# Patient Record
Sex: MALE | Race: WHITE | Employment: OTHER | URBAN - NONMETROPOLITAN AREA
[De-identification: names, ages, dates, MRNs, and addresses within clinical notes are randomized per-mention and may not be internally consistent; named-entity substitution may affect disease eponyms.]

---

## 2017-01-02 DIAGNOSIS — I25.84 CORONARY ARTERY DISEASE DUE TO CALCIFIED CORONARY LESION: ICD-10-CM

## 2017-01-02 DIAGNOSIS — I25.10 CORONARY ARTERY DISEASE DUE TO CALCIFIED CORONARY LESION: ICD-10-CM

## 2017-01-02 DIAGNOSIS — I20.9 ANGINA, CLASS III (HCC): Primary | ICD-10-CM

## 2017-03-13 ENCOUNTER — HOSPITAL ENCOUNTER (OUTPATIENT)
Dept: CARDIAC REHAB | Age: 82
Setting detail: THERAPIES SERIES
Discharge: HOME | End: 2017-03-13
Attending: FAMILY MEDICINE | Admitting: FAMILY MEDICINE
Payer: MEDICARE

## 2017-03-15 ENCOUNTER — HOSPITAL ENCOUNTER (OUTPATIENT)
Dept: CARDIAC REHAB | Age: 82
Setting detail: THERAPIES SERIES
Discharge: HOME | End: 2017-03-15
Attending: FAMILY MEDICINE | Admitting: FAMILY MEDICINE
Payer: MEDICARE

## 2017-03-27 ENCOUNTER — HOSPITAL ENCOUNTER (OUTPATIENT)
Dept: CARDIAC REHAB | Age: 82
Setting detail: THERAPIES SERIES
Discharge: HOME | End: 2017-03-27
Attending: FAMILY MEDICINE | Admitting: FAMILY MEDICINE
Payer: MEDICARE

## 2017-03-27 PROCEDURE — 93798 PHYS/QHP OP CAR RHAB W/ECG: CPT

## 2017-03-28 ENCOUNTER — HOSPITAL ENCOUNTER (OUTPATIENT)
Dept: CARDIAC REHAB | Age: 82
Setting detail: THERAPIES SERIES
Discharge: HOME | End: 2017-03-28
Attending: FAMILY MEDICINE | Admitting: FAMILY MEDICINE
Payer: MEDICARE

## 2017-03-28 PROCEDURE — 93798 PHYS/QHP OP CAR RHAB W/ECG: CPT

## 2017-03-30 ENCOUNTER — HOSPITAL ENCOUNTER (OUTPATIENT)
Dept: CARDIAC REHAB | Age: 82
Setting detail: THERAPIES SERIES
Discharge: HOME | End: 2017-03-30
Attending: FAMILY MEDICINE | Admitting: FAMILY MEDICINE
Payer: MEDICARE

## 2017-03-30 PROCEDURE — 93798 PHYS/QHP OP CAR RHAB W/ECG: CPT

## 2017-04-03 ENCOUNTER — HOSPITAL ENCOUNTER (OUTPATIENT)
Dept: CARDIAC REHAB | Age: 82
Setting detail: THERAPIES SERIES
Discharge: HOME | End: 2017-04-03
Attending: FAMILY MEDICINE | Admitting: FAMILY MEDICINE
Payer: MEDICARE

## 2017-04-03 PROCEDURE — 93798 PHYS/QHP OP CAR RHAB W/ECG: CPT

## 2017-04-06 ENCOUNTER — HOSPITAL ENCOUNTER (OUTPATIENT)
Dept: CARDIAC REHAB | Age: 82
Setting detail: THERAPIES SERIES
Discharge: HOME | End: 2017-04-06
Attending: FAMILY MEDICINE | Admitting: FAMILY MEDICINE
Payer: MEDICARE

## 2017-04-06 PROCEDURE — 93798 PHYS/QHP OP CAR RHAB W/ECG: CPT

## 2017-04-12 ENCOUNTER — HOSPITAL ENCOUNTER (OUTPATIENT)
Dept: CARDIAC REHAB | Age: 82
Setting detail: THERAPIES SERIES
Discharge: HOME | End: 2017-04-12
Attending: FAMILY MEDICINE | Admitting: FAMILY MEDICINE
Payer: MEDICARE

## 2017-04-17 ENCOUNTER — HOSPITAL ENCOUNTER (OUTPATIENT)
Dept: CARDIAC REHAB | Age: 82
Setting detail: THERAPIES SERIES
Discharge: HOME | End: 2017-04-17
Attending: FAMILY MEDICINE | Admitting: FAMILY MEDICINE
Payer: MEDICARE

## 2017-04-17 ENCOUNTER — HOSPITAL ENCOUNTER (OUTPATIENT)
Age: 82
Discharge: HOME | End: 2017-04-17
Payer: MEDICARE

## 2017-04-17 DIAGNOSIS — E78.49 OTHER HYPERLIPIDEMIA: ICD-10-CM

## 2017-04-17 LAB
ALT SERPL-CCNC: 37 U/L (ref 5–41)
ANION GAP SERPL CALCULATED.3IONS-SCNC: 14 MMOL/L (ref 9–17)
AST SERPL-CCNC: 46 U/L
BUN BLDV-MCNC: 31 MG/DL (ref 8–23)
BUN/CREAT BLD: 21 (ref 9–20)
CALCIUM SERPL-MCNC: 9.5 MG/DL (ref 8.6–10.4)
CHLORIDE BLD-SCNC: 102 MMOL/L (ref 98–107)
CO2: 26 MMOL/L (ref 20–31)
CREAT SERPL-MCNC: 1.46 MG/DL (ref 0.7–1.2)
GFR AFRICAN AMERICAN: 56 ML/MIN
GFR NON-AFRICAN AMERICAN: 46 ML/MIN
GFR SERPL CREATININE-BSD FRML MDRD: ABNORMAL ML/MIN/{1.73_M2}
GFR SERPL CREATININE-BSD FRML MDRD: ABNORMAL ML/MIN/{1.73_M2}
GLUCOSE BLD-MCNC: 104 MG/DL (ref 70–99)
HCT VFR BLD CALC: 40.7 % (ref 41–53)
HEMOGLOBIN: 13.3 G/DL (ref 13.5–17)
HIGH SENSITIVE C-REACTIVE PROTEIN: 10.6 MG/L
MCH RBC QN AUTO: 30.6 PG (ref 26–34)
MCHC RBC AUTO-ENTMCNC: 32.8 G/DL (ref 31–37)
MCV RBC AUTO: 93.3 FL (ref 80–100)
PDW BLD-RTO: 15.8 % (ref 12.1–15.2)
PLATELET # BLD: 203 K/UL (ref 140–450)
PMV BLD AUTO: ABNORMAL FL (ref 6–12)
POTASSIUM SERPL-SCNC: 4.3 MMOL/L (ref 3.7–5.3)
RBC # BLD: 4.37 M/UL (ref 4.5–5.9)
SODIUM BLD-SCNC: 142 MMOL/L (ref 135–144)
WBC # BLD: 6.6 K/UL (ref 3.5–11)

## 2017-04-17 PROCEDURE — 80048 BASIC METABOLIC PNL TOTAL CA: CPT

## 2017-04-17 PROCEDURE — 84450 TRANSFERASE (AST) (SGOT): CPT

## 2017-04-17 PROCEDURE — 85027 COMPLETE CBC AUTOMATED: CPT

## 2017-04-17 PROCEDURE — 93798 PHYS/QHP OP CAR RHAB W/ECG: CPT

## 2017-04-17 PROCEDURE — 36415 COLL VENOUS BLD VENIPUNCTURE: CPT

## 2017-04-17 PROCEDURE — 86141 C-REACTIVE PROTEIN HS: CPT

## 2017-04-17 PROCEDURE — 84460 ALANINE AMINO (ALT) (SGPT): CPT

## 2017-04-19 ENCOUNTER — HOSPITAL ENCOUNTER (OUTPATIENT)
Dept: CARDIAC REHAB | Age: 82
Setting detail: THERAPIES SERIES
Discharge: HOME | End: 2017-04-19
Attending: FAMILY MEDICINE | Admitting: FAMILY MEDICINE
Payer: MEDICARE

## 2017-04-19 PROCEDURE — 93798 PHYS/QHP OP CAR RHAB W/ECG: CPT

## 2017-04-20 ENCOUNTER — HOSPITAL ENCOUNTER (OUTPATIENT)
Dept: CARDIAC REHAB | Age: 82
Setting detail: THERAPIES SERIES
Discharge: HOME | End: 2017-04-20
Attending: FAMILY MEDICINE | Admitting: FAMILY MEDICINE
Payer: MEDICARE

## 2017-04-20 PROCEDURE — 93798 PHYS/QHP OP CAR RHAB W/ECG: CPT

## 2017-04-24 ENCOUNTER — HOSPITAL ENCOUNTER (OUTPATIENT)
Dept: CARDIAC REHAB | Age: 82
Setting detail: THERAPIES SERIES
Discharge: HOME | End: 2017-04-24
Attending: FAMILY MEDICINE | Admitting: FAMILY MEDICINE
Payer: MEDICARE

## 2017-04-24 PROBLEM — Z23 NEED FOR PROPHYLACTIC VACCINATION AGAINST STREPTOCOCCUS PNEUMONIAE (PNEUMOCOCCUS): Status: ACTIVE | Noted: 2017-04-24

## 2017-04-24 PROBLEM — N18.30 CHRONIC RENAL FAILURE, STAGE 3 (MODERATE) (HCC): Status: ACTIVE | Noted: 2017-04-24

## 2017-04-26 ENCOUNTER — HOSPITAL ENCOUNTER (OUTPATIENT)
Dept: CARDIAC REHAB | Age: 82
Setting detail: THERAPIES SERIES
Discharge: HOME | End: 2017-04-26
Attending: FAMILY MEDICINE | Admitting: FAMILY MEDICINE
Payer: MEDICARE

## 2017-04-26 PROCEDURE — 93798 PHYS/QHP OP CAR RHAB W/ECG: CPT

## 2017-04-27 ENCOUNTER — HOSPITAL ENCOUNTER (OUTPATIENT)
Dept: CARDIAC REHAB | Age: 82
Setting detail: THERAPIES SERIES
Discharge: HOME | End: 2017-04-27
Attending: FAMILY MEDICINE | Admitting: FAMILY MEDICINE
Payer: MEDICARE

## 2017-04-27 PROCEDURE — 93798 PHYS/QHP OP CAR RHAB W/ECG: CPT

## 2017-05-01 ENCOUNTER — HOSPITAL ENCOUNTER (OUTPATIENT)
Dept: CARDIAC REHAB | Age: 82
Setting detail: THERAPIES SERIES
Discharge: HOME | End: 2017-05-01
Attending: FAMILY MEDICINE | Admitting: FAMILY MEDICINE
Payer: MEDICARE

## 2017-05-01 PROCEDURE — 93798 PHYS/QHP OP CAR RHAB W/ECG: CPT

## 2017-05-03 ENCOUNTER — HOSPITAL ENCOUNTER (OUTPATIENT)
Dept: CARDIAC REHAB | Age: 82
Setting detail: THERAPIES SERIES
Discharge: HOME | End: 2017-05-03
Attending: FAMILY MEDICINE | Admitting: FAMILY MEDICINE
Payer: MEDICARE

## 2017-05-08 ENCOUNTER — HOSPITAL ENCOUNTER (OUTPATIENT)
Dept: CARDIAC REHAB | Age: 82
Setting detail: THERAPIES SERIES
Discharge: HOME | End: 2017-05-08
Attending: FAMILY MEDICINE | Admitting: FAMILY MEDICINE
Payer: MEDICARE

## 2017-05-08 PROCEDURE — 93798 PHYS/QHP OP CAR RHAB W/ECG: CPT

## 2017-05-10 ENCOUNTER — HOSPITAL ENCOUNTER (OUTPATIENT)
Dept: CARDIAC REHAB | Age: 82
Setting detail: THERAPIES SERIES
Discharge: HOME | End: 2017-05-10
Attending: FAMILY MEDICINE | Admitting: FAMILY MEDICINE
Payer: MEDICARE

## 2017-05-10 PROCEDURE — 93798 PHYS/QHP OP CAR RHAB W/ECG: CPT

## 2017-05-11 ENCOUNTER — HOSPITAL ENCOUNTER (OUTPATIENT)
Dept: CARDIAC REHAB | Age: 82
Setting detail: THERAPIES SERIES
Discharge: HOME | End: 2017-05-11
Attending: FAMILY MEDICINE | Admitting: FAMILY MEDICINE
Payer: MEDICARE

## 2017-05-11 PROCEDURE — 93798 PHYS/QHP OP CAR RHAB W/ECG: CPT

## 2017-05-15 ENCOUNTER — HOSPITAL ENCOUNTER (OUTPATIENT)
Dept: CARDIAC REHAB | Age: 82
Setting detail: THERAPIES SERIES
Discharge: HOME | End: 2017-05-15
Attending: FAMILY MEDICINE | Admitting: FAMILY MEDICINE
Payer: MEDICARE

## 2017-05-15 PROCEDURE — 93798 PHYS/QHP OP CAR RHAB W/ECG: CPT

## 2017-05-17 ENCOUNTER — HOSPITAL ENCOUNTER (OUTPATIENT)
Age: 82
Setting detail: OBSERVATION
LOS: 1 days | Discharge: ANOTHER HOSPITAL | End: 2017-05-19
Attending: EMERGENCY MEDICINE | Admitting: FAMILY MEDICINE
Payer: MEDICARE

## 2017-05-17 ENCOUNTER — HOSPITAL ENCOUNTER (OUTPATIENT)
Dept: CARDIAC REHAB | Age: 82
Setting detail: THERAPIES SERIES
Discharge: HOME | End: 2017-05-17
Attending: FAMILY MEDICINE | Admitting: FAMILY MEDICINE
Payer: MEDICARE

## 2017-05-17 ENCOUNTER — APPOINTMENT (OUTPATIENT)
Dept: GENERAL RADIOLOGY | Age: 82
End: 2017-05-17
Payer: MEDICARE

## 2017-05-17 DIAGNOSIS — I35.0 AORTIC VALVE STENOSIS, UNSPECIFIED ETIOLOGY: Primary | ICD-10-CM

## 2017-05-17 PROBLEM — R55 SYNCOPE AND COLLAPSE: Status: ACTIVE | Noted: 2017-05-17

## 2017-05-17 LAB
-: ABNORMAL
ABSOLUTE EOS #: 0.3 K/UL (ref 0–0.4)
ABSOLUTE LYMPH #: 0.8 K/UL (ref 1–4.8)
ABSOLUTE MONO #: 0.5 K/UL (ref 0–1)
AMORPHOUS: ABNORMAL
ANION GAP SERPL CALCULATED.3IONS-SCNC: 17 MMOL/L (ref 9–17)
BACTERIA: ABNORMAL
BASOPHILS # BLD: 1 %
BASOPHILS ABSOLUTE: 0 K/UL (ref 0–0.2)
BILIRUBIN URINE: NEGATIVE
BNP INTERPRETATION: ABNORMAL
BUN BLDV-MCNC: 33 MG/DL (ref 8–23)
BUN/CREAT BLD: 23 (ref 9–20)
CALCIUM SERPL-MCNC: 9.2 MG/DL (ref 8.6–10.4)
CASTS UA: ABNORMAL /LPF
CHLORIDE BLD-SCNC: 106 MMOL/L (ref 98–107)
CK MB: 7.2 NG/ML
CO2: 21 MMOL/L (ref 20–31)
COLOR: YELLOW
COMMENT UA: ABNORMAL
CREAT SERPL-MCNC: 1.45 MG/DL (ref 0.7–1.2)
CRYSTALS, UA: ABNORMAL /HPF
DIFFERENTIAL TYPE: ABNORMAL
EKG ATRIAL RATE: 62 BPM
EKG ATRIAL RATE: 64 BPM
EKG P AXIS: 53 DEGREES
EKG P AXIS: 89 DEGREES
EKG P-R INTERVAL: 176 MS
EKG P-R INTERVAL: 192 MS
EKG Q-T INTERVAL: 448 MS
EKG Q-T INTERVAL: 470 MS
EKG QRS DURATION: 90 MS
EKG QRS DURATION: 92 MS
EKG QTC CALCULATION (BAZETT): 462 MS
EKG QTC CALCULATION (BAZETT): 477 MS
EKG R AXIS: 72 DEGREES
EKG R AXIS: 92 DEGREES
EKG T AXIS: 84 DEGREES
EKG T AXIS: 97 DEGREES
EKG VENTRICULAR RATE: 62 BPM
EKG VENTRICULAR RATE: 64 BPM
EOSINOPHILS RELATIVE PERCENT: 4 %
EPITHELIAL CELLS UA: ABNORMAL /HPF (ref 0–5)
GFR AFRICAN AMERICAN: 57 ML/MIN
GFR NON-AFRICAN AMERICAN: 47 ML/MIN
GFR SERPL CREATININE-BSD FRML MDRD: ABNORMAL ML/MIN/{1.73_M2}
GFR SERPL CREATININE-BSD FRML MDRD: ABNORMAL ML/MIN/{1.73_M2}
GLUCOSE BLD-MCNC: 137 MG/DL (ref 70–99)
GLUCOSE URINE: NEGATIVE
HCT VFR BLD CALC: 41.4 % (ref 41–53)
HEMOGLOBIN: 13.3 G/DL (ref 13.5–17)
INR BLD: 1.1 (ref 0.9–1.2)
KETONES, URINE: NEGATIVE
LEUKOCYTE ESTERASE, URINE: NEGATIVE
LYMPHOCYTES # BLD: 12 %
MCH RBC QN AUTO: 30.2 PG (ref 26–34)
MCHC RBC AUTO-ENTMCNC: 32.1 G/DL (ref 31–37)
MCV RBC AUTO: 93.9 FL (ref 80–100)
MONOCYTES # BLD: 7 %
MUCUS: ABNORMAL
MYOGLOBIN: 141 NG/ML (ref 28–72)
NITRITE, URINE: NEGATIVE
OTHER OBSERVATIONS UA: ABNORMAL
PARTIAL THROMBOPLASTIN TIME: 26.5 SEC (ref 23.2–34.4)
PDW BLD-RTO: 17 % (ref 12.1–15.2)
PH UA: 6 (ref 5–9)
PLATELET # BLD: 156 K/UL (ref 140–450)
PLATELET ESTIMATE: ABNORMAL
PMV BLD AUTO: ABNORMAL FL (ref 6–12)
POTASSIUM SERPL-SCNC: 4.4 MMOL/L (ref 3.7–5.3)
PRO-BNP: 4214 PG/ML
PROTEIN UA: ABNORMAL
PROTHROMBIN TIME: 11.4 SEC (ref 9.7–12.2)
RBC # BLD: 4.4 M/UL (ref 4.5–5.9)
RBC # BLD: ABNORMAL 10*6/UL
RBC UA: ABNORMAL /HPF (ref 0–2)
RENAL EPITHELIAL, UA: ABNORMAL /HPF
SEG NEUTROPHILS: 76 %
SEGMENTED NEUTROPHILS ABSOLUTE COUNT: 5.2 K/UL (ref 1.8–7.7)
SODIUM BLD-SCNC: 144 MMOL/L (ref 135–144)
SPECIFIC GRAVITY UA: 1.01 (ref 1.01–1.02)
TRICHOMONAS: ABNORMAL
TROPONIN INTERP: ABNORMAL
TROPONIN INTERP: ABNORMAL
TROPONIN T: 0.04 NG/ML
TROPONIN T: 0.05 NG/ML
TSH SERPL DL<=0.05 MIU/L-ACNC: 5.85 MIU/L (ref 0.3–5)
TURBIDITY: CLEAR
URINE HGB: NEGATIVE
UROBILINOGEN, URINE: NORMAL
WBC # BLD: 6.8 K/UL (ref 3.5–11)
WBC # BLD: ABNORMAL 10*3/UL
WBC UA: ABNORMAL /HPF (ref 0–5)
YEAST: ABNORMAL

## 2017-05-17 PROCEDURE — 84484 ASSAY OF TROPONIN QUANT: CPT

## 2017-05-17 PROCEDURE — 80048 BASIC METABOLIC PNL TOTAL CA: CPT

## 2017-05-17 PROCEDURE — 36415 COLL VENOUS BLD VENIPUNCTURE: CPT

## 2017-05-17 PROCEDURE — 82553 CREATINE MB FRACTION: CPT

## 2017-05-17 PROCEDURE — 6360000002 HC RX W HCPCS: Performed by: FAMILY MEDICINE

## 2017-05-17 PROCEDURE — 85610 PROTHROMBIN TIME: CPT

## 2017-05-17 PROCEDURE — 84443 ASSAY THYROID STIM HORMONE: CPT

## 2017-05-17 PROCEDURE — 93005 ELECTROCARDIOGRAM TRACING: CPT

## 2017-05-17 PROCEDURE — 6370000000 HC RX 637 (ALT 250 FOR IP): Performed by: FAMILY MEDICINE

## 2017-05-17 PROCEDURE — 2580000003 HC RX 258: Performed by: FAMILY MEDICINE

## 2017-05-17 PROCEDURE — 99285 EMERGENCY DEPT VISIT HI MDM: CPT

## 2017-05-17 PROCEDURE — G0378 HOSPITAL OBSERVATION PER HR: HCPCS

## 2017-05-17 PROCEDURE — 94664 DEMO&/EVAL PT USE INHALER: CPT

## 2017-05-17 PROCEDURE — 99215 OFFICE O/P EST HI 40 MIN: CPT | Performed by: FAMILY MEDICINE

## 2017-05-17 PROCEDURE — 85025 COMPLETE CBC W/AUTO DIFF WBC: CPT

## 2017-05-17 PROCEDURE — 81001 URINALYSIS AUTO W/SCOPE: CPT

## 2017-05-17 PROCEDURE — 96372 THER/PROPH/DIAG INJ SC/IM: CPT

## 2017-05-17 PROCEDURE — 71020 XR CHEST STANDARD TWO VW: CPT

## 2017-05-17 PROCEDURE — 85730 THROMBOPLASTIN TIME PARTIAL: CPT

## 2017-05-17 PROCEDURE — 83880 ASSAY OF NATRIURETIC PEPTIDE: CPT

## 2017-05-17 PROCEDURE — 94760 N-INVAS EAR/PLS OXIMETRY 1: CPT

## 2017-05-17 PROCEDURE — 83874 ASSAY OF MYOGLOBIN: CPT

## 2017-05-17 RX ORDER — EZETIMIBE 10 MG/1
10 TABLET ORAL DAILY
Status: DISCONTINUED | OUTPATIENT
Start: 2017-05-17 | End: 2017-05-17 | Stop reason: CLARIF

## 2017-05-17 RX ORDER — ATORVASTATIN CALCIUM 40 MG/1
40 TABLET, FILM COATED ORAL NIGHTLY
Status: DISCONTINUED | OUTPATIENT
Start: 2017-05-18 | End: 2017-05-18

## 2017-05-17 RX ORDER — SODIUM CHLORIDE 9 MG/ML
INJECTION, SOLUTION INTRAVENOUS CONTINUOUS
Status: DISCONTINUED | OUTPATIENT
Start: 2017-05-17 | End: 2017-05-17

## 2017-05-17 RX ORDER — ASPIRIN 81 MG/1
81 TABLET, CHEWABLE ORAL DAILY
Status: DISCONTINUED | OUTPATIENT
Start: 2017-05-18 | End: 2017-05-19 | Stop reason: HOSPADM

## 2017-05-17 RX ORDER — SODIUM CHLORIDE 0.9 % (FLUSH) 0.9 %
10 SYRINGE (ML) INJECTION PRN
Status: DISCONTINUED | OUTPATIENT
Start: 2017-05-17 | End: 2017-05-18

## 2017-05-17 RX ORDER — OXCARBAZEPINE 300 MG/1
300 TABLET, FILM COATED ORAL 4 TIMES DAILY
Status: DISCONTINUED | OUTPATIENT
Start: 2017-05-17 | End: 2017-05-19 | Stop reason: HOSPADM

## 2017-05-17 RX ORDER — FUROSEMIDE 20 MG/1
20 TABLET ORAL DAILY
Status: DISCONTINUED | OUTPATIENT
Start: 2017-05-18 | End: 2017-05-19 | Stop reason: HOSPADM

## 2017-05-17 RX ORDER — ACETAMINOPHEN 325 MG/1
650 TABLET ORAL EVERY 4 HOURS PRN
Status: DISCONTINUED | OUTPATIENT
Start: 2017-05-17 | End: 2017-05-19 | Stop reason: HOSPADM

## 2017-05-17 RX ORDER — SODIUM CHLORIDE 0.9 % (FLUSH) 0.9 %
10 SYRINGE (ML) INJECTION EVERY 12 HOURS SCHEDULED
Status: DISCONTINUED | OUTPATIENT
Start: 2017-05-17 | End: 2017-05-18

## 2017-05-17 RX ORDER — METOPROLOL TARTRATE 50 MG/1
50 TABLET, FILM COATED ORAL 2 TIMES DAILY
Status: DISCONTINUED | OUTPATIENT
Start: 2017-05-17 | End: 2017-05-18

## 2017-05-17 RX ORDER — ONDANSETRON 2 MG/ML
4 INJECTION INTRAMUSCULAR; INTRAVENOUS EVERY 6 HOURS PRN
Status: DISCONTINUED | OUTPATIENT
Start: 2017-05-17 | End: 2017-05-19 | Stop reason: HOSPADM

## 2017-05-17 RX ORDER — ALBUTEROL SULFATE 90 UG/1
2 AEROSOL, METERED RESPIRATORY (INHALATION) EVERY 6 HOURS PRN
Status: DISCONTINUED | OUTPATIENT
Start: 2017-05-17 | End: 2017-05-19 | Stop reason: HOSPADM

## 2017-05-17 RX ORDER — BACLOFEN 10 MG/1
10 TABLET ORAL 2 TIMES DAILY
Status: DISCONTINUED | OUTPATIENT
Start: 2017-05-17 | End: 2017-05-19 | Stop reason: HOSPADM

## 2017-05-17 RX ORDER — LISINOPRIL 20 MG/1
40 TABLET ORAL DAILY
Status: DISCONTINUED | OUTPATIENT
Start: 2017-05-18 | End: 2017-05-18

## 2017-05-17 RX ORDER — ISOSORBIDE MONONITRATE 30 MG/1
30 TABLET, EXTENDED RELEASE ORAL DAILY
Status: DISCONTINUED | OUTPATIENT
Start: 2017-05-18 | End: 2017-05-19 | Stop reason: HOSPADM

## 2017-05-17 RX ADMIN — OXCARBAZEPINE 300 MG: 300 TABLET, FILM COATED ORAL at 15:53

## 2017-05-17 RX ADMIN — Medication 10 ML: at 21:59

## 2017-05-17 RX ADMIN — METOPROLOL TARTRATE 50 MG: 50 TABLET ORAL at 21:59

## 2017-05-17 RX ADMIN — SODIUM CHLORIDE: 9 INJECTION, SOLUTION INTRAVENOUS at 15:53

## 2017-05-17 RX ADMIN — OXCARBAZEPINE 300 MG: 300 TABLET, FILM COATED ORAL at 22:00

## 2017-05-17 RX ADMIN — BACLOFEN 10 MG: 10 TABLET ORAL at 22:00

## 2017-05-17 RX ADMIN — ENOXAPARIN SODIUM 40 MG: 40 INJECTION SUBCUTANEOUS at 15:53

## 2017-05-17 ASSESSMENT — PAIN SCALES - GENERAL: PAINLEVEL_OUTOF10: 0

## 2017-05-18 ENCOUNTER — HOSPITAL ENCOUNTER (OUTPATIENT)
Dept: CARDIAC CATH/INVASIVE PROCEDURES | Age: 82
Setting detail: OBSERVATION
Discharge: HOME | End: 2017-05-18
Payer: MEDICARE

## 2017-05-18 ENCOUNTER — TELEPHONE (OUTPATIENT)
Dept: CARDIOTHORACIC SURGERY | Age: 82
End: 2017-05-18

## 2017-05-18 LAB
ABSOLUTE EOS #: 0.2 K/UL (ref 0–0.4)
ABSOLUTE LYMPH #: 1 K/UL (ref 1–4.8)
ABSOLUTE MONO #: 0.6 K/UL (ref 0–1)
ALBUMIN SERPL-MCNC: 3.4 G/DL (ref 3.5–5.2)
ALBUMIN/GLOBULIN RATIO: 1.2 (ref 1–2.5)
ALP BLD-CCNC: 73 U/L (ref 40–129)
ALT SERPL-CCNC: 38 U/L (ref 5–41)
ANION GAP SERPL CALCULATED.3IONS-SCNC: 13 MMOL/L (ref 9–17)
AST SERPL-CCNC: 50 U/L
BASOPHILS # BLD: 1 %
BASOPHILS ABSOLUTE: 0 K/UL (ref 0–0.2)
BILIRUB SERPL-MCNC: 0.61 MG/DL (ref 0.3–1.2)
BUN BLDV-MCNC: 27 MG/DL (ref 8–23)
BUN/CREAT BLD: 24 (ref 9–20)
CALCIUM SERPL-MCNC: 8.9 MG/DL (ref 8.6–10.4)
CHLORIDE BLD-SCNC: 104 MMOL/L (ref 98–107)
CO2: 23 MMOL/L (ref 20–31)
CREAT SERPL-MCNC: 1.13 MG/DL (ref 0.7–1.2)
DIFFERENTIAL TYPE: ABNORMAL
EOSINOPHILS RELATIVE PERCENT: 5 %
GFR AFRICAN AMERICAN: >60 ML/MIN
GFR NON-AFRICAN AMERICAN: >60 ML/MIN
GFR SERPL CREATININE-BSD FRML MDRD: ABNORMAL ML/MIN/{1.73_M2}
GFR SERPL CREATININE-BSD FRML MDRD: ABNORMAL ML/MIN/{1.73_M2}
GLUCOSE BLD-MCNC: 86 MG/DL (ref 70–99)
HCT VFR BLD CALC: 40.1 % (ref 41–53)
HEMOGLOBIN: 12.8 G/DL (ref 13.5–17)
LYMPHOCYTES # BLD: 19 %
MCH RBC QN AUTO: 30.3 PG (ref 26–34)
MCHC RBC AUTO-ENTMCNC: 32 G/DL (ref 31–37)
MCV RBC AUTO: 94.7 FL (ref 80–100)
MONOCYTES # BLD: 12 %
PDW BLD-RTO: 16.7 % (ref 12.1–15.2)
PLATELET # BLD: 146 K/UL (ref 140–450)
PLATELET ESTIMATE: ABNORMAL
PMV BLD AUTO: ABNORMAL FL (ref 6–12)
POTASSIUM SERPL-SCNC: 4.4 MMOL/L (ref 3.7–5.3)
RBC # BLD: 4.23 M/UL (ref 4.5–5.9)
RBC # BLD: ABNORMAL 10*6/UL
SEG NEUTROPHILS: 63 %
SEGMENTED NEUTROPHILS ABSOLUTE COUNT: 3.3 K/UL (ref 1.8–7.7)
SODIUM BLD-SCNC: 140 MMOL/L (ref 135–144)
TOTAL PROTEIN: 6.2 G/DL (ref 6.4–8.3)
WBC # BLD: 5.1 K/UL (ref 3.5–11)
WBC # BLD: ABNORMAL 10*3/UL

## 2017-05-18 PROCEDURE — 6370000000 HC RX 637 (ALT 250 FOR IP): Performed by: FAMILY MEDICINE

## 2017-05-18 PROCEDURE — 2580000003 HC RX 258: Performed by: FAMILY MEDICINE

## 2017-05-18 PROCEDURE — 93458 L HRT ARTERY/VENTRICLE ANGIO: CPT | Performed by: FAMILY MEDICINE

## 2017-05-18 PROCEDURE — C1887 CATHETER, GUIDING: HCPCS

## 2017-05-18 PROCEDURE — 93454 CORONARY ARTERY ANGIO S&I: CPT | Performed by: FAMILY MEDICINE

## 2017-05-18 PROCEDURE — 6360000002 HC RX W HCPCS

## 2017-05-18 PROCEDURE — G0378 HOSPITAL OBSERVATION PER HR: HCPCS

## 2017-05-18 PROCEDURE — 85025 COMPLETE CBC W/AUTO DIFF WBC: CPT

## 2017-05-18 PROCEDURE — 2500000003 HC RX 250 WO HCPCS

## 2017-05-18 PROCEDURE — C1769 GUIDE WIRE: HCPCS

## 2017-05-18 PROCEDURE — 80053 COMPREHEN METABOLIC PANEL: CPT

## 2017-05-18 PROCEDURE — 99152 MOD SED SAME PHYS/QHP 5/>YRS: CPT | Performed by: FAMILY MEDICINE

## 2017-05-18 PROCEDURE — C1894 INTRO/SHEATH, NON-LASER: HCPCS

## 2017-05-18 PROCEDURE — 36415 COLL VENOUS BLD VENIPUNCTURE: CPT

## 2017-05-18 RX ORDER — LISINOPRIL 40 MG/1
40 TABLET ORAL DAILY
Status: DISCONTINUED | OUTPATIENT
Start: 2017-05-18 | End: 2017-05-19 | Stop reason: HOSPADM

## 2017-05-18 RX ORDER — ACETAMINOPHEN 325 MG/1
650 TABLET ORAL EVERY 4 HOURS PRN
Status: CANCELLED | OUTPATIENT
Start: 2017-05-18

## 2017-05-18 RX ORDER — PREDNISONE 20 MG/1
60 TABLET ORAL DAILY
Status: CANCELLED | OUTPATIENT
Start: 2017-05-18

## 2017-05-18 RX ORDER — SODIUM CHLORIDE 0.9 % (FLUSH) 0.9 %
10 SYRINGE (ML) INJECTION PRN
Status: CANCELLED | OUTPATIENT
Start: 2017-05-18

## 2017-05-18 RX ORDER — NITROGLYCERIN 0.4 MG/1
0.4 TABLET SUBLINGUAL EVERY 5 MIN PRN
Status: CANCELLED | OUTPATIENT
Start: 2017-05-18

## 2017-05-18 RX ORDER — BISOPROLOL FUMARATE 10 MG/1
10 TABLET ORAL DAILY
Status: DISCONTINUED | OUTPATIENT
Start: 2017-05-18 | End: 2017-05-19 | Stop reason: HOSPADM

## 2017-05-18 RX ORDER — SODIUM CHLORIDE 9 MG/ML
INJECTION, SOLUTION INTRAVENOUS CONTINUOUS
Status: DISCONTINUED | OUTPATIENT
Start: 2017-05-18 | End: 2017-05-18

## 2017-05-18 RX ORDER — NITROGLYCERIN 0.4 MG/1
0.4 TABLET SUBLINGUAL EVERY 5 MIN PRN
Status: DISCONTINUED | OUTPATIENT
Start: 2017-05-18 | End: 2017-05-19 | Stop reason: HOSPADM

## 2017-05-18 RX ORDER — PREDNISONE 20 MG/1
60 TABLET ORAL DAILY
Status: DISCONTINUED | OUTPATIENT
Start: 2017-05-18 | End: 2017-05-18

## 2017-05-18 RX ORDER — DIPHENHYDRAMINE HCL 25 MG
50 TABLET ORAL ONCE
Status: CANCELLED | OUTPATIENT
Start: 2017-05-18 | End: 2017-05-18

## 2017-05-18 RX ORDER — ROSUVASTATIN CALCIUM 40 MG/1
40 TABLET, COATED ORAL NIGHTLY
Status: DISCONTINUED | OUTPATIENT
Start: 2017-05-18 | End: 2017-05-19 | Stop reason: HOSPADM

## 2017-05-18 RX ORDER — SODIUM CHLORIDE 9 MG/ML
INJECTION, SOLUTION INTRAVENOUS CONTINUOUS
Status: CANCELLED | OUTPATIENT
Start: 2017-05-18

## 2017-05-18 RX ORDER — FAMOTIDINE 20 MG/1
40 TABLET, FILM COATED ORAL ONCE
Status: COMPLETED | OUTPATIENT
Start: 2017-05-18 | End: 2017-05-18

## 2017-05-18 RX ORDER — SODIUM CHLORIDE 0.9 % (FLUSH) 0.9 %
10 SYRINGE (ML) INJECTION EVERY 12 HOURS SCHEDULED
Status: DISCONTINUED | OUTPATIENT
Start: 2017-05-18 | End: 2017-05-19 | Stop reason: HOSPADM

## 2017-05-18 RX ORDER — EZETIMIBE 10 MG/1
10 TABLET ORAL DAILY
Status: DISCONTINUED | OUTPATIENT
Start: 2017-05-18 | End: 2017-05-19 | Stop reason: HOSPADM

## 2017-05-18 RX ORDER — DIPHENHYDRAMINE HCL 25 MG
50 TABLET ORAL ONCE
Status: COMPLETED | OUTPATIENT
Start: 2017-05-18 | End: 2017-05-18

## 2017-05-18 RX ORDER — SODIUM CHLORIDE 0.9 % (FLUSH) 0.9 %
10 SYRINGE (ML) INJECTION EVERY 12 HOURS SCHEDULED
Status: CANCELLED | OUTPATIENT
Start: 2017-05-18

## 2017-05-18 RX ORDER — ACETAMINOPHEN 325 MG/1
650 TABLET ORAL EVERY 4 HOURS PRN
Status: DISCONTINUED | OUTPATIENT
Start: 2017-05-18 | End: 2017-05-19 | Stop reason: HOSPADM

## 2017-05-18 RX ORDER — SODIUM CHLORIDE 0.9 % (FLUSH) 0.9 %
10 SYRINGE (ML) INJECTION PRN
Status: DISCONTINUED | OUTPATIENT
Start: 2017-05-18 | End: 2017-05-19 | Stop reason: HOSPADM

## 2017-05-18 RX ADMIN — ASPIRIN 81 MG 81 MG: 81 TABLET ORAL at 10:09

## 2017-05-18 RX ADMIN — FUROSEMIDE 20 MG: 20 TABLET ORAL at 10:10

## 2017-05-18 RX ADMIN — ROSUVASTATIN CALCIUM 40 MG: 40 TABLET, COATED ORAL at 21:41

## 2017-05-18 RX ADMIN — BISOPROLOL FUMARATE 10 MG: 10 TABLET ORAL at 10:11

## 2017-05-18 RX ADMIN — DIPHENHYDRAMINE HYDROCHLORIDE 50 MG: 25 CAPSULE ORAL at 14:07

## 2017-05-18 RX ADMIN — Medication 10 ML: at 20:38

## 2017-05-18 RX ADMIN — Medication 120 MG: at 21:41

## 2017-05-18 RX ADMIN — OXCARBAZEPINE 300 MG: 300 TABLET, FILM COATED ORAL at 16:47

## 2017-05-18 RX ADMIN — OXCARBAZEPINE 300 MG: 300 TABLET, FILM COATED ORAL at 20:36

## 2017-05-18 RX ADMIN — SODIUM CHLORIDE: 9 INJECTION, SOLUTION INTRAVENOUS at 12:26

## 2017-05-18 RX ADMIN — LISINOPRIL 40 MG: 40 TABLET ORAL at 10:10

## 2017-05-18 RX ADMIN — FAMOTIDINE 40 MG: 20 TABLET, FILM COATED ORAL at 14:07

## 2017-05-18 RX ADMIN — Medication 10 ML: at 08:48

## 2017-05-18 RX ADMIN — PREDNISONE 60 MG: 20 TABLET ORAL at 10:53

## 2017-05-18 RX ADMIN — BACLOFEN 10 MG: 10 TABLET ORAL at 08:46

## 2017-05-18 RX ADMIN — BACLOFEN 10 MG: 10 TABLET ORAL at 20:36

## 2017-05-18 RX ADMIN — EZETIMIBE 10 MG: 10 TABLET ORAL at 10:10

## 2017-05-18 RX ADMIN — OXCARBAZEPINE 300 MG: 300 TABLET, FILM COATED ORAL at 12:25

## 2017-05-18 RX ADMIN — OXCARBAZEPINE 300 MG: 300 TABLET, FILM COATED ORAL at 08:47

## 2017-05-18 RX ADMIN — ISOSORBIDE MONONITRATE 30 MG: 30 TABLET, EXTENDED RELEASE ORAL at 10:09

## 2017-05-18 ASSESSMENT — PAIN SCALES - GENERAL
PAINLEVEL_OUTOF10: 0

## 2017-05-19 ENCOUNTER — ANESTHESIA EVENT (OUTPATIENT)
Dept: OPERATING ROOM | Age: 82
DRG: 220 | End: 2017-05-19
Payer: MEDICARE

## 2017-05-19 ENCOUNTER — APPOINTMENT (OUTPATIENT)
Dept: GENERAL RADIOLOGY | Age: 82
DRG: 220 | End: 2017-05-19
Attending: THORACIC SURGERY (CARDIOTHORACIC VASCULAR SURGERY)
Payer: MEDICARE

## 2017-05-19 ENCOUNTER — HOSPITAL ENCOUNTER (INPATIENT)
Age: 82
LOS: 4 days | Discharge: HOME HEALTH | DRG: 220 | End: 2017-05-23
Attending: THORACIC SURGERY (CARDIOTHORACIC VASCULAR SURGERY) | Admitting: THORACIC SURGERY (CARDIOTHORACIC VASCULAR SURGERY)
Payer: MEDICARE

## 2017-05-19 ENCOUNTER — ANESTHESIA (OUTPATIENT)
Dept: OPERATING ROOM | Age: 82
DRG: 220 | End: 2017-05-19
Payer: MEDICARE

## 2017-05-19 VITALS
HEART RATE: 64 BPM | WEIGHT: 217.6 LBS | DIASTOLIC BLOOD PRESSURE: 81 MMHG | TEMPERATURE: 97.6 F | SYSTOLIC BLOOD PRESSURE: 148 MMHG | BODY MASS INDEX: 34.15 KG/M2 | OXYGEN SATURATION: 96 % | RESPIRATION RATE: 16 BRPM | HEIGHT: 67 IN

## 2017-05-19 VITALS — TEMPERATURE: 98.5 F | DIASTOLIC BLOOD PRESSURE: 63 MMHG | SYSTOLIC BLOOD PRESSURE: 99 MMHG | OXYGEN SATURATION: 100 %

## 2017-05-19 LAB
ABSOLUTE EOS #: 0.1 K/UL (ref 0–0.4)
ABSOLUTE LYMPH #: 1 K/UL (ref 1–4.8)
ABSOLUTE MONO #: 0.8 K/UL (ref 0.1–1.2)
ALBUMIN SERPL-MCNC: 3.9 G/DL (ref 3.5–5.2)
ALBUMIN/GLOBULIN RATIO: 1.1 (ref 1–2.5)
ALP BLD-CCNC: 82 U/L (ref 40–129)
ALT SERPL-CCNC: 42 U/L (ref 5–41)
AMPHETAMINE SCREEN URINE: NEGATIVE
ANION GAP SERPL CALCULATED.3IONS-SCNC: 12 MMOL/L (ref 9–17)
ANION GAP SERPL CALCULATED.3IONS-SCNC: 14 MMOL/L (ref 9–17)
AST SERPL-CCNC: 50 U/L
BARBITURATE SCREEN URINE: NEGATIVE
BASOPHILS # BLD: 1 %
BASOPHILS ABSOLUTE: 0.1 K/UL (ref 0–0.2)
BENZODIAZEPINE SCREEN, URINE: NEGATIVE
BILIRUB SERPL-MCNC: 0.62 MG/DL (ref 0.3–1.2)
BLOOD BANK SPECIMEN: NORMAL
BUN BLDV-MCNC: 25 MG/DL (ref 8–23)
BUN BLDV-MCNC: 29 MG/DL (ref 8–23)
BUN/CREAT BLD: ABNORMAL (ref 9–20)
BUN/CREAT BLD: ABNORMAL (ref 9–20)
BUPRENORPHINE URINE: NORMAL
CALCIUM SERPL-MCNC: 8.6 MG/DL (ref 8.6–10.4)
CALCIUM SERPL-MCNC: 9 MG/DL (ref 8.6–10.4)
CANNABINOID SCREEN URINE: NEGATIVE
CHLORIDE BLD-SCNC: 104 MMOL/L (ref 98–107)
CHLORIDE BLD-SCNC: 110 MMOL/L (ref 98–107)
CO2: 20 MMOL/L (ref 20–31)
CO2: 25 MMOL/L (ref 20–31)
COCAINE METABOLITE, URINE: NEGATIVE
CREAT SERPL-MCNC: 1.04 MG/DL (ref 0.7–1.2)
CREAT SERPL-MCNC: 1.14 MG/DL (ref 0.7–1.2)
DIFFERENTIAL TYPE: ABNORMAL
EOSINOPHILS RELATIVE PERCENT: 2 %
FIBRINOGEN: 208 MG/DL (ref 140–420)
FIO2: 60
FIO2: ABNORMAL
GFR AFRICAN AMERICAN: >60 ML/MIN
GFR AFRICAN AMERICAN: >60 ML/MIN
GFR NON-AFRICAN AMERICAN: >60 ML/MIN
GFR NON-AFRICAN AMERICAN: >60 ML/MIN
GFR SERPL CREATININE-BSD FRML MDRD: ABNORMAL ML/MIN/{1.73_M2}
GLUCOSE BLD-MCNC: 120 MG/DL (ref 74–106)
GLUCOSE BLD-MCNC: 125 MG/DL (ref 74–106)
GLUCOSE BLD-MCNC: 127 MG/DL (ref 74–106)
GLUCOSE BLD-MCNC: 134 MG/DL (ref 74–106)
GLUCOSE BLD-MCNC: 145 MG/DL (ref 70–99)
GLUCOSE BLD-MCNC: 147 MG/DL (ref 74–106)
GLUCOSE BLD-MCNC: 177 MG/DL (ref 74–106)
GLUCOSE BLD-MCNC: 82 MG/DL (ref 70–99)
GLUCOSE BLD-MCNC: 97 MG/DL (ref 74–106)
GLUCOSE BLD-MCNC: 99 MG/DL (ref 74–106)
HCT VFR BLD CALC: 37.5 % (ref 41–53)
HCT VFR BLD CALC: 42.1 % (ref 41–53)
HEMOGLOBIN: 12.3 G/DL (ref 13.5–17.5)
HEMOGLOBIN: 13.7 G/DL (ref 13.5–17.5)
INR BLD: 1.3
INR BLD: 1.6
LYMPHOCYTES # BLD: 14 %
MAGNESIUM: 2.8 MG/DL (ref 1.6–2.6)
MCH RBC QN AUTO: 30.8 PG (ref 26–34)
MCH RBC QN AUTO: 31.1 PG (ref 26–34)
MCHC RBC AUTO-ENTMCNC: 32.6 G/DL (ref 31–37)
MCHC RBC AUTO-ENTMCNC: 32.9 G/DL (ref 31–37)
MCV RBC AUTO: 94.6 FL (ref 80–100)
MCV RBC AUTO: 94.6 FL (ref 80–100)
MDMA URINE: NORMAL
METHADONE SCREEN, URINE: NEGATIVE
METHAMPHETAMINE, URINE: NORMAL
MONOCYTES # BLD: 12 %
NEGATIVE BASE EXCESS, ART: 1 (ref 0–2)
NEGATIVE BASE EXCESS, ART: 2 (ref 0–2)
NEGATIVE BASE EXCESS, ART: 4 (ref 0–2)
NEGATIVE BASE EXCESS, ART: 5 (ref 0–2)
NEGATIVE BASE EXCESS, ART: ABNORMAL (ref 0–2)
O2 DEVICE/FLOW/%: ABNORMAL
OPIATES, URINE: NEGATIVE
OXYCODONE SCREEN URINE: NEGATIVE
PARTIAL THROMBOPLASTIN TIME: 26.6 SEC (ref 21.3–31.3)
PARTIAL THROMBOPLASTIN TIME: 26.8 SEC (ref 21.3–31.3)
PATIENT TEMP: ABNORMAL
PDW BLD-RTO: 17.5 % (ref 12.5–15.4)
PDW BLD-RTO: 17.5 % (ref 12.5–15.4)
PHENCYCLIDINE, URINE: NEGATIVE
PLATELET # BLD: 109 K/UL (ref 140–450)
PLATELET # BLD: 194 K/UL (ref 140–450)
PLATELET # BLD: 99 K/UL (ref 140–450)
PLATELET ESTIMATE: ABNORMAL
PMV BLD AUTO: 7.9 FL (ref 6–12)
PMV BLD AUTO: 8.1 FL (ref 6–12)
POC HCO3: 20.7 MMOL/L (ref 22–27)
POC HCO3: 21.8 MMOL/L (ref 22–27)
POC HCO3: 21.8 MMOL/L (ref 22–27)
POC HCO3: 22.1 MMOL/L (ref 22–27)
POC HCO3: 23.4 MMOL/L (ref 22–27)
POC HCO3: 24.4 MMOL/L (ref 22–27)
POC HCO3: 25.7 MMOL/L (ref 22–27)
POC HCO3: 28.3 MMOL/L (ref 22–27)
POC HCO3: 28.3 MMOL/L (ref 22–27)
POC HEMATOCRIT: 26 % (ref 41–53)
POC HEMATOCRIT: 26 % (ref 41–53)
POC HEMATOCRIT: 27 % (ref 41–53)
POC HEMATOCRIT: 32 % (ref 41–53)
POC HEMATOCRIT: 33 % (ref 41–53)
POC HEMATOCRIT: 35 % (ref 41–53)
POC HEMATOCRIT: 35 % (ref 41–53)
POC HEMATOCRIT: 37 % (ref 41–53)
POC HEMOGLOBIN: 10.9 GM/DL (ref 13.5–17.5)
POC HEMOGLOBIN: 11.2 GM/DL (ref 13.5–17.5)
POC HEMOGLOBIN: 11.9 GM/DL (ref 13.5–17.5)
POC HEMOGLOBIN: 11.9 GM/DL (ref 13.5–17.5)
POC HEMOGLOBIN: 12.6 GM/DL (ref 13.5–17.5)
POC HEMOGLOBIN: 8.8 GM/DL (ref 13.5–17.5)
POC HEMOGLOBIN: 8.8 GM/DL (ref 13.5–17.5)
POC HEMOGLOBIN: 9.2 GM/DL (ref 13.5–17.5)
POC IONIZED CALCIUM: 1.04 MMOL/L (ref 1.13–1.33)
POC IONIZED CALCIUM: 1.08 MMOL/L (ref 1.13–1.33)
POC IONIZED CALCIUM: 1.09 MMOL/L (ref 1.13–1.33)
POC IONIZED CALCIUM: 1.13 MMOL/L (ref 1.13–1.33)
POC IONIZED CALCIUM: 1.14 MMOL/L (ref 1.13–1.33)
POC IONIZED CALCIUM: 1.19 MMOL/L (ref 1.13–1.33)
POC IONIZED CALCIUM: 1.26 MMOL/L (ref 1.13–1.33)
POC IONIZED CALCIUM: 1.28 MMOL/L (ref 1.13–1.33)
POC O2 SATURATION: 100 %
POC O2 SATURATION: 93 %
POC O2 SATURATION: 95 %
POC O2 SATURATION: 96 %
POC O2 SATURATION: 99 %
POC PCO2 TEMP: ABNORMAL MM HG
POC PCO2: 38 MM HG (ref 32–45)
POC PCO2: 38 MM HG (ref 32–45)
POC PCO2: 39 MM HG (ref 32–45)
POC PCO2: 39 MM HG (ref 32–45)
POC PCO2: 40 MM HG (ref 32–45)
POC PCO2: 40 MM HG (ref 32–45)
POC PCO2: 42 MM HG (ref 32–45)
POC PCO2: 43 MM HG (ref 32–45)
POC PCO2: 43 MM HG (ref 32–45)
POC PH TEMP: ABNORMAL
POC PH: 7.32 (ref 7.35–7.45)
POC PH: 7.33 (ref 7.35–7.45)
POC PH: 7.36 (ref 7.35–7.45)
POC PH: 7.38 (ref 7.35–7.45)
POC PH: 7.41 (ref 7.35–7.45)
POC PH: 7.44 (ref 7.35–7.45)
POC PH: 7.48 (ref 7.35–7.45)
POC PO2 TEMP: ABNORMAL MM HG
POC PO2: 155 MM HG (ref 75–95)
POC PO2: 218 MM HG (ref 75–95)
POC PO2: 257 MM HG (ref 75–95)
POC PO2: 309 MM HG (ref 75–95)
POC PO2: 371 MM HG (ref 75–95)
POC PO2: 436 MM HG (ref 75–95)
POC PO2: 71 MM HG (ref 75–95)
POC PO2: 80 MM HG (ref 75–95)
POC PO2: 83 MM HG (ref 75–95)
POC POTASSIUM: 3.6 MMOL/L (ref 3.5–5.1)
POC POTASSIUM: 3.9 MMOL/L (ref 3.5–5.1)
POC POTASSIUM: 4.1 MMOL/L (ref 3.5–5.1)
POC POTASSIUM: 4.1 MMOL/L (ref 3.5–5.1)
POC POTASSIUM: 4.2 MMOL/L (ref 3.5–5.1)
POC POTASSIUM: 4.3 MMOL/L (ref 3.5–5.1)
POC POTASSIUM: 4.7 MMOL/L (ref 3.5–5.1)
POC POTASSIUM: 4.7 MMOL/L (ref 3.5–5.1)
POC SODIUM: 143 MMOL/L (ref 136–145)
POC SODIUM: 143 MMOL/L (ref 136–145)
POC SODIUM: 146 MMOL/L (ref 136–145)
POSITIVE BASE EXCESS, ART: 1 (ref 0–2)
POSITIVE BASE EXCESS, ART: 4 (ref 0–2)
POSITIVE BASE EXCESS, ART: 5 (ref 0–2)
POSITIVE BASE EXCESS, ART: ABNORMAL (ref 0–2)
POTASSIUM SERPL-SCNC: 4.3 MMOL/L (ref 3.7–5.3)
POTASSIUM SERPL-SCNC: 4.4 MMOL/L (ref 3.7–5.3)
PROPOXYPHENE, URINE: NORMAL
PROTHROMBIN TIME: 13.7 SEC (ref 9.4–12.6)
PROTHROMBIN TIME: 16.9 SEC (ref 9.4–12.6)
RBC # BLD: 3.97 M/UL (ref 4.5–5.9)
RBC # BLD: 4.44 M/UL (ref 4.5–5.9)
RBC # BLD: ABNORMAL 10*6/UL
SEG NEUTROPHILS: 71 %
SEGMENTED NEUTROPHILS ABSOLUTE COUNT: 4.9 K/UL (ref 1.8–7.7)
SODIUM BLD-SCNC: 142 MMOL/L (ref 135–144)
SODIUM BLD-SCNC: 143 MMOL/L (ref 135–144)
TCO2 (CALC), ART: 22 MM HG (ref 23–28)
TCO2 (CALC), ART: 23 MM HG (ref 23–28)
TCO2 (CALC), ART: 25 MM HG (ref 23–28)
TCO2 (CALC), ART: 26 MM HG (ref 23–28)
TCO2 (CALC), ART: 27 MM HG (ref 23–28)
TCO2 (CALC), ART: 29 MM HG (ref 23–28)
TCO2 (CALC), ART: 30 MM HG (ref 23–28)
TEST INFORMATION: NORMAL
TOTAL PROTEIN: 7.4 G/DL (ref 6.4–8.3)
TRICYCLIC ANTIDEPRESSANTS, UR: NORMAL
WBC # BLD: 13.1 K/UL (ref 3.5–11)
WBC # BLD: 6.9 K/UL (ref 3.5–11)
WBC # BLD: ABNORMAL 10*3/UL

## 2017-05-19 PROCEDURE — 85730 THROMBOPLASTIN TIME PARTIAL: CPT

## 2017-05-19 PROCEDURE — 2580000003 HC RX 258: Performed by: FAMILY MEDICINE

## 2017-05-19 PROCEDURE — 85610 PROTHROMBIN TIME: CPT

## 2017-05-19 PROCEDURE — 88311 DECALCIFY TISSUE: CPT

## 2017-05-19 PROCEDURE — 94002 VENT MGMT INPAT INIT DAY: CPT

## 2017-05-19 PROCEDURE — 6360000002 HC RX W HCPCS: Performed by: THORACIC SURGERY (CARDIOTHORACIC VASCULAR SURGERY)

## 2017-05-19 PROCEDURE — 86850 RBC ANTIBODY SCREEN: CPT

## 2017-05-19 PROCEDURE — 2500000003 HC RX 250 WO HCPCS: Performed by: THORACIC SURGERY (CARDIOTHORACIC VASCULAR SURGERY)

## 2017-05-19 PROCEDURE — 2500000003 HC RX 250 WO HCPCS

## 2017-05-19 PROCEDURE — 80307 DRUG TEST PRSMV CHEM ANLYZR: CPT

## 2017-05-19 PROCEDURE — 2580000003 HC RX 258: Performed by: SPECIALIST

## 2017-05-19 PROCEDURE — 80053 COMPREHEN METABOLIC PANEL: CPT

## 2017-05-19 PROCEDURE — 86901 BLOOD TYPING SEROLOGIC RH(D): CPT

## 2017-05-19 PROCEDURE — 021009W BYPASS CORONARY ARTERY, ONE ARTERY FROM AORTA WITH AUTOLOGOUS VENOUS TISSUE, OPEN APPROACH: ICD-10-PCS | Performed by: THORACIC SURGERY (CARDIOTHORACIC VASCULAR SURGERY)

## 2017-05-19 PROCEDURE — A4216 STERILE WATER/SALINE, 10 ML: HCPCS

## 2017-05-19 PROCEDURE — 80048 BASIC METABOLIC PNL TOTAL CA: CPT

## 2017-05-19 PROCEDURE — 94770 HC ETCO2 MONITOR DAILY: CPT

## 2017-05-19 PROCEDURE — 5A1221Z PERFORMANCE OF CARDIAC OUTPUT, CONTINUOUS: ICD-10-PCS | Performed by: THORACIC SURGERY (CARDIOTHORACIC VASCULAR SURGERY)

## 2017-05-19 PROCEDURE — 36415 COLL VENOUS BLD VENIPUNCTURE: CPT

## 2017-05-19 PROCEDURE — 85014 HEMATOCRIT: CPT

## 2017-05-19 PROCEDURE — 83735 ASSAY OF MAGNESIUM: CPT

## 2017-05-19 PROCEDURE — 82330 ASSAY OF CALCIUM: CPT

## 2017-05-19 PROCEDURE — 94762 N-INVAS EAR/PLS OXIMTRY CONT: CPT

## 2017-05-19 PROCEDURE — 86900 BLOOD TYPING SEROLOGIC ABO: CPT

## 2017-05-19 PROCEDURE — C1729 CATH, DRAINAGE: HCPCS | Performed by: THORACIC SURGERY (CARDIOTHORACIC VASCULAR SURGERY)

## 2017-05-19 PROCEDURE — 82803 BLOOD GASES ANY COMBINATION: CPT

## 2017-05-19 PROCEDURE — 2500000003 HC RX 250 WO HCPCS: Performed by: SPECIALIST

## 2017-05-19 PROCEDURE — 87641 MR-STAPH DNA AMP PROBE: CPT

## 2017-05-19 PROCEDURE — 02100Z9 BYPASS CORONARY ARTERY, ONE ARTERY FROM LEFT INTERNAL MAMMARY, OPEN APPROACH: ICD-10-PCS | Performed by: THORACIC SURGERY (CARDIOTHORACIC VASCULAR SURGERY)

## 2017-05-19 PROCEDURE — 84132 ASSAY OF SERUM POTASSIUM: CPT

## 2017-05-19 PROCEDURE — 3700000001 HC ADD 15 MINUTES (ANESTHESIA): Performed by: THORACIC SURGERY (CARDIOTHORACIC VASCULAR SURGERY)

## 2017-05-19 PROCEDURE — 2000000000 HC ICU R&B

## 2017-05-19 PROCEDURE — 3700000000 HC ANESTHESIA ATTENDED CARE: Performed by: THORACIC SURGERY (CARDIOTHORACIC VASCULAR SURGERY)

## 2017-05-19 PROCEDURE — 06BP0ZZ EXCISION OF RIGHT SAPHENOUS VEIN, OPEN APPROACH: ICD-10-PCS | Performed by: THORACIC SURGERY (CARDIOTHORACIC VASCULAR SURGERY)

## 2017-05-19 PROCEDURE — 82947 ASSAY GLUCOSE BLOOD QUANT: CPT

## 2017-05-19 PROCEDURE — 2580000003 HC RX 258

## 2017-05-19 PROCEDURE — 6360000002 HC RX W HCPCS: Performed by: SPECIALIST

## 2017-05-19 PROCEDURE — 2580000003 HC RX 258: Performed by: THORACIC SURGERY (CARDIOTHORACIC VASCULAR SURGERY)

## 2017-05-19 PROCEDURE — 88305 TISSUE EXAM BY PATHOLOGIST: CPT

## 2017-05-19 PROCEDURE — G0378 HOSPITAL OBSERVATION PER HR: HCPCS

## 2017-05-19 PROCEDURE — 85027 COMPLETE CBC AUTOMATED: CPT

## 2017-05-19 PROCEDURE — 3600000008 HC SURGERY OHS BASE: Performed by: THORACIC SURGERY (CARDIOTHORACIC VASCULAR SURGERY)

## 2017-05-19 PROCEDURE — 2780000006 HC MISC HEART VALVE: Performed by: THORACIC SURGERY (CARDIOTHORACIC VASCULAR SURGERY)

## 2017-05-19 PROCEDURE — 2720000010 HC SURG SUPPLY STERILE: Performed by: THORACIC SURGERY (CARDIOTHORACIC VASCULAR SURGERY)

## 2017-05-19 PROCEDURE — 6370000000 HC RX 637 (ALT 250 FOR IP): Performed by: THORACIC SURGERY (CARDIOTHORACIC VASCULAR SURGERY)

## 2017-05-19 PROCEDURE — 6370000000 HC RX 637 (ALT 250 FOR IP): Performed by: FAMILY MEDICINE

## 2017-05-19 PROCEDURE — 85049 AUTOMATED PLATELET COUNT: CPT

## 2017-05-19 PROCEDURE — B24BZZ4 ULTRASONOGRAPHY OF HEART WITH AORTA, TRANSESOPHAGEAL: ICD-10-PCS | Performed by: ANESTHESIOLOGY

## 2017-05-19 PROCEDURE — 85025 COMPLETE CBC W/AUTO DIFF WBC: CPT

## 2017-05-19 PROCEDURE — 84295 ASSAY OF SERUM SODIUM: CPT

## 2017-05-19 PROCEDURE — 02RF08Z REPLACEMENT OF AORTIC VALVE WITH ZOOPLASTIC TISSUE, OPEN APPROACH: ICD-10-PCS | Performed by: THORACIC SURGERY (CARDIOTHORACIC VASCULAR SURGERY)

## 2017-05-19 PROCEDURE — 3600000018 HC SURGERY OHS ADDTL 15MIN: Performed by: THORACIC SURGERY (CARDIOTHORACIC VASCULAR SURGERY)

## 2017-05-19 PROCEDURE — 85384 FIBRINOGEN ACTIVITY: CPT

## 2017-05-19 PROCEDURE — 86920 COMPATIBILITY TEST SPIN: CPT

## 2017-05-19 DEVICE — IMPLANTABLE DEVICE: Type: IMPLANTABLE DEVICE | Site: HEART | Status: FUNCTIONAL

## 2017-05-19 RX ORDER — SODIUM CHLORIDE 450 MG/100ML
INJECTION, SOLUTION INTRAVENOUS CONTINUOUS
Status: DISCONTINUED | OUTPATIENT
Start: 2017-05-19 | End: 2017-05-21

## 2017-05-19 RX ORDER — HEPARIN SODIUM 1000 [USP'U]/ML
INJECTION, SOLUTION INTRAVENOUS; SUBCUTANEOUS PRN
Status: DISCONTINUED | OUTPATIENT
Start: 2017-05-19 | End: 2017-05-19 | Stop reason: SDUPTHER

## 2017-05-19 RX ORDER — ACETAMINOPHEN 325 MG/1
650 TABLET ORAL EVERY 4 HOURS PRN
Status: DISCONTINUED | OUTPATIENT
Start: 2017-05-19 | End: 2017-05-23 | Stop reason: HOSPADM

## 2017-05-19 RX ORDER — MAGNESIUM SULFATE 1 G/100ML
1 INJECTION INTRAVENOUS PRN
Status: DISCONTINUED | OUTPATIENT
Start: 2017-05-19 | End: 2017-05-23 | Stop reason: HOSPADM

## 2017-05-19 RX ORDER — ETOMIDATE 2 MG/ML
INJECTION INTRAVENOUS PRN
Status: DISCONTINUED | OUTPATIENT
Start: 2017-05-19 | End: 2017-05-19 | Stop reason: SDUPTHER

## 2017-05-19 RX ORDER — AMINOCAPROIC ACID 250 MG/ML
INJECTION, SOLUTION INTRAVENOUS PRN
Status: DISCONTINUED | OUTPATIENT
Start: 2017-05-19 | End: 2017-05-19 | Stop reason: SDUPTHER

## 2017-05-19 RX ORDER — ROCURONIUM BROMIDE 10 MG/ML
INJECTION, SOLUTION INTRAVENOUS PRN
Status: DISCONTINUED | OUTPATIENT
Start: 2017-05-19 | End: 2017-05-19 | Stop reason: SDUPTHER

## 2017-05-19 RX ORDER — DOPAMINE HYDROCHLORIDE 160 MG/100ML
INJECTION, SOLUTION INTRAVENOUS CONTINUOUS PRN
Status: DISCONTINUED | OUTPATIENT
Start: 2017-05-19 | End: 2017-05-19 | Stop reason: SDUPTHER

## 2017-05-19 RX ORDER — PROPOFOL 10 MG/ML
INJECTION, EMULSION INTRAVENOUS PRN
Status: DISCONTINUED | OUTPATIENT
Start: 2017-05-19 | End: 2017-05-19 | Stop reason: SDUPTHER

## 2017-05-19 RX ORDER — HYDROCODONE BITARTRATE AND ACETAMINOPHEN 5; 325 MG/1; MG/1
2 TABLET ORAL EVERY 4 HOURS PRN
Status: DISCONTINUED | OUTPATIENT
Start: 2017-05-19 | End: 2017-05-19

## 2017-05-19 RX ORDER — FENTANYL CITRATE 0.05 MG/ML
INJECTION, SOLUTION INTRAMUSCULAR; INTRAVENOUS PRN
Status: DISCONTINUED | OUTPATIENT
Start: 2017-05-19 | End: 2017-05-19 | Stop reason: SDUPTHER

## 2017-05-19 RX ORDER — PROTAMINE SULFATE 10 MG/ML
INJECTION, SOLUTION INTRAVENOUS PRN
Status: DISCONTINUED | OUTPATIENT
Start: 2017-05-19 | End: 2017-05-19 | Stop reason: SDUPTHER

## 2017-05-19 RX ORDER — CHLORHEXIDINE GLUCONATE 0.12 MG/ML
15 RINSE ORAL ONCE
Status: CANCELLED | OUTPATIENT
Start: 2017-05-19 | End: 2017-05-19

## 2017-05-19 RX ORDER — CALCIUM CHLORIDE 100 MG/ML
INJECTION INTRAVENOUS; INTRAVENTRICULAR PRN
Status: DISCONTINUED | OUTPATIENT
Start: 2017-05-19 | End: 2017-05-19 | Stop reason: SDUPTHER

## 2017-05-19 RX ORDER — MORPHINE SULFATE 2 MG/ML
2 INJECTION, SOLUTION INTRAMUSCULAR; INTRAVENOUS
Status: DISCONTINUED | OUTPATIENT
Start: 2017-05-19 | End: 2017-05-19

## 2017-05-19 RX ORDER — POTASSIUM CHLORIDE 29.8 MG/ML
20 INJECTION INTRAVENOUS PRN
Status: DISCONTINUED | OUTPATIENT
Start: 2017-05-19 | End: 2017-05-23 | Stop reason: HOSPADM

## 2017-05-19 RX ORDER — POLYETHYLENE GLYCOL 3350 17 G/17G
17 POWDER, FOR SOLUTION ORAL DAILY
Status: DISCONTINUED | OUTPATIENT
Start: 2017-05-19 | End: 2017-05-19 | Stop reason: HOSPADM

## 2017-05-19 RX ORDER — MEPERIDINE HYDROCHLORIDE 50 MG/ML
25 INJECTION INTRAMUSCULAR; INTRAVENOUS; SUBCUTANEOUS
Status: ACTIVE | OUTPATIENT
Start: 2017-05-19 | End: 2017-05-19

## 2017-05-19 RX ORDER — VECURONIUM BROMIDE 1 MG/ML
5 INJECTION, POWDER, LYOPHILIZED, FOR SOLUTION INTRAVENOUS ONCE
Status: COMPLETED | OUTPATIENT
Start: 2017-05-19 | End: 2017-05-19

## 2017-05-19 RX ORDER — SODIUM CHLORIDE 0.9 % (FLUSH) 0.9 %
10 SYRINGE (ML) INJECTION EVERY 12 HOURS SCHEDULED
Status: DISCONTINUED | OUTPATIENT
Start: 2017-05-19 | End: 2017-05-23 | Stop reason: HOSPADM

## 2017-05-19 RX ORDER — HEPARIN SODIUM 1000 [USP'U]/ML
INJECTION, SOLUTION INTRAVENOUS; SUBCUTANEOUS PRN
Status: DISCONTINUED | OUTPATIENT
Start: 2017-05-19 | End: 2017-05-19 | Stop reason: HOSPADM

## 2017-05-19 RX ORDER — DEXTROSE MONOHYDRATE 25 G/50ML
12.5 INJECTION, SOLUTION INTRAVENOUS PRN
Status: DISCONTINUED | OUTPATIENT
Start: 2017-05-19 | End: 2017-05-23 | Stop reason: HOSPADM

## 2017-05-19 RX ORDER — DOPAMINE HYDROCHLORIDE 160 MG/100ML
2.5 INJECTION, SOLUTION INTRAVENOUS CONTINUOUS
Status: DISCONTINUED | OUTPATIENT
Start: 2017-05-19 | End: 2017-05-22

## 2017-05-19 RX ORDER — ONDANSETRON 2 MG/ML
4 INJECTION INTRAMUSCULAR; INTRAVENOUS EVERY 8 HOURS PRN
Status: DISCONTINUED | OUTPATIENT
Start: 2017-05-19 | End: 2017-05-23 | Stop reason: HOSPADM

## 2017-05-19 RX ORDER — SODIUM CHLORIDE 0.9 % (FLUSH) 0.9 %
10 SYRINGE (ML) INJECTION PRN
Status: DISCONTINUED | OUTPATIENT
Start: 2017-05-19 | End: 2017-05-23 | Stop reason: HOSPADM

## 2017-05-19 RX ORDER — PROPOFOL 10 MG/ML
INJECTION, EMULSION INTRAVENOUS CONTINUOUS PRN
Status: DISCONTINUED | OUTPATIENT
Start: 2017-05-19 | End: 2017-05-19 | Stop reason: SDUPTHER

## 2017-05-19 RX ORDER — MIDAZOLAM HYDROCHLORIDE 1 MG/ML
INJECTION INTRAMUSCULAR; INTRAVENOUS PRN
Status: DISCONTINUED | OUTPATIENT
Start: 2017-05-19 | End: 2017-05-19 | Stop reason: SDUPTHER

## 2017-05-19 RX ORDER — PANTOPRAZOLE SODIUM 40 MG/10ML
40 INJECTION, POWDER, LYOPHILIZED, FOR SOLUTION INTRAVENOUS DAILY
Status: DISCONTINUED | OUTPATIENT
Start: 2017-05-19 | End: 2017-05-23 | Stop reason: HOSPADM

## 2017-05-19 RX ORDER — DEXTROSE MONOHYDRATE 50 MG/ML
100 INJECTION, SOLUTION INTRAVENOUS PRN
Status: DISCONTINUED | OUTPATIENT
Start: 2017-05-19 | End: 2017-05-23 | Stop reason: HOSPADM

## 2017-05-19 RX ORDER — 0.9 % SODIUM CHLORIDE 0.9 %
10 VIAL (ML) INJECTION DAILY
Status: DISCONTINUED | OUTPATIENT
Start: 2017-05-19 | End: 2017-05-23 | Stop reason: HOSPADM

## 2017-05-19 RX ORDER — MAGNESIUM HYDROXIDE 1200 MG/15ML
LIQUID ORAL CONTINUOUS PRN
Status: DISCONTINUED | OUTPATIENT
Start: 2017-05-19 | End: 2017-05-19 | Stop reason: HOSPADM

## 2017-05-19 RX ORDER — LIDOCAINE HYDROCHLORIDE 10 MG/ML
INJECTION, SOLUTION EPIDURAL; INFILTRATION; INTRACAUDAL; PERINEURAL PRN
Status: DISCONTINUED | OUTPATIENT
Start: 2017-05-19 | End: 2017-05-19 | Stop reason: SDUPTHER

## 2017-05-19 RX ORDER — PANTOPRAZOLE SODIUM 40 MG/10ML
40 INJECTION, POWDER, LYOPHILIZED, FOR SOLUTION INTRAVENOUS DAILY
Status: DISCONTINUED | OUTPATIENT
Start: 2017-05-20 | End: 2017-05-19

## 2017-05-19 RX ORDER — ALBUTEROL SULFATE 2.5 MG/3ML
2.5 SOLUTION RESPIRATORY (INHALATION)
Status: DISCONTINUED | OUTPATIENT
Start: 2017-05-20 | End: 2017-05-23 | Stop reason: HOSPADM

## 2017-05-19 RX ORDER — HYDROCODONE BITARTRATE AND ACETAMINOPHEN 5; 325 MG/1; MG/1
1 TABLET ORAL EVERY 4 HOURS PRN
Status: DISCONTINUED | OUTPATIENT
Start: 2017-05-19 | End: 2017-05-19

## 2017-05-19 RX ORDER — NICOTINE POLACRILEX 4 MG
15 LOZENGE BUCCAL PRN
Status: DISCONTINUED | OUTPATIENT
Start: 2017-05-19 | End: 2017-05-23 | Stop reason: HOSPADM

## 2017-05-19 RX ORDER — SODIUM CHLORIDE, SODIUM LACTATE, POTASSIUM CHLORIDE, CALCIUM CHLORIDE 600; 310; 30; 20 MG/100ML; MG/100ML; MG/100ML; MG/100ML
INJECTION, SOLUTION INTRAVENOUS CONTINUOUS PRN
Status: DISCONTINUED | OUTPATIENT
Start: 2017-05-19 | End: 2017-05-19 | Stop reason: SDUPTHER

## 2017-05-19 RX ORDER — ALBUMIN, HUMAN INJ 5% 5 %
25 SOLUTION INTRAVENOUS PRN
Status: DISCONTINUED | OUTPATIENT
Start: 2017-05-19 | End: 2017-05-23 | Stop reason: HOSPADM

## 2017-05-19 RX ORDER — DOCUSATE SODIUM 100 MG/1
100 CAPSULE, LIQUID FILLED ORAL 2 TIMES DAILY
Status: DISCONTINUED | OUTPATIENT
Start: 2017-05-19 | End: 2017-05-23 | Stop reason: HOSPADM

## 2017-05-19 RX ORDER — 0.9 % SODIUM CHLORIDE 0.9 %
10 VIAL (ML) INJECTION DAILY
Status: DISCONTINUED | OUTPATIENT
Start: 2017-05-20 | End: 2017-05-19

## 2017-05-19 RX ORDER — PROPOFOL 10 MG/ML
10 INJECTION, EMULSION INTRAVENOUS
Status: DISCONTINUED | OUTPATIENT
Start: 2017-05-19 | End: 2017-05-20

## 2017-05-19 RX ORDER — AMIODARONE HYDROCHLORIDE 200 MG/1
200 TABLET ORAL 3 TIMES DAILY
Status: DISCONTINUED | OUTPATIENT
Start: 2017-05-19 | End: 2017-05-21

## 2017-05-19 RX ADMIN — EPINEPHRINE 0.02 MCG/KG/MIN: 1 INJECTION PARENTERAL at 17:01

## 2017-05-19 RX ADMIN — BUMETANIDE 0.1 MG/HR: 0.25 INJECTION, SOLUTION INTRAMUSCULAR; INTRAVENOUS at 19:20

## 2017-05-19 RX ADMIN — HEPARIN SODIUM 30000 UNITS: 1000 INJECTION, SOLUTION INTRAVENOUS; SUBCUTANEOUS at 17:29

## 2017-05-19 RX ADMIN — FENTANYL CITRATE 100 MCG: 50 INJECTION, SOLUTION INTRAMUSCULAR; INTRAVENOUS at 17:15

## 2017-05-19 RX ADMIN — SODIUM CHLORIDE 2.2 UNITS/HR: 9 INJECTION, SOLUTION INTRAVENOUS at 21:15

## 2017-05-19 RX ADMIN — PROPOFOL 1.7 MCG/KG/MIN: 10 INJECTION, EMULSION INTRAVENOUS at 19:30

## 2017-05-19 RX ADMIN — Medication 10 ML: at 08:19

## 2017-05-19 RX ADMIN — ISOSORBIDE MONONITRATE 30 MG: 30 TABLET, EXTENDED RELEASE ORAL at 08:14

## 2017-05-19 RX ADMIN — PHENYLEPHRINE HYDROCHLORIDE 25 MCG/MIN: 10 INJECTION INTRAMUSCULAR; INTRAVENOUS; SUBCUTANEOUS at 16:57

## 2017-05-19 RX ADMIN — POLYETHYLENE GLYCOL (3350) 17 G: 17 POWDER, FOR SOLUTION ORAL at 08:15

## 2017-05-19 RX ADMIN — ROCURONIUM BROMIDE 50 MG: 10 INJECTION INTRAVENOUS at 19:04

## 2017-05-19 RX ADMIN — FENTANYL CITRATE 250 MCG: 50 INJECTION, SOLUTION INTRAMUSCULAR; INTRAVENOUS at 16:52

## 2017-05-19 RX ADMIN — FENTANYL CITRATE 100 MCG: 50 INJECTION, SOLUTION INTRAMUSCULAR; INTRAVENOUS at 17:20

## 2017-05-19 RX ADMIN — MIDAZOLAM HYDROCHLORIDE 1 MG: 1 INJECTION, SOLUTION INTRAMUSCULAR; INTRAVENOUS at 15:46

## 2017-05-19 RX ADMIN — CALCIUM CHLORIDE 0.5 G: 100 INJECTION, SOLUTION INTRAVENOUS; INTRAVENTRICULAR at 19:40

## 2017-05-19 RX ADMIN — BACLOFEN 10 MG: 10 TABLET ORAL at 08:17

## 2017-05-19 RX ADMIN — ROCURONIUM BROMIDE 50 MG: 10 INJECTION INTRAVENOUS at 20:25

## 2017-05-19 RX ADMIN — FENTANYL CITRATE 100 MCG: 50 INJECTION, SOLUTION INTRAMUSCULAR; INTRAVENOUS at 17:18

## 2017-05-19 RX ADMIN — AMINOCAPROIC ACID 1 G/HR: 250 INJECTION, SOLUTION INTRAVENOUS at 16:55

## 2017-05-19 RX ADMIN — DOPAMINE HYDROCHLORIDE 2 MCG/KG/MIN: 160 INJECTION, SOLUTION INTRAVENOUS at 19:20

## 2017-05-19 RX ADMIN — EZETIMIBE 10 MG: 10 TABLET ORAL at 08:14

## 2017-05-19 RX ADMIN — SODIUM BICARBONATE 50 MEQ: 84 INJECTION INTRAVENOUS at 21:45

## 2017-05-19 RX ADMIN — VECURONIUM BROMIDE 10 MG: 1 INJECTION, POWDER, LYOPHILIZED, FOR SOLUTION INTRAVENOUS at 22:00

## 2017-05-19 RX ADMIN — BISOPROLOL FUMARATE 10 MG: 10 TABLET ORAL at 08:13

## 2017-05-19 RX ADMIN — FENTANYL CITRATE 250 MCG: 50 INJECTION, SOLUTION INTRAMUSCULAR; INTRAVENOUS at 15:54

## 2017-05-19 RX ADMIN — CALCIUM CHLORIDE 0.5 G: 100 INJECTION, SOLUTION INTRAVENOUS; INTRAVENTRICULAR at 20:10

## 2017-05-19 RX ADMIN — PROPOFOL 3.4 MCG/KG/MIN: 10 INJECTION, EMULSION INTRAVENOUS at 23:15

## 2017-05-19 RX ADMIN — ETOMIDATE 15 MG: 2 INJECTION, SOLUTION INTRAVENOUS at 15:54

## 2017-05-19 RX ADMIN — Medication 0.02 MCG/KG/MIN: at 19:43

## 2017-05-19 RX ADMIN — LISINOPRIL 40 MG: 40 TABLET ORAL at 08:14

## 2017-05-19 RX ADMIN — POTASSIUM CHLORIDE 20 MEQ: 29.8 INJECTION, SOLUTION INTRAVENOUS at 22:28

## 2017-05-19 RX ADMIN — ROCURONIUM BROMIDE 30 MG: 10 INJECTION INTRAVENOUS at 16:43

## 2017-05-19 RX ADMIN — VANCOMYCIN HYDROCHLORIDE 1500 MG: 1 INJECTION, POWDER, LYOPHILIZED, FOR SOLUTION INTRAVENOUS at 16:00

## 2017-05-19 RX ADMIN — OXCARBAZEPINE 300 MG: 300 TABLET, FILM COATED ORAL at 08:17

## 2017-05-19 RX ADMIN — FUROSEMIDE 20 MG: 20 TABLET ORAL at 08:13

## 2017-05-19 RX ADMIN — MIDAZOLAM HYDROCHLORIDE 1 MG: 1 INJECTION, SOLUTION INTRAMUSCULAR; INTRAVENOUS at 19:04

## 2017-05-19 RX ADMIN — ROCURONIUM BROMIDE 50 MG: 10 INJECTION INTRAVENOUS at 15:54

## 2017-05-19 RX ADMIN — ROCURONIUM BROMIDE 50 MG: 10 INJECTION INTRAVENOUS at 17:45

## 2017-05-19 RX ADMIN — SODIUM CHLORIDE, POTASSIUM CHLORIDE, SODIUM LACTATE AND CALCIUM CHLORIDE: 600; 310; 30; 20 INJECTION, SOLUTION INTRAVENOUS at 15:44

## 2017-05-19 RX ADMIN — PROTAMINE SULFATE 250 MG: 10 INJECTION, SOLUTION INTRAVENOUS at 19:50

## 2017-05-19 RX ADMIN — DOPAMINE HYDROCHLORIDE 2 MCG/KG/MIN: 160 INJECTION, SOLUTION INTRAVENOUS at 23:15

## 2017-05-19 RX ADMIN — FENTANYL CITRATE 100 MCG: 50 INJECTION, SOLUTION INTRAMUSCULAR; INTRAVENOUS at 17:24

## 2017-05-19 RX ADMIN — FENTANYL CITRATE 100 MCG: 50 INJECTION, SOLUTION INTRAMUSCULAR; INTRAVENOUS at 17:12

## 2017-05-19 RX ADMIN — ASPIRIN 81 MG 81 MG: 81 TABLET ORAL at 08:12

## 2017-05-19 RX ADMIN — Medication 50 MEQ: at 21:45

## 2017-05-19 RX ADMIN — PROPOFOL 100 MG: 10 INJECTION, EMULSION INTRAVENOUS at 15:54

## 2017-05-19 RX ADMIN — LIDOCAINE HYDROCHLORIDE 50 MG: 10 INJECTION, SOLUTION EPIDURAL; INFILTRATION; INTRACAUDAL; PERINEURAL at 15:54

## 2017-05-19 RX ADMIN — AMINOCAPROIC ACID 5000 MG: 250 INJECTION, SOLUTION INTRAVENOUS at 16:20

## 2017-05-19 RX ADMIN — PHENYLEPHRINE HYDROCHLORIDE 66.67 MCG/MIN: 10 INJECTION INTRAVENOUS at 23:15

## 2017-05-19 ASSESSMENT — PAIN SCALES - GENERAL
PAINLEVEL_OUTOF10: 0
PAINLEVEL_OUTOF10: 0

## 2017-05-19 ASSESSMENT — PULMONARY FUNCTION TESTS
PIF_VALUE: 22

## 2017-05-20 LAB
ANION GAP SERPL CALCULATED.3IONS-SCNC: 14 MMOL/L (ref 9–17)
ANION GAP SERPL CALCULATED.3IONS-SCNC: 15 MMOL/L (ref 9–17)
BUN BLDV-MCNC: 27 MG/DL (ref 8–23)
BUN BLDV-MCNC: 27 MG/DL (ref 8–23)
BUN/CREAT BLD: ABNORMAL (ref 9–20)
BUN/CREAT BLD: ABNORMAL (ref 9–20)
CALCIUM SERPL-MCNC: 8.1 MG/DL (ref 8.6–10.4)
CALCIUM SERPL-MCNC: 8.5 MG/DL (ref 8.6–10.4)
CHLORIDE BLD-SCNC: 106 MMOL/L (ref 98–107)
CHLORIDE BLD-SCNC: 107 MMOL/L (ref 98–107)
CO2: 21 MMOL/L (ref 20–31)
CO2: 23 MMOL/L (ref 20–31)
CREAT SERPL-MCNC: 1.52 MG/DL (ref 0.7–1.2)
CREAT SERPL-MCNC: 1.52 MG/DL (ref 0.7–1.2)
FIO2: 2
FIO2: 2
FIO2: 28
FIO2: 40
FIO2: 50
FIO2: 60
GFR AFRICAN AMERICAN: 54 ML/MIN
GFR AFRICAN AMERICAN: 54 ML/MIN
GFR NON-AFRICAN AMERICAN: 44 ML/MIN
GFR NON-AFRICAN AMERICAN: 44 ML/MIN
GFR SERPL CREATININE-BSD FRML MDRD: ABNORMAL ML/MIN/{1.73_M2}
GLUCOSE BLD-MCNC: 106 MG/DL (ref 75–110)
GLUCOSE BLD-MCNC: 110 MG/DL (ref 75–110)
GLUCOSE BLD-MCNC: 110 MG/DL (ref 75–110)
GLUCOSE BLD-MCNC: 115 MG/DL (ref 75–110)
GLUCOSE BLD-MCNC: 116 MG/DL (ref 74–106)
GLUCOSE BLD-MCNC: 117 MG/DL (ref 75–110)
GLUCOSE BLD-MCNC: 119 MG/DL (ref 70–99)
GLUCOSE BLD-MCNC: 128 MG/DL (ref 74–106)
GLUCOSE BLD-MCNC: 128 MG/DL (ref 75–110)
GLUCOSE BLD-MCNC: 130 MG/DL (ref 70–99)
GLUCOSE BLD-MCNC: 132 MG/DL (ref 75–110)
GLUCOSE BLD-MCNC: 158 MG/DL (ref 75–110)
GLUCOSE BLD-MCNC: 66 MG/DL (ref 75–110)
GLUCOSE BLD-MCNC: 73 MG/DL (ref 75–110)
GLUCOSE BLD-MCNC: 86 MG/DL (ref 75–110)
GLUCOSE BLD-MCNC: 87 MG/DL (ref 75–110)
GLUCOSE BLD-MCNC: 87 MG/DL (ref 75–110)
HCT VFR BLD CALC: 22.7 % (ref 41–53)
HCT VFR BLD CALC: 25.3 % (ref 41–53)
HCT VFR BLD CALC: 26.6 % (ref 41–53)
HEMOGLOBIN: 7.4 G/DL (ref 13.5–17.5)
HEMOGLOBIN: 8.5 G/DL (ref 13.5–17.5)
HEMOGLOBIN: 8.7 G/DL (ref 13.5–17.5)
INR BLD: 1.2
MAGNESIUM: 2.3 MG/DL (ref 1.6–2.6)
MAGNESIUM: 2.5 MG/DL (ref 1.6–2.6)
MCH RBC QN AUTO: 30.7 PG (ref 26–34)
MCHC RBC AUTO-ENTMCNC: 32.9 G/DL (ref 31–37)
MCV RBC AUTO: 93.5 FL (ref 80–100)
MRSA, DNA, NASAL: NORMAL
NEGATIVE BASE EXCESS, ART: 1 (ref 0–2)
NEGATIVE BASE EXCESS, ART: 1 (ref 0–2)
NEGATIVE BASE EXCESS, ART: 2 (ref 0–2)
NEGATIVE BASE EXCESS, ART: 3 (ref 0–2)
NEGATIVE BASE EXCESS, ART: 3 (ref 0–2)
NEGATIVE BASE EXCESS, ART: 4 (ref 0–2)
NEGATIVE BASE EXCESS, ART: 4 (ref 0–2)
NEGATIVE BASE EXCESS, ART: 5 (ref 0–2)
NEGATIVE BASE EXCESS, ART: ABNORMAL (ref 0–2)
O2 DEVICE/FLOW/%: ABNORMAL
O2 DEVICE/FLOW/%: NORMAL
PATIENT TEMP: 37.4
PATIENT TEMP: 37.7
PATIENT TEMP: ABNORMAL
PATIENT TEMP: NORMAL
PDW BLD-RTO: 16.8 % (ref 12.5–15.4)
PLATELET # BLD: 121 K/UL (ref 140–450)
PMV BLD AUTO: 7.2 FL (ref 6–12)
POC HCO3: 20.8 MMOL/L (ref 22–27)
POC HCO3: 21.7 MMOL/L (ref 22–27)
POC HCO3: 21.8 MMOL/L (ref 22–27)
POC HCO3: 21.9 MMOL/L (ref 22–27)
POC HCO3: 22.3 MMOL/L (ref 22–27)
POC HCO3: 22.9 MMOL/L (ref 22–27)
POC HCO3: 22.9 MMOL/L (ref 22–27)
POC HCO3: 23.4 MMOL/L (ref 22–27)
POC HCO3: 23.5 MMOL/L (ref 22–27)
POC HCO3: 24.3 MMOL/L (ref 22–27)
POC HCO3: 25.9 MMOL/L (ref 22–27)
POC HEMATOCRIT: 20 % (ref 41–53)
POC HEMATOCRIT: 24 % (ref 41–53)
POC HEMOGLOBIN: 6.8 GM/DL (ref 13.5–17.5)
POC HEMOGLOBIN: 8.2 GM/DL (ref 13.5–17.5)
POC IONIZED CALCIUM: 1.15 MMOL/L (ref 1.13–1.33)
POC IONIZED CALCIUM: 1.16 MMOL/L (ref 1.13–1.33)
POC O2 SATURATION: 94 %
POC O2 SATURATION: 96 %
POC O2 SATURATION: 97 %
POC O2 SATURATION: 98 %
POC O2 SATURATION: 98 %
POC O2 SATURATION: 99 %
POC PCO2 TEMP: ABNORMAL MM HG
POC PCO2 TEMP: NORMAL MM HG
POC PCO2: 36 MM HG (ref 32–45)
POC PCO2: 37 MM HG (ref 32–45)
POC PCO2: 37 MM HG (ref 32–45)
POC PCO2: 38 MM HG (ref 32–45)
POC PCO2: 39 MM HG (ref 32–45)
POC PCO2: 40 MM HG (ref 32–45)
POC PH TEMP: ABNORMAL
POC PH TEMP: NORMAL
POC PH: 7.35 (ref 7.35–7.45)
POC PH: 7.37 (ref 7.35–7.45)
POC PH: 7.38 (ref 7.35–7.45)
POC PH: 7.39 (ref 7.35–7.45)
POC PH: 7.39 (ref 7.35–7.45)
POC PH: 7.4 (ref 7.35–7.45)
POC PH: 7.4 (ref 7.35–7.45)
POC PH: 7.41 (ref 7.35–7.45)
POC PH: 7.43 (ref 7.35–7.45)
POC PO2 TEMP: ABNORMAL MM HG
POC PO2 TEMP: NORMAL MM HG
POC PO2: 107 MM HG (ref 75–95)
POC PO2: 110 MM HG (ref 75–95)
POC PO2: 126 MM HG (ref 75–95)
POC PO2: 70 MM HG (ref 75–95)
POC PO2: 81 MM HG (ref 75–95)
POC PO2: 86 MM HG (ref 75–95)
POC PO2: 88 MM HG (ref 75–95)
POC PO2: 95 MM HG (ref 75–95)
POC PO2: 96 MM HG (ref 75–95)
POC POTASSIUM: 4.6 MMOL/L (ref 3.5–5.1)
POC POTASSIUM: 5.2 MMOL/L (ref 3.5–5.1)
POC SODIUM: 142 MMOL/L (ref 136–145)
POC SODIUM: 145 MMOL/L (ref 136–145)
POSITIVE BASE EXCESS, ART: 2 (ref 0–2)
POSITIVE BASE EXCESS, ART: ABNORMAL (ref 0–2)
POSITIVE BASE EXCESS, ART: NORMAL (ref 0–2)
POTASSIUM SERPL-SCNC: 4 MMOL/L (ref 3.7–5.3)
POTASSIUM SERPL-SCNC: 4.2 MMOL/L (ref 3.7–5.3)
POTASSIUM SERPL-SCNC: 5.3 MMOL/L (ref 3.7–5.3)
PROTHROMBIN TIME: 13.3 SEC (ref 9.4–12.6)
RBC # BLD: 2.84 M/UL (ref 4.5–5.9)
SODIUM BLD-SCNC: 143 MMOL/L (ref 135–144)
SODIUM BLD-SCNC: 143 MMOL/L (ref 135–144)
SPECIMEN DESCRIPTION: NORMAL
TCO2 (CALC), ART: 22 MM HG (ref 23–28)
TCO2 (CALC), ART: 23 MM HG (ref 23–28)
TCO2 (CALC), ART: 24 MM HG (ref 23–28)
TCO2 (CALC), ART: 24 MM HG (ref 23–28)
TCO2 (CALC), ART: 25 MM HG (ref 23–28)
TCO2 (CALC), ART: 27 MM HG (ref 23–28)
WBC # BLD: 12.8 K/UL (ref 3.5–11)

## 2017-05-20 PROCEDURE — 94640 AIRWAY INHALATION TREATMENT: CPT

## 2017-05-20 PROCEDURE — 85014 HEMATOCRIT: CPT

## 2017-05-20 PROCEDURE — 85018 HEMOGLOBIN: CPT

## 2017-05-20 PROCEDURE — 36430 TRANSFUSION BLD/BLD COMPNT: CPT

## 2017-05-20 PROCEDURE — 82330 ASSAY OF CALCIUM: CPT

## 2017-05-20 PROCEDURE — P9045 ALBUMIN (HUMAN), 5%, 250 ML: HCPCS | Performed by: THORACIC SURGERY (CARDIOTHORACIC VASCULAR SURGERY)

## 2017-05-20 PROCEDURE — 2500000003 HC RX 250 WO HCPCS: Performed by: THORACIC SURGERY (CARDIOTHORACIC VASCULAR SURGERY)

## 2017-05-20 PROCEDURE — 94664 DEMO&/EVAL PT USE INHALER: CPT

## 2017-05-20 PROCEDURE — 94003 VENT MGMT INPAT SUBQ DAY: CPT

## 2017-05-20 PROCEDURE — 6360000002 HC RX W HCPCS: Performed by: THORACIC SURGERY (CARDIOTHORACIC VASCULAR SURGERY)

## 2017-05-20 PROCEDURE — 82803 BLOOD GASES ANY COMBINATION: CPT

## 2017-05-20 PROCEDURE — 94762 N-INVAS EAR/PLS OXIMTRY CONT: CPT

## 2017-05-20 PROCEDURE — 85027 COMPLETE CBC AUTOMATED: CPT

## 2017-05-20 PROCEDURE — C9113 INJ PANTOPRAZOLE SODIUM, VIA: HCPCS | Performed by: THORACIC SURGERY (CARDIOTHORACIC VASCULAR SURGERY)

## 2017-05-20 PROCEDURE — 94660 CPAP INITIATION&MGMT: CPT

## 2017-05-20 PROCEDURE — 84132 ASSAY OF SERUM POTASSIUM: CPT

## 2017-05-20 PROCEDURE — P9016 RBC LEUKOCYTES REDUCED: HCPCS

## 2017-05-20 PROCEDURE — 2100000001 HC CVICU R&B

## 2017-05-20 PROCEDURE — P9041 ALBUMIN (HUMAN),5%, 50ML: HCPCS | Performed by: THORACIC SURGERY (CARDIOTHORACIC VASCULAR SURGERY)

## 2017-05-20 PROCEDURE — 86900 BLOOD TYPING SEROLOGIC ABO: CPT

## 2017-05-20 PROCEDURE — 80048 BASIC METABOLIC PNL TOTAL CA: CPT

## 2017-05-20 PROCEDURE — 83735 ASSAY OF MAGNESIUM: CPT

## 2017-05-20 PROCEDURE — 6370000000 HC RX 637 (ALT 250 FOR IP): Performed by: THORACIC SURGERY (CARDIOTHORACIC VASCULAR SURGERY)

## 2017-05-20 PROCEDURE — 85610 PROTHROMBIN TIME: CPT

## 2017-05-20 PROCEDURE — 2580000003 HC RX 258: Performed by: THORACIC SURGERY (CARDIOTHORACIC VASCULAR SURGERY)

## 2017-05-20 PROCEDURE — 94770 HC ETCO2 MONITOR DAILY: CPT

## 2017-05-20 PROCEDURE — 84295 ASSAY OF SERUM SODIUM: CPT

## 2017-05-20 PROCEDURE — 6360000002 HC RX W HCPCS: Performed by: PHYSICIAN ASSISTANT

## 2017-05-20 PROCEDURE — 82947 ASSAY GLUCOSE BLOOD QUANT: CPT

## 2017-05-20 RX ORDER — 0.9 % SODIUM CHLORIDE 0.9 %
VIAL (ML) INJECTION
Status: DISPENSED
Start: 2017-05-20 | End: 2017-05-20

## 2017-05-20 RX ORDER — ATORVASTATIN CALCIUM 40 MG/1
40 TABLET, FILM COATED ORAL NIGHTLY
Status: DISCONTINUED | OUTPATIENT
Start: 2017-05-20 | End: 2017-05-23 | Stop reason: HOSPADM

## 2017-05-20 RX ADMIN — CEFAZOLIN SODIUM 1 G: 1 INJECTION, SOLUTION INTRAVENOUS at 08:01

## 2017-05-20 RX ADMIN — PHENYLEPHRINE HYDROCHLORIDE 0.24 MCG/KG/MIN: 10 INJECTION INTRAVENOUS at 14:20

## 2017-05-20 RX ADMIN — AMIODARONE HYDROCHLORIDE 200 MG: 200 TABLET ORAL at 08:02

## 2017-05-20 RX ADMIN — PANTOPRAZOLE SODIUM 40 MG: 40 INJECTION, POWDER, FOR SOLUTION INTRAVENOUS at 08:01

## 2017-05-20 RX ADMIN — ACETAMINOPHEN 650 MG: 325 TABLET ORAL at 04:46

## 2017-05-20 RX ADMIN — ALBUTEROL SULFATE 2.5 MG: 2.5 SOLUTION RESPIRATORY (INHALATION) at 11:00

## 2017-05-20 RX ADMIN — POTASSIUM CHLORIDE 20 MEQ: 29.8 INJECTION, SOLUTION INTRAVENOUS at 01:49

## 2017-05-20 RX ADMIN — SODIUM CHLORIDE: 4.5 INJECTION, SOLUTION INTRAVENOUS at 22:00

## 2017-05-20 RX ADMIN — DOCUSATE SODIUM 100 MG: 100 CAPSULE ORAL at 21:03

## 2017-05-20 RX ADMIN — ALBUTEROL SULFATE 2.5 MG: 2.5 SOLUTION RESPIRATORY (INHALATION) at 20:24

## 2017-05-20 RX ADMIN — ALBUMIN (HUMAN) 25 G: 12.5 INJECTION, SOLUTION INTRAVENOUS at 00:57

## 2017-05-20 RX ADMIN — SODIUM BICARBONATE 50 MEQ: 84 INJECTION INTRAVENOUS at 00:30

## 2017-05-20 RX ADMIN — SODIUM CHLORIDE: 4.5 INJECTION, SOLUTION INTRAVENOUS at 00:56

## 2017-05-20 RX ADMIN — ACETAMINOPHEN 650 MG: 325 TABLET ORAL at 21:03

## 2017-05-20 RX ADMIN — CEFAZOLIN SODIUM 1 G: 1 INJECTION, SOLUTION INTRAVENOUS at 00:03

## 2017-05-20 RX ADMIN — Medication 0.07 MCG/KG/MIN: at 00:46

## 2017-05-20 RX ADMIN — CEFAZOLIN SODIUM 1 G: 1 INJECTION, SOLUTION INTRAVENOUS at 15:29

## 2017-05-20 RX ADMIN — Medication 10 ML: at 08:03

## 2017-05-20 RX ADMIN — ONDANSETRON 4 MG: 2 INJECTION, SOLUTION INTRAMUSCULAR; INTRAVENOUS at 04:48

## 2017-05-20 RX ADMIN — ALBUTEROL SULFATE 2.5 MG: 2.5 SOLUTION RESPIRATORY (INHALATION) at 16:02

## 2017-05-20 RX ADMIN — POTASSIUM CHLORIDE 20 MEQ: 29.8 INJECTION, SOLUTION INTRAVENOUS at 06:39

## 2017-05-20 RX ADMIN — AMIODARONE HYDROCHLORIDE 200 MG: 200 TABLET ORAL at 21:03

## 2017-05-20 RX ADMIN — EPINEPHRINE 0.03 MCG/KG/MIN: 1 INJECTION PARENTERAL at 14:20

## 2017-05-20 RX ADMIN — BUMETANIDE 0.1 MG/HR: 0.25 INJECTION, SOLUTION INTRAMUSCULAR; INTRAVENOUS at 00:43

## 2017-05-20 RX ADMIN — ALBUMIN (HUMAN) 25 G: 12.5 INJECTION, SOLUTION INTRAVENOUS at 06:52

## 2017-05-20 RX ADMIN — Medication 10 ML: at 21:04

## 2017-05-20 RX ADMIN — AMIODARONE HYDROCHLORIDE 200 MG: 200 TABLET ORAL at 14:17

## 2017-05-20 RX ADMIN — ATORVASTATIN CALCIUM 40 MG: 40 TABLET, FILM COATED ORAL at 21:03

## 2017-05-20 RX ADMIN — ACETAMINOPHEN 650 MG: 325 TABLET ORAL at 15:28

## 2017-05-20 RX ADMIN — Medication 10 ML: at 00:07

## 2017-05-20 RX ADMIN — EPINEPHRINE 0.05 MCG/KG/MIN: 1 INJECTION PARENTERAL at 00:45

## 2017-05-20 RX ADMIN — ALBUTEROL SULFATE 2.5 MG: 2.5 SOLUTION RESPIRATORY (INHALATION) at 07:27

## 2017-05-20 RX ADMIN — PANTOPRAZOLE SODIUM 40 MG: 40 INJECTION, POWDER, FOR SOLUTION INTRAVENOUS at 00:04

## 2017-05-20 RX ADMIN — ALBUMIN (HUMAN) 25 G: 12.5 INJECTION, SOLUTION INTRAVENOUS at 05:04

## 2017-05-20 ASSESSMENT — PAIN SCALES - GENERAL
PAINLEVEL_OUTOF10: 0
PAINLEVEL_OUTOF10: 5
PAINLEVEL_OUTOF10: 0

## 2017-05-20 ASSESSMENT — PAIN DESCRIPTION - PAIN TYPE: TYPE: SURGICAL PAIN

## 2017-05-20 ASSESSMENT — PULMONARY FUNCTION TESTS
PIF_VALUE: 23
PIF_VALUE: 11
PIF_VALUE: 16
PIF_VALUE: 21
PIF_VALUE: 13

## 2017-05-20 ASSESSMENT — PAIN DESCRIPTION - DESCRIPTORS: DESCRIPTORS: SORE;DISCOMFORT

## 2017-05-20 ASSESSMENT — PAIN DESCRIPTION - ONSET: ONSET: UNABLE TO TELL

## 2017-05-20 ASSESSMENT — PAIN DESCRIPTION - PROGRESSION: CLINICAL_PROGRESSION: GRADUALLY IMPROVING

## 2017-05-20 ASSESSMENT — PAIN DESCRIPTION - FREQUENCY: FREQUENCY: INTERMITTENT

## 2017-05-20 ASSESSMENT — PAIN DESCRIPTION - LOCATION: LOCATION: CHEST

## 2017-05-20 ASSESSMENT — PAIN DESCRIPTION - ORIENTATION: ORIENTATION: MID

## 2017-05-21 ENCOUNTER — APPOINTMENT (OUTPATIENT)
Dept: GENERAL RADIOLOGY | Age: 82
DRG: 220 | End: 2017-05-21
Attending: THORACIC SURGERY (CARDIOTHORACIC VASCULAR SURGERY)
Payer: MEDICARE

## 2017-05-21 LAB
ANION GAP SERPL CALCULATED.3IONS-SCNC: 13 MMOL/L (ref 9–17)
ANION GAP SERPL CALCULATED.3IONS-SCNC: 15 MMOL/L (ref 9–17)
BUN BLDV-MCNC: 28 MG/DL (ref 8–23)
BUN BLDV-MCNC: 30 MG/DL (ref 8–23)
BUN/CREAT BLD: ABNORMAL (ref 9–20)
BUN/CREAT BLD: ABNORMAL (ref 9–20)
CALCIUM SERPL-MCNC: 8.4 MG/DL (ref 8.6–10.4)
CALCIUM SERPL-MCNC: 8.5 MG/DL (ref 8.6–10.4)
CHLORIDE BLD-SCNC: 102 MMOL/L (ref 98–107)
CHLORIDE BLD-SCNC: 102 MMOL/L (ref 98–107)
CO2: 20 MMOL/L (ref 20–31)
CO2: 23 MMOL/L (ref 20–31)
CREAT SERPL-MCNC: 1.48 MG/DL (ref 0.7–1.2)
CREAT SERPL-MCNC: 1.55 MG/DL (ref 0.7–1.2)
FIO2: 2
FIO2: 3
GFR AFRICAN AMERICAN: 53 ML/MIN
GFR AFRICAN AMERICAN: 55 ML/MIN
GFR NON-AFRICAN AMERICAN: 43 ML/MIN
GFR NON-AFRICAN AMERICAN: 46 ML/MIN
GFR SERPL CREATININE-BSD FRML MDRD: ABNORMAL ML/MIN/{1.73_M2}
GLUCOSE BLD-MCNC: 108 MG/DL (ref 70–99)
GLUCOSE BLD-MCNC: 128 MG/DL (ref 75–110)
GLUCOSE BLD-MCNC: 135 MG/DL (ref 75–110)
GLUCOSE BLD-MCNC: 138 MG/DL (ref 75–110)
GLUCOSE BLD-MCNC: 157 MG/DL (ref 75–110)
GLUCOSE BLD-MCNC: 166 MG/DL (ref 75–110)
GLUCOSE BLD-MCNC: 82 MG/DL (ref 75–110)
GLUCOSE BLD-MCNC: 83 MG/DL (ref 75–110)
GLUCOSE BLD-MCNC: 84 MG/DL (ref 70–99)
HCT VFR BLD CALC: 24 % (ref 41–53)
HCT VFR BLD CALC: 27 % (ref 41–53)
HEMOGLOBIN: 8 G/DL (ref 13.5–17.5)
HEMOGLOBIN: 9 G/DL (ref 13.5–17.5)
INR BLD: 1.1
MAGNESIUM: 2.1 MG/DL (ref 1.6–2.6)
MAGNESIUM: 2.1 MG/DL (ref 1.6–2.6)
MCH RBC QN AUTO: 30.8 PG (ref 26–34)
MCHC RBC AUTO-ENTMCNC: 33.5 G/DL (ref 31–37)
MCV RBC AUTO: 91.9 FL (ref 80–100)
NEGATIVE BASE EXCESS, ART: ABNORMAL (ref 0–2)
NEGATIVE BASE EXCESS, ART: NORMAL (ref 0–2)
O2 DEVICE/FLOW/%: ABNORMAL
O2 DEVICE/FLOW/%: NORMAL
PATIENT TEMP: 37.4
PATIENT TEMP: 37.9
PDW BLD-RTO: 17 % (ref 12.5–15.4)
PLATELET # BLD: 93 K/UL (ref 140–450)
PMV BLD AUTO: 7.2 FL (ref 6–12)
POC HCO3: 24.9 MMOL/L (ref 22–27)
POC HCO3: 25.9 MMOL/L (ref 22–27)
POC O2 SATURATION: 95 %
POC O2 SATURATION: 95 %
POC PCO2 TEMP: ABNORMAL MM HG
POC PCO2 TEMP: NORMAL MM HG
POC PCO2: 39 MM HG (ref 32–45)
POC PCO2: 41 MM HG (ref 32–45)
POC PH TEMP: ABNORMAL
POC PH TEMP: NORMAL
POC PH: 7.39 (ref 7.35–7.45)
POC PH: 7.44 (ref 7.35–7.45)
POC PO2 TEMP: ABNORMAL MM HG
POC PO2 TEMP: NORMAL MM HG
POC PO2: 74 MM HG (ref 75–95)
POC PO2: 79 MM HG (ref 75–95)
POSITIVE BASE EXCESS, ART: 0 (ref 0–2)
POSITIVE BASE EXCESS, ART: 2 (ref 0–2)
POTASSIUM SERPL-SCNC: 4.2 MMOL/L (ref 3.7–5.3)
POTASSIUM SERPL-SCNC: 4.2 MMOL/L (ref 3.7–5.3)
POTASSIUM SERPL-SCNC: 5.1 MMOL/L (ref 3.7–5.3)
PROTHROMBIN TIME: 11.8 SEC (ref 9.4–12.6)
RBC # BLD: 2.93 M/UL (ref 4.5–5.9)
SODIUM BLD-SCNC: 137 MMOL/L (ref 135–144)
SODIUM BLD-SCNC: 138 MMOL/L (ref 135–144)
TCO2 (CALC), ART: 26 MM HG (ref 23–28)
TCO2 (CALC), ART: 27 MM HG (ref 23–28)
WBC # BLD: 9.1 K/UL (ref 3.5–11)

## 2017-05-21 PROCEDURE — P9016 RBC LEUKOCYTES REDUCED: HCPCS

## 2017-05-21 PROCEDURE — 94660 CPAP INITIATION&MGMT: CPT

## 2017-05-21 PROCEDURE — 85014 HEMATOCRIT: CPT

## 2017-05-21 PROCEDURE — 85018 HEMOGLOBIN: CPT

## 2017-05-21 PROCEDURE — 6360000002 HC RX W HCPCS: Performed by: THORACIC SURGERY (CARDIOTHORACIC VASCULAR SURGERY)

## 2017-05-21 PROCEDURE — 2500000003 HC RX 250 WO HCPCS: Performed by: THORACIC SURGERY (CARDIOTHORACIC VASCULAR SURGERY)

## 2017-05-21 PROCEDURE — 84132 ASSAY OF SERUM POTASSIUM: CPT

## 2017-05-21 PROCEDURE — 2580000003 HC RX 258: Performed by: THORACIC SURGERY (CARDIOTHORACIC VASCULAR SURGERY)

## 2017-05-21 PROCEDURE — G8979 MOBILITY GOAL STATUS: HCPCS

## 2017-05-21 PROCEDURE — 83735 ASSAY OF MAGNESIUM: CPT

## 2017-05-21 PROCEDURE — 6370000000 HC RX 637 (ALT 250 FOR IP): Performed by: STUDENT IN AN ORGANIZED HEALTH CARE EDUCATION/TRAINING PROGRAM

## 2017-05-21 PROCEDURE — 97110 THERAPEUTIC EXERCISES: CPT

## 2017-05-21 PROCEDURE — C9113 INJ PANTOPRAZOLE SODIUM, VIA: HCPCS | Performed by: THORACIC SURGERY (CARDIOTHORACIC VASCULAR SURGERY)

## 2017-05-21 PROCEDURE — 85610 PROTHROMBIN TIME: CPT

## 2017-05-21 PROCEDURE — 82947 ASSAY GLUCOSE BLOOD QUANT: CPT

## 2017-05-21 PROCEDURE — G8978 MOBILITY CURRENT STATUS: HCPCS

## 2017-05-21 PROCEDURE — 80048 BASIC METABOLIC PNL TOTAL CA: CPT

## 2017-05-21 PROCEDURE — 97116 GAIT TRAINING THERAPY: CPT

## 2017-05-21 PROCEDURE — 2100000001 HC CVICU R&B

## 2017-05-21 PROCEDURE — 97162 PT EVAL MOD COMPLEX 30 MIN: CPT

## 2017-05-21 PROCEDURE — 71010 XR CHEST PORTABLE: CPT

## 2017-05-21 PROCEDURE — 6370000000 HC RX 637 (ALT 250 FOR IP): Performed by: THORACIC SURGERY (CARDIOTHORACIC VASCULAR SURGERY)

## 2017-05-21 PROCEDURE — 36430 TRANSFUSION BLD/BLD COMPNT: CPT

## 2017-05-21 PROCEDURE — 94762 N-INVAS EAR/PLS OXIMTRY CONT: CPT

## 2017-05-21 PROCEDURE — 94640 AIRWAY INHALATION TREATMENT: CPT

## 2017-05-21 PROCEDURE — 93005 ELECTROCARDIOGRAM TRACING: CPT

## 2017-05-21 PROCEDURE — 86900 BLOOD TYPING SEROLOGIC ABO: CPT

## 2017-05-21 PROCEDURE — 82803 BLOOD GASES ANY COMBINATION: CPT

## 2017-05-21 PROCEDURE — 85027 COMPLETE CBC AUTOMATED: CPT

## 2017-05-21 RX ORDER — ALBUTEROL SULFATE 90 UG/1
2 AEROSOL, METERED RESPIRATORY (INHALATION) EVERY 6 HOURS PRN
Status: DISCONTINUED | OUTPATIENT
Start: 2017-05-21 | End: 2017-05-21

## 2017-05-21 RX ORDER — DIPHENHYDRAMINE HCL 25 MG
75 TABLET ORAL NIGHTLY PRN
Status: DISCONTINUED | OUTPATIENT
Start: 2017-05-21 | End: 2017-05-21

## 2017-05-21 RX ORDER — MEPERIDINE HYDROCHLORIDE 50 MG/ML
25 INJECTION INTRAMUSCULAR; INTRAVENOUS; SUBCUTANEOUS ONCE
Status: DISCONTINUED | OUTPATIENT
Start: 2017-05-21 | End: 2017-05-23 | Stop reason: HOSPADM

## 2017-05-21 RX ORDER — MEPERIDINE HYDROCHLORIDE 50 MG/ML
25 INJECTION INTRAMUSCULAR; INTRAVENOUS; SUBCUTANEOUS ONCE
Status: COMPLETED | OUTPATIENT
Start: 2017-05-21 | End: 2017-05-21

## 2017-05-21 RX ORDER — DIPHENHYDRAMINE HCL 25 MG
25 TABLET ORAL NIGHTLY PRN
Status: DISCONTINUED | OUTPATIENT
Start: 2017-05-21 | End: 2017-05-23 | Stop reason: HOSPADM

## 2017-05-21 RX ORDER — BACLOFEN 10 MG/1
10 TABLET ORAL 3 TIMES DAILY PRN
Status: DISCONTINUED | OUTPATIENT
Start: 2017-05-21 | End: 2017-05-23 | Stop reason: HOSPADM

## 2017-05-21 RX ORDER — OXCARBAZEPINE 300 MG/1
300 TABLET, FILM COATED ORAL 4 TIMES DAILY
Status: DISCONTINUED | OUTPATIENT
Start: 2017-05-21 | End: 2017-05-23 | Stop reason: HOSPADM

## 2017-05-21 RX ORDER — TRAMADOL HYDROCHLORIDE 50 MG/1
50 TABLET ORAL ONCE
Status: DISCONTINUED | OUTPATIENT
Start: 2017-05-21 | End: 2017-05-23 | Stop reason: HOSPADM

## 2017-05-21 RX ADMIN — BACLOFEN 10 MG: 10 TABLET ORAL at 13:21

## 2017-05-21 RX ADMIN — DIPHENHYDRAMINE HCL 25 MG: 25 TABLET ORAL at 20:41

## 2017-05-21 RX ADMIN — PANTOPRAZOLE SODIUM 40 MG: 40 INJECTION, POWDER, FOR SOLUTION INTRAVENOUS at 08:20

## 2017-05-21 RX ADMIN — ACETAMINOPHEN 650 MG: 325 TABLET ORAL at 02:04

## 2017-05-21 RX ADMIN — ALBUTEROL SULFATE 2.5 MG: 2.5 SOLUTION RESPIRATORY (INHALATION) at 15:21

## 2017-05-21 RX ADMIN — Medication 10 ML: at 08:21

## 2017-05-21 RX ADMIN — DOCUSATE SODIUM 100 MG: 100 CAPSULE ORAL at 08:20

## 2017-05-21 RX ADMIN — ACETAMINOPHEN 650 MG: 325 TABLET ORAL at 13:21

## 2017-05-21 RX ADMIN — DOPAMINE HYDROCHLORIDE 2 MCG/KG/MIN: 160 INJECTION, SOLUTION INTRAVENOUS at 06:13

## 2017-05-21 RX ADMIN — ALBUTEROL SULFATE 2.5 MG: 2.5 SOLUTION RESPIRATORY (INHALATION) at 11:41

## 2017-05-21 RX ADMIN — Medication 0.04 MCG/KG/MIN: at 06:13

## 2017-05-21 RX ADMIN — BUMETANIDE 0.03 MG/HR: 0.25 INJECTION, SOLUTION INTRAMUSCULAR; INTRAVENOUS at 06:22

## 2017-05-21 RX ADMIN — ACETAMINOPHEN 650 MG: 325 TABLET ORAL at 20:41

## 2017-05-21 RX ADMIN — ATORVASTATIN CALCIUM 40 MG: 40 TABLET, FILM COATED ORAL at 20:41

## 2017-05-21 RX ADMIN — OXCARBAZEPINE 300 MG: 300 TABLET, FILM COATED ORAL at 15:21

## 2017-05-21 RX ADMIN — OXCARBAZEPINE 300 MG: 300 TABLET, FILM COATED ORAL at 11:31

## 2017-05-21 RX ADMIN — OXCARBAZEPINE 300 MG: 300 TABLET, FILM COATED ORAL at 20:41

## 2017-05-21 RX ADMIN — MEPERIDINE HYDROCHLORIDE 25 MG: 50 INJECTION, SOLUTION INTRAMUSCULAR; INTRAVENOUS; SUBCUTANEOUS at 17:19

## 2017-05-21 RX ADMIN — ALBUTEROL SULFATE 2.5 MG: 2.5 SOLUTION RESPIRATORY (INHALATION) at 07:26

## 2017-05-21 RX ADMIN — POTASSIUM CHLORIDE 20 MEQ: 29.8 INJECTION, SOLUTION INTRAVENOUS at 06:22

## 2017-05-21 RX ADMIN — POTASSIUM CHLORIDE 20 MEQ: 29.8 INJECTION, SOLUTION INTRAVENOUS at 00:43

## 2017-05-21 RX ADMIN — ACETAMINOPHEN 650 MG: 325 TABLET ORAL at 06:13

## 2017-05-21 RX ADMIN — BACLOFEN 10 MG: 10 TABLET ORAL at 20:41

## 2017-05-21 RX ADMIN — ALBUTEROL SULFATE 2.5 MG: 2.5 SOLUTION RESPIRATORY (INHALATION) at 20:16

## 2017-05-21 RX ADMIN — DOCUSATE SODIUM 100 MG: 100 CAPSULE ORAL at 20:41

## 2017-05-21 RX ADMIN — SODIUM CHLORIDE 1.15 UNITS/HR: 9 INJECTION, SOLUTION INTRAVENOUS at 06:22

## 2017-05-21 RX ADMIN — Medication 10 ML: at 20:42

## 2017-05-21 ASSESSMENT — PAIN DESCRIPTION - PAIN TYPE
TYPE: SURGICAL PAIN
TYPE: SURGICAL PAIN;ACUTE PAIN
TYPE: SURGICAL PAIN
TYPE: ACUTE PAIN;SURGICAL PAIN
TYPE: ACUTE PAIN;SURGICAL PAIN
TYPE: SURGICAL PAIN
TYPE: ACUTE PAIN;SURGICAL PAIN

## 2017-05-21 ASSESSMENT — PAIN SCALES - GENERAL
PAINLEVEL_OUTOF10: 2
PAINLEVEL_OUTOF10: 4
PAINLEVEL_OUTOF10: 0
PAINLEVEL_OUTOF10: 3
PAINLEVEL_OUTOF10: 1
PAINLEVEL_OUTOF10: 3
PAINLEVEL_OUTOF10: 2
PAINLEVEL_OUTOF10: 3
PAINLEVEL_OUTOF10: 0
PAINLEVEL_OUTOF10: 3
PAINLEVEL_OUTOF10: 2

## 2017-05-21 ASSESSMENT — PAIN DESCRIPTION - LOCATION
LOCATION: CHEST;INCISION
LOCATION: CHEST
LOCATION: CHEST;INCISION
LOCATION: CHEST
LOCATION: CHEST;INCISION
LOCATION: CHEST;INCISION
LOCATION: CHEST

## 2017-05-21 ASSESSMENT — PAIN DESCRIPTION - ORIENTATION
ORIENTATION: MID

## 2017-05-21 ASSESSMENT — PAIN DESCRIPTION - ONSET
ONSET: ON-GOING

## 2017-05-21 ASSESSMENT — PAIN DESCRIPTION - FREQUENCY
FREQUENCY: INTERMITTENT

## 2017-05-21 ASSESSMENT — PAIN DESCRIPTION - PROGRESSION
CLINICAL_PROGRESSION: GRADUALLY IMPROVING

## 2017-05-21 ASSESSMENT — PULMONARY FUNCTION TESTS: PIF_VALUE: 13

## 2017-05-21 ASSESSMENT — PAIN DESCRIPTION - DESCRIPTORS
DESCRIPTORS: DISCOMFORT
DESCRIPTORS: DISCOMFORT
DESCRIPTORS: DISCOMFORT;SORE

## 2017-05-22 ENCOUNTER — APPOINTMENT (OUTPATIENT)
Dept: GENERAL RADIOLOGY | Age: 82
DRG: 220 | End: 2017-05-22
Attending: THORACIC SURGERY (CARDIOTHORACIC VASCULAR SURGERY)
Payer: MEDICARE

## 2017-05-22 LAB
ABO/RH: NORMAL
ANION GAP SERPL CALCULATED.3IONS-SCNC: 12 MMOL/L (ref 9–17)
ANTIBODY SCREEN: NEGATIVE
ARM BAND NUMBER: NORMAL
BLD PROD TYP BPU: NORMAL
BUN BLDV-MCNC: 29 MG/DL (ref 8–23)
BUN/CREAT BLD: ABNORMAL (ref 9–20)
CALCIUM SERPL-MCNC: 8.7 MG/DL (ref 8.6–10.4)
CHLORIDE BLD-SCNC: 102 MMOL/L (ref 98–107)
CO2: 24 MMOL/L (ref 20–31)
CREAT SERPL-MCNC: 1.15 MG/DL (ref 0.7–1.2)
CROSSMATCH RESULT: NORMAL
DISPENSE STATUS BLOOD BANK: NORMAL
EKG ATRIAL RATE: 82 BPM
EKG P AXIS: 72 DEGREES
EKG P-R INTERVAL: 346 MS
EKG Q-T INTERVAL: 386 MS
EKG QRS DURATION: 86 MS
EKG QTC CALCULATION (BAZETT): 450 MS
EKG R AXIS: 66 DEGREES
EKG T AXIS: -148 DEGREES
EKG VENTRICULAR RATE: 82 BPM
EXPIRATION DATE: NORMAL
FIO2: 40
GFR AFRICAN AMERICAN: >60 ML/MIN
GFR NON-AFRICAN AMERICAN: >60 ML/MIN
GFR SERPL CREATININE-BSD FRML MDRD: ABNORMAL ML/MIN/{1.73_M2}
GFR SERPL CREATININE-BSD FRML MDRD: ABNORMAL ML/MIN/{1.73_M2}
GLUCOSE BLD-MCNC: 100 MG/DL (ref 75–110)
GLUCOSE BLD-MCNC: 106 MG/DL (ref 75–110)
GLUCOSE BLD-MCNC: 108 MG/DL (ref 70–99)
GLUCOSE BLD-MCNC: 116 MG/DL (ref 75–110)
GLUCOSE BLD-MCNC: 123 MG/DL (ref 74–106)
GLUCOSE BLD-MCNC: 132 MG/DL (ref 75–110)
HCT VFR BLD CALC: 26.8 % (ref 41–53)
HCT VFR BLD CALC: 28 % (ref 41–53)
HEMOGLOBIN: 9.1 G/DL (ref 13.5–17.5)
HEMOGLOBIN: 9.3 G/DL (ref 13.5–17.5)
INR BLD: 1
MAGNESIUM: 2.2 MG/DL (ref 1.6–2.6)
MCH RBC QN AUTO: 31.1 PG (ref 26–34)
MCH RBC QN AUTO: 31.1 PG (ref 26–34)
MCHC RBC AUTO-ENTMCNC: 33.2 G/DL (ref 31–37)
MCHC RBC AUTO-ENTMCNC: 33.8 G/DL (ref 31–37)
MCV RBC AUTO: 92 FL (ref 80–100)
MCV RBC AUTO: 93.7 FL (ref 80–100)
NEGATIVE BASE EXCESS, ART: ABNORMAL (ref 0–2)
O2 DEVICE/FLOW/%: ABNORMAL
PATIENT TEMP: ABNORMAL
PDW BLD-RTO: 16.8 % (ref 12.5–15.4)
PDW BLD-RTO: 17.3 % (ref 12.5–15.4)
PLATELET # BLD: 78 K/UL (ref 140–450)
PLATELET # BLD: 98 K/UL (ref 140–450)
PMV BLD AUTO: 7.8 FL (ref 6–12)
PMV BLD AUTO: 8.5 FL (ref 6–12)
POC HCO3: ABNORMAL MMOL/L (ref 22–27)
POC HEMATOCRIT: 24 % (ref 41–53)
POC HEMOGLOBIN: 8.2 GM/DL (ref 13.5–17.5)
POC IONIZED CALCIUM: 1.13 MMOL/L (ref 1.13–1.33)
POC O2 SATURATION: ABNORMAL %
POC PCO2 TEMP: ABNORMAL MM HG
POC PCO2: ABNORMAL MM HG (ref 32–45)
POC PH TEMP: ABNORMAL
POC PH: ABNORMAL (ref 7.35–7.45)
POC PO2 TEMP: ABNORMAL MM HG
POC PO2: 119 MM HG (ref 75–95)
POC POTASSIUM: 5.3 MMOL/L (ref 3.5–5.1)
POC SODIUM: 146 MMOL/L (ref 136–145)
POSITIVE BASE EXCESS, ART: ABNORMAL (ref 0–2)
POTASSIUM SERPL-SCNC: 4.6 MMOL/L (ref 3.7–5.3)
PROTHROMBIN TIME: 10.5 SEC (ref 9.4–12.6)
RBC # BLD: 2.91 M/UL (ref 4.5–5.9)
RBC # BLD: 2.98 M/UL (ref 4.5–5.9)
SODIUM BLD-SCNC: 138 MMOL/L (ref 135–144)
TCO2 (CALC), ART: ABNORMAL MM HG (ref 23–28)
TRANSFUSION STATUS: NORMAL
UNIT DIVISION: 0
UNIT NUMBER: NORMAL
WBC # BLD: 7.2 K/UL (ref 3.5–11)
WBC # BLD: 7.5 K/UL (ref 3.5–11)

## 2017-05-22 PROCEDURE — 6370000000 HC RX 637 (ALT 250 FOR IP): Performed by: THORACIC SURGERY (CARDIOTHORACIC VASCULAR SURGERY)

## 2017-05-22 PROCEDURE — 80048 BASIC METABOLIC PNL TOTAL CA: CPT

## 2017-05-22 PROCEDURE — 6370000000 HC RX 637 (ALT 250 FOR IP): Performed by: STUDENT IN AN ORGANIZED HEALTH CARE EDUCATION/TRAINING PROGRAM

## 2017-05-22 PROCEDURE — 36415 COLL VENOUS BLD VENIPUNCTURE: CPT

## 2017-05-22 PROCEDURE — C9113 INJ PANTOPRAZOLE SODIUM, VIA: HCPCS | Performed by: THORACIC SURGERY (CARDIOTHORACIC VASCULAR SURGERY)

## 2017-05-22 PROCEDURE — 6360000002 HC RX W HCPCS: Performed by: THORACIC SURGERY (CARDIOTHORACIC VASCULAR SURGERY)

## 2017-05-22 PROCEDURE — 97116 GAIT TRAINING THERAPY: CPT

## 2017-05-22 PROCEDURE — 85610 PROTHROMBIN TIME: CPT

## 2017-05-22 PROCEDURE — 97530 THERAPEUTIC ACTIVITIES: CPT

## 2017-05-22 PROCEDURE — 97110 THERAPEUTIC EXERCISES: CPT

## 2017-05-22 PROCEDURE — 71020 XR CHEST STANDARD TWO VW: CPT

## 2017-05-22 PROCEDURE — 6360000002 HC RX W HCPCS

## 2017-05-22 PROCEDURE — 94640 AIRWAY INHALATION TREATMENT: CPT

## 2017-05-22 PROCEDURE — 2140000001 HC CVICU INTERMEDIATE R&B

## 2017-05-22 PROCEDURE — 2500000003 HC RX 250 WO HCPCS

## 2017-05-22 PROCEDURE — 85027 COMPLETE CBC AUTOMATED: CPT

## 2017-05-22 PROCEDURE — 83735 ASSAY OF MAGNESIUM: CPT

## 2017-05-22 PROCEDURE — 82947 ASSAY GLUCOSE BLOOD QUANT: CPT

## 2017-05-22 PROCEDURE — 2580000003 HC RX 258: Performed by: THORACIC SURGERY (CARDIOTHORACIC VASCULAR SURGERY)

## 2017-05-22 RX ORDER — ASPIRIN 81 MG/1
81 TABLET, CHEWABLE ORAL DAILY
Status: DISCONTINUED | OUTPATIENT
Start: 2017-05-22 | End: 2017-05-22

## 2017-05-22 RX ADMIN — DOCUSATE SODIUM 100 MG: 100 CAPSULE ORAL at 08:48

## 2017-05-22 RX ADMIN — DOCUSATE SODIUM 100 MG: 100 CAPSULE ORAL at 22:05

## 2017-05-22 RX ADMIN — OXCARBAZEPINE 300 MG: 300 TABLET, FILM COATED ORAL at 22:05

## 2017-05-22 RX ADMIN — ALBUTEROL SULFATE 2.5 MG: 2.5 SOLUTION RESPIRATORY (INHALATION) at 07:50

## 2017-05-22 RX ADMIN — BACLOFEN 10 MG: 10 TABLET ORAL at 13:54

## 2017-05-22 RX ADMIN — Medication 10 ML: at 22:33

## 2017-05-22 RX ADMIN — Medication 10 ML: at 08:49

## 2017-05-22 RX ADMIN — OXCARBAZEPINE 300 MG: 300 TABLET, FILM COATED ORAL at 18:17

## 2017-05-22 RX ADMIN — OXCARBAZEPINE 300 MG: 300 TABLET, FILM COATED ORAL at 08:48

## 2017-05-22 RX ADMIN — METOPROLOL TARTRATE 12.5 MG: 25 TABLET ORAL at 12:00

## 2017-05-22 RX ADMIN — BACLOFEN 10 MG: 10 TABLET ORAL at 06:24

## 2017-05-22 RX ADMIN — ATORVASTATIN CALCIUM 40 MG: 40 TABLET, FILM COATED ORAL at 22:05

## 2017-05-22 RX ADMIN — DIPHENHYDRAMINE HCL 25 MG: 25 TABLET ORAL at 22:32

## 2017-05-22 RX ADMIN — ALBUTEROL SULFATE 2.5 MG: 2.5 SOLUTION RESPIRATORY (INHALATION) at 11:16

## 2017-05-22 RX ADMIN — ACETAMINOPHEN 650 MG: 325 TABLET ORAL at 06:24

## 2017-05-22 RX ADMIN — Medication 10 ML: at 08:51

## 2017-05-22 RX ADMIN — OXCARBAZEPINE 300 MG: 300 TABLET, FILM COATED ORAL at 13:55

## 2017-05-22 RX ADMIN — ASPIRIN 81 MG: 81 TABLET, CHEWABLE ORAL at 12:00

## 2017-05-22 RX ADMIN — ALBUTEROL SULFATE 2.5 MG: 2.5 SOLUTION RESPIRATORY (INHALATION) at 15:22

## 2017-05-22 RX ADMIN — DOPAMINE HYDROCHLORIDE 2 MCG/KG/MIN: 160 INJECTION, SOLUTION INTRAVENOUS at 06:00

## 2017-05-22 RX ADMIN — METOPROLOL TARTRATE 12.5 MG: 25 TABLET ORAL at 22:05

## 2017-05-22 RX ADMIN — ALBUTEROL SULFATE 2.5 MG: 2.5 SOLUTION RESPIRATORY (INHALATION) at 19:55

## 2017-05-22 RX ADMIN — PANTOPRAZOLE SODIUM 40 MG: 40 INJECTION, POWDER, FOR SOLUTION INTRAVENOUS at 08:48

## 2017-05-22 ASSESSMENT — PAIN DESCRIPTION - PROGRESSION: CLINICAL_PROGRESSION: GRADUALLY IMPROVING

## 2017-05-22 ASSESSMENT — PAIN DESCRIPTION - PAIN TYPE: TYPE: CHRONIC PAIN

## 2017-05-22 ASSESSMENT — PAIN DESCRIPTION - LOCATION: LOCATION: FACE

## 2017-05-22 ASSESSMENT — PAIN SCALES - GENERAL
PAINLEVEL_OUTOF10: 5
PAINLEVEL_OUTOF10: 0

## 2017-05-22 ASSESSMENT — PAIN DESCRIPTION - DESCRIPTORS: DESCRIPTORS: CONSTANT;DISCOMFORT

## 2017-05-22 ASSESSMENT — PAIN DESCRIPTION - FREQUENCY: FREQUENCY: INTERMITTENT

## 2017-05-22 ASSESSMENT — PAIN DESCRIPTION - ORIENTATION: ORIENTATION: LEFT

## 2017-05-22 ASSESSMENT — PAIN DESCRIPTION - ONSET: ONSET: AWAKENED FROM SLEEP

## 2017-05-23 VITALS
DIASTOLIC BLOOD PRESSURE: 55 MMHG | OXYGEN SATURATION: 94 % | HEART RATE: 91 BPM | RESPIRATION RATE: 16 BRPM | HEIGHT: 67 IN | TEMPERATURE: 99 F | BODY MASS INDEX: 34.19 KG/M2 | SYSTOLIC BLOOD PRESSURE: 127 MMHG | WEIGHT: 217.81 LBS

## 2017-05-23 LAB
ANION GAP SERPL CALCULATED.3IONS-SCNC: 12 MMOL/L (ref 9–17)
BUN BLDV-MCNC: 35 MG/DL (ref 8–23)
BUN/CREAT BLD: ABNORMAL (ref 9–20)
CALCIUM SERPL-MCNC: 8.8 MG/DL (ref 8.6–10.4)
CHLORIDE BLD-SCNC: 101 MMOL/L (ref 98–107)
CO2: 24 MMOL/L (ref 20–31)
CREAT SERPL-MCNC: 1.21 MG/DL (ref 0.7–1.2)
EKG ATRIAL RATE: 88 BPM
EKG P AXIS: 27 DEGREES
EKG P-R INTERVAL: 296 MS
EKG Q-T INTERVAL: 384 MS
EKG QRS DURATION: 86 MS
EKG QTC CALCULATION (BAZETT): 464 MS
EKG R AXIS: 31 DEGREES
EKG T AXIS: 128 DEGREES
EKG VENTRICULAR RATE: 88 BPM
GFR AFRICAN AMERICAN: >60 ML/MIN
GFR NON-AFRICAN AMERICAN: 58 ML/MIN
GFR SERPL CREATININE-BSD FRML MDRD: ABNORMAL ML/MIN/{1.73_M2}
GFR SERPL CREATININE-BSD FRML MDRD: ABNORMAL ML/MIN/{1.73_M2}
GLUCOSE BLD-MCNC: 103 MG/DL (ref 70–99)
GLUCOSE BLD-MCNC: 131 MG/DL (ref 75–110)
GLUCOSE BLD-MCNC: 98 MG/DL (ref 75–110)
HCT VFR BLD CALC: 26.6 % (ref 41–53)
HEMOGLOBIN: 9.1 G/DL (ref 13.5–17.5)
INR BLD: 1
MAGNESIUM: 2.3 MG/DL (ref 1.6–2.6)
MCH RBC QN AUTO: 31.5 PG (ref 26–34)
MCHC RBC AUTO-ENTMCNC: 34.1 G/DL (ref 31–37)
MCV RBC AUTO: 92.6 FL (ref 80–100)
PDW BLD-RTO: 16.9 % (ref 12.5–15.4)
PLATELET # BLD: 113 K/UL (ref 140–450)
PMV BLD AUTO: 8.1 FL (ref 6–12)
POTASSIUM SERPL-SCNC: 4.7 MMOL/L (ref 3.7–5.3)
PROTHROMBIN TIME: 10.9 SEC (ref 9.4–12.6)
RBC # BLD: 2.87 M/UL (ref 4.5–5.9)
SODIUM BLD-SCNC: 137 MMOL/L (ref 135–144)
SURGICAL PATHOLOGY REPORT: NORMAL
WBC # BLD: 7.1 K/UL (ref 3.5–11)

## 2017-05-23 PROCEDURE — G8987 SELF CARE CURRENT STATUS: HCPCS

## 2017-05-23 PROCEDURE — 94762 N-INVAS EAR/PLS OXIMTRY CONT: CPT

## 2017-05-23 PROCEDURE — 94640 AIRWAY INHALATION TREATMENT: CPT

## 2017-05-23 PROCEDURE — 2580000003 HC RX 258: Performed by: THORACIC SURGERY (CARDIOTHORACIC VASCULAR SURGERY)

## 2017-05-23 PROCEDURE — 6360000002 HC RX W HCPCS: Performed by: THORACIC SURGERY (CARDIOTHORACIC VASCULAR SURGERY)

## 2017-05-23 PROCEDURE — 93005 ELECTROCARDIOGRAM TRACING: CPT

## 2017-05-23 PROCEDURE — G8988 SELF CARE GOAL STATUS: HCPCS

## 2017-05-23 PROCEDURE — 97535 SELF CARE MNGMENT TRAINING: CPT

## 2017-05-23 PROCEDURE — 36415 COLL VENOUS BLD VENIPUNCTURE: CPT

## 2017-05-23 PROCEDURE — 85027 COMPLETE CBC AUTOMATED: CPT

## 2017-05-23 PROCEDURE — 83735 ASSAY OF MAGNESIUM: CPT

## 2017-05-23 PROCEDURE — 82947 ASSAY GLUCOSE BLOOD QUANT: CPT

## 2017-05-23 PROCEDURE — 80048 BASIC METABOLIC PNL TOTAL CA: CPT

## 2017-05-23 PROCEDURE — 97530 THERAPEUTIC ACTIVITIES: CPT

## 2017-05-23 PROCEDURE — 6370000000 HC RX 637 (ALT 250 FOR IP): Performed by: STUDENT IN AN ORGANIZED HEALTH CARE EDUCATION/TRAINING PROGRAM

## 2017-05-23 PROCEDURE — 97166 OT EVAL MOD COMPLEX 45 MIN: CPT

## 2017-05-23 PROCEDURE — 85610 PROTHROMBIN TIME: CPT

## 2017-05-23 PROCEDURE — 6370000000 HC RX 637 (ALT 250 FOR IP): Performed by: THORACIC SURGERY (CARDIOTHORACIC VASCULAR SURGERY)

## 2017-05-23 PROCEDURE — C9113 INJ PANTOPRAZOLE SODIUM, VIA: HCPCS | Performed by: THORACIC SURGERY (CARDIOTHORACIC VASCULAR SURGERY)

## 2017-05-23 RX ORDER — ASPIRIN 81 MG/1
81 TABLET, CHEWABLE ORAL ONCE
Status: COMPLETED | OUTPATIENT
Start: 2017-05-23 | End: 2017-05-23

## 2017-05-23 RX ORDER — POTASSIUM CHLORIDE 750 MG/1
10 TABLET, EXTENDED RELEASE ORAL DAILY
Qty: 30 TABLET | Refills: 0 | Status: ON HOLD | OUTPATIENT
Start: 2017-05-23 | End: 2017-06-01

## 2017-05-23 RX ORDER — FUROSEMIDE 20 MG/1
40 TABLET ORAL DAILY
Qty: 10 TABLET | Refills: 1 | Status: ON HOLD | OUTPATIENT
Start: 2017-05-23 | End: 2017-06-01 | Stop reason: HOSPADM

## 2017-05-23 RX ORDER — TRAMADOL HYDROCHLORIDE 50 MG/1
50 TABLET ORAL EVERY 6 HOURS PRN
Qty: 50 TABLET | Refills: 0 | Status: ON HOLD | OUTPATIENT
Start: 2017-05-23 | End: 2017-06-01 | Stop reason: HOSPADM

## 2017-05-23 RX ADMIN — DOCUSATE SODIUM 100 MG: 100 CAPSULE ORAL at 08:02

## 2017-05-23 RX ADMIN — ALBUTEROL SULFATE 2.5 MG: 2.5 SOLUTION RESPIRATORY (INHALATION) at 11:00

## 2017-05-23 RX ADMIN — PANTOPRAZOLE SODIUM 40 MG: 40 INJECTION, POWDER, FOR SOLUTION INTRAVENOUS at 08:03

## 2017-05-23 RX ADMIN — METOPROLOL TARTRATE 12.5 MG: 25 TABLET ORAL at 08:02

## 2017-05-23 RX ADMIN — ALBUTEROL SULFATE 2.5 MG: 2.5 SOLUTION RESPIRATORY (INHALATION) at 07:50

## 2017-05-23 RX ADMIN — ASPIRIN 81 MG: 81 TABLET ORAL at 11:15

## 2017-05-23 RX ADMIN — Medication 10 ML: at 08:03

## 2017-05-23 RX ADMIN — OXCARBAZEPINE 300 MG: 300 TABLET, FILM COATED ORAL at 13:07

## 2017-05-23 RX ADMIN — OXCARBAZEPINE 300 MG: 300 TABLET, FILM COATED ORAL at 08:02

## 2017-05-23 ASSESSMENT — PAIN SCALES - GENERAL: PAINLEVEL_OUTOF10: 0

## 2017-05-24 ENCOUNTER — HOSPITAL ENCOUNTER (OUTPATIENT)
Dept: CARDIAC REHAB | Age: 82
Setting detail: THERAPIES SERIES
Discharge: HOME | End: 2017-05-24
Attending: FAMILY MEDICINE | Admitting: FAMILY MEDICINE
Payer: MEDICARE

## 2017-05-24 ENCOUNTER — TELEPHONE (OUTPATIENT)
Dept: PHARMACY | Age: 82
End: 2017-05-24

## 2017-05-24 ENCOUNTER — CARE COORDINATION (OUTPATIENT)
Dept: CASE MANAGEMENT | Age: 82
End: 2017-05-24

## 2017-05-24 NOTE — PROGRESS NOTES
healthy handout    Target Goal:  -LDL-C<100 if triglycerides are > 200  -LDL-C < 70 for high risk patients  -HbA1c < 7%  -BMI < 25   Education    Stages of Change:    [] pre-contemplation   [x] Action   [] Contemplate   [] Maintainence   [x] Prep   [] Relapse    Family support: [x] Yes  [] No    Tobacco use: [] Yes  [x] No    Intervention:  [] Referred to smoking cessation counselor     [] Individual education and counseling  [] Tobacco adjunct  [] Informed of education class schedule     Education:   [] Risk Factors/Modifications  [] Psychological aspects  [] Angina    [] Medications  [] CHF      [] Cardiac A&P    Target Goal:  -Complete cessation of tobacco use (if applicable)  -Continued risk factor modifications  -Recognizing signs/symptoms to report  -Proper use of meds    Psychosocial  Stages of Change:    [] pre-contemplation   [x] Action   [] Contemplate   [] Maintainence   [x] Prep   [] Relapse    Intervention:   [] Psych Consult/  [x] Uses stress management skills    [] Physician Referral    [] Stress management class   [] Med Change? Education:    [] Coping Techniques   [] Relaxation techniques   [] S/S of Depression    Target Goal:  -Assess presence or absence of depression using a valid screening tool. -Maximize coping skills.  -Positive support system. Preventative Medication:   [x] Aspirin       [x] Beta Blockade      [x] Statin or other lipid lowering agent     [] Clopidogrel   [x] ACE Inhibitor   [] Other anticoagulation medications     Fall Risk assess: [x] Yes  [] No  Assistive Device:   [] Cane  [] Walker [] Wheel Chair  [] Gait belt    Patient/Program goal:   -continue to increase stamina/strength to 30-50 total exercise by increasing 1-2 level/wk and 1-2  min/wk to achieve THR and RPE 11-13 (has increased mets from 2.6 to 3.2)-pt had developed syncope which led to hsopitalization. He underwent CABG and AVR on 05/19/17 at [facility]. Katy Will supsend rehab at this time.

## 2017-05-25 ENCOUNTER — APPOINTMENT (OUTPATIENT)
Dept: CARDIAC REHAB | Age: 82
End: 2017-05-25
Attending: FAMILY MEDICINE
Payer: MEDICARE

## 2017-05-25 ENCOUNTER — HOSPITAL ENCOUNTER (OUTPATIENT)
Dept: GENERAL RADIOLOGY | Age: 82
Discharge: HOME | End: 2017-05-25
Payer: MEDICARE

## 2017-05-25 DIAGNOSIS — R52 PAIN: ICD-10-CM

## 2017-05-25 PROCEDURE — 71020 XR CHEST STANDARD TWO VW: CPT

## 2017-05-26 ENCOUNTER — CARE COORDINATION (OUTPATIENT)
Dept: CASE MANAGEMENT | Age: 82
End: 2017-05-26

## 2017-05-26 ENCOUNTER — HOSPITAL ENCOUNTER (OUTPATIENT)
Dept: VASCULAR LAB | Age: 82
Discharge: HOME | End: 2017-05-26
Payer: MEDICARE

## 2017-05-26 DIAGNOSIS — M79.604 BILATERAL LEG PAIN: ICD-10-CM

## 2017-05-26 DIAGNOSIS — M79.605 BILATERAL LEG PAIN: ICD-10-CM

## 2017-05-26 PROCEDURE — 93970 EXTREMITY STUDY: CPT

## 2017-05-29 ENCOUNTER — APPOINTMENT (OUTPATIENT)
Dept: CARDIAC REHAB | Age: 82
End: 2017-05-29
Attending: FAMILY MEDICINE
Payer: MEDICARE

## 2017-05-31 ENCOUNTER — HOSPITAL ENCOUNTER (OUTPATIENT)
Dept: GENERAL RADIOLOGY | Age: 82
Discharge: HOME | End: 2017-05-31
Payer: MEDICARE

## 2017-05-31 ENCOUNTER — APPOINTMENT (OUTPATIENT)
Dept: INTERVENTIONAL RADIOLOGY/VASCULAR | Age: 82
End: 2017-05-31
Attending: THORACIC SURGERY (CARDIOTHORACIC VASCULAR SURGERY)
Payer: MEDICARE

## 2017-05-31 ENCOUNTER — HOSPITAL ENCOUNTER (OUTPATIENT)
Age: 82
Setting detail: OBSERVATION
Discharge: HOME | End: 2017-06-01
Attending: THORACIC SURGERY (CARDIOTHORACIC VASCULAR SURGERY) | Admitting: THORACIC SURGERY (CARDIOTHORACIC VASCULAR SURGERY)
Payer: MEDICARE

## 2017-05-31 ENCOUNTER — APPOINTMENT (OUTPATIENT)
Dept: GENERAL RADIOLOGY | Age: 82
End: 2017-05-31
Attending: THORACIC SURGERY (CARDIOTHORACIC VASCULAR SURGERY)
Payer: MEDICARE

## 2017-05-31 ENCOUNTER — APPOINTMENT (OUTPATIENT)
Dept: CARDIAC REHAB | Age: 82
End: 2017-05-31
Attending: FAMILY MEDICINE
Payer: MEDICARE

## 2017-05-31 DIAGNOSIS — Z98.890 POST-OPERATIVE STATE: ICD-10-CM

## 2017-05-31 DIAGNOSIS — J90 PLEURAL EFFUSION, BILATERAL: Primary | ICD-10-CM

## 2017-05-31 DIAGNOSIS — R06.02 SHORTNESS OF BREATH: ICD-10-CM

## 2017-05-31 LAB
ABSOLUTE EOS #: 0.3 K/UL (ref 0–0.4)
ABSOLUTE LYMPH #: 0.7 K/UL (ref 1–4.8)
ABSOLUTE MONO #: 0.9 K/UL (ref 0.2–0.8)
ALBUMIN SERPL-MCNC: 3.6 G/DL (ref 3.5–5.2)
ALBUMIN/GLOBULIN RATIO: ABNORMAL (ref 1–2.5)
ALP BLD-CCNC: 133 U/L (ref 40–129)
ALT SERPL-CCNC: 55 U/L (ref 5–41)
ANION GAP SERPL CALCULATED.3IONS-SCNC: 14 MMOL/L (ref 9–17)
AST SERPL-CCNC: 58 U/L
BASOPHILS # BLD: 0 %
BASOPHILS ABSOLUTE: 0 K/UL (ref 0–0.2)
BILIRUB SERPL-MCNC: 1.12 MG/DL (ref 0.3–1.2)
BILIRUBIN DIRECT: 0.48 MG/DL
BILIRUBIN, INDIRECT: 0.64 MG/DL (ref 0–1)
BUN BLDV-MCNC: 48 MG/DL (ref 8–23)
CALCIUM SERPL-MCNC: 8.9 MG/DL (ref 8.6–10.4)
CHLORIDE BLD-SCNC: 98 MMOL/L (ref 98–107)
CO2: 25 MMOL/L (ref 20–31)
CREAT SERPL-MCNC: 1.66 MG/DL (ref 0.7–1.2)
DIFFERENTIAL TYPE: ABNORMAL
EOSINOPHILS RELATIVE PERCENT: 2 %
GFR AFRICAN AMERICAN: 49 ML/MIN
GFR NON-AFRICAN AMERICAN: 40 ML/MIN
GFR SERPL CREATININE-BSD FRML MDRD: ABNORMAL ML/MIN/{1.73_M2}
GFR SERPL CREATININE-BSD FRML MDRD: ABNORMAL ML/MIN/{1.73_M2}
GLUCOSE BLD-MCNC: 159 MG/DL (ref 70–99)
HCT VFR BLD CALC: 35.5 % (ref 41–53)
HEMOGLOBIN: 11.3 G/DL (ref 13.5–17.5)
INR BLD: 1.2
LYMPHOCYTES # BLD: 6 %
MCH RBC QN AUTO: 30.4 PG (ref 26–34)
MCHC RBC AUTO-ENTMCNC: 31.9 G/DL (ref 31–37)
MCV RBC AUTO: 95.3 FL (ref 80–100)
MONOCYTES # BLD: 7 %
PDW BLD-RTO: 16.9 % (ref 11.5–14.5)
PLATELET # BLD: 505 K/UL (ref 130–400)
PLATELET ESTIMATE: ABNORMAL
PMV BLD AUTO: 6.9 FL (ref 6–12)
POTASSIUM SERPL-SCNC: 5.3 MMOL/L (ref 3.7–5.3)
PROTHROMBIN TIME: 12.1 SEC (ref 9.7–11.6)
RBC # BLD: 3.73 M/UL (ref 4.5–5.9)
RBC # BLD: ABNORMAL 10*6/UL
SEG NEUTROPHILS: 85 %
SEGMENTED NEUTROPHILS ABSOLUTE COUNT: 10.8 K/UL (ref 1.8–7.7)
SODIUM BLD-SCNC: 137 MMOL/L (ref 135–144)
TOTAL PROTEIN: 6.9 G/DL (ref 6.4–8.3)
WBC # BLD: 12.7 K/UL (ref 3.5–11)
WBC # BLD: ABNORMAL 10*3/UL

## 2017-05-31 PROCEDURE — 71010 XR CHEST PORTABLE: CPT

## 2017-05-31 PROCEDURE — G0378 HOSPITAL OBSERVATION PER HR: HCPCS

## 2017-05-31 PROCEDURE — 85610 PROTHROMBIN TIME: CPT

## 2017-05-31 PROCEDURE — G0379 DIRECT REFER HOSPITAL OBSERV: HCPCS

## 2017-05-31 PROCEDURE — 85025 COMPLETE CBC W/AUTO DIFF WBC: CPT

## 2017-05-31 PROCEDURE — 82248 BILIRUBIN DIRECT: CPT

## 2017-05-31 PROCEDURE — 6370000000 HC RX 637 (ALT 250 FOR IP): Performed by: THORACIC SURGERY (CARDIOTHORACIC VASCULAR SURGERY)

## 2017-05-31 PROCEDURE — 32555 ASPIRATE PLEURA W/ IMAGING: CPT | Performed by: RADIOLOGY

## 2017-05-31 PROCEDURE — 80053 COMPREHEN METABOLIC PANEL: CPT

## 2017-05-31 PROCEDURE — 36415 COLL VENOUS BLD VENIPUNCTURE: CPT

## 2017-05-31 PROCEDURE — C1729 CATH, DRAINAGE: HCPCS

## 2017-05-31 PROCEDURE — 71020 XR CHEST STANDARD TWO VW: CPT

## 2017-05-31 RX ORDER — ATORVASTATIN CALCIUM 80 MG/1
80 TABLET, FILM COATED ORAL NIGHTLY
Status: DISCONTINUED | OUTPATIENT
Start: 2017-05-31 | End: 2017-06-01 | Stop reason: HOSPADM

## 2017-05-31 RX ORDER — BACLOFEN 10 MG/1
10 TABLET ORAL 3 TIMES DAILY PRN
Status: DISCONTINUED | OUTPATIENT
Start: 2017-05-31 | End: 2017-06-01 | Stop reason: HOSPADM

## 2017-05-31 RX ORDER — DIPHENHYDRAMINE HCL 25 MG
25 TABLET ORAL EVERY 6 HOURS PRN
Status: DISCONTINUED | OUTPATIENT
Start: 2017-05-31 | End: 2017-06-01 | Stop reason: HOSPADM

## 2017-05-31 RX ORDER — ENALAPRIL MALEATE 5 MG/1
5 TABLET ORAL DAILY
Status: DISCONTINUED | OUTPATIENT
Start: 2017-05-31 | End: 2017-05-31

## 2017-05-31 RX ORDER — ASPIRIN 81 MG/1
81 TABLET, CHEWABLE ORAL DAILY
Status: DISCONTINUED | OUTPATIENT
Start: 2017-05-31 | End: 2017-06-01 | Stop reason: HOSPADM

## 2017-05-31 RX ORDER — TRAMADOL HYDROCHLORIDE 50 MG/1
50 TABLET ORAL EVERY 6 HOURS PRN
Status: DISCONTINUED | OUTPATIENT
Start: 2017-05-31 | End: 2017-06-01 | Stop reason: HOSPADM

## 2017-05-31 RX ORDER — FUROSEMIDE 40 MG/1
40 TABLET ORAL DAILY
Status: DISCONTINUED | OUTPATIENT
Start: 2017-05-31 | End: 2017-05-31

## 2017-05-31 RX ORDER — POTASSIUM CHLORIDE 20 MEQ/1
10 TABLET, EXTENDED RELEASE ORAL DAILY
Status: DISCONTINUED | OUTPATIENT
Start: 2017-05-31 | End: 2017-05-31

## 2017-05-31 RX ORDER — ROSUVASTATIN CALCIUM 10 MG/1
40 TABLET, COATED ORAL EVERY EVENING
Status: DISCONTINUED | OUTPATIENT
Start: 2017-05-31 | End: 2017-05-31 | Stop reason: CLARIF

## 2017-05-31 RX ORDER — EZETIMIBE 10 MG/1
10 TABLET ORAL DAILY
Status: DISCONTINUED | OUTPATIENT
Start: 2017-05-31 | End: 2017-05-31 | Stop reason: RX

## 2017-05-31 RX ORDER — ALBUTEROL SULFATE 90 UG/1
2 AEROSOL, METERED RESPIRATORY (INHALATION) EVERY 6 HOURS PRN
Status: DISCONTINUED | OUTPATIENT
Start: 2017-05-31 | End: 2017-06-01 | Stop reason: HOSPADM

## 2017-05-31 RX ORDER — OXCARBAZEPINE 300 MG/1
300 TABLET, FILM COATED ORAL 4 TIMES DAILY
Status: DISCONTINUED | OUTPATIENT
Start: 2017-05-31 | End: 2017-06-01 | Stop reason: HOSPADM

## 2017-05-31 RX ADMIN — ATORVASTATIN CALCIUM 80 MG: 80 TABLET, FILM COATED ORAL at 21:42

## 2017-05-31 RX ADMIN — METOPROLOL TARTRATE 25 MG: 25 TABLET ORAL at 21:42

## 2017-05-31 RX ADMIN — OXCARBAZEPINE 300 MG: 300 TABLET, FILM COATED ORAL at 21:42

## 2017-05-31 ASSESSMENT — PAIN SCALES - GENERAL
PAINLEVEL_OUTOF10: 0

## 2017-06-01 ENCOUNTER — APPOINTMENT (OUTPATIENT)
Dept: GENERAL RADIOLOGY | Age: 82
End: 2017-06-01
Attending: THORACIC SURGERY (CARDIOTHORACIC VASCULAR SURGERY)
Payer: MEDICARE

## 2017-06-01 ENCOUNTER — APPOINTMENT (OUTPATIENT)
Dept: INTERVENTIONAL RADIOLOGY/VASCULAR | Age: 82
End: 2017-06-01
Attending: THORACIC SURGERY (CARDIOTHORACIC VASCULAR SURGERY)
Payer: MEDICARE

## 2017-06-01 VITALS
HEIGHT: 67 IN | SYSTOLIC BLOOD PRESSURE: 129 MMHG | RESPIRATION RATE: 16 BRPM | OXYGEN SATURATION: 95 % | TEMPERATURE: 98.2 F | WEIGHT: 220.6 LBS | DIASTOLIC BLOOD PRESSURE: 53 MMHG | BODY MASS INDEX: 34.62 KG/M2 | HEART RATE: 76 BPM

## 2017-06-01 LAB
ANION GAP SERPL CALCULATED.3IONS-SCNC: 11 MMOL/L (ref 9–17)
BUN BLDV-MCNC: 44 MG/DL (ref 8–23)
BUN/CREAT BLD: 32 (ref 9–20)
CALCIUM SERPL-MCNC: 8.6 MG/DL (ref 8.6–10.4)
CHLORIDE BLD-SCNC: 104 MMOL/L (ref 98–107)
CO2: 26 MMOL/L (ref 20–31)
CREAT SERPL-MCNC: 1.36 MG/DL (ref 0.7–1.2)
GFR AFRICAN AMERICAN: >60 ML/MIN
GFR NON-AFRICAN AMERICAN: 50 ML/MIN
GFR SERPL CREATININE-BSD FRML MDRD: ABNORMAL ML/MIN/{1.73_M2}
GFR SERPL CREATININE-BSD FRML MDRD: ABNORMAL ML/MIN/{1.73_M2}
GLUCOSE BLD-MCNC: 95 MG/DL (ref 70–99)
HCT VFR BLD CALC: 31.7 % (ref 41–53)
HEMOGLOBIN: 10.3 G/DL (ref 13.5–17.5)
INR BLD: 1.1
MAGNESIUM: 2.4 MG/DL (ref 1.6–2.6)
MCH RBC QN AUTO: 30.6 PG (ref 26–34)
MCHC RBC AUTO-ENTMCNC: 32.3 G/DL (ref 31–37)
MCV RBC AUTO: 94.8 FL (ref 80–100)
PARTIAL THROMBOPLASTIN TIME: 25.9 SEC (ref 23–31)
PDW BLD-RTO: 16.8 % (ref 11.5–14.5)
PLATELET # BLD: 434 K/UL (ref 130–400)
PMV BLD AUTO: 6.8 FL (ref 6–12)
POTASSIUM SERPL-SCNC: 5.2 MMOL/L (ref 3.7–5.3)
PROTHROMBIN TIME: 11.7 SEC (ref 9.7–11.6)
RBC # BLD: 3.35 M/UL (ref 4.5–5.9)
SODIUM BLD-SCNC: 141 MMOL/L (ref 135–144)
WBC # BLD: 11.6 K/UL (ref 3.5–11)

## 2017-06-01 PROCEDURE — 83735 ASSAY OF MAGNESIUM: CPT

## 2017-06-01 PROCEDURE — 36415 COLL VENOUS BLD VENIPUNCTURE: CPT

## 2017-06-01 PROCEDURE — 85730 THROMBOPLASTIN TIME PARTIAL: CPT

## 2017-06-01 PROCEDURE — 32555 ASPIRATE PLEURA W/ IMAGING: CPT | Performed by: RADIOLOGY

## 2017-06-01 PROCEDURE — 6370000000 HC RX 637 (ALT 250 FOR IP): Performed by: THORACIC SURGERY (CARDIOTHORACIC VASCULAR SURGERY)

## 2017-06-01 PROCEDURE — 85027 COMPLETE CBC AUTOMATED: CPT

## 2017-06-01 PROCEDURE — 71010 XR CHEST LIMITED: CPT

## 2017-06-01 PROCEDURE — 80048 BASIC METABOLIC PNL TOTAL CA: CPT

## 2017-06-01 PROCEDURE — G0378 HOSPITAL OBSERVATION PER HR: HCPCS

## 2017-06-01 PROCEDURE — 85610 PROTHROMBIN TIME: CPT

## 2017-06-01 PROCEDURE — C1729 CATH, DRAINAGE: HCPCS

## 2017-06-01 PROCEDURE — 71020 XR CHEST STANDARD TWO VW: CPT

## 2017-06-01 RX ORDER — POTASSIUM CHLORIDE 750 MG/1
10 TABLET, EXTENDED RELEASE ORAL EVERY OTHER DAY
Qty: 30 TABLET | Refills: 0 | Status: SHIPPED | OUTPATIENT
Start: 2017-06-01 | End: 2017-06-09 | Stop reason: SDUPTHER

## 2017-06-01 RX ORDER — FUROSEMIDE 40 MG/1
40 TABLET ORAL EVERY OTHER DAY
Status: DISCONTINUED | OUTPATIENT
Start: 2017-06-01 | End: 2017-06-01 | Stop reason: HOSPADM

## 2017-06-01 RX ORDER — FUROSEMIDE 40 MG/1
40 TABLET ORAL EVERY OTHER DAY
Qty: 60 TABLET | Refills: 3 | Status: SHIPPED | OUTPATIENT
Start: 2017-06-01 | End: 2017-06-09

## 2017-06-01 RX ADMIN — TRAMADOL HYDROCHLORIDE 50 MG: 50 TABLET, FILM COATED ORAL at 00:38

## 2017-06-01 RX ADMIN — DIPHENHYDRAMINE HCL 25 MG: 25 TABLET ORAL at 00:38

## 2017-06-01 ASSESSMENT — PAIN SCALES - GENERAL
PAINLEVEL_OUTOF10: 0
PAINLEVEL_OUTOF10: 7

## 2017-06-02 ENCOUNTER — CARE COORDINATION (OUTPATIENT)
Dept: CASE MANAGEMENT | Age: 82
End: 2017-06-02

## 2017-06-02 ENCOUNTER — HOSPITAL ENCOUNTER (OUTPATIENT)
Dept: GENERAL RADIOLOGY | Age: 82
Discharge: HOME | End: 2017-06-02
Payer: MEDICARE

## 2017-06-02 ENCOUNTER — HOSPITAL ENCOUNTER (OUTPATIENT)
Age: 82
Discharge: HOME | End: 2017-06-02
Payer: MEDICARE

## 2017-06-02 DIAGNOSIS — Z95.1 S/P CABG (CORONARY ARTERY BYPASS GRAFT): Primary | ICD-10-CM

## 2017-06-02 DIAGNOSIS — I25.10 ASHD (ARTERIOSCLEROTIC HEART DISEASE): ICD-10-CM

## 2017-06-02 DIAGNOSIS — I50.32 CHRONIC DIASTOLIC CONGESTIVE HEART FAILURE (HCC): ICD-10-CM

## 2017-06-02 DIAGNOSIS — Z95.2 STATUS POST AORTIC VALVE REPLACEMENT: ICD-10-CM

## 2017-06-02 PROCEDURE — 71020 XR CHEST STANDARD TWO VW: CPT

## 2017-06-03 ENCOUNTER — CARE COORDINATION (OUTPATIENT)
Dept: CASE MANAGEMENT | Age: 82
End: 2017-06-03

## 2017-06-05 ENCOUNTER — HOSPITAL ENCOUNTER (OUTPATIENT)
Age: 82
Discharge: HOME | End: 2017-06-05
Payer: MEDICARE

## 2017-06-05 ENCOUNTER — HOSPITAL ENCOUNTER (OUTPATIENT)
Dept: GENERAL RADIOLOGY | Age: 82
Discharge: HOME | End: 2017-06-05
Payer: MEDICARE

## 2017-06-05 DIAGNOSIS — R06.00 DYSPNEA: ICD-10-CM

## 2017-06-05 DIAGNOSIS — Z95.1 S/P CABG (CORONARY ARTERY BYPASS GRAFT): ICD-10-CM

## 2017-06-05 DIAGNOSIS — J90 PLEURAL EFFUSION, BILATERAL: Primary | ICD-10-CM

## 2017-06-05 DIAGNOSIS — Z95.1 POSTSURGICAL AORTOCORONARY BYPASS STATUS: ICD-10-CM

## 2017-06-05 DIAGNOSIS — J98.11 ATELECTASIS: ICD-10-CM

## 2017-06-05 LAB
ANION GAP SERPL CALCULATED.3IONS-SCNC: 12 MMOL/L (ref 9–17)
BNP INTERPRETATION: ABNORMAL
BUN BLDV-MCNC: 30 MG/DL (ref 8–23)
BUN/CREAT BLD: 27 (ref 9–20)
CALCIUM SERPL-MCNC: 8.6 MG/DL (ref 8.6–10.4)
CHLORIDE BLD-SCNC: 105 MMOL/L (ref 98–107)
CO2: 25 MMOL/L (ref 20–31)
CREAT SERPL-MCNC: 1.11 MG/DL (ref 0.7–1.2)
GFR AFRICAN AMERICAN: >60 ML/MIN
GFR NON-AFRICAN AMERICAN: >60 ML/MIN
GFR SERPL CREATININE-BSD FRML MDRD: ABNORMAL ML/MIN/{1.73_M2}
GFR SERPL CREATININE-BSD FRML MDRD: ABNORMAL ML/MIN/{1.73_M2}
GLUCOSE BLD-MCNC: 114 MG/DL (ref 70–99)
HCT VFR BLD CALC: 33.8 % (ref 41–53)
HEMOGLOBIN: 10.8 G/DL (ref 13.5–17)
MCH RBC QN AUTO: 30.1 PG (ref 26–34)
MCHC RBC AUTO-ENTMCNC: 31.9 G/DL (ref 31–37)
MCV RBC AUTO: 94.4 FL (ref 80–100)
PDW BLD-RTO: 16.6 % (ref 12.1–15.2)
PLATELET # BLD: 361 K/UL (ref 140–450)
PMV BLD AUTO: ABNORMAL FL (ref 6–12)
POTASSIUM SERPL-SCNC: 4.6 MMOL/L (ref 3.7–5.3)
PRO-BNP: 4358 PG/ML
RBC # BLD: 3.58 M/UL (ref 4.5–5.9)
SODIUM BLD-SCNC: 142 MMOL/L (ref 135–144)
WBC # BLD: 8.8 K/UL (ref 3.5–11)

## 2017-06-05 PROCEDURE — 80048 BASIC METABOLIC PNL TOTAL CA: CPT

## 2017-06-05 PROCEDURE — 71020 XR CHEST STANDARD TWO VW: CPT

## 2017-06-05 PROCEDURE — 36415 COLL VENOUS BLD VENIPUNCTURE: CPT

## 2017-06-05 PROCEDURE — 85027 COMPLETE CBC AUTOMATED: CPT

## 2017-06-05 PROCEDURE — 83880 ASSAY OF NATRIURETIC PEPTIDE: CPT

## 2017-06-06 ENCOUNTER — TELEPHONE (OUTPATIENT)
Dept: CARDIOVASCULAR ICU | Age: 82
End: 2017-06-06

## 2017-06-06 DIAGNOSIS — J98.11 ATELECTASIS: ICD-10-CM

## 2017-06-06 DIAGNOSIS — J90 PLEURAL EFFUSION, BILATERAL: ICD-10-CM

## 2017-06-06 DIAGNOSIS — Z95.1 S/P CABG (CORONARY ARTERY BYPASS GRAFT): ICD-10-CM

## 2017-06-06 DIAGNOSIS — Z95.1 S/P CABG (CORONARY ARTERY BYPASS GRAFT): Primary | ICD-10-CM

## 2017-06-06 DIAGNOSIS — Z95.2 S/P AORTIC VALVE REPLACEMENT: ICD-10-CM

## 2017-06-06 DIAGNOSIS — R06.00 DYSPNEA, UNSPECIFIED TYPE: Primary | ICD-10-CM

## 2017-06-07 ENCOUNTER — HOSPITAL ENCOUNTER (OUTPATIENT)
Dept: ULTRASOUND IMAGING | Age: 82
Discharge: HOME | End: 2017-06-07
Payer: MEDICARE

## 2017-06-07 DIAGNOSIS — Z95.2 S/P AORTIC VALVE REPLACEMENT: ICD-10-CM

## 2017-06-07 DIAGNOSIS — J90 PLEURAL EFFUSION, BILATERAL: ICD-10-CM

## 2017-06-07 DIAGNOSIS — R06.00 DYSPNEA, UNSPECIFIED TYPE: ICD-10-CM

## 2017-06-07 DIAGNOSIS — Z95.1 S/P CABG (CORONARY ARTERY BYPASS GRAFT): ICD-10-CM

## 2017-06-07 PROCEDURE — 32555 ASPIRATE PLEURA W/ IMAGING: CPT

## 2017-06-08 ENCOUNTER — CARE COORDINATION (OUTPATIENT)
Dept: CASE MANAGEMENT | Age: 82
End: 2017-06-08

## 2017-06-09 ENCOUNTER — HOSPITAL ENCOUNTER (OUTPATIENT)
Age: 82
Discharge: HOME | End: 2017-06-09
Payer: MEDICARE

## 2017-06-09 ENCOUNTER — OFFICE VISIT (OUTPATIENT)
Dept: CARDIOLOGY | Age: 82
End: 2017-06-09
Payer: MEDICARE

## 2017-06-09 VITALS
HEIGHT: 67 IN | WEIGHT: 221 LBS | SYSTOLIC BLOOD PRESSURE: 121 MMHG | OXYGEN SATURATION: 96 % | RESPIRATION RATE: 16 BRPM | HEART RATE: 74 BPM | BODY MASS INDEX: 34.69 KG/M2 | DIASTOLIC BLOOD PRESSURE: 71 MMHG

## 2017-06-09 DIAGNOSIS — J90 PLEURAL EFFUSION: Primary | ICD-10-CM

## 2017-06-09 DIAGNOSIS — I50.33 ACUTE ON CHRONIC DIASTOLIC CHF (CONGESTIVE HEART FAILURE), NYHA CLASS 4 (HCC): ICD-10-CM

## 2017-06-09 DIAGNOSIS — Z95.1 S/P CABG (CORONARY ARTERY BYPASS GRAFT): ICD-10-CM

## 2017-06-09 DIAGNOSIS — R06.00 DYSPNEA, UNSPECIFIED TYPE: ICD-10-CM

## 2017-06-09 DIAGNOSIS — J90 BILATERAL PLEURAL EFFUSION: ICD-10-CM

## 2017-06-09 DIAGNOSIS — J90 PLEURAL EFFUSION, BILATERAL: ICD-10-CM

## 2017-06-09 DIAGNOSIS — Z95.2 STATUS POST AORTIC VALVE REPLACEMENT: ICD-10-CM

## 2017-06-09 DIAGNOSIS — Z95.2 S/P AORTIC VALVE REPLACEMENT: ICD-10-CM

## 2017-06-09 DIAGNOSIS — I25.119 CORONARY ARTERY DISEASE INVOLVING NATIVE CORONARY ARTERY OF NATIVE HEART WITH ANGINA PECTORIS (HCC): ICD-10-CM

## 2017-06-09 DIAGNOSIS — I50.33 ACUTE ON CHRONIC DIASTOLIC CHF (CONGESTIVE HEART FAILURE), NYHA CLASS 4 (HCC): Primary | ICD-10-CM

## 2017-06-09 PROBLEM — R55 SYNCOPE AND COLLAPSE: Status: RESOLVED | Noted: 2017-05-17 | Resolved: 2017-06-09

## 2017-06-09 LAB
ANION GAP SERPL CALCULATED.3IONS-SCNC: 12 MMOL/L (ref 9–17)
BUN BLDV-MCNC: 32 MG/DL (ref 8–23)
BUN/CREAT BLD: 25 (ref 9–20)
CALCIUM SERPL-MCNC: 8.9 MG/DL (ref 8.6–10.4)
CHLORIDE BLD-SCNC: 101 MMOL/L (ref 98–107)
CO2: 27 MMOL/L (ref 20–31)
CREAT SERPL-MCNC: 1.28 MG/DL (ref 0.7–1.2)
GFR AFRICAN AMERICAN: >60 ML/MIN
GFR NON-AFRICAN AMERICAN: 54 ML/MIN
GFR SERPL CREATININE-BSD FRML MDRD: ABNORMAL ML/MIN/{1.73_M2}
GFR SERPL CREATININE-BSD FRML MDRD: ABNORMAL ML/MIN/{1.73_M2}
GLUCOSE BLD-MCNC: 77 MG/DL (ref 70–99)
POTASSIUM SERPL-SCNC: 4.6 MMOL/L (ref 3.7–5.3)
SODIUM BLD-SCNC: 140 MMOL/L (ref 135–144)

## 2017-06-09 PROCEDURE — 99215 OFFICE O/P EST HI 40 MIN: CPT | Performed by: FAMILY MEDICINE

## 2017-06-09 PROCEDURE — 4040F PNEUMOC VAC/ADMIN/RCVD: CPT | Performed by: FAMILY MEDICINE

## 2017-06-09 PROCEDURE — G8598 ASA/ANTIPLAT THER USED: HCPCS | Performed by: FAMILY MEDICINE

## 2017-06-09 PROCEDURE — G8417 CALC BMI ABV UP PARAM F/U: HCPCS | Performed by: FAMILY MEDICINE

## 2017-06-09 PROCEDURE — 1123F ACP DISCUSS/DSCN MKR DOCD: CPT | Performed by: FAMILY MEDICINE

## 2017-06-09 PROCEDURE — 1036F TOBACCO NON-USER: CPT | Performed by: FAMILY MEDICINE

## 2017-06-09 PROCEDURE — 1111F DSCHRG MED/CURRENT MED MERGE: CPT | Performed by: FAMILY MEDICINE

## 2017-06-09 PROCEDURE — 80048 BASIC METABOLIC PNL TOTAL CA: CPT

## 2017-06-09 PROCEDURE — G8427 DOCREV CUR MEDS BY ELIG CLIN: HCPCS | Performed by: FAMILY MEDICINE

## 2017-06-09 PROCEDURE — 36415 COLL VENOUS BLD VENIPUNCTURE: CPT

## 2017-06-09 RX ORDER — POTASSIUM CHLORIDE 750 MG/1
10 TABLET, EXTENDED RELEASE ORAL DAILY
Qty: 90 TABLET | Refills: 3 | Status: SHIPPED | OUTPATIENT
Start: 2017-06-09 | End: 2018-07-17 | Stop reason: SDUPTHER

## 2017-06-09 RX ORDER — FUROSEMIDE 80 MG
80 TABLET ORAL DAILY
Qty: 90 TABLET | Refills: 3 | Status: SHIPPED | OUTPATIENT
Start: 2017-06-09 | End: 2017-06-21 | Stop reason: SDUPTHER

## 2017-06-10 DIAGNOSIS — I50.43 ACUTE ON CHRONIC COMBINED SYSTOLIC AND DIASTOLIC HF (HEART FAILURE), NYHA CLASS 3 (HCC): Primary | ICD-10-CM

## 2017-06-12 ENCOUNTER — TELEPHONE (OUTPATIENT)
Dept: CARDIOLOGY | Age: 82
End: 2017-06-12

## 2017-06-13 ENCOUNTER — HOSPITAL ENCOUNTER (OUTPATIENT)
Dept: GENERAL RADIOLOGY | Age: 82
Discharge: HOME | End: 2017-06-13
Payer: MEDICARE

## 2017-06-13 ENCOUNTER — CARE COORDINATION (OUTPATIENT)
Dept: CASE MANAGEMENT | Age: 82
End: 2017-06-13

## 2017-06-13 ENCOUNTER — HOSPITAL ENCOUNTER (OUTPATIENT)
Dept: ULTRASOUND IMAGING | Age: 82
Discharge: HOME | End: 2017-06-13
Payer: MEDICARE

## 2017-06-13 ENCOUNTER — TELEPHONE (OUTPATIENT)
Dept: CARDIOLOGY | Age: 82
End: 2017-06-13

## 2017-06-13 ENCOUNTER — HOSPITAL ENCOUNTER (OUTPATIENT)
Dept: NON INVASIVE DIAGNOSTICS | Age: 82
Discharge: HOME | End: 2017-06-13
Payer: MEDICARE

## 2017-06-13 VITALS
RESPIRATION RATE: 18 BRPM | DIASTOLIC BLOOD PRESSURE: 93 MMHG | OXYGEN SATURATION: 96 % | SYSTOLIC BLOOD PRESSURE: 159 MMHG | HEART RATE: 96 BPM

## 2017-06-13 DIAGNOSIS — J98.11 ATELECTASIS: ICD-10-CM

## 2017-06-13 DIAGNOSIS — I25.119 CORONARY ARTERY DISEASE INVOLVING NATIVE CORONARY ARTERY OF NATIVE HEART WITH ANGINA PECTORIS (HCC): ICD-10-CM

## 2017-06-13 DIAGNOSIS — Z98.890 STATUS POST THORACENTESIS: ICD-10-CM

## 2017-06-13 DIAGNOSIS — J90 BILATERAL PLEURAL EFFUSION: ICD-10-CM

## 2017-06-13 DIAGNOSIS — I50.33 ACUTE ON CHRONIC DIASTOLIC CHF (CONGESTIVE HEART FAILURE), NYHA CLASS 4 (HCC): ICD-10-CM

## 2017-06-13 DIAGNOSIS — Z95.2 STATUS POST AORTIC VALVE REPLACEMENT: ICD-10-CM

## 2017-06-13 DIAGNOSIS — Z95.1 S/P CABG (CORONARY ARTERY BYPASS GRAFT): ICD-10-CM

## 2017-06-13 LAB
LV EF: 55 %
LVEF MODALITY: NORMAL

## 2017-06-13 PROCEDURE — 32555 ASPIRATE PLEURA W/ IMAGING: CPT

## 2017-06-13 PROCEDURE — 93306 TTE W/DOPPLER COMPLETE: CPT

## 2017-06-13 PROCEDURE — 71020 XR CHEST STANDARD TWO VW: CPT

## 2017-06-13 PROCEDURE — 2500000003 HC RX 250 WO HCPCS: Performed by: RADIOLOGY

## 2017-06-13 RX ORDER — LIDOCAINE HYDROCHLORIDE 20 MG/ML
INJECTION, SOLUTION INFILTRATION; PERINEURAL
Status: COMPLETED | OUTPATIENT
Start: 2017-06-13 | End: 2017-06-13

## 2017-06-13 RX ADMIN — LIDOCAINE HYDROCHLORIDE 5 ML: 20 INJECTION, SOLUTION INFILTRATION; PERINEURAL at 15:41

## 2017-06-14 DIAGNOSIS — J90 PLEURAL EFFUSION, BILATERAL: Primary | ICD-10-CM

## 2017-06-14 DIAGNOSIS — R06.02 SOB (SHORTNESS OF BREATH): ICD-10-CM

## 2017-06-15 ENCOUNTER — OFFICE VISIT (OUTPATIENT)
Dept: CARDIOLOGY | Age: 82
End: 2017-06-15
Payer: MEDICARE

## 2017-06-15 VITALS
BODY MASS INDEX: 33.97 KG/M2 | HEART RATE: 80 BPM | SYSTOLIC BLOOD PRESSURE: 121 MMHG | WEIGHT: 216.4 LBS | DIASTOLIC BLOOD PRESSURE: 80 MMHG | HEIGHT: 67 IN | OXYGEN SATURATION: 94 %

## 2017-06-15 DIAGNOSIS — I50.33 ACUTE ON CHRONIC DIASTOLIC HEART FAILURE (HCC): Primary | ICD-10-CM

## 2017-06-15 DIAGNOSIS — J90 BILATERAL PLEURAL EFFUSION: ICD-10-CM

## 2017-06-15 PROCEDURE — 99215 OFFICE O/P EST HI 40 MIN: CPT | Performed by: FAMILY MEDICINE

## 2017-06-15 PROCEDURE — G8427 DOCREV CUR MEDS BY ELIG CLIN: HCPCS | Performed by: FAMILY MEDICINE

## 2017-06-15 PROCEDURE — 1111F DSCHRG MED/CURRENT MED MERGE: CPT | Performed by: FAMILY MEDICINE

## 2017-06-15 PROCEDURE — 1036F TOBACCO NON-USER: CPT | Performed by: FAMILY MEDICINE

## 2017-06-15 PROCEDURE — 4040F PNEUMOC VAC/ADMIN/RCVD: CPT | Performed by: FAMILY MEDICINE

## 2017-06-15 PROCEDURE — G8417 CALC BMI ABV UP PARAM F/U: HCPCS | Performed by: FAMILY MEDICINE

## 2017-06-15 PROCEDURE — 1123F ACP DISCUSS/DSCN MKR DOCD: CPT | Performed by: FAMILY MEDICINE

## 2017-06-15 PROCEDURE — G8598 ASA/ANTIPLAT THER USED: HCPCS | Performed by: FAMILY MEDICINE

## 2017-06-15 RX ORDER — LISINOPRIL 5 MG/1
2.5 TABLET ORAL DAILY
Qty: 90 TABLET | Refills: 3 | Status: SHIPPED | OUTPATIENT
Start: 2017-06-15 | End: 2018-08-14 | Stop reason: SDUPTHER

## 2017-06-15 RX ORDER — METOPROLOL SUCCINATE 50 MG/1
50 TABLET, EXTENDED RELEASE ORAL DAILY
Qty: 90 TABLET | Refills: 3 | Status: SHIPPED | OUTPATIENT
Start: 2017-06-15 | End: 2017-08-21 | Stop reason: SDUPTHER

## 2017-06-16 ENCOUNTER — HOSPITAL ENCOUNTER (OUTPATIENT)
Dept: ULTRASOUND IMAGING | Age: 82
Discharge: HOME | End: 2017-06-16
Payer: MEDICARE

## 2017-06-16 VITALS
DIASTOLIC BLOOD PRESSURE: 56 MMHG | OXYGEN SATURATION: 100 % | RESPIRATION RATE: 16 BRPM | HEART RATE: 84 BPM | SYSTOLIC BLOOD PRESSURE: 107 MMHG

## 2017-06-16 DIAGNOSIS — J90 PLEURAL EFFUSION, BILATERAL: ICD-10-CM

## 2017-06-16 DIAGNOSIS — R06.02 SOB (SHORTNESS OF BREATH): ICD-10-CM

## 2017-06-16 PROCEDURE — 2500000003 HC RX 250 WO HCPCS: Performed by: RADIOLOGY

## 2017-06-16 PROCEDURE — 32555 ASPIRATE PLEURA W/ IMAGING: CPT

## 2017-06-16 RX ORDER — LIDOCAINE HYDROCHLORIDE 20 MG/ML
INJECTION, SOLUTION INFILTRATION; PERINEURAL
Status: COMPLETED | OUTPATIENT
Start: 2017-06-16 | End: 2017-06-16

## 2017-06-16 RX ADMIN — LIDOCAINE HYDROCHLORIDE 6 ML: 20 INJECTION, SOLUTION INFILTRATION; PERINEURAL at 11:29

## 2017-06-21 ENCOUNTER — HOSPITAL ENCOUNTER (OUTPATIENT)
Age: 82
Discharge: HOME | End: 2017-06-21
Payer: MEDICARE

## 2017-06-21 ENCOUNTER — HOSPITAL ENCOUNTER (OUTPATIENT)
Dept: GENERAL RADIOLOGY | Age: 82
Discharge: HOME | End: 2017-06-21
Payer: MEDICARE

## 2017-06-21 ENCOUNTER — OFFICE VISIT (OUTPATIENT)
Dept: CARDIOLOGY | Age: 82
End: 2017-06-21
Payer: MEDICARE

## 2017-06-21 VITALS
HEART RATE: 88 BPM | SYSTOLIC BLOOD PRESSURE: 122 MMHG | WEIGHT: 219.4 LBS | DIASTOLIC BLOOD PRESSURE: 78 MMHG | RESPIRATION RATE: 16 BRPM | BODY MASS INDEX: 34.44 KG/M2 | OXYGEN SATURATION: 96 % | HEIGHT: 67 IN

## 2017-06-21 DIAGNOSIS — J90 BILATERAL PLEURAL EFFUSION: ICD-10-CM

## 2017-06-21 DIAGNOSIS — R06.02 SHORTNESS OF BREATH: ICD-10-CM

## 2017-06-21 DIAGNOSIS — I50.33 ACUTE ON CHRONIC DIASTOLIC HEART FAILURE (HCC): Primary | ICD-10-CM

## 2017-06-21 DIAGNOSIS — I50.33 ACUTE ON CHRONIC DIASTOLIC HEART FAILURE (HCC): ICD-10-CM

## 2017-06-21 LAB
ANION GAP SERPL CALCULATED.3IONS-SCNC: 13 MMOL/L (ref 9–17)
BUN BLDV-MCNC: 33 MG/DL (ref 8–23)
BUN/CREAT BLD: 28 (ref 9–20)
CALCIUM SERPL-MCNC: 9 MG/DL (ref 8.6–10.4)
CHLORIDE BLD-SCNC: 102 MMOL/L (ref 98–107)
CO2: 28 MMOL/L (ref 20–31)
CREAT SERPL-MCNC: 1.2 MG/DL (ref 0.7–1.2)
GFR AFRICAN AMERICAN: >60 ML/MIN
GFR NON-AFRICAN AMERICAN: 58 ML/MIN
GFR SERPL CREATININE-BSD FRML MDRD: ABNORMAL ML/MIN/{1.73_M2}
GFR SERPL CREATININE-BSD FRML MDRD: ABNORMAL ML/MIN/{1.73_M2}
GLUCOSE BLD-MCNC: 105 MG/DL (ref 70–99)
POTASSIUM SERPL-SCNC: 4.8 MMOL/L (ref 3.7–5.3)
SODIUM BLD-SCNC: 143 MMOL/L (ref 135–144)

## 2017-06-21 PROCEDURE — G8598 ASA/ANTIPLAT THER USED: HCPCS | Performed by: FAMILY MEDICINE

## 2017-06-21 PROCEDURE — 71020 XR CHEST STANDARD TWO VW: CPT

## 2017-06-21 PROCEDURE — 1111F DSCHRG MED/CURRENT MED MERGE: CPT | Performed by: FAMILY MEDICINE

## 2017-06-21 PROCEDURE — 4040F PNEUMOC VAC/ADMIN/RCVD: CPT | Performed by: FAMILY MEDICINE

## 2017-06-21 PROCEDURE — 1123F ACP DISCUSS/DSCN MKR DOCD: CPT | Performed by: FAMILY MEDICINE

## 2017-06-21 PROCEDURE — G8417 CALC BMI ABV UP PARAM F/U: HCPCS | Performed by: FAMILY MEDICINE

## 2017-06-21 PROCEDURE — 80048 BASIC METABOLIC PNL TOTAL CA: CPT

## 2017-06-21 PROCEDURE — 99215 OFFICE O/P EST HI 40 MIN: CPT | Performed by: FAMILY MEDICINE

## 2017-06-21 PROCEDURE — 1036F TOBACCO NON-USER: CPT | Performed by: FAMILY MEDICINE

## 2017-06-21 PROCEDURE — G8427 DOCREV CUR MEDS BY ELIG CLIN: HCPCS | Performed by: FAMILY MEDICINE

## 2017-06-21 PROCEDURE — 36415 COLL VENOUS BLD VENIPUNCTURE: CPT

## 2017-06-21 RX ORDER — FUROSEMIDE 80 MG
80 TABLET ORAL DAILY
Qty: 90 TABLET | Refills: 3 | Status: SHIPPED | OUTPATIENT
Start: 2017-06-21 | End: 2017-10-17 | Stop reason: SDUPTHER

## 2017-06-23 DIAGNOSIS — J90 PLEURAL EFFUSION: Primary | ICD-10-CM

## 2017-06-26 ENCOUNTER — HOSPITAL ENCOUNTER (OUTPATIENT)
Dept: ULTRASOUND IMAGING | Age: 82
Discharge: HOME | End: 2017-06-26
Payer: MEDICARE

## 2017-06-26 ENCOUNTER — HOSPITAL ENCOUNTER (OUTPATIENT)
Dept: LAB | Age: 82
Discharge: HOME | End: 2017-06-26
Payer: MEDICARE

## 2017-06-26 ENCOUNTER — TELEPHONE (OUTPATIENT)
Dept: CARDIOLOGY | Age: 82
End: 2017-06-26

## 2017-06-26 VITALS
SYSTOLIC BLOOD PRESSURE: 112 MMHG | OXYGEN SATURATION: 99 % | DIASTOLIC BLOOD PRESSURE: 57 MMHG | RESPIRATION RATE: 16 BRPM | HEART RATE: 91 BPM

## 2017-06-26 DIAGNOSIS — R06.02 SHORTNESS OF BREATH: ICD-10-CM

## 2017-06-26 DIAGNOSIS — J90 PLEURAL EFFUSION: ICD-10-CM

## 2017-06-26 DIAGNOSIS — J90 BILATERAL PLEURAL EFFUSION: ICD-10-CM

## 2017-06-26 DIAGNOSIS — I50.33 ACUTE ON CHRONIC DIASTOLIC HEART FAILURE (HCC): ICD-10-CM

## 2017-06-26 LAB
ANION GAP SERPL CALCULATED.3IONS-SCNC: 12 MMOL/L (ref 9–17)
BUN BLDV-MCNC: 32 MG/DL (ref 8–23)
BUN/CREAT BLD: 26 (ref 9–20)
CALCIUM SERPL-MCNC: 9.1 MG/DL (ref 8.6–10.4)
CHLORIDE BLD-SCNC: 100 MMOL/L (ref 98–107)
CO2: 29 MMOL/L (ref 20–31)
CREAT SERPL-MCNC: 1.23 MG/DL (ref 0.7–1.2)
GFR AFRICAN AMERICAN: >60 ML/MIN
GFR NON-AFRICAN AMERICAN: 57 ML/MIN
GFR SERPL CREATININE-BSD FRML MDRD: ABNORMAL ML/MIN/{1.73_M2}
GFR SERPL CREATININE-BSD FRML MDRD: ABNORMAL ML/MIN/{1.73_M2}
GLUCOSE BLD-MCNC: 138 MG/DL (ref 70–99)
POTASSIUM SERPL-SCNC: 4 MMOL/L (ref 3.7–5.3)
SODIUM BLD-SCNC: 141 MMOL/L (ref 135–144)

## 2017-06-26 PROCEDURE — 80048 BASIC METABOLIC PNL TOTAL CA: CPT

## 2017-06-26 PROCEDURE — 32555 ASPIRATE PLEURA W/ IMAGING: CPT

## 2017-06-26 PROCEDURE — 36415 COLL VENOUS BLD VENIPUNCTURE: CPT

## 2017-06-26 PROCEDURE — 2500000003 HC RX 250 WO HCPCS: Performed by: RADIOLOGY

## 2017-06-26 RX ORDER — LIDOCAINE HYDROCHLORIDE 20 MG/ML
INJECTION, SOLUTION INFILTRATION; PERINEURAL
Status: COMPLETED | OUTPATIENT
Start: 2017-06-26 | End: 2017-06-26

## 2017-06-26 RX ADMIN — LIDOCAINE HYDROCHLORIDE 5 ML: 20 INJECTION, SOLUTION INFILTRATION; PERINEURAL at 11:07

## 2017-06-27 ENCOUNTER — HOSPITAL ENCOUNTER (OUTPATIENT)
Dept: ULTRASOUND IMAGING | Age: 82
Discharge: HOME | End: 2017-06-27
Payer: MEDICARE

## 2017-06-27 VITALS
OXYGEN SATURATION: 98 % | HEART RATE: 81 BPM | RESPIRATION RATE: 16 BRPM | SYSTOLIC BLOOD PRESSURE: 117 MMHG | DIASTOLIC BLOOD PRESSURE: 71 MMHG

## 2017-06-27 DIAGNOSIS — J90 PLEURAL EFFUSION: ICD-10-CM

## 2017-06-27 PROCEDURE — 32555 ASPIRATE PLEURA W/ IMAGING: CPT

## 2017-06-27 PROCEDURE — 2500000003 HC RX 250 WO HCPCS: Performed by: RADIOLOGY

## 2017-06-27 RX ORDER — LIDOCAINE HYDROCHLORIDE 20 MG/ML
INJECTION, SOLUTION INFILTRATION; PERINEURAL
Status: COMPLETED | OUTPATIENT
Start: 2017-06-27 | End: 2017-06-27

## 2017-06-27 RX ADMIN — LIDOCAINE HYDROCHLORIDE 8 ML: 20 INJECTION, SOLUTION INFILTRATION; PERINEURAL at 13:36

## 2017-06-28 ENCOUNTER — OFFICE VISIT (OUTPATIENT)
Dept: CARDIOLOGY | Age: 82
End: 2017-06-28
Payer: MEDICARE

## 2017-06-28 VITALS
OXYGEN SATURATION: 94 % | WEIGHT: 210.8 LBS | SYSTOLIC BLOOD PRESSURE: 105 MMHG | BODY MASS INDEX: 33.09 KG/M2 | DIASTOLIC BLOOD PRESSURE: 68 MMHG | HEART RATE: 68 BPM | HEIGHT: 67 IN

## 2017-06-28 DIAGNOSIS — J90 BILATERAL PLEURAL EFFUSION: ICD-10-CM

## 2017-06-28 DIAGNOSIS — I50.33 ACUTE ON CHRONIC DIASTOLIC HEART FAILURE (HCC): Primary | ICD-10-CM

## 2017-06-28 DIAGNOSIS — I25.119 CORONARY ARTERY DISEASE INVOLVING NATIVE CORONARY ARTERY OF NATIVE HEART WITH ANGINA PECTORIS (HCC): ICD-10-CM

## 2017-06-28 PROCEDURE — 4040F PNEUMOC VAC/ADMIN/RCVD: CPT | Performed by: FAMILY MEDICINE

## 2017-06-28 PROCEDURE — 99214 OFFICE O/P EST MOD 30 MIN: CPT | Performed by: FAMILY MEDICINE

## 2017-06-28 PROCEDURE — G8427 DOCREV CUR MEDS BY ELIG CLIN: HCPCS | Performed by: FAMILY MEDICINE

## 2017-06-28 PROCEDURE — G8598 ASA/ANTIPLAT THER USED: HCPCS | Performed by: FAMILY MEDICINE

## 2017-06-28 PROCEDURE — G8417 CALC BMI ABV UP PARAM F/U: HCPCS | Performed by: FAMILY MEDICINE

## 2017-06-28 PROCEDURE — 1123F ACP DISCUSS/DSCN MKR DOCD: CPT | Performed by: FAMILY MEDICINE

## 2017-06-28 PROCEDURE — 1036F TOBACCO NON-USER: CPT | Performed by: FAMILY MEDICINE

## 2017-07-05 ENCOUNTER — HOSPITAL ENCOUNTER (OUTPATIENT)
Dept: LAB | Age: 82
Discharge: HOME | End: 2017-07-05
Payer: MEDICARE

## 2017-07-05 ENCOUNTER — HOSPITAL ENCOUNTER (OUTPATIENT)
Dept: GENERAL RADIOLOGY | Age: 82
Discharge: HOME | End: 2017-07-05
Payer: MEDICARE

## 2017-07-05 DIAGNOSIS — I50.33 ACUTE ON CHRONIC DIASTOLIC HEART FAILURE (HCC): ICD-10-CM

## 2017-07-05 DIAGNOSIS — I25.119 CORONARY ARTERY DISEASE INVOLVING NATIVE CORONARY ARTERY OF NATIVE HEART WITH ANGINA PECTORIS (HCC): ICD-10-CM

## 2017-07-05 LAB
ANION GAP SERPL CALCULATED.3IONS-SCNC: 10 MMOL/L (ref 9–17)
BUN BLDV-MCNC: 26 MG/DL (ref 8–23)
BUN/CREAT BLD: 22 (ref 9–20)
CALCIUM SERPL-MCNC: 9.2 MG/DL (ref 8.6–10.4)
CHLORIDE BLD-SCNC: 98 MMOL/L (ref 98–107)
CO2: 32 MMOL/L (ref 20–31)
CREAT SERPL-MCNC: 1.16 MG/DL (ref 0.7–1.2)
GFR AFRICAN AMERICAN: >60 ML/MIN
GFR NON-AFRICAN AMERICAN: >60 ML/MIN
GFR SERPL CREATININE-BSD FRML MDRD: ABNORMAL ML/MIN/{1.73_M2}
GFR SERPL CREATININE-BSD FRML MDRD: ABNORMAL ML/MIN/{1.73_M2}
GLUCOSE BLD-MCNC: 137 MG/DL (ref 70–99)
POTASSIUM SERPL-SCNC: 5 MMOL/L (ref 3.7–5.3)
SODIUM BLD-SCNC: 140 MMOL/L (ref 135–144)

## 2017-07-05 PROCEDURE — 80048 BASIC METABOLIC PNL TOTAL CA: CPT

## 2017-07-05 PROCEDURE — 36415 COLL VENOUS BLD VENIPUNCTURE: CPT

## 2017-07-05 PROCEDURE — 71020 XR CHEST STANDARD TWO VW: CPT

## 2017-07-06 ENCOUNTER — TELEPHONE (OUTPATIENT)
Dept: CARDIOLOGY | Age: 82
End: 2017-07-06

## 2017-07-07 ENCOUNTER — TELEPHONE (OUTPATIENT)
Dept: CARDIOLOGY | Age: 82
End: 2017-07-07

## 2017-07-10 DIAGNOSIS — I50.33 ACUTE ON CHRONIC DIASTOLIC HEART FAILURE (HCC): Primary | ICD-10-CM

## 2017-07-10 DIAGNOSIS — I25.119 CORONARY ARTERY DISEASE INVOLVING NATIVE CORONARY ARTERY OF NATIVE HEART WITH ANGINA PECTORIS (HCC): ICD-10-CM

## 2017-07-13 ENCOUNTER — HOSPITAL ENCOUNTER (OUTPATIENT)
Dept: GENERAL RADIOLOGY | Age: 82
Discharge: HOME | End: 2017-07-13
Payer: MEDICARE

## 2017-07-13 ENCOUNTER — TELEPHONE (OUTPATIENT)
Dept: CARDIOLOGY | Age: 82
End: 2017-07-13

## 2017-07-13 DIAGNOSIS — I50.33 ACUTE ON CHRONIC DIASTOLIC HEART FAILURE (HCC): ICD-10-CM

## 2017-07-13 DIAGNOSIS — I25.119 CORONARY ARTERY DISEASE INVOLVING NATIVE CORONARY ARTERY OF NATIVE HEART WITH ANGINA PECTORIS (HCC): ICD-10-CM

## 2017-07-13 DIAGNOSIS — J90 BILATERAL PLEURAL EFFUSION: Primary | ICD-10-CM

## 2017-07-13 PROCEDURE — 71020 XR CHEST STANDARD TWO VW: CPT

## 2017-07-19 ENCOUNTER — OFFICE VISIT (OUTPATIENT)
Dept: CARDIOLOGY | Age: 82
End: 2017-07-19
Payer: MEDICARE

## 2017-07-19 ENCOUNTER — HOSPITAL ENCOUNTER (OUTPATIENT)
Dept: GENERAL RADIOLOGY | Age: 82
Discharge: HOME | End: 2017-07-19
Payer: MEDICARE

## 2017-07-19 VITALS
BODY MASS INDEX: 31.33 KG/M2 | RESPIRATION RATE: 16 BRPM | DIASTOLIC BLOOD PRESSURE: 61 MMHG | SYSTOLIC BLOOD PRESSURE: 107 MMHG | WEIGHT: 199.6 LBS | OXYGEN SATURATION: 96 % | HEIGHT: 67 IN | HEART RATE: 73 BPM

## 2017-07-19 DIAGNOSIS — I25.10 CAD IN NATIVE ARTERY: ICD-10-CM

## 2017-07-19 DIAGNOSIS — I50.33 ACUTE ON CHRONIC DIASTOLIC CHF (CONGESTIVE HEART FAILURE), NYHA CLASS 3 (HCC): Primary | ICD-10-CM

## 2017-07-19 DIAGNOSIS — I50.33 ACUTE ON CHRONIC DIASTOLIC HEART FAILURE (HCC): ICD-10-CM

## 2017-07-19 DIAGNOSIS — J90 BILATERAL PLEURAL EFFUSION: ICD-10-CM

## 2017-07-19 PROCEDURE — 99214 OFFICE O/P EST MOD 30 MIN: CPT | Performed by: FAMILY MEDICINE

## 2017-07-19 PROCEDURE — 4040F PNEUMOC VAC/ADMIN/RCVD: CPT | Performed by: FAMILY MEDICINE

## 2017-07-19 PROCEDURE — 71020 XR CHEST STANDARD TWO VW: CPT

## 2017-07-19 PROCEDURE — 1123F ACP DISCUSS/DSCN MKR DOCD: CPT | Performed by: FAMILY MEDICINE

## 2017-07-19 PROCEDURE — 1036F TOBACCO NON-USER: CPT | Performed by: FAMILY MEDICINE

## 2017-07-19 PROCEDURE — G8427 DOCREV CUR MEDS BY ELIG CLIN: HCPCS | Performed by: FAMILY MEDICINE

## 2017-07-19 PROCEDURE — G8598 ASA/ANTIPLAT THER USED: HCPCS | Performed by: FAMILY MEDICINE

## 2017-07-19 PROCEDURE — G8417 CALC BMI ABV UP PARAM F/U: HCPCS | Performed by: FAMILY MEDICINE

## 2017-07-24 ENCOUNTER — HOSPITAL ENCOUNTER (OUTPATIENT)
Dept: CARDIAC REHAB | Age: 82
Setting detail: THERAPIES SERIES
Discharge: HOME | End: 2017-07-24
Payer: MEDICARE

## 2017-07-24 VITALS
WEIGHT: 208.7 LBS | DIASTOLIC BLOOD PRESSURE: 70 MMHG | BODY MASS INDEX: 32.75 KG/M2 | HEIGHT: 67 IN | SYSTOLIC BLOOD PRESSURE: 124 MMHG | HEART RATE: 74 BPM | OXYGEN SATURATION: 96 %

## 2017-07-26 ENCOUNTER — HOSPITAL ENCOUNTER (OUTPATIENT)
Dept: CARDIAC REHAB | Age: 82
Setting detail: THERAPIES SERIES
Discharge: HOME | End: 2017-07-26
Payer: MEDICARE

## 2017-07-26 PROCEDURE — 93798 PHYS/QHP OP CAR RHAB W/ECG: CPT

## 2017-07-27 ENCOUNTER — HOSPITAL ENCOUNTER (OUTPATIENT)
Dept: CARDIAC REHAB | Age: 82
Setting detail: THERAPIES SERIES
Discharge: HOME | End: 2017-07-27
Payer: MEDICARE

## 2017-07-27 PROCEDURE — 93798 PHYS/QHP OP CAR RHAB W/ECG: CPT

## 2017-07-31 ENCOUNTER — HOSPITAL ENCOUNTER (OUTPATIENT)
Dept: CARDIAC REHAB | Age: 82
Setting detail: THERAPIES SERIES
Discharge: HOME | End: 2017-07-31
Payer: MEDICARE

## 2017-07-31 PROCEDURE — 93798 PHYS/QHP OP CAR RHAB W/ECG: CPT

## 2017-08-01 ENCOUNTER — HOSPITAL ENCOUNTER (OUTPATIENT)
Age: 82
Discharge: HOME | End: 2017-08-01
Payer: MEDICARE

## 2017-08-01 ENCOUNTER — OFFICE VISIT (OUTPATIENT)
Dept: CARDIOLOGY | Age: 82
End: 2017-08-01
Payer: MEDICARE

## 2017-08-01 ENCOUNTER — HOSPITAL ENCOUNTER (OUTPATIENT)
Dept: GENERAL RADIOLOGY | Age: 82
Discharge: HOME | End: 2017-08-01
Payer: MEDICARE

## 2017-08-01 VITALS
OXYGEN SATURATION: 95 % | HEIGHT: 67 IN | WEIGHT: 200.2 LBS | DIASTOLIC BLOOD PRESSURE: 73 MMHG | BODY MASS INDEX: 31.42 KG/M2 | HEART RATE: 74 BPM | SYSTOLIC BLOOD PRESSURE: 126 MMHG | RESPIRATION RATE: 16 BRPM

## 2017-08-01 DIAGNOSIS — I50.32 CHRONIC DIASTOLIC HF (HEART FAILURE), NYHA CLASS 2 (HCC): ICD-10-CM

## 2017-08-01 DIAGNOSIS — I48.0 PAF (PAROXYSMAL ATRIAL FIBRILLATION) (HCC): ICD-10-CM

## 2017-08-01 DIAGNOSIS — I48.0 PAF (PAROXYSMAL ATRIAL FIBRILLATION) (HCC): Primary | ICD-10-CM

## 2017-08-01 LAB
ANION GAP SERPL CALCULATED.3IONS-SCNC: 12 MMOL/L (ref 9–17)
BUN BLDV-MCNC: 26 MG/DL (ref 8–23)
BUN/CREAT BLD: 25 (ref 9–20)
CALCIUM SERPL-MCNC: 9.6 MG/DL (ref 8.6–10.4)
CHLORIDE BLD-SCNC: 99 MMOL/L (ref 98–107)
CO2: 29 MMOL/L (ref 20–31)
CREAT SERPL-MCNC: 1.04 MG/DL (ref 0.7–1.2)
GFR AFRICAN AMERICAN: >60 ML/MIN
GFR NON-AFRICAN AMERICAN: >60 ML/MIN
GFR SERPL CREATININE-BSD FRML MDRD: ABNORMAL ML/MIN/{1.73_M2}
GFR SERPL CREATININE-BSD FRML MDRD: ABNORMAL ML/MIN/{1.73_M2}
GLUCOSE BLD-MCNC: 87 MG/DL (ref 70–99)
POTASSIUM SERPL-SCNC: 4.8 MMOL/L (ref 3.7–5.3)
SODIUM BLD-SCNC: 140 MMOL/L (ref 135–144)

## 2017-08-01 PROCEDURE — 4040F PNEUMOC VAC/ADMIN/RCVD: CPT | Performed by: FAMILY MEDICINE

## 2017-08-01 PROCEDURE — 36415 COLL VENOUS BLD VENIPUNCTURE: CPT

## 2017-08-01 PROCEDURE — 1123F ACP DISCUSS/DSCN MKR DOCD: CPT | Performed by: FAMILY MEDICINE

## 2017-08-01 PROCEDURE — G8598 ASA/ANTIPLAT THER USED: HCPCS | Performed by: FAMILY MEDICINE

## 2017-08-01 PROCEDURE — 1036F TOBACCO NON-USER: CPT | Performed by: FAMILY MEDICINE

## 2017-08-01 PROCEDURE — 80048 BASIC METABOLIC PNL TOTAL CA: CPT

## 2017-08-01 PROCEDURE — G8427 DOCREV CUR MEDS BY ELIG CLIN: HCPCS | Performed by: FAMILY MEDICINE

## 2017-08-01 PROCEDURE — 71020 XR CHEST STANDARD TWO VW: CPT

## 2017-08-01 PROCEDURE — 99213 OFFICE O/P EST LOW 20 MIN: CPT | Performed by: FAMILY MEDICINE

## 2017-08-01 PROCEDURE — G8417 CALC BMI ABV UP PARAM F/U: HCPCS | Performed by: FAMILY MEDICINE

## 2017-08-03 ENCOUNTER — HOSPITAL ENCOUNTER (OUTPATIENT)
Dept: NON INVASIVE DIAGNOSTICS | Age: 82
Discharge: HOME | End: 2017-08-03
Payer: MEDICARE

## 2017-08-03 ENCOUNTER — HOSPITAL ENCOUNTER (OUTPATIENT)
Dept: CARDIAC REHAB | Age: 82
Setting detail: THERAPIES SERIES
Discharge: HOME | End: 2017-08-03
Payer: MEDICARE

## 2017-08-03 DIAGNOSIS — I48.0 PAF (PAROXYSMAL ATRIAL FIBRILLATION) (HCC): ICD-10-CM

## 2017-08-03 DIAGNOSIS — I50.32 CHRONIC DIASTOLIC HF (HEART FAILURE), NYHA CLASS 2 (HCC): ICD-10-CM

## 2017-08-03 PROCEDURE — 93798 PHYS/QHP OP CAR RHAB W/ECG: CPT

## 2017-08-03 PROCEDURE — 93225 XTRNL ECG REC<48 HRS REC: CPT

## 2017-08-07 ENCOUNTER — HOSPITAL ENCOUNTER (OUTPATIENT)
Dept: CARDIAC REHAB | Age: 82
Setting detail: THERAPIES SERIES
Discharge: HOME | End: 2017-08-07
Payer: MEDICARE

## 2017-08-07 PROCEDURE — 93798 PHYS/QHP OP CAR RHAB W/ECG: CPT

## 2017-08-10 ENCOUNTER — HOSPITAL ENCOUNTER (OUTPATIENT)
Dept: CARDIAC REHAB | Age: 82
Setting detail: THERAPIES SERIES
Discharge: HOME | End: 2017-08-10
Payer: MEDICARE

## 2017-08-10 PROCEDURE — 93798 PHYS/QHP OP CAR RHAB W/ECG: CPT

## 2017-08-14 ENCOUNTER — HOSPITAL ENCOUNTER (OUTPATIENT)
Dept: CARDIAC REHAB | Age: 82
Setting detail: THERAPIES SERIES
Discharge: HOME | End: 2017-08-14
Payer: MEDICARE

## 2017-08-14 PROCEDURE — 93798 PHYS/QHP OP CAR RHAB W/ECG: CPT

## 2017-08-15 ENCOUNTER — TELEPHONE (OUTPATIENT)
Dept: CARDIOLOGY | Age: 82
End: 2017-08-15

## 2017-08-21 ENCOUNTER — HOSPITAL ENCOUNTER (OUTPATIENT)
Age: 82
Discharge: HOME | End: 2017-08-21
Payer: MEDICARE

## 2017-08-21 ENCOUNTER — HOSPITAL ENCOUNTER (OUTPATIENT)
Dept: CARDIAC REHAB | Age: 82
Setting detail: THERAPIES SERIES
Discharge: HOME | End: 2017-08-21
Payer: MEDICARE

## 2017-08-21 ENCOUNTER — TELEPHONE (OUTPATIENT)
Dept: CARDIOLOGY | Age: 82
End: 2017-08-21

## 2017-08-21 ENCOUNTER — OFFICE VISIT (OUTPATIENT)
Dept: CARDIOLOGY | Age: 82
End: 2017-08-21
Payer: MEDICARE

## 2017-08-21 VITALS
HEART RATE: 62 BPM | BODY MASS INDEX: 31.86 KG/M2 | OXYGEN SATURATION: 96 % | WEIGHT: 203 LBS | RESPIRATION RATE: 16 BRPM | HEIGHT: 67 IN | SYSTOLIC BLOOD PRESSURE: 97 MMHG | DIASTOLIC BLOOD PRESSURE: 56 MMHG

## 2017-08-21 DIAGNOSIS — I95.2 HYPOTENSION DUE TO DRUGS: ICD-10-CM

## 2017-08-21 DIAGNOSIS — E78.5 HYPERLIPIDEMIA, UNSPECIFIED: Chronic | ICD-10-CM

## 2017-08-21 DIAGNOSIS — I48.0 PAF (PAROXYSMAL ATRIAL FIBRILLATION) (HCC): ICD-10-CM

## 2017-08-21 DIAGNOSIS — I25.119 CORONARY ARTERY DISEASE INVOLVING NATIVE CORONARY ARTERY OF NATIVE HEART WITH ANGINA PECTORIS (HCC): Primary | ICD-10-CM

## 2017-08-21 DIAGNOSIS — N18.30 CHRONIC RENAL FAILURE, STAGE 3 (MODERATE) (HCC): ICD-10-CM

## 2017-08-21 LAB
ABSOLUTE EOS #: 0.3 K/UL (ref 0–0.4)
ABSOLUTE LYMPH #: 0.7 K/UL (ref 1–4.8)
ABSOLUTE MONO #: 0.7 K/UL (ref 0–1)
ALBUMIN SERPL-MCNC: 3.8 G/DL (ref 3.5–5.2)
ALBUMIN/GLOBULIN RATIO: 1 (ref 1–2.5)
ALP BLD-CCNC: 94 U/L (ref 40–129)
ALT SERPL-CCNC: 16 U/L (ref 5–41)
ANION GAP SERPL CALCULATED.3IONS-SCNC: 12 MMOL/L (ref 9–17)
AST SERPL-CCNC: 35 U/L
BASOPHILS # BLD: 1 %
BASOPHILS ABSOLUTE: 0.1 K/UL (ref 0–0.2)
BILIRUB SERPL-MCNC: 0.41 MG/DL (ref 0.3–1.2)
BUN BLDV-MCNC: 34 MG/DL (ref 8–23)
BUN/CREAT BLD: 27 (ref 9–20)
CALCIUM SERPL-MCNC: 9.3 MG/DL (ref 8.6–10.4)
CHLORIDE BLD-SCNC: 100 MMOL/L (ref 98–107)
CO2: 29 MMOL/L (ref 20–31)
CREAT SERPL-MCNC: 1.27 MG/DL (ref 0.7–1.2)
DIFFERENTIAL TYPE: ABNORMAL
EOSINOPHILS RELATIVE PERCENT: 6 %
GFR AFRICAN AMERICAN: >60 ML/MIN
GFR NON-AFRICAN AMERICAN: 55 ML/MIN
GFR SERPL CREATININE-BSD FRML MDRD: ABNORMAL ML/MIN/{1.73_M2}
GFR SERPL CREATININE-BSD FRML MDRD: ABNORMAL ML/MIN/{1.73_M2}
GLUCOSE BLD-MCNC: 97 MG/DL (ref 70–99)
HCT VFR BLD CALC: 37 % (ref 41–53)
HEMOGLOBIN: 11.8 G/DL (ref 13.5–17)
HIGH SENSITIVE C-REACTIVE PROTEIN: 4.8 MG/L
LYMPHOCYTES # BLD: 13 %
MCH RBC QN AUTO: 27.2 PG (ref 26–34)
MCHC RBC AUTO-ENTMCNC: 31.7 G/DL (ref 31–37)
MCV RBC AUTO: 85.8 FL (ref 80–100)
MONOCYTES # BLD: 13 %
PDW BLD-RTO: 18.8 % (ref 12.1–15.2)
PLATELET # BLD: 185 K/UL (ref 140–450)
PLATELET ESTIMATE: ABNORMAL
PMV BLD AUTO: 7.9 FL (ref 6–12)
POTASSIUM SERPL-SCNC: 4.6 MMOL/L (ref 3.7–5.3)
RBC # BLD: 4.32 M/UL (ref 4.5–5.9)
RBC # BLD: ABNORMAL 10*6/UL
SEG NEUTROPHILS: 67 %
SEGMENTED NEUTROPHILS ABSOLUTE COUNT: 3.7 K/UL (ref 1.8–7.7)
SODIUM BLD-SCNC: 141 MMOL/L (ref 135–144)
TOTAL PROTEIN: 7.6 G/DL (ref 6.4–8.3)
WBC # BLD: 5.4 K/UL (ref 3.5–11)
WBC # BLD: ABNORMAL 10*3/UL

## 2017-08-21 PROCEDURE — 80053 COMPREHEN METABOLIC PANEL: CPT

## 2017-08-21 PROCEDURE — 86141 C-REACTIVE PROTEIN HS: CPT

## 2017-08-21 PROCEDURE — G8417 CALC BMI ABV UP PARAM F/U: HCPCS | Performed by: FAMILY MEDICINE

## 2017-08-21 PROCEDURE — 1036F TOBACCO NON-USER: CPT | Performed by: FAMILY MEDICINE

## 2017-08-21 PROCEDURE — 85025 COMPLETE CBC W/AUTO DIFF WBC: CPT

## 2017-08-21 PROCEDURE — 1123F ACP DISCUSS/DSCN MKR DOCD: CPT | Performed by: FAMILY MEDICINE

## 2017-08-21 PROCEDURE — G8427 DOCREV CUR MEDS BY ELIG CLIN: HCPCS | Performed by: FAMILY MEDICINE

## 2017-08-21 PROCEDURE — 4040F PNEUMOC VAC/ADMIN/RCVD: CPT | Performed by: FAMILY MEDICINE

## 2017-08-21 PROCEDURE — 36415 COLL VENOUS BLD VENIPUNCTURE: CPT

## 2017-08-21 PROCEDURE — 93798 PHYS/QHP OP CAR RHAB W/ECG: CPT

## 2017-08-21 PROCEDURE — G8598 ASA/ANTIPLAT THER USED: HCPCS | Performed by: FAMILY MEDICINE

## 2017-08-21 PROCEDURE — 99214 OFFICE O/P EST MOD 30 MIN: CPT | Performed by: FAMILY MEDICINE

## 2017-08-21 RX ORDER — METOPROLOL SUCCINATE 50 MG/1
25 TABLET, EXTENDED RELEASE ORAL DAILY
Qty: 90 TABLET | Refills: 3
Start: 2017-08-21 | End: 2017-09-21 | Stop reason: HOSPADM

## 2017-08-21 NOTE — TELEPHONE ENCOUNTER
[facility] called and patients eliquis is not covered, but will cover alternate. I spoke to patient and they will use the 2 week supply of samples, use their free 30 day supply then change medications.

## 2017-08-23 ENCOUNTER — HOSPITAL ENCOUNTER (OUTPATIENT)
Dept: CARDIAC REHAB | Age: 82
Setting detail: THERAPIES SERIES
Discharge: HOME | End: 2017-08-23
Payer: MEDICARE

## 2017-08-23 PROCEDURE — 93798 PHYS/QHP OP CAR RHAB W/ECG: CPT

## 2017-08-24 ENCOUNTER — HOSPITAL ENCOUNTER (OUTPATIENT)
Dept: CARDIAC REHAB | Age: 82
Setting detail: THERAPIES SERIES
Discharge: HOME | End: 2017-08-24
Payer: MEDICARE

## 2017-08-24 PROCEDURE — 93798 PHYS/QHP OP CAR RHAB W/ECG: CPT

## 2017-08-28 ENCOUNTER — HOSPITAL ENCOUNTER (OUTPATIENT)
Dept: CARDIAC REHAB | Age: 82
Setting detail: THERAPIES SERIES
Discharge: HOME | End: 2017-08-28
Payer: MEDICARE

## 2017-08-28 PROBLEM — I48.91 ATRIAL FIBRILLATION (HCC): Status: ACTIVE | Noted: 2017-08-28

## 2017-08-28 PROBLEM — D64.9 ANEMIA: Status: ACTIVE | Noted: 2017-08-28

## 2017-08-28 PROBLEM — I50.811 ACUTE RIGHT-SIDED CHF (CONGESTIVE HEART FAILURE) (HCC): Status: ACTIVE | Noted: 2017-08-28

## 2017-08-28 PROCEDURE — 93798 PHYS/QHP OP CAR RHAB W/ECG: CPT

## 2017-08-29 PROBLEM — I48.0 PAF (PAROXYSMAL ATRIAL FIBRILLATION) (HCC): Status: RESOLVED | Noted: 2017-08-21 | Resolved: 2017-08-29

## 2017-08-30 ENCOUNTER — HOSPITAL ENCOUNTER (OUTPATIENT)
Dept: CARDIAC REHAB | Age: 82
Setting detail: THERAPIES SERIES
Discharge: HOME | End: 2017-08-30
Payer: MEDICARE

## 2017-08-30 PROCEDURE — 93798 PHYS/QHP OP CAR RHAB W/ECG: CPT

## 2017-08-31 ENCOUNTER — HOSPITAL ENCOUNTER (OUTPATIENT)
Age: 82
Discharge: HOME | End: 2017-08-31
Payer: MEDICARE

## 2017-08-31 ENCOUNTER — HOSPITAL ENCOUNTER (OUTPATIENT)
Dept: CARDIAC REHAB | Age: 82
Setting detail: THERAPIES SERIES
Discharge: HOME | End: 2017-08-31
Payer: MEDICARE

## 2017-08-31 DIAGNOSIS — N40.2 PROSTATE NODULE: ICD-10-CM

## 2017-08-31 LAB — PROSTATE SPECIFIC ANTIGEN: 1.01 UG/L

## 2017-08-31 PROCEDURE — 93798 PHYS/QHP OP CAR RHAB W/ECG: CPT

## 2017-08-31 PROCEDURE — 36415 COLL VENOUS BLD VENIPUNCTURE: CPT

## 2017-08-31 PROCEDURE — 84153 ASSAY OF PSA TOTAL: CPT

## 2017-09-06 ENCOUNTER — HOSPITAL ENCOUNTER (OUTPATIENT)
Age: 82
Discharge: HOME | End: 2017-09-06
Payer: MEDICARE

## 2017-09-06 ENCOUNTER — OFFICE VISIT (OUTPATIENT)
Dept: UROLOGY | Age: 82
End: 2017-09-06
Payer: MEDICARE

## 2017-09-06 ENCOUNTER — HOSPITAL ENCOUNTER (OUTPATIENT)
Dept: CARDIAC REHAB | Age: 82
Setting detail: THERAPIES SERIES
Discharge: HOME | End: 2017-09-06
Payer: MEDICARE

## 2017-09-06 VITALS
DIASTOLIC BLOOD PRESSURE: 66 MMHG | HEIGHT: 66 IN | WEIGHT: 206 LBS | BODY MASS INDEX: 33.11 KG/M2 | SYSTOLIC BLOOD PRESSURE: 132 MMHG

## 2017-09-06 DIAGNOSIS — N40.2 PROSTATE NODULE: Primary | ICD-10-CM

## 2017-09-06 DIAGNOSIS — E78.5 HYPERLIPIDEMIA, UNSPECIFIED HYPERLIPIDEMIA TYPE: ICD-10-CM

## 2017-09-06 LAB
ANION GAP SERPL CALCULATED.3IONS-SCNC: 11 MMOL/L (ref 9–17)
BUN BLDV-MCNC: 36 MG/DL (ref 8–23)
BUN/CREAT BLD: 27 (ref 9–20)
CALCIUM SERPL-MCNC: 9.4 MG/DL (ref 8.6–10.4)
CHLORIDE BLD-SCNC: 100 MMOL/L (ref 98–107)
CO2: 30 MMOL/L (ref 20–31)
CREAT SERPL-MCNC: 1.35 MG/DL (ref 0.7–1.2)
GFR AFRICAN AMERICAN: >60 ML/MIN
GFR NON-AFRICAN AMERICAN: 51 ML/MIN
GFR SERPL CREATININE-BSD FRML MDRD: ABNORMAL ML/MIN/{1.73_M2}
GFR SERPL CREATININE-BSD FRML MDRD: ABNORMAL ML/MIN/{1.73_M2}
GLUCOSE BLD-MCNC: 124 MG/DL (ref 70–99)
POTASSIUM SERPL-SCNC: 4.6 MMOL/L (ref 3.7–5.3)
SODIUM BLD-SCNC: 141 MMOL/L (ref 135–144)

## 2017-09-06 PROCEDURE — G8417 CALC BMI ABV UP PARAM F/U: HCPCS | Performed by: NURSE PRACTITIONER

## 2017-09-06 PROCEDURE — 99213 OFFICE O/P EST LOW 20 MIN: CPT | Performed by: NURSE PRACTITIONER

## 2017-09-06 PROCEDURE — 1123F ACP DISCUSS/DSCN MKR DOCD: CPT | Performed by: NURSE PRACTITIONER

## 2017-09-06 PROCEDURE — 93798 PHYS/QHP OP CAR RHAB W/ECG: CPT

## 2017-09-06 PROCEDURE — 1036F TOBACCO NON-USER: CPT | Performed by: NURSE PRACTITIONER

## 2017-09-06 PROCEDURE — 36415 COLL VENOUS BLD VENIPUNCTURE: CPT

## 2017-09-06 PROCEDURE — 4040F PNEUMOC VAC/ADMIN/RCVD: CPT | Performed by: NURSE PRACTITIONER

## 2017-09-06 PROCEDURE — G8427 DOCREV CUR MEDS BY ELIG CLIN: HCPCS | Performed by: NURSE PRACTITIONER

## 2017-09-06 PROCEDURE — G8598 ASA/ANTIPLAT THER USED: HCPCS | Performed by: NURSE PRACTITIONER

## 2017-09-06 PROCEDURE — 80048 BASIC METABOLIC PNL TOTAL CA: CPT

## 2017-09-06 ASSESSMENT — ENCOUNTER SYMPTOMS
VOMITING: 0
CONSTIPATION: 0
COLOR CHANGE: 0
EYE PAIN: 0
WHEEZING: 0
NAUSEA: 0
ABDOMINAL PAIN: 0
BACK PAIN: 0
SHORTNESS OF BREATH: 0
COUGH: 0
EYE REDNESS: 0

## 2017-09-07 ENCOUNTER — HOSPITAL ENCOUNTER (OUTPATIENT)
Dept: CARDIAC REHAB | Age: 82
Setting detail: THERAPIES SERIES
Discharge: HOME | End: 2017-09-07
Payer: MEDICARE

## 2017-09-07 PROCEDURE — 93798 PHYS/QHP OP CAR RHAB W/ECG: CPT

## 2017-09-11 ENCOUNTER — HOSPITAL ENCOUNTER (OUTPATIENT)
Dept: CARDIAC REHAB | Age: 82
Setting detail: THERAPIES SERIES
Discharge: HOME | End: 2017-09-11
Payer: MEDICARE

## 2017-09-11 PROCEDURE — 93798 PHYS/QHP OP CAR RHAB W/ECG: CPT

## 2017-09-13 ENCOUNTER — HOSPITAL ENCOUNTER (OUTPATIENT)
Dept: LAB | Age: 82
Discharge: HOME | End: 2017-09-13
Payer: MEDICARE

## 2017-09-13 ENCOUNTER — HOSPITAL ENCOUNTER (OUTPATIENT)
Dept: CARDIAC REHAB | Age: 82
Setting detail: THERAPIES SERIES
Discharge: HOME | End: 2017-09-13
Payer: MEDICARE

## 2017-09-13 DIAGNOSIS — E78.5 HYPERLIPIDEMIA, UNSPECIFIED HYPERLIPIDEMIA TYPE: ICD-10-CM

## 2017-09-13 LAB
ANION GAP SERPL CALCULATED.3IONS-SCNC: 14 MMOL/L (ref 9–17)
BUN BLDV-MCNC: 46 MG/DL (ref 8–23)
BUN/CREAT BLD: 33 (ref 9–20)
CALCIUM SERPL-MCNC: 9.3 MG/DL (ref 8.6–10.4)
CHLORIDE BLD-SCNC: 100 MMOL/L (ref 98–107)
CO2: 26 MMOL/L (ref 20–31)
CREAT SERPL-MCNC: 1.41 MG/DL (ref 0.7–1.2)
GFR AFRICAN AMERICAN: 59 ML/MIN
GFR NON-AFRICAN AMERICAN: 48 ML/MIN
GFR SERPL CREATININE-BSD FRML MDRD: ABNORMAL ML/MIN/{1.73_M2}
GFR SERPL CREATININE-BSD FRML MDRD: ABNORMAL ML/MIN/{1.73_M2}
GLUCOSE BLD-MCNC: 104 MG/DL (ref 70–99)
POTASSIUM SERPL-SCNC: 4.6 MMOL/L (ref 3.7–5.3)
SODIUM BLD-SCNC: 140 MMOL/L (ref 135–144)

## 2017-09-13 PROCEDURE — 36415 COLL VENOUS BLD VENIPUNCTURE: CPT

## 2017-09-13 PROCEDURE — 93798 PHYS/QHP OP CAR RHAB W/ECG: CPT

## 2017-09-13 PROCEDURE — 80048 BASIC METABOLIC PNL TOTAL CA: CPT

## 2017-09-18 ENCOUNTER — HOSPITAL ENCOUNTER (OUTPATIENT)
Dept: CARDIAC REHAB | Age: 82
Setting detail: THERAPIES SERIES
Discharge: HOME | End: 2017-09-18
Payer: MEDICARE

## 2017-09-18 PROCEDURE — 93798 PHYS/QHP OP CAR RHAB W/ECG: CPT

## 2017-09-20 ENCOUNTER — HOSPITAL ENCOUNTER (OUTPATIENT)
Dept: LAB | Age: 82
Discharge: HOME | End: 2017-09-20
Payer: MEDICARE

## 2017-09-20 ENCOUNTER — HOSPITAL ENCOUNTER (OUTPATIENT)
Dept: CARDIAC REHAB | Age: 82
Setting detail: THERAPIES SERIES
Discharge: HOME | End: 2017-09-20
Payer: MEDICARE

## 2017-09-20 DIAGNOSIS — E78.5 HYPERLIPIDEMIA, UNSPECIFIED HYPERLIPIDEMIA TYPE: ICD-10-CM

## 2017-09-20 LAB
ANION GAP SERPL CALCULATED.3IONS-SCNC: 15 MMOL/L (ref 9–17)
BUN BLDV-MCNC: 40 MG/DL (ref 8–23)
BUN/CREAT BLD: 29 (ref 9–20)
CALCIUM SERPL-MCNC: 9.4 MG/DL (ref 8.6–10.4)
CHLORIDE BLD-SCNC: 95 MMOL/L (ref 98–107)
CO2: 25 MMOL/L (ref 20–31)
CREAT SERPL-MCNC: 1.39 MG/DL (ref 0.7–1.2)
GFR AFRICAN AMERICAN: 60 ML/MIN
GFR NON-AFRICAN AMERICAN: 49 ML/MIN
GFR SERPL CREATININE-BSD FRML MDRD: ABNORMAL ML/MIN/{1.73_M2}
GFR SERPL CREATININE-BSD FRML MDRD: ABNORMAL ML/MIN/{1.73_M2}
GLUCOSE BLD-MCNC: 99 MG/DL (ref 70–99)
POTASSIUM SERPL-SCNC: 4.8 MMOL/L (ref 3.7–5.3)
SODIUM BLD-SCNC: 135 MMOL/L (ref 135–144)

## 2017-09-20 PROCEDURE — 80048 BASIC METABOLIC PNL TOTAL CA: CPT

## 2017-09-20 PROCEDURE — 93798 PHYS/QHP OP CAR RHAB W/ECG: CPT

## 2017-09-20 PROCEDURE — 36415 COLL VENOUS BLD VENIPUNCTURE: CPT

## 2017-09-21 ENCOUNTER — HOSPITAL ENCOUNTER (OUTPATIENT)
Dept: CARDIAC CATH/INVASIVE PROCEDURES | Age: 82
Discharge: HOME | End: 2017-09-21
Payer: MEDICARE

## 2017-09-21 ENCOUNTER — HOSPITAL ENCOUNTER (OUTPATIENT)
Dept: CARDIAC REHAB | Age: 82
Setting detail: THERAPIES SERIES
Discharge: HOME | End: 2017-09-21
Payer: MEDICARE

## 2017-09-21 VITALS
HEART RATE: 64 BPM | OXYGEN SATURATION: 94 % | WEIGHT: 204 LBS | TEMPERATURE: 97.6 F | SYSTOLIC BLOOD PRESSURE: 116 MMHG | DIASTOLIC BLOOD PRESSURE: 67 MMHG | RESPIRATION RATE: 18 BRPM | HEIGHT: 66 IN | BODY MASS INDEX: 32.78 KG/M2

## 2017-09-21 PROCEDURE — 93798 PHYS/QHP OP CAR RHAB W/ECG: CPT

## 2017-09-21 PROCEDURE — 6360000002 HC RX W HCPCS

## 2017-09-21 PROCEDURE — 6360000002 HC RX W HCPCS: Performed by: FAMILY MEDICINE

## 2017-09-21 PROCEDURE — 2580000003 HC RX 258: Performed by: FAMILY MEDICINE

## 2017-09-21 PROCEDURE — 93005 ELECTROCARDIOGRAM TRACING: CPT

## 2017-09-21 PROCEDURE — 92960 CARDIOVERSION ELECTRIC EXT: CPT | Performed by: FAMILY MEDICINE

## 2017-09-21 RX ORDER — SODIUM CHLORIDE 9 MG/ML
INJECTION, SOLUTION INTRAVENOUS CONTINUOUS
Status: DISCONTINUED | OUTPATIENT
Start: 2017-09-21 | End: 2017-09-22 | Stop reason: HOSPADM

## 2017-09-21 RX ORDER — SODIUM CHLORIDE 0.9 % (FLUSH) 0.9 %
10 SYRINGE (ML) INJECTION EVERY 12 HOURS SCHEDULED
Status: DISCONTINUED | OUTPATIENT
Start: 2017-09-21 | End: 2017-09-22 | Stop reason: HOSPADM

## 2017-09-21 RX ORDER — MIDAZOLAM HYDROCHLORIDE 1 MG/ML
INJECTION INTRAMUSCULAR; INTRAVENOUS
Status: COMPLETED | OUTPATIENT
Start: 2017-09-21 | End: 2017-09-21

## 2017-09-21 RX ORDER — AMIODARONE HYDROCHLORIDE 200 MG/1
200 TABLET ORAL 2 TIMES DAILY
Qty: 60 TABLET | Refills: 0 | Status: SHIPPED | OUTPATIENT
Start: 2017-09-21 | End: 2017-10-17 | Stop reason: SDUPTHER

## 2017-09-21 RX ORDER — SODIUM CHLORIDE 0.9 % (FLUSH) 0.9 %
10 SYRINGE (ML) INJECTION PRN
Status: DISCONTINUED | OUTPATIENT
Start: 2017-09-21 | End: 2017-09-21

## 2017-09-21 RX ORDER — SODIUM CHLORIDE 0.9 % (FLUSH) 0.9 %
10 SYRINGE (ML) INJECTION PRN
Status: DISCONTINUED | OUTPATIENT
Start: 2017-09-21 | End: 2017-09-22 | Stop reason: HOSPADM

## 2017-09-21 RX ORDER — SODIUM CHLORIDE 0.9 % (FLUSH) 0.9 %
10 SYRINGE (ML) INJECTION EVERY 12 HOURS SCHEDULED
Status: DISCONTINUED | OUTPATIENT
Start: 2017-09-21 | End: 2017-09-21

## 2017-09-21 RX ORDER — FENTANYL CITRATE 50 UG/ML
INJECTION, SOLUTION INTRAMUSCULAR; INTRAVENOUS
Status: COMPLETED | OUTPATIENT
Start: 2017-09-21 | End: 2017-09-21

## 2017-09-21 RX ADMIN — FENTANYL CITRATE 25 MCG: 50 INJECTION INTRAMUSCULAR; INTRAVENOUS at 15:47

## 2017-09-21 RX ADMIN — MIDAZOLAM HYDROCHLORIDE 1 MG: 1 INJECTION, SOLUTION INTRAMUSCULAR; INTRAVENOUS at 15:46

## 2017-09-21 RX ADMIN — SODIUM CHLORIDE: 9 INJECTION, SOLUTION INTRAVENOUS at 15:11

## 2017-09-22 LAB
EKG ATRIAL RATE: 54 BPM
EKG ATRIAL RATE: 64 BPM
EKG P AXIS: 47 DEGREES
EKG P-R INTERVAL: 202 MS
EKG Q-T INTERVAL: 448 MS
EKG Q-T INTERVAL: 464 MS
EKG QRS DURATION: 94 MS
EKG QRS DURATION: 96 MS
EKG QTC CALCULATION (BAZETT): 462 MS
EKG QTC CALCULATION (BAZETT): 478 MS
EKG R AXIS: 49 DEGREES
EKG R AXIS: 57 DEGREES
EKG T AXIS: 11 DEGREES
EKG T AXIS: 41 DEGREES
EKG VENTRICULAR RATE: 64 BPM
EKG VENTRICULAR RATE: 64 BPM

## 2017-09-25 ENCOUNTER — HOSPITAL ENCOUNTER (OUTPATIENT)
Dept: CARDIAC REHAB | Age: 82
Setting detail: THERAPIES SERIES
Discharge: HOME | End: 2017-09-25
Payer: MEDICARE

## 2017-09-25 PROCEDURE — 93798 PHYS/QHP OP CAR RHAB W/ECG: CPT

## 2017-09-27 ENCOUNTER — HOSPITAL ENCOUNTER (OUTPATIENT)
Dept: CARDIAC REHAB | Age: 82
Setting detail: THERAPIES SERIES
Discharge: HOME | End: 2017-09-27
Payer: MEDICARE

## 2017-09-27 PROCEDURE — 93798 PHYS/QHP OP CAR RHAB W/ECG: CPT

## 2017-09-28 ENCOUNTER — HOSPITAL ENCOUNTER (OUTPATIENT)
Dept: CARDIAC REHAB | Age: 82
Setting detail: THERAPIES SERIES
Discharge: HOME | End: 2017-09-28
Payer: MEDICARE

## 2017-09-28 PROCEDURE — 93798 PHYS/QHP OP CAR RHAB W/ECG: CPT

## 2017-10-02 ENCOUNTER — HOSPITAL ENCOUNTER (OUTPATIENT)
Dept: CARDIAC REHAB | Age: 82
Setting detail: THERAPIES SERIES
Discharge: HOME | End: 2017-10-02
Payer: MEDICARE

## 2017-10-02 ENCOUNTER — TELEPHONE (OUTPATIENT)
Dept: CARDIOLOGY | Age: 82
End: 2017-10-02

## 2017-10-02 PROCEDURE — 93798 PHYS/QHP OP CAR RHAB W/ECG: CPT

## 2017-10-02 NOTE — TELEPHONE ENCOUNTER
Patient stopped in office wondering how long he would have to be on Eliquis. It is expensive and did not want to get refill if was going to be taken off of. Patient has upcoming appt with Dr. Triny Kaiser in a few weeks and I told him I could provide him with some samples until his appt and then he could discuss with Dr. Triny Kaiser. We were all out of the Eliquis 5mg tabs so I gave him some samples of the Eliquis 2.5mg tabs and told him he could take 2 tabs (=5mg) daily. Told him he could check back with office for the 5mg tabs as the rep had not been in yet with additional samples.

## 2017-10-04 ENCOUNTER — HOSPITAL ENCOUNTER (OUTPATIENT)
Dept: CARDIAC REHAB | Age: 82
Setting detail: THERAPIES SERIES
Discharge: HOME | End: 2017-10-04
Payer: MEDICARE

## 2017-10-04 PROCEDURE — 93798 PHYS/QHP OP CAR RHAB W/ECG: CPT

## 2017-10-05 ENCOUNTER — HOSPITAL ENCOUNTER (OUTPATIENT)
Dept: CARDIAC REHAB | Age: 82
Setting detail: THERAPIES SERIES
Discharge: HOME | End: 2017-10-05
Payer: MEDICARE

## 2017-10-05 PROCEDURE — 93798 PHYS/QHP OP CAR RHAB W/ECG: CPT

## 2017-10-09 ENCOUNTER — HOSPITAL ENCOUNTER (OUTPATIENT)
Dept: CARDIAC REHAB | Age: 82
Setting detail: THERAPIES SERIES
Discharge: HOME | End: 2017-10-09
Payer: MEDICARE

## 2017-10-09 PROCEDURE — 93798 PHYS/QHP OP CAR RHAB W/ECG: CPT

## 2017-10-11 ENCOUNTER — HOSPITAL ENCOUNTER (OUTPATIENT)
Dept: CARDIAC REHAB | Age: 82
Setting detail: THERAPIES SERIES
Discharge: HOME | End: 2017-10-11
Payer: MEDICARE

## 2017-10-11 PROCEDURE — 93798 PHYS/QHP OP CAR RHAB W/ECG: CPT

## 2017-10-12 ENCOUNTER — HOSPITAL ENCOUNTER (OUTPATIENT)
Dept: CARDIAC REHAB | Age: 82
Setting detail: THERAPIES SERIES
Discharge: HOME | End: 2017-10-12
Payer: MEDICARE

## 2017-10-12 PROCEDURE — 93798 PHYS/QHP OP CAR RHAB W/ECG: CPT

## 2017-10-16 ENCOUNTER — HOSPITAL ENCOUNTER (OUTPATIENT)
Dept: CARDIAC REHAB | Age: 82
Setting detail: THERAPIES SERIES
Discharge: HOME | End: 2017-10-16
Payer: MEDICARE

## 2017-10-16 PROCEDURE — 93798 PHYS/QHP OP CAR RHAB W/ECG: CPT

## 2017-10-17 ENCOUNTER — OFFICE VISIT (OUTPATIENT)
Dept: CARDIOLOGY | Age: 82
End: 2017-10-17
Payer: MEDICARE

## 2017-10-17 VITALS
HEIGHT: 67 IN | HEART RATE: 70 BPM | BODY MASS INDEX: 30.64 KG/M2 | DIASTOLIC BLOOD PRESSURE: 52 MMHG | RESPIRATION RATE: 16 BRPM | WEIGHT: 195.2 LBS | SYSTOLIC BLOOD PRESSURE: 95 MMHG | OXYGEN SATURATION: 96 %

## 2017-10-17 DIAGNOSIS — I95.2 HYPOTENSION DUE TO DRUGS: ICD-10-CM

## 2017-10-17 DIAGNOSIS — I25.10 ASHD (ARTERIOSCLEROTIC HEART DISEASE): ICD-10-CM

## 2017-10-17 DIAGNOSIS — I50.32 CHRONIC DIASTOLIC HF (HEART FAILURE), NYHA CLASS 2 (HCC): ICD-10-CM

## 2017-10-17 DIAGNOSIS — I48.0 PAF (PAROXYSMAL ATRIAL FIBRILLATION) (HCC): Primary | ICD-10-CM

## 2017-10-17 PROBLEM — J90 RECURRENT LEFT PLEURAL EFFUSION: Status: RESOLVED | Noted: 2017-06-09 | Resolved: 2017-10-17

## 2017-10-17 PROCEDURE — 4040F PNEUMOC VAC/ADMIN/RCVD: CPT | Performed by: FAMILY MEDICINE

## 2017-10-17 PROCEDURE — G8417 CALC BMI ABV UP PARAM F/U: HCPCS | Performed by: FAMILY MEDICINE

## 2017-10-17 PROCEDURE — 93000 ELECTROCARDIOGRAM COMPLETE: CPT | Performed by: FAMILY MEDICINE

## 2017-10-17 PROCEDURE — G8427 DOCREV CUR MEDS BY ELIG CLIN: HCPCS | Performed by: FAMILY MEDICINE

## 2017-10-17 PROCEDURE — 1036F TOBACCO NON-USER: CPT | Performed by: FAMILY MEDICINE

## 2017-10-17 PROCEDURE — 99214 OFFICE O/P EST MOD 30 MIN: CPT | Performed by: FAMILY MEDICINE

## 2017-10-17 PROCEDURE — G8484 FLU IMMUNIZE NO ADMIN: HCPCS | Performed by: FAMILY MEDICINE

## 2017-10-17 PROCEDURE — G8598 ASA/ANTIPLAT THER USED: HCPCS | Performed by: FAMILY MEDICINE

## 2017-10-17 PROCEDURE — 1123F ACP DISCUSS/DSCN MKR DOCD: CPT | Performed by: FAMILY MEDICINE

## 2017-10-17 RX ORDER — AMIODARONE HYDROCHLORIDE 200 MG/1
200 TABLET ORAL DAILY
Qty: 60 TABLET | Refills: 0 | Status: SHIPPED | OUTPATIENT
Start: 2017-10-17 | End: 2017-12-12

## 2017-10-17 RX ORDER — FUROSEMIDE 80 MG
80 TABLET ORAL DAILY
Qty: 90 TABLET | Refills: 3 | Status: SHIPPED | OUTPATIENT
Start: 2017-10-17 | End: 2017-12-12 | Stop reason: SDUPTHER

## 2017-10-18 ENCOUNTER — HOSPITAL ENCOUNTER (OUTPATIENT)
Dept: CARDIAC REHAB | Age: 82
Setting detail: THERAPIES SERIES
Discharge: HOME | End: 2017-10-18
Payer: MEDICARE

## 2017-10-18 PROCEDURE — 93798 PHYS/QHP OP CAR RHAB W/ECG: CPT

## 2017-10-18 NOTE — PROGRESS NOTES
Phase II Cardiac Rehab Individualized Treatment Plan-90 Day     Patient Name: Murphy Little  Date of Initial Assessment: 10/18/2017  ACCOUNT #: [de-identified]  Diagnosis: CABG   Onset Date: 05/31/17  Referring Physician: Dr Radha Miller  Risk Stratification: High  Session Number: 31   EXERCISE    Stages of Change:   [] pre-contemplation   [x] Action   [] Contemplate   [] Maintainence   [x] Prep   [] Relapse          Exercise Prescription:  Mode: [x] TM [x] B [] STP [] EL [x] R  Frequency: 3 x week  Duration: 31-60 min   Intensity: METS 4.1  Progression: Increase 1-2 levels/week or 1-2 min/week to achieve target HR and RPE 11-13. [] Angina with Exertion THR:    [] Resistance Training Weight (lbs):  8 Reps: 10-15    Hypertension:  [x] Yes  [] No  Resting BP: 128/58  Peak Exercise BP: 132/84  [] Med change? Intervention:  Home Exercise:  Type: walking  Duration: 30 min   Frequency: Daily    [x] Resistance Training    Education:   [x] Equipment [place]  [x] Self pulse   [x] Proper use weights/therabands   [x] S/S to report  [x] Low Na Diet    [x] Warm up/ Cool down  [] BP Medication    [x] RPE Scale   [] Understand BP   [x] Ex Safety   [] Exercise specialist class-Home Exercise       Target Goal:   -Individual Exercise Plan  -BP<140/90 or <130/80 if DM   -Aerobic active 30 + minutes 5-7 days per week    Nutrition    Stages of Change:   [] pre-contemplation   [x] Action   [] Contemplate   [] Maintainence   [x] Prep   [] Relapse    Lipids: Total Cholesterol:  No new resutls  Triglycerides:   HDL:   LDL:   [] Med Change? Diabetes:  [] Yes  [x] No  Random BS:  HbA1c:  [] Med Change?     Weight Management:  Wt Goal: 1-2 lbs/wk    Intervention:   [x] Dietitian Consult       [x] Nurse/Patient Discussion     [x] Diet Class           [] Referred to Diabetes Education     Education:  [] S&S hypo/hyperglycemia  [x] Low fat/low cholesterol diet  [] Weight loss methods      [] Relate Diabetes/CAD     [x] Eating heart

## 2017-10-18 NOTE — PROGRESS NOTES
Patient: Murphy Little  : 1935  Date of Visit: 2017    REASON FOR VISIT / CONSULTATION: Follow Up After Procedure (Cardioversion done on 17. Hx: CAD, PAF. At last visit Eliquis was started and Metoprolol was decreased to 25 mg daily. Weight at last visit was 203 lb and todays weight is 195 lb. Pt states that he has been feeling good since last visit and states that he felt like a million dollars after cardioversion but thinks his heart may be out of whack again due to shakiness in hands. Flutter on/off all over chest but this is just once in a while. Denies CP and SOB. Continues to do cardiac rehab. )      Dear Marshall Rai MD      I had the pleasure of seeing your patient Murphy Little in follow up today. As you know, Mr. Carmen Thurman is a [de-identified] y.o. male with a history of severe aortic stenosis and severe 3 vessel coronary artery disease leading to a 2 vessel CABG as well as a bioprosthetic aortic valve replacement on 17 by Dr. Richard. Since I last saw him, he reports losing another 8 lbs and overall feels better. However, he still says he feels sluggish and does not think he is doing as good as before his surgery. He denied any current or recent chest pain, shortness of breath, abdominal pain, bleeding problems, problems with his medications or any other concerns at this time. Past Medical History:   Diagnosis Date    Abnormal cardiac cath 10/01/2012    30% LAD, 80% ostial stenosis-CX, RCAOccluded at its ostium. There was minimal left to right collateral flow.  Abnormal echocardiogram     EF >55%. Mild to moderate aortic stenosis. Mean gradient to 18 mmHg.  Atrial fibrillation ([address] 2017    CAD (coronary artery disease)     30% LAD, 80% ostial stenosis-CX, RCAOccluded at its ostium. There was minimal left to right collateral flow.       Chronic diastolic CHF (congestive heart failure), NYHA class 2 ([address] 2016    Chronic renal failure, stage 3 (moderate) 4/24/2017    Erectile dysfunction     H/O cardiac catheterization 06/02/2017    LMCA: Lesion on LMCA: Distal subsection. 60% stenosis. Comments: Calcified lesion in the ostial Cx extending into the distal left main and proximal LAD. LAD: lesion on Prox LAD: Ostial. 50% stenosis. Lesion on 1st Diag:Proximal subsection. 50% stenosis. LCx: Lesion on Prox CX: Osital. 95% stenosis. Lesion on Dist CX: Ostial 50% stenosis. RCA: Lesion on prox RCA: Proximal subsection. 100% stenosis.  H/O echocardiogram 12/9/15    EF:55%. LV wall thickness is moderately increased. Bi atrial enlargement noted. Mean aortic valve gradient is 33mmhg w/moderate to severe aortic stenosis. Mitral annular calcification. Mild mitral regurgitation. Moderate pulm hypertension w/RV systolic pressure of 43 mmhg. Mild (grade 1) diastolic dysfunction.  H/O echocardiogram 12/05/2016    EF:60%. Mildly increased LV wall thickness. Biatrial enlargement. Moderate to severe aortic stenosis. Mean gradient 36 mmHg, KRISTINA 0.08. Mild mitral regurgitation. Moderate pulmonary hypertension with an estimated RV systolic pressure of 60 mmHg. Mild diastolic dysfunction.  H/O echocardiogram 06/13/2017    EF 55%. Moderately increased LV wall thickness normal LV cavity size. Severe bi-atrial enlargement. Mild mitral and moderate tricuspid regurg. Mild pulmonary hypertension estimated right ventricular systolic pressure of 35 mmHg. Bilateral pleural effusion.  History of cardioversion 09/21/2017    Successful cardioversion to NSR with 200J biphasic    History of echocardiogram 11/21/13    EF >55%, mild to mod LVH, LA  is mod dilated, mild to mod AS, mild diastolic dysfunction noted.  History of echocardiogram 06/09/15    EF 55%. LV wall thickness is severely increased.  LA is moderately dilated(34-39)with a LA volume index of 36 ml.    History of Holter monitoring 08/08/2017    Atrial fibrillation throughout with fairly controlled

## 2017-10-25 ENCOUNTER — HOSPITAL ENCOUNTER (OUTPATIENT)
Dept: CARDIAC REHAB | Age: 82
Setting detail: THERAPIES SERIES
Discharge: HOME | End: 2017-10-25
Payer: MEDICARE

## 2017-10-25 ENCOUNTER — HOSPITAL ENCOUNTER (OUTPATIENT)
Age: 82
Discharge: HOME | End: 2017-10-25
Payer: MEDICARE

## 2017-10-25 DIAGNOSIS — E78.5 HYPERLIPIDEMIA, UNSPECIFIED HYPERLIPIDEMIA TYPE: Chronic | ICD-10-CM

## 2017-10-25 LAB
ANION GAP SERPL CALCULATED.3IONS-SCNC: 15 MMOL/L (ref 9–17)
BUN BLDV-MCNC: 28 MG/DL (ref 8–23)
BUN/CREAT BLD: 21 (ref 9–20)
CALCIUM SERPL-MCNC: 9.2 MG/DL (ref 8.6–10.4)
CHLORIDE BLD-SCNC: 100 MMOL/L (ref 98–107)
CO2: 25 MMOL/L (ref 20–31)
CREAT SERPL-MCNC: 1.31 MG/DL (ref 0.7–1.2)
GFR AFRICAN AMERICAN: >60 ML/MIN
GFR NON-AFRICAN AMERICAN: 53 ML/MIN
GFR SERPL CREATININE-BSD FRML MDRD: ABNORMAL ML/MIN/{1.73_M2}
GFR SERPL CREATININE-BSD FRML MDRD: ABNORMAL ML/MIN/{1.73_M2}
GLUCOSE BLD-MCNC: 94 MG/DL (ref 70–99)
POTASSIUM SERPL-SCNC: 4.5 MMOL/L (ref 3.7–5.3)
SODIUM BLD-SCNC: 140 MMOL/L (ref 135–144)

## 2017-10-25 PROCEDURE — 80048 BASIC METABOLIC PNL TOTAL CA: CPT

## 2017-10-25 PROCEDURE — 93798 PHYS/QHP OP CAR RHAB W/ECG: CPT

## 2017-10-25 PROCEDURE — 36415 COLL VENOUS BLD VENIPUNCTURE: CPT

## 2017-10-26 ENCOUNTER — HOSPITAL ENCOUNTER (OUTPATIENT)
Dept: CARDIAC REHAB | Age: 82
Setting detail: THERAPIES SERIES
Discharge: HOME | End: 2017-10-26
Payer: MEDICARE

## 2017-10-26 PROCEDURE — 93798 PHYS/QHP OP CAR RHAB W/ECG: CPT

## 2017-10-30 ENCOUNTER — HOSPITAL ENCOUNTER (OUTPATIENT)
Dept: CARDIAC REHAB | Age: 82
Setting detail: THERAPIES SERIES
Discharge: HOME | End: 2017-10-30
Payer: MEDICARE

## 2017-10-30 PROCEDURE — 93798 PHYS/QHP OP CAR RHAB W/ECG: CPT

## 2017-11-01 ENCOUNTER — HOSPITAL ENCOUNTER (OUTPATIENT)
Dept: CARDIAC REHAB | Age: 82
Setting detail: THERAPIES SERIES
Discharge: HOME | End: 2017-11-01
Payer: MEDICARE

## 2017-11-01 PROCEDURE — 93798 PHYS/QHP OP CAR RHAB W/ECG: CPT

## 2017-11-02 ENCOUNTER — HOSPITAL ENCOUNTER (OUTPATIENT)
Dept: CARDIAC REHAB | Age: 82
Setting detail: THERAPIES SERIES
Discharge: HOME | End: 2017-11-02
Payer: MEDICARE

## 2017-11-02 PROCEDURE — 93798 PHYS/QHP OP CAR RHAB W/ECG: CPT

## 2017-11-02 NOTE — PROGRESS NOTES
Phase II Cardiac Rehab Individualized Treatment Plan-Discharge    Patient Name: aKtie Castro  Date of Initial Assessment: 11/2/2017  ACCOUNT #: [de-identified]  Diagnosis: CABG    Onset Date: 05/31/17  Referring Physician: Dr Jonathon Daniel  Risk Stratification: High  Session Number: 39   EXERCISE    Stages of Change:   [] pre-contemplation   [] Action   [] Contemplate   [x] Maintainence   [] Prep   [] Relapse          Exercise Prescription:  Mode: [x] TM [x] B [x] STP [] EL [x] R  Frequency: 3 x week  Duration: 31-60 min   Intensity: METS 3.7 Pt progressed from 2.1 to 3.7 and increased time on both machines,. Progression:    [] Angina with Exertion THR:    [] Resistance Training Weight (lbs):  8lb Reps: 10-15    Hypertension:  [x] Yes  [] No  Resting BP: *142/60  Peak Exercise BP: 120/72  [] Med Change? Intervention:  Home Exercise:  Type: walking  Duration: 30 min   Frequency: Dialy    [x] Resistance Training    Depression Screening:  [] Have you been feeling sad. ..down in the dumps? [] Have you lost interest in your job, sports, hobbies, friends? [] Do you often feel tired? [] Do you have trouble sleeping or do you sleep too much? [x] Have you been gaining or losing weight? [] Do you often feel down on yourself, that everything is your fault? [] Do you have troubled making decisions or concentrating on your work? [] Do you often feel agitated or like you can barely move? [] Do you ever feel that life isn't worth living?    *If greater than 5 symptoms listed,  notified. Education:   [x] Education Goals met        Target Goal:   -Individual Exercise Plan  -BP<140/90 or <130/80 if DM   -Aerobic active 30 + minutes 5-7 days per week    Nutrition    Stages of Change:   [] pre-contemplation   [] Action   [] Contemplate   [x] Maintainenc   [] Prep   [] Relapse    Lipids: Total Cholesterol: 166  Triglycerides: 86  HDL: 60  LDL: 89  [] Med Change?      Diabetes:  [] Yes  [x] No  FBS: HbA1c:  [] Med Change? Random BS:   [] BS in range    Weight Management:  Weight: 199.4  Height: 7  BMI: 31  Wt Goal: 1-2 lbs/wk  Special Diet: Low fat, low chol, low sodium     Intervention:   [x] Dietitian Consult       [x] Nurse/Patient Discussion     [x] Diet Class           [] Referred to Diabetes Education     Education:  [x] Education Goals met    Target Goal:  -LDL-C<100 if triglycerides are > 200  -LDL-C < 70 for high risk patients  -HbA1c < 7%  -BMI < 25   Education    Stages of Change:    [] pre-contemplation   [] Action   [] Contemplate   [x] Maintainence   [] Prep   [] Relapse    Knowledge test score: 100      Family support: [x] Yes  [] No    Tobacco use: [] Yes  [x] No    Intervention:  [] Referred to smoking cessation counselor     [] Individual education and counseling  [] Tobacco adjunct  [] Informed of education class schedule     Education:   [x] Education goals met    Target Goal:  -Complete cessation of tobacco use (if applicable)  -Continued risk factor modifications  -Recognizing signs/symptoms to report  -Proper use of meds    Psychosocial  Stages of Change:    [] pre-contemplation   [] Action   [] Contemplate   [x] Maintainence   [] Prep   [] Relapse    Psychosocial Test:  Tool Used: [employer] Quality of Life  Score: 26  Depression screening score: 1  []Medication change? Education:    [x] Education Goals met    Target Goal:  -Assess presence or absence of depression using a valid screening tool. -Maximize coping skills.  -Positive support system.     Preventative Medication:   [x] Aspirin       [x] Beta Blockade      [x] Statin or other lipid lowering agent     [] Clopidogrel   [x] ACE Inhibitor   [x] Other anticoagulation medications     Fall Risk assess: [x] Yes  [] No  Assistive Device:   [] Cane  [] Walker [] Wheel Chair  [] Gait belt    Patient/Program goal:   -increased stamina/strength to 30-50 total exercise by increasing 1-2 level/wk and 1-2   min/wk  to achieve THR and RPE 11-13-METS  (Patient has increased mets from 1.9 to 4.1  -continue weights/ therabands with increasing resistance and reps (patient in currently using 8# resistance with 5-10 reps)  -maintain good BP -WNL  -develop regular exercise 30 min daily (does not exercise at home; encouraged to develop home exercise program    Pt has been able to maintain RSR after cardioversion.   Pt plans to continue with Phase III.        Physician Changes/Comments:      Cardiac Rehab Staff

## 2017-11-06 ENCOUNTER — HOSPITAL ENCOUNTER (OUTPATIENT)
Dept: CARDIAC REHAB | Age: 82
Setting detail: THERAPIES SERIES
Discharge: HOME | End: 2017-11-06
Payer: MEDICARE

## 2017-11-08 ENCOUNTER — APPOINTMENT (OUTPATIENT)
Dept: CARDIAC REHAB | Age: 82
End: 2017-11-08
Payer: MEDICARE

## 2017-11-09 ENCOUNTER — APPOINTMENT (OUTPATIENT)
Dept: CARDIAC REHAB | Age: 82
End: 2017-11-09
Payer: MEDICARE

## 2017-11-13 ENCOUNTER — APPOINTMENT (OUTPATIENT)
Dept: CARDIAC REHAB | Age: 82
End: 2017-11-13
Payer: MEDICARE

## 2017-11-15 ENCOUNTER — APPOINTMENT (OUTPATIENT)
Dept: CARDIAC REHAB | Age: 82
End: 2017-11-15
Payer: MEDICARE

## 2017-11-16 ENCOUNTER — APPOINTMENT (OUTPATIENT)
Dept: CARDIAC REHAB | Age: 82
End: 2017-11-16
Payer: MEDICARE

## 2017-11-20 ENCOUNTER — APPOINTMENT (OUTPATIENT)
Dept: CARDIAC REHAB | Age: 82
End: 2017-11-20
Payer: MEDICARE

## 2017-11-27 ENCOUNTER — HOSPITAL ENCOUNTER (OUTPATIENT)
Dept: NON INVASIVE DIAGNOSTICS | Age: 82
Discharge: HOME | End: 2017-11-27

## 2017-12-06 ENCOUNTER — HOSPITAL ENCOUNTER (OUTPATIENT)
Dept: NON INVASIVE DIAGNOSTICS | Age: 82
Discharge: HOME | End: 2017-12-06
Payer: MEDICARE

## 2017-12-06 DIAGNOSIS — I25.10 ASHD (ARTERIOSCLEROTIC HEART DISEASE): ICD-10-CM

## 2017-12-06 DIAGNOSIS — I48.0 PAF (PAROXYSMAL ATRIAL FIBRILLATION) (HCC): ICD-10-CM

## 2017-12-06 PROCEDURE — 93225 XTRNL ECG REC<48 HRS REC: CPT

## 2017-12-11 ENCOUNTER — HOSPITAL ENCOUNTER (OUTPATIENT)
Age: 82
Discharge: HOME | End: 2017-12-11
Payer: MEDICARE

## 2017-12-11 DIAGNOSIS — E78.5 HYPERLIPIDEMIA, UNSPECIFIED HYPERLIPIDEMIA TYPE: ICD-10-CM

## 2017-12-11 LAB
ALT SERPL-CCNC: 26 U/L (ref 5–41)
ANION GAP SERPL CALCULATED.3IONS-SCNC: 13 MMOL/L (ref 9–17)
AST SERPL-CCNC: 40 U/L
BUN BLDV-MCNC: 36 MG/DL (ref 8–23)
BUN/CREAT BLD: 25 (ref 9–20)
CALCIUM SERPL-MCNC: 9 MG/DL (ref 8.6–10.4)
CHLORIDE BLD-SCNC: 100 MMOL/L (ref 98–107)
CHOLESTEROL/HDL RATIO: 2.1
CHOLESTEROL: 157 MG/DL
CO2: 28 MMOL/L (ref 20–31)
CREAT SERPL-MCNC: 1.44 MG/DL (ref 0.7–1.2)
GFR AFRICAN AMERICAN: 57 ML/MIN
GFR NON-AFRICAN AMERICAN: 47 ML/MIN
GFR SERPL CREATININE-BSD FRML MDRD: ABNORMAL ML/MIN/{1.73_M2}
GFR SERPL CREATININE-BSD FRML MDRD: ABNORMAL ML/MIN/{1.73_M2}
GLUCOSE BLD-MCNC: 101 MG/DL (ref 70–99)
HCT VFR BLD CALC: 38.7 % (ref 41–53)
HDLC SERPL-MCNC: 75 MG/DL
HEMOGLOBIN: 12.2 G/DL (ref 13.5–17)
HIGH SENSITIVE C-REACTIVE PROTEIN: 3.3 MG/L
LDL CHOLESTEROL: 72 MG/DL (ref 0–130)
MCH RBC QN AUTO: 29.2 PG (ref 26–34)
MCHC RBC AUTO-ENTMCNC: 31.5 G/DL (ref 31–37)
MCV RBC AUTO: 92.5 FL (ref 80–100)
PDW BLD-RTO: 20.1 % (ref 12.1–15.2)
PLATELET # BLD: 177 K/UL (ref 140–450)
PMV BLD AUTO: 7.5 FL (ref 6–12)
POTASSIUM SERPL-SCNC: 4.1 MMOL/L (ref 3.7–5.3)
RBC # BLD: 4.19 M/UL (ref 4.5–5.9)
SODIUM BLD-SCNC: 141 MMOL/L (ref 135–144)
TRIGL SERPL-MCNC: 51 MG/DL
VLDLC SERPL CALC-MCNC: NORMAL MG/DL (ref 1–30)
WBC # BLD: 4.7 K/UL (ref 3.5–11)

## 2017-12-11 PROCEDURE — 86141 C-REACTIVE PROTEIN HS: CPT

## 2017-12-11 PROCEDURE — 36415 COLL VENOUS BLD VENIPUNCTURE: CPT

## 2017-12-11 PROCEDURE — 84460 ALANINE AMINO (ALT) (SGPT): CPT

## 2017-12-11 PROCEDURE — 80048 BASIC METABOLIC PNL TOTAL CA: CPT

## 2017-12-11 PROCEDURE — 80061 LIPID PANEL: CPT

## 2017-12-11 PROCEDURE — 84450 TRANSFERASE (AST) (SGOT): CPT

## 2017-12-11 PROCEDURE — 85027 COMPLETE CBC AUTOMATED: CPT

## 2017-12-12 ENCOUNTER — OFFICE VISIT (OUTPATIENT)
Dept: CARDIOLOGY | Age: 82
End: 2017-12-12
Payer: MEDICARE

## 2017-12-12 VITALS
SYSTOLIC BLOOD PRESSURE: 149 MMHG | DIASTOLIC BLOOD PRESSURE: 73 MMHG | RESPIRATION RATE: 20 BRPM | OXYGEN SATURATION: 95 % | HEART RATE: 68 BPM | HEIGHT: 67 IN | BODY MASS INDEX: 32.33 KG/M2 | WEIGHT: 206 LBS

## 2017-12-12 DIAGNOSIS — I25.119 CORONARY ARTERY DISEASE INVOLVING NATIVE CORONARY ARTERY OF NATIVE HEART WITH ANGINA PECTORIS (HCC): ICD-10-CM

## 2017-12-12 DIAGNOSIS — R06.02 SHORTNESS OF BREATH ON EXERTION: ICD-10-CM

## 2017-12-12 DIAGNOSIS — Z86.79 HISTORY OF ATRIAL FIBRILLATION: ICD-10-CM

## 2017-12-12 DIAGNOSIS — I50.33 ACUTE ON CHRONIC DIASTOLIC CHF (CONGESTIVE HEART FAILURE), NYHA CLASS 3 (HCC): Primary | ICD-10-CM

## 2017-12-12 DIAGNOSIS — R42 LIGHTHEADED: ICD-10-CM

## 2017-12-12 PROCEDURE — G8598 ASA/ANTIPLAT THER USED: HCPCS | Performed by: FAMILY MEDICINE

## 2017-12-12 PROCEDURE — G8417 CALC BMI ABV UP PARAM F/U: HCPCS | Performed by: FAMILY MEDICINE

## 2017-12-12 PROCEDURE — 1036F TOBACCO NON-USER: CPT | Performed by: FAMILY MEDICINE

## 2017-12-12 PROCEDURE — 1123F ACP DISCUSS/DSCN MKR DOCD: CPT | Performed by: FAMILY MEDICINE

## 2017-12-12 PROCEDURE — G8484 FLU IMMUNIZE NO ADMIN: HCPCS | Performed by: FAMILY MEDICINE

## 2017-12-12 PROCEDURE — 99214 OFFICE O/P EST MOD 30 MIN: CPT | Performed by: FAMILY MEDICINE

## 2017-12-12 PROCEDURE — 4040F PNEUMOC VAC/ADMIN/RCVD: CPT | Performed by: FAMILY MEDICINE

## 2017-12-12 PROCEDURE — G8427 DOCREV CUR MEDS BY ELIG CLIN: HCPCS | Performed by: FAMILY MEDICINE

## 2017-12-12 RX ORDER — FUROSEMIDE 80 MG
80 TABLET ORAL DAILY
Qty: 135 TABLET | Refills: 3 | Status: ON HOLD | OUTPATIENT
Start: 2017-12-12 | End: 2018-10-06 | Stop reason: HOSPADM

## 2017-12-12 NOTE — PROGRESS NOTES
ostial Cx extending into the distal left main and proximal LAD. LAD: lesion on Prox LAD: Ostial. 50% stenosis. Lesion on 1st Diag:Proximal subsection. 50% stenosis. LCx: Lesion on Prox CX: Osital. 95% stenosis. Lesion on Dist CX: Ostial 50% stenosis. RCA: Lesion on prox RCA: Proximal subsection. 100% stenosis.  H/O echocardiogram 12/9/15    EF:55%. LV wall thickness is moderately increased. Bi atrial enlargement noted. Mean aortic valve gradient is 33mmhg w/moderate to severe aortic stenosis. Mitral annular calcification. Mild mitral regurgitation. Moderate pulm hypertension w/RV systolic pressure of 43 mmhg. Mild (grade 1) diastolic dysfunction.  H/O echocardiogram 12/05/2016    EF:60%. Mildly increased LV wall thickness. Biatrial enlargement. Moderate to severe aortic stenosis. Mean gradient 36 mmHg, KRISTINA 0.08. Mild mitral regurgitation. Moderate pulmonary hypertension with an estimated RV systolic pressure of 60 mmHg. Mild diastolic dysfunction.  H/O echocardiogram 06/13/2017    EF 55%. Moderately increased LV wall thickness normal LV cavity size. Severe bi-atrial enlargement. Mild mitral and moderate tricuspid regurg. Mild pulmonary hypertension estimated right ventricular systolic pressure of 35 mmHg. Bilateral pleural effusion.  History of cardioversion 09/21/2017    Successful cardioversion to NSR with 200J biphasic    History of echocardiogram 11/21/13    EF >55%, mild to mod LVH, LA  is mod dilated, mild to mod AS, mild diastolic dysfunction noted.  History of echocardiogram 06/09/15    EF 55%. LV wall thickness is severely increased. LA is moderately dilated(34-39)with a LA volume index of 36 ml.    History of Holter monitoring 08/08/2017    Atrial fibrillation throughout with fairly controlled ventricular response (HR less than 100 bpm 93% of the time), no signficant pauses.  Rare isolated PVC's    History of Holter monitoring 08/08/2017    Atrial Fibrillation throughout with fairly controlled ventricular response (HR less than 100bpm 93% of the time),no significant pauses. Rare isolated PVC's    History of Holter monitoring 12/06/2017    Rare PAC's with one 4 beat run of wide complex tachycardia at 170 bpm, No symptoms reported. clinical correlation required    Hyperlipidemia 2/21/2006    Hypertension     Osteoarthritis of hip 6/20/2013    PVD (peripheral vascular disease) [address]     Reported history of a 60% right carotid stenosis.  Trigeminal neuralgia 7/10/2013       CURRENT ALLERGIES: Iodine; Norco [hydrocodone-acetaminophen]; Other; Shellfish-derived products; Shrimp flavor; and Oxycodone REVIEW OF SYSTEMS: 14 systems were reviewed. Pertinent positives and negatives as above, all else negative.      Past Surgical History:   Procedure Laterality Date    CARDIAC CATHETERIZATION Left 05/18/2017    Right Radial/[facility]/    CARDIOVERSION  09/21/2017    [facility]/Dr Her/2 shocks at 200 joules    CORONARY ARTERY BYPASS GRAFT N/A 5/19/2017    CORONARY ARTERY BYPASS GRAFT X 2: LIMA-LAD, SVG-OM, AORTIC VALVE REPLACEMENT WITH 23 MM MEDTRONIC MOSAIC ULTRA, EVACUATION OF 1.5 LITER PLEURAL FLUID; ALLAN PER ANESTHESIA performed by Bright Ryan MD at [address] Right 7/29/13    [facility] - Dr. Ron Walton    tube was blocked    SHOULDER SURGERY      roto cuff surgery left shoulder    Social History:  Social History   Substance Use Topics    Smoking status: Former Smoker     Years: 15.00     Types: Cigars    Smokeless tobacco: Former User      Comment: quit 30-40 years ago    Alcohol use 0.0 oz/week      Comment: weekly        CURRENT MEDICATIONS:  Outpatient Prescriptions Marked as Taking for the 12/12/17 encounter (Office Visit) with Maia Ga MD   Medication Sig Dispense Refill    furosemide (LASIX) 80 MG tablet Take 1 tablet by mouth daily Take 1 tablet qAM

## 2017-12-18 ENCOUNTER — OFFICE VISIT (OUTPATIENT)
Dept: FAMILY MEDICINE CLINIC | Age: 82
End: 2017-12-18
Payer: MEDICARE

## 2017-12-18 ENCOUNTER — HOSPITAL ENCOUNTER (OUTPATIENT)
Age: 82
Discharge: HOME | End: 2017-12-18
Payer: MEDICARE

## 2017-12-18 VITALS
HEIGHT: 67 IN | SYSTOLIC BLOOD PRESSURE: 108 MMHG | BODY MASS INDEX: 31.14 KG/M2 | WEIGHT: 198.4 LBS | DIASTOLIC BLOOD PRESSURE: 60 MMHG

## 2017-12-18 DIAGNOSIS — E78.5 HYPERLIPIDEMIA, UNSPECIFIED HYPERLIPIDEMIA TYPE: Primary | Chronic | ICD-10-CM

## 2017-12-18 DIAGNOSIS — I48.91 ATRIAL FIBRILLATION, UNSPECIFIED TYPE (HCC): ICD-10-CM

## 2017-12-18 DIAGNOSIS — I25.119 CORONARY ARTERY DISEASE INVOLVING NATIVE CORONARY ARTERY OF NATIVE HEART WITH ANGINA PECTORIS (HCC): ICD-10-CM

## 2017-12-18 DIAGNOSIS — N18.30 CHRONIC RENAL FAILURE, STAGE 3 (MODERATE) (HCC): ICD-10-CM

## 2017-12-18 DIAGNOSIS — I25.10 ASHD (ARTERIOSCLEROTIC HEART DISEASE): ICD-10-CM

## 2017-12-18 DIAGNOSIS — I50.33 ACUTE ON CHRONIC DIASTOLIC CHF (CONGESTIVE HEART FAILURE), NYHA CLASS 3 (HCC): ICD-10-CM

## 2017-12-18 DIAGNOSIS — I10 ESSENTIAL HYPERTENSION: ICD-10-CM

## 2017-12-18 DIAGNOSIS — R42 LIGHTHEADED: ICD-10-CM

## 2017-12-18 LAB
ANION GAP SERPL CALCULATED.3IONS-SCNC: 14 MMOL/L (ref 9–17)
BUN BLDV-MCNC: 32 MG/DL (ref 8–23)
BUN/CREAT BLD: 23 (ref 9–20)
CALCIUM SERPL-MCNC: 9.3 MG/DL (ref 8.6–10.4)
CHLORIDE BLD-SCNC: 98 MMOL/L (ref 98–107)
CO2: 29 MMOL/L (ref 20–31)
CREAT SERPL-MCNC: 1.4 MG/DL (ref 0.7–1.2)
GFR AFRICAN AMERICAN: 59 ML/MIN
GFR NON-AFRICAN AMERICAN: 49 ML/MIN
GFR SERPL CREATININE-BSD FRML MDRD: ABNORMAL ML/MIN/{1.73_M2}
GFR SERPL CREATININE-BSD FRML MDRD: ABNORMAL ML/MIN/{1.73_M2}
GLUCOSE BLD-MCNC: 84 MG/DL (ref 70–99)
HCT VFR BLD CALC: 41.2 % (ref 41–53)
HEMOGLOBIN: 13 G/DL (ref 13.5–17)
MCH RBC QN AUTO: 29.4 PG (ref 26–34)
MCHC RBC AUTO-ENTMCNC: 31.7 G/DL (ref 31–37)
MCV RBC AUTO: 92.8 FL (ref 80–100)
PDW BLD-RTO: 18.4 % (ref 12.1–15.2)
PLATELET # BLD: 196 K/UL (ref 140–450)
PMV BLD AUTO: 7 FL (ref 6–12)
POTASSIUM SERPL-SCNC: 4.1 MMOL/L (ref 3.7–5.3)
RBC # BLD: 4.44 M/UL (ref 4.5–5.9)
SODIUM BLD-SCNC: 141 MMOL/L (ref 135–144)
WBC # BLD: 5.4 K/UL (ref 3.5–11)

## 2017-12-18 PROCEDURE — G8417 CALC BMI ABV UP PARAM F/U: HCPCS | Performed by: FAMILY MEDICINE

## 2017-12-18 PROCEDURE — 99214 OFFICE O/P EST MOD 30 MIN: CPT | Performed by: FAMILY MEDICINE

## 2017-12-18 PROCEDURE — G8484 FLU IMMUNIZE NO ADMIN: HCPCS | Performed by: FAMILY MEDICINE

## 2017-12-18 PROCEDURE — 4040F PNEUMOC VAC/ADMIN/RCVD: CPT | Performed by: FAMILY MEDICINE

## 2017-12-18 PROCEDURE — 1123F ACP DISCUSS/DSCN MKR DOCD: CPT | Performed by: FAMILY MEDICINE

## 2017-12-18 PROCEDURE — 1036F TOBACCO NON-USER: CPT | Performed by: FAMILY MEDICINE

## 2017-12-18 PROCEDURE — G8427 DOCREV CUR MEDS BY ELIG CLIN: HCPCS | Performed by: FAMILY MEDICINE

## 2017-12-18 PROCEDURE — 36415 COLL VENOUS BLD VENIPUNCTURE: CPT

## 2017-12-18 PROCEDURE — G8598 ASA/ANTIPLAT THER USED: HCPCS | Performed by: FAMILY MEDICINE

## 2017-12-18 PROCEDURE — 85027 COMPLETE CBC AUTOMATED: CPT

## 2017-12-18 PROCEDURE — 80048 BASIC METABOLIC PNL TOTAL CA: CPT

## 2017-12-18 RX ORDER — EZETIMIBE 10 MG/1
10 TABLET ORAL DAILY
Qty: 90 TABLET | Refills: 3 | Status: SHIPPED | OUTPATIENT
Start: 2017-12-18 | End: 2019-01-29

## 2017-12-18 ASSESSMENT — PATIENT HEALTH QUESTIONNAIRE - PHQ9
SUM OF ALL RESPONSES TO PHQ9 QUESTIONS 1 & 2: 0
1. LITTLE INTEREST OR PLEASURE IN DOING THINGS: 0
SUM OF ALL RESPONSES TO PHQ QUESTIONS 1-9: 0
2. FEELING DOWN, DEPRESSED OR HOPELESS: 0

## 2017-12-18 NOTE — PROGRESS NOTES
[address] [address]  [place] [address]  Dept: [phone]        Celina Jasmine is a 81 y.o. male here for 4 mon reg ck, HTN, HLD .        HPI:  HYPERLIPIDEMIA   Medication compliance: compliant all of the time. Patient is  following a low fat, low cholesterol diet. He is exercising regularly (cardiac rehab 3x/week)      HYPERTENSION  He is exercising and is adherent to a low-salt diet. Blood pressure is not being monitored at home. Cardiac symptoms: none. Patient denies: chest pain, dyspnea and palpitations.     He recently had a 24 hour holter monitor. He states that this was ordered because he thought that his heart was out of rhythm. Dr. Judy Juan ordered this and informed him that results were normal. He also discontinued eliquis.     Home Medications           Prior to Admission medications    Medication Sig Start Date End Date Taking? Authorizing Provider   furosemide (LASIX) 80 MG tablet Take 1 tablet by mouth daily Take 1 tablet qAM and 1/2 tab in afternoon.  12/12/17     Sedrick Mckeon MD   baclofen (LIORESAL) 10 MG tablet TAKE ONE TABLET BY MOUTH THREE TIMES DAILY AS NEEDED 8/25/17     Tommie Jimenez MD   OXcarbazepine (TRILEPTAL) 300 MG tablet TAKE ONE TABLET BY MOUTH 4 TIMES DAILY  Patient taking differently: TAKE ONE TABLET BY MOUTH 3 TIMES DAILY 6/23/17     Tommie Jimenez MD   lisinopril (PRINIVIL;ZESTRIL) 5 MG tablet Take 0.5 tablets by mouth daily 6/15/17     Sedrick Mckeon MD   potassium chloride (KLOR-CON M) 10 MEQ extended release tablet Take 1 tablet by mouth daily 6/9/17     Sedrick Mckeon MD   rosuvastatin (CRESTOR) 40 MG tablet Take 1 tablet by mouth every evening 12/20/16     Tommie Jimenez MD   ezetimibe (ZETIA) 10 MG tablet Take 1 tablet by mouth daily 12/20/16     Tommie Jimenez MD   EPIPEN 2-AMBAR 0.3 MG/0.3ML SOAJ injection USE AS DIRECTED 9/19/16     Tommie Jimenez MD   aspirin 81 MG tablet Take 81 mg by mouth nightly        Historical Provider, MD            ROS:  General Constitutional: Denies chills. Denies fever. Denies headache. Denies lightheadedness. Ophthalmologic: Denies blurred vision. ENT: Denies nasal congestion. Denies sore throat. Denies ear pain and pressure. Respiratory: Denies cough. Admits episode of shortness of breath 3 weeks ago while walking in from the parking lot to go to cardiac rehab, this is gone now. . Denies wheezing. Cardiovascular: Denies chest pain at rest. Denies irregular heartbeat. Denies palpitations. Gastrointestinal: Denies abdominal pain. Denies blood in the stool. Denies constipation. Denies diarrhea. Denies nausea. Denies vomiting. Genitourinary: Denies blood in the urine. Denies difficulty urinating. Denies frequent urination. Denies painful urination. Denies urinary incontinence  Musculoskeletal: Denies muscle aches. Denies painful joints. Denies swollen joints. Peripheral Vascular: Denies pain/cramping in legs after exertion. Skin: Denies dry skin. Denies itching. Denies rash. Dr. Dayne Solorzano removed lesion on forehead. He states that this came back \"melanoma\" but was informed that they excised enough that the margins were clear and that it needs no further excision. He gets suture removed tomorrow. Neurologic: Denies falls. Denies dizziness. Denies fainting. Denies tingling/numbness. Psychiatric: Denies sleep disturbance. Denies anxiety.  Denies depressed mood.     Past Surgical History         Past Surgical History:   Procedure Laterality Date    CARDIAC CATHETERIZATION Left 05/18/2017     Right Radial/[facility]/    CARDIOVERSION   09/21/2017     [facility]) [facility]/Dr Her/2 shocks at 200 joules    CORONARY ARTERY BYPASS GRAFT N/A 5/19/2017     CORONARY ARTERY BYPASS GRAFT X 2: LIMA-LAD, SVG-OM, AORTIC VALVE REPLACEMENT WITH 23 MM MEDTRONIC MOSAIC ULTRA, EVACUATION OF 1.5 LITER PLEURAL FLUID; ALLAN PER ANESTHESIA performed by Gwen Pope MD at [address] [place] [address] Left      HIP SURGERY Right 7/29/13   [facility] - Dr. Patti Mcguire Right     [facility]     tube was blocked    SHOULDER SURGERY         roto cuff surgery left shoulder            Family History         Family History   Problem Relation Age of Onset    Heart Disease Mother      Heart Disease Father      Cancer Father      Heart Disease Sister      High Blood Pressure Sister      Heart Disease Sister      High Blood Pressure Sister      Heart Disease Brother      Heart Disease Brother      Heart Disease Brother              Past Medical History        Past Medical History:   Diagnosis Date    Abnormal cardiac cath 10/01/2012     30% LAD, 80% ostial stenosis-CX, RCAOccluded at its ostium. There was minimal left to right collateral flow.  Abnormal echocardiogram 11/12     EF >55%. Mild to moderate aortic stenosis. Mean gradient to 18 mmHg.  Atrial fibrillation ([address] 8/28/2017    CAD (coronary artery disease) 9/12     30% LAD, 80% ostial stenosis-CX, RCAOccluded at its ostium. There was minimal left to right collateral flow.  Chronic diastolic CHF (congestive heart failure), NYHA class 2 ([address] 9/7/2016    Chronic renal failure, stage 3 (moderate) 4/24/2017    Erectile dysfunction      H/O cardiac catheterization 06/02/2017     LMCA: Lesion on LMCA: Distal subsection. 60% stenosis. Comments: Calcified lesion in the ostial Cx extending into the distal left main and proximal LAD. LAD: lesion on Prox LAD: Ostial. 50% stenosis. Lesion on 1st Diag:Proximal subsection. 50% stenosis. LCx: Lesion on Prox CX: Osital. 95% stenosis. Lesion on Dist CX: Ostial 50% stenosis. RCA: Lesion on prox RCA: Proximal subsection. 100% stenosis.  H/O echocardiogram 12/9/15     EF:55%. LV wall thickness is moderately increased. Bi atrial enlargement noted. Mean aortic valve gradient is 33mmhg w/moderate to severe aortic stenosis. Mitral annular calcification.  Mild mitral were placed in this encounter.        Orders Placed This Encounter   Procedures    ALT     Standing Status:   Future     Standing Expiration Date:   12/18/2018    AST     Standing Status:   Future     Standing Expiration Date:   12/18/2018    Basic Metabolic Panel     Standing Status:   Future     Standing Expiration Date:   12/18/2018    CBC     Standing Status:   Future     Standing Expiration Date:   12/18/2018    CRP,High Sensitivity     Standing Status:   Future     Standing Expiration Date:   12/18/2018    Sedimentation Rate     Standing Status:   Future     Standing Expiration Date:   12/18/2018    Brain Natriuretic Peptide     Standing Status:   Future     Standing Expiration Date:   12/18/2018          Scribed by: Hunter Solis

## 2017-12-18 NOTE — PROGRESS NOTES
[address] [address]  [place] [id number]  Dept: [phone]      Dina Tilley is a 81 y.o. male here for No chief complaint on file. HPI:  HYPERLIPIDEMIA   Medication compliance: compliant all of the time. Patient is  following a low fat, low cholesterol diet. He is exercising regularly (cardiac rehab 3x/week)      HYPERTENSION  He is exercising and is adherent to a low-salt diet. Blood pressure is not being monitored at home. Cardiac symptoms: none. Patient denies: chest pain, dyspnea and palpitations. He recently had a 24 hour holter monitor. He states that this was ordered because he thought that his heart was out of rhythm. Dr. Payal Vitale ordered this and informed him that results were normal. He also discontinued eliquis. Prior to Admission medications    Medication Sig Start Date End Date Taking? Authorizing Provider   furosemide (LASIX) 80 MG tablet Take 1 tablet by mouth daily Take 1 tablet qAM and 1/2 tab in afternoon.  12/12/17   Ana Gonzalez MD   baclofen (LIORESAL) 10 MG tablet TAKE ONE TABLET BY MOUTH THREE TIMES DAILY AS NEEDED 8/25/17   Margie Callahan MD   OXcarbazepine (TRILEPTAL) 300 MG tablet TAKE ONE TABLET BY MOUTH 4 TIMES DAILY  Patient taking differently: TAKE ONE TABLET BY MOUTH 3 TIMES DAILY 6/23/17   Margie Callahan MD   lisinopril (PRINIVIL;ZESTRIL) 5 MG tablet Take 0.5 tablets by mouth daily 6/15/17   Ana Gonzalez MD   potassium chloride (KLOR-CON M) 10 MEQ extended release tablet Take 1 tablet by mouth daily 6/9/17   Ana Gonzalez MD   rosuvastatin (CRESTOR) 40 MG tablet Take 1 tablet by mouth every evening 12/20/16   Margie Callahan MD   ezetimibe (ZETIA) 10 MG tablet Take 1 tablet by mouth daily 12/20/16   Margie Callahan MD   EPIPEN 2-AMBAR 0.3 MG/0.3ML SOAJ injection USE AS DIRECTED 9/19/16   Margie Callahan MD   aspirin 81 MG tablet Take 81 mg by mouth nightly     Historical Provider, MD       ROS:  General Constitutional: Denies chills. Denies fever. Denies headache. Denies lightheadedness. Ophthalmologic: Denies blurred vision. ENT: Denies nasal congestion. Denies sore throat. Denies ear pain and pressure. Respiratory: Denies cough. Admits episode of shortness of breath 3 weeks ago while walking in from the parking lot to go to cardiac rehab, this is gone now. . Denies wheezing. Cardiovascular: Denies chest pain at rest. Denies irregular heartbeat. Denies palpitations. Gastrointestinal: Denies abdominal pain. Denies blood in the stool. Denies constipation. Denies diarrhea. Denies nausea. Denies vomiting. Genitourinary: Denies blood in the urine. Denies difficulty urinating. Denies frequent urination. Denies painful urination. Denies urinary incontinence  Musculoskeletal: Denies muscle aches. Denies painful joints. Denies swollen joints. Peripheral Vascular: Denies pain/cramping in legs after exertion. Skin: Denies dry skin. Denies itching. Denies rash. Dr. Clover Glass removed lesion on forehead. He states that this came back \"melanoma\" but was informed that they excised enough that the margins were clear and that it needs no further excision. He gets suture removed tomorrow. Neurologic: Denies falls. Denies dizziness. Denies fainting. Denies tingling/numbness. Psychiatric: Denies sleep disturbance. Denies anxiety. Denies depressed mood.     Past Surgical History:   Procedure Laterality Date    CARDIAC CATHETERIZATION Left 05/18/2017    Right Radial/[facility]/    CARDIOVERSION  09/21/2017    [facility]/Dr Her/2 shocks at 200 joules    CORONARY ARTERY BYPASS GRAFT N/A 5/19/2017    CORONARY ARTERY BYPASS GRAFT X 2: LIMA-LAD, SVG-OM, AORTIC VALVE REPLACEMENT WITH 23 MM MEDTRONIC MOSAIC ULTRA, EVACUATION OF 1.5 LITER PLEURAL FLUID; ALLAN PER ANESTHESIA performed by Lurdes Mcconnell MD at [address],[place] [address] Right 7/29/13    [facility] - Dr. Mayra Mcdonough LV wall thickness. Biatrial enlargement. Moderate to severe aortic stenosis. Mean gradient 36 mmHg, KRISTINA 0.08. Mild mitral regurgitation. Moderate pulmonary hypertension with an estimated RV systolic pressure of 60 mmHg. Mild diastolic dysfunction.  H/O echocardiogram 06/13/2017    EF 55%. Moderately increased LV wall thickness normal LV cavity size. Severe bi-atrial enlargement. Mild mitral and moderate tricuspid regurg. Mild pulmonary hypertension estimated right ventricular systolic pressure of 35 mmHg. Bilateral pleural effusion.  History of cardioversion 09/21/2017    Successful cardioversion to NSR with 200J biphasic    History of echocardiogram 11/21/13    EF >55%, mild to mod LVH, LA  is mod dilated, mild to mod AS, mild diastolic dysfunction noted.  History of echocardiogram 06/09/15    EF 55%. LV wall thickness is severely increased. LA is moderately dilated(34-39)with a LA volume index of 36 ml.    History of Holter monitoring 08/08/2017    Atrial fibrillation throughout with fairly controlled ventricular response (HR less than 100 bpm 93% of the time), no signficant pauses. Rare isolated PVC's    History of Holter monitoring 08/08/2017    Atrial Fibrillation throughout with fairly controlled ventricular response (HR less than 100bpm 93% of the time),no significant pauses. Rare isolated PVC's    History of Holter monitoring 12/06/2017    Rare PAC's with one 4 beat run of wide complex tachycardia at 170 bpm, No symptoms reported. clinical correlation required    Hyperlipidemia 2/21/2006    Hypertension     Osteoarthritis of hip 6/20/2013    PVD (peripheral vascular disease) ([facility] [address]     Reported history of a 60% right carotid stenosis.     Trigeminal neuralgia 7/10/2013      Social History   Substance Use Topics    Smoking status: Former Smoker     Years: 15.00     Types: Cigars    Smokeless tobacco: Former User      Comment: quit 30-40 years ago    Alcohol use 0.0 oz/week      Comment:

## 2017-12-18 NOTE — PATIENT INSTRUCTIONS
PLAN:  We discussed the importance of reduction of his sodium intake for kidney protection and reduced cardiac risk  He should continue to stay active and eat healthy  I recommend that he drink 6-8 8oz glasses of water a day  He should be checking his weight daily and watching the rise the day after eating high sodium meals

## 2017-12-19 ENCOUNTER — TELEPHONE (OUTPATIENT)
Dept: CARDIOLOGY | Age: 82
End: 2017-12-19

## 2017-12-19 NOTE — TELEPHONE ENCOUNTER
----- Message from John Bruce MD sent at 12/18/2017 11:19 PM EST -----  Please let patient know labs ok. Thanks.

## 2017-12-21 ENCOUNTER — HOSPITAL ENCOUNTER (OUTPATIENT)
Dept: NON INVASIVE DIAGNOSTICS | Age: 82
Discharge: HOME | End: 2017-12-21
Payer: MEDICARE

## 2017-12-21 DIAGNOSIS — R42 LIGHTHEADED: ICD-10-CM

## 2017-12-21 DIAGNOSIS — I25.119 CORONARY ARTERY DISEASE INVOLVING NATIVE CORONARY ARTERY OF NATIVE HEART WITH ANGINA PECTORIS (HCC): ICD-10-CM

## 2017-12-21 PROCEDURE — 93017 CV STRESS TEST TRACING ONLY: CPT

## 2017-12-22 NOTE — PROCEDURES
II, AVF, V5, V6 which did not  meet diagnostic criteria for myocardial ischemia with occasional premature  atrial contractions (PACs) and occasional premature ventricular  contractions (PVCs). IMPRESSION:  No significant electrocardiographic evidence of myocardial ischemia during  EKG monitoring without significant associated arrhythmias. Overall these results are most consistent with an excellent exercise  tolerance for age and gender. MORAIMA ANTHONY    D: 06/22/2017 13:10:25       T: 12/22/2017 13:49:13     AA/MARCELINA_EDIT  Job#: [id number]    Doc#: Unknown    CC:  Kassandra Mills.  Hattie Lutehr

## 2018-01-22 ENCOUNTER — OFFICE VISIT (OUTPATIENT)
Dept: CARDIOLOGY | Age: 83
End: 2018-01-22
Payer: MEDICARE

## 2018-01-22 VITALS
RESPIRATION RATE: 20 BRPM | DIASTOLIC BLOOD PRESSURE: 68 MMHG | HEIGHT: 67 IN | OXYGEN SATURATION: 97 % | HEART RATE: 79 BPM | SYSTOLIC BLOOD PRESSURE: 117 MMHG | BODY MASS INDEX: 31.11 KG/M2 | WEIGHT: 198.2 LBS

## 2018-01-22 DIAGNOSIS — N52.01 ERECTILE DYSFUNCTION DUE TO ARTERIAL INSUFFICIENCY: ICD-10-CM

## 2018-01-22 DIAGNOSIS — I10 ESSENTIAL HYPERTENSION: ICD-10-CM

## 2018-01-22 DIAGNOSIS — I35.0 SEVERE AORTIC STENOSIS BY PRIOR ECHOCARDIOGRAM: ICD-10-CM

## 2018-01-22 DIAGNOSIS — I50.32 CHRONIC DIASTOLIC CHF (CONGESTIVE HEART FAILURE), NYHA CLASS 3 (HCC): Primary | ICD-10-CM

## 2018-01-22 DIAGNOSIS — I25.119 CORONARY ARTERY DISEASE INVOLVING NATIVE CORONARY ARTERY OF NATIVE HEART WITH ANGINA PECTORIS (HCC): ICD-10-CM

## 2018-01-22 DIAGNOSIS — Z95.2 STATUS POST AORTIC VALVE REPLACEMENT: ICD-10-CM

## 2018-01-22 PROCEDURE — G8417 CALC BMI ABV UP PARAM F/U: HCPCS | Performed by: FAMILY MEDICINE

## 2018-01-22 PROCEDURE — 4040F PNEUMOC VAC/ADMIN/RCVD: CPT | Performed by: FAMILY MEDICINE

## 2018-01-22 PROCEDURE — G8427 DOCREV CUR MEDS BY ELIG CLIN: HCPCS | Performed by: FAMILY MEDICINE

## 2018-01-22 PROCEDURE — G8484 FLU IMMUNIZE NO ADMIN: HCPCS | Performed by: FAMILY MEDICINE

## 2018-01-22 PROCEDURE — G8598 ASA/ANTIPLAT THER USED: HCPCS | Performed by: FAMILY MEDICINE

## 2018-01-22 PROCEDURE — 99214 OFFICE O/P EST MOD 30 MIN: CPT | Performed by: FAMILY MEDICINE

## 2018-01-22 PROCEDURE — 1036F TOBACCO NON-USER: CPT | Performed by: FAMILY MEDICINE

## 2018-01-22 PROCEDURE — 1123F ACP DISCUSS/DSCN MKR DOCD: CPT | Performed by: FAMILY MEDICINE

## 2018-01-22 NOTE — PROGRESS NOTES
Gilmer Espinoza CMA am scribing for and in the presence of Dr. Rhoda Ac      Patient: Padmini Goodman  : 1935  Date of Visit: 2018    REASON FOR VISIT / CONSULTATION: Follow-up (6wk follow up, Hx CAD, PAF, CHF,  Lasix increased last visit to 80 in am and 40 in the afternoon, Denies CP/pressure, had 1 episode that felt like palpitation, got a little pressure then all went away. It only lasted about 5 seconds. No other episodes since. Denies sob, dizziness/lightheadedness. Has just a little swelling bilateral legs, right a little more swollen)      Dear Leroy Johnson MD    I had the pleasure of seeing your patient Padmini Goodman in follow up today. As you know, Mr. Joan Muñiz is a 81 y.o. male with a history of severe aortic stenosis and severe 3 vessel coronary artery disease leading to a 2 vessel CABG as well as a bioprosthetic aortic valve replacement on 17 by Dr. Richard. His last stress test that was done on 2017 had shown 150 percentile predicted for his age and gender, which is unremarkable. HPI:   Mr. Joan Muñiz reports doing fairly well since last visit with not really any complaints other than he did have an episode of a burning sensation in his chest which only lasted about 5-10 seconds. She denies chest pain or pressure, shortness of breath, bleeding problems, or abdominal pain. Past Medical History:   Diagnosis Date    Abnormal cardiac cath 10/01/2012    30% LAD, 80% ostial stenosis-CX, RCAOccluded at its ostium. There was minimal left to right collateral flow.  Abnormal echocardiogram     EF >55%. Mild to moderate aortic stenosis. Mean gradient to 18 mmHg.  Atrial fibrillation ([address] 2017    CAD (coronary artery disease)     30% LAD, 80% ostial stenosis-CX, RCAOccluded at its ostium. There was minimal left to right collateral flow.       Chronic diastolic CHF (congestive heart failure), NYHA class 2 ([address] 2016    Chronic renal failure, stage 3 (moderate) 4/24/2017    Erectile dysfunction     H/O cardiac catheterization 06/02/2017    LMCA: Lesion on LMCA: Distal subsection. 60% stenosis. Comments: Calcified lesion in the ostial Cx extending into the distal left main and proximal LAD. LAD: lesion on Prox LAD: Ostial. 50% stenosis. Lesion on 1st Diag:Proximal subsection. 50% stenosis. LCx: Lesion on Prox CX: Osital. 95% stenosis. Lesion on Dist CX: Ostial 50% stenosis. RCA: Lesion on prox RCA: Proximal subsection. 100% stenosis.  H/O echocardiogram 12/9/15    EF:55%. LV wall thickness is moderately increased. Bi atrial enlargement noted. Mean aortic valve gradient is 33mmhg w/moderate to severe aortic stenosis. Mitral annular calcification. Mild mitral regurgitation. Moderate pulm hypertension w/RV systolic pressure of 43 mmhg. Mild (grade 1) diastolic dysfunction.  H/O echocardiogram 12/05/2016    EF:60%. Mildly increased LV wall thickness. Biatrial enlargement. Moderate to severe aortic stenosis. Mean gradient 36 mmHg, KRISTINA 0.08. Mild mitral regurgitation. Moderate pulmonary hypertension with an estimated RV systolic pressure of 60 mmHg. Mild diastolic dysfunction.  H/O echocardiogram 06/13/2017    EF 55%. Moderately increased LV wall thickness normal LV cavity size. Severe bi-atrial enlargement. Mild mitral and moderate tricuspid regurg. Mild pulmonary hypertension estimated right ventricular systolic pressure of 35 mmHg. Bilateral pleural effusion.  History of cardioversion 09/21/2017    Successful cardioversion to NSR with 200J biphasic    History of echocardiogram 11/21/13    EF >55%, mild to mod LVH, LA  is mod dilated, mild to mod AS, mild diastolic dysfunction noted.  History of echocardiogram 06/09/15    EF 55%. LV wall thickness is severely increased.  LA is moderately dilated(34-39)with a LA volume index of 36 ml.    History of Holter monitoring 08/08/2017    Atrial fibrillation throughout with fairly controlled ventricular response (HR less than 100 bpm 93% of the time), no signficant pauses. Rare isolated PVC's    History of Holter monitoring 08/08/2017    Atrial Fibrillation throughout with fairly controlled ventricular response (HR less than 100bpm 93% of the time),no significant pauses. Rare isolated PVC's    History of Holter monitoring 12/06/2017    Rare PAC's with one 4 beat run of wide complex tachycardia at 170 bpm, No symptoms reported. clinical correlation required    Hyperlipidemia 2/21/2006    Hypertension     Osteoarthritis of hip 6/20/2013    PVD (peripheral vascular disease) ([facility] [address]     Reported history of a 60% right carotid stenosis.  Trigeminal neuralgia 7/10/2013       CURRENT ALLERGIES: Iodine; Norco [hydrocodone-acetaminophen]; Other; Shellfish-derived products; Shrimp flavor; and Oxycodone REVIEW OF SYSTEMS: 14 systems were reviewed. Pertinent positives and negatives as above, all else negative.      Past Surgical History:   Procedure Laterality Date    CARDIAC CATHETERIZATION Left 05/18/2017    Right Radial/[employer] [facility]/    CARDIOVERSION  09/21/2017    [facility]/Dr Her/2 shocks at 200 joules    CORONARY ARTERY BYPASS GRAFT N/A 5/19/2017    CORONARY ARTERY BYPASS GRAFT X 2: LIMA-LAD, SVG-OM, AORTIC VALVE REPLACEMENT WITH 23 MM MEDTRONIC MOSAIC ULTRA, EVACUATION OF 1.5 LITER PLEURAL FLUID; ALLAN PER ANESTHESIA performed by Sandeep Huggins MD at [address],[place] Right 7/29/13    [facility] - Dr. Zach Horne Right    [address]    tube was blocked    SHOULDER SURGERY      roto cuff surgery left shoulder    Social History:  Social History   Substance Use Topics    Smoking status: Former Smoker     Years: 15.00     Types: Cigars    Smokeless tobacco: Former User      Comment: quit 30-40 years ago    Alcohol use 0.0 oz/week      Comment: weekly        CURRENT MEDICATIONS:  Outpatient Prescriptions Marked as Taking for the 1/22/18 encounter (Office Visit) with Damion Sanabria MD   Medication Sig Dispense Refill    ezetimibe (ZETIA) 10 MG tablet Take 1 tablet by mouth daily 90 tablet 3    furosemide (LASIX) 80 MG tablet Take 1 tablet by mouth daily Take 1 tablet qAM and 1/2 tab in afternoon. 135 tablet 3    baclofen (LIORESAL) 10 MG tablet TAKE ONE TABLET BY MOUTH THREE TIMES DAILY AS NEEDED 270 tablet 1    OXcarbazepine (TRILEPTAL) 300 MG tablet TAKE ONE TABLET BY MOUTH 4 TIMES DAILY (Patient taking differently: TAKE ONE TABLET BY MOUTH 3 TIMES DAILY) 360 tablet 1    lisinopril (PRINIVIL;ZESTRIL) 5 MG tablet Take 0.5 tablets by mouth daily 90 tablet 3    potassium chloride (KLOR-CON M) 10 MEQ extended release tablet Take 1 tablet by mouth daily 90 tablet 3    rosuvastatin (CRESTOR) 40 MG tablet Take 1 tablet by mouth every evening 90 tablet 3    EPIPEN 2-AMBAR 0.3 MG/0.3ML SOAJ injection USE AS DIRECTED 1 each 0    aspirin 81 MG tablet Take 81 mg by mouth nightly          FAMILY HISTORY: family history includes Cancer in his father; Heart Disease in his brother, brother, brother, father, mother, sister, and sister; High Blood Pressure in his sister and sister. PHYSICAL EXAM:   /68 (Site: Left Arm, Position: Sitting, Cuff Size: Medium Adult)   Pulse 79   Resp 20   Ht 5' 7\" (1.702 m)   Wt 198 lb 3.2 oz (89.9 kg)   SpO2 97%   BMI 31.04 kg/m²  Body mass index is 31.04 kg/m². Constitutional: He is oriented to person, place, and time. He appears well-developed and well-nourished. In no acute distress. HEENT: Normocephalic and atraumatic. No JVD present. Carotid bruit is not present. No mass and no thyromegaly present. No lymphadenopathy present. Cardiovascular: Normal rate, regular rhythm, normal heart sounds. Exam reveals no gallop and no friction rubs.  A III/VI systolic murmur at the myocardial apex  Pulmonary/Chest: Effort normal and breath sounds normal. No respiratory distress. He has no wheezes, rhonchi or rales but did have a little decreased lung sounds at the left base only. Abdominal: Soft, non-tender. Bowel sounds and aorta are normal. He exhibits no organomegaly, mass or bruit. Extremities: Trace-1+ pitting pedal edema up to the knees bilaterally R>L. No cyanosis, or clubbing. Pulses are 2+ radial/carotid/dorsalis pedis and posterior tibial bilaterally. Neurological: He is alert and oriented to person, place, and time. No evidence of gross cranial nerve deficit. Coordination appeared normal.   Skin: Skin is warm and dry. There is no rash or diaphoresis. Psychiatric: He has a normal mood and affect. His speech is normal and behavior is normal.      MOST RECENT LABS ON RECORD:   Lab Results   Component Value Date    WBC 5.4 12/18/2017    HGB 13.0 (L) 12/18/2017    HCT 41.2 12/18/2017     12/18/2017    CHOL 157 12/11/2017    TRIG 51 12/11/2017    HDL 75 12/11/2017    ALT 26 12/11/2017    AST 40 (H) 12/11/2017     12/18/2017    K 4.1 12/18/2017    CL 98 12/18/2017    CREATININE 1.40 (H) 12/18/2017    BUN 32 (H) 12/18/2017    CO2 29 12/18/2017    TSH 5.85 (H) 05/17/2017    PSA 1.01 08/31/2017    INR 1.1 06/01/2017    LABA1C 5.4 08/16/2016         ASSESSMENT:  1. Chronic diastolic CHF (congestive heart failure), NYHA class 3 ([facility] [address]    2. Coronary artery disease involving native coronary artery of native heart with angina pectoris ([facility] [address]    3. Essential hypertension    4. Erectile dysfunction due to arterial insufficiency    5. Severe aortic stenosis by prior echocardiogram    6. Status post aortic valve replacement         PLAN:  · Chronic Diastolic Heart Failure: Echocardiogram done on 6/13/17-EF:55%  · ACE/ARB: Continue lisinopril  · Diuretics:Continue Furosemide (Lasix)   · Counseling: I told him to continue wearing mild compression knee high stockings to help decreased his lower extremity edema as well as his risk of developing cellulitis.  I also advised him to try and keep his legs up and try and limit salt in his diet. · Aortic stenosis s/p AVR 5/17: Recommend yearly follow up with echocardiography per ACC guidelines. · Atherosclerotic Heart Disease: Hx: CABG x 2 and Bioprosthetic Aortic Valve Replacement on 5/17  Antiplatelet Agent: Continue aspirin 81 mg daily. · Beta Blocker Therapy: Since Ejection Fraction is 55%, this is not indicated  · Statin Therapy: Continue rosuvastatin (Crestor) 40 mg nightly. · Additional Testing: None    Essential Hypertension: Controlled  · ACE Inibitor/ARB: Continue lisinopril        · Diuretics: Continue furosemide (lasix)           · Erectile dysfunction: Mr. Storm Come asked me if it was safe for him to start using Viagra again and I told him I did not have any concerns about him using this, especially with his almost 150% predicted exercise capacity on his 12/17 treadmill stress test.     Finally, I recommended that he continue his other medications and follow up with you as previously scheduled. FOLLOW UP:   I told Mr. Storm Come to call my office if he had any problems, but otherwise told him to Return in about 4 months (around 5/22/2018). However, I would be happy to see him sooner should the need arise. Once again, thank you for allowing me to participate in this patients care. Please do not hesitate to contact me could I be of further assistance. Sincerely,  Jailene Eckert. Taina WILSON, MS, F.A.C.C. [facility] Cardiology Specialist  [address] [place] [address]  Phone: [phone], Fax: [phone]    I believe that the risk of significant morbidity and mortality related to the patient's current medical conditions are: Intermediate. The documentation recorded by the scribe, accurately and completely reflects the services I personally performed and the decisions made by me. Kailee Baum MD, MS, F.A.C.C. 12/12/2017

## 2018-03-05 ENCOUNTER — OFFICE VISIT (OUTPATIENT)
Dept: FAMILY MEDICINE CLINIC | Age: 83
End: 2018-03-05
Payer: MEDICARE

## 2018-03-05 ENCOUNTER — HOSPITAL ENCOUNTER (OUTPATIENT)
Age: 83
Discharge: HOME | End: 2018-03-05
Payer: MEDICARE

## 2018-03-05 VITALS
SYSTOLIC BLOOD PRESSURE: 138 MMHG | DIASTOLIC BLOOD PRESSURE: 84 MMHG | HEIGHT: 67 IN | WEIGHT: 211 LBS | BODY MASS INDEX: 33.12 KG/M2

## 2018-03-05 DIAGNOSIS — I10 ESSENTIAL HYPERTENSION: Primary | ICD-10-CM

## 2018-03-05 DIAGNOSIS — I50.33 ACUTE ON CHRONIC DIASTOLIC CHF (CONGESTIVE HEART FAILURE), NYHA CLASS 3 (HCC): ICD-10-CM

## 2018-03-05 DIAGNOSIS — I25.10 ASHD (ARTERIOSCLEROTIC HEART DISEASE): ICD-10-CM

## 2018-03-05 DIAGNOSIS — R06.00 DYSPNEA, UNSPECIFIED TYPE: ICD-10-CM

## 2018-03-05 DIAGNOSIS — E78.5 HYPERLIPIDEMIA, UNSPECIFIED HYPERLIPIDEMIA TYPE: Chronic | ICD-10-CM

## 2018-03-05 LAB
ALT SERPL-CCNC: 22 U/L (ref 5–41)
ANION GAP SERPL CALCULATED.3IONS-SCNC: 14 MMOL/L (ref 9–17)
AST SERPL-CCNC: 36 U/L
BNP INTERPRETATION: ABNORMAL
BUN BLDV-MCNC: 42 MG/DL (ref 8–23)
BUN/CREAT BLD: 32 (ref 9–20)
CALCIUM SERPL-MCNC: 9.4 MG/DL (ref 8.6–10.4)
CHLORIDE BLD-SCNC: 102 MMOL/L (ref 98–107)
CO2: 25 MMOL/L (ref 20–31)
CREAT SERPL-MCNC: 1.31 MG/DL (ref 0.7–1.2)
GFR AFRICAN AMERICAN: >60 ML/MIN
GFR NON-AFRICAN AMERICAN: 53 ML/MIN
GFR SERPL CREATININE-BSD FRML MDRD: ABNORMAL ML/MIN/{1.73_M2}
GFR SERPL CREATININE-BSD FRML MDRD: ABNORMAL ML/MIN/{1.73_M2}
GLUCOSE BLD-MCNC: 88 MG/DL (ref 70–99)
HCT VFR BLD CALC: 40.9 % (ref 40.7–50.3)
HEMOGLOBIN: 12.7 G/DL (ref 13–17)
MAGNESIUM: 2.6 MG/DL (ref 1.6–2.6)
MCH RBC QN AUTO: 29.5 PG (ref 25.2–33.5)
MCHC RBC AUTO-ENTMCNC: 31.1 G/DL (ref 28.4–34.8)
MCV RBC AUTO: 95.1 FL (ref 82.6–102.9)
NRBC AUTOMATED: 0 PER 100 WBC
PDW BLD-RTO: 15.6 % (ref 11.8–14.4)
PLATELET # BLD: 168 K/UL (ref 138–453)
PMV BLD AUTO: 9.7 FL (ref 8.1–13.5)
POTASSIUM SERPL-SCNC: 4.3 MMOL/L (ref 3.7–5.3)
PRO-BNP: 3038 PG/ML
RBC # BLD: 4.3 M/UL (ref 4.21–5.77)
SODIUM BLD-SCNC: 141 MMOL/L (ref 135–144)
T4 TOTAL: 5.2 UG/DL (ref 4.5–12)
TSH SERPL DL<=0.05 MIU/L-ACNC: 6.95 MIU/L (ref 0.3–5)
WBC # BLD: 6 K/UL (ref 3.5–11.3)

## 2018-03-05 PROCEDURE — G8598 ASA/ANTIPLAT THER USED: HCPCS | Performed by: FAMILY MEDICINE

## 2018-03-05 PROCEDURE — G8427 DOCREV CUR MEDS BY ELIG CLIN: HCPCS | Performed by: FAMILY MEDICINE

## 2018-03-05 PROCEDURE — G8417 CALC BMI ABV UP PARAM F/U: HCPCS | Performed by: FAMILY MEDICINE

## 2018-03-05 PROCEDURE — 36415 COLL VENOUS BLD VENIPUNCTURE: CPT

## 2018-03-05 PROCEDURE — 84450 TRANSFERASE (AST) (SGOT): CPT

## 2018-03-05 PROCEDURE — 1036F TOBACCO NON-USER: CPT | Performed by: FAMILY MEDICINE

## 2018-03-05 PROCEDURE — 99214 OFFICE O/P EST MOD 30 MIN: CPT | Performed by: FAMILY MEDICINE

## 2018-03-05 PROCEDURE — 83735 ASSAY OF MAGNESIUM: CPT

## 2018-03-05 PROCEDURE — 84436 ASSAY OF TOTAL THYROXINE: CPT

## 2018-03-05 PROCEDURE — 4040F PNEUMOC VAC/ADMIN/RCVD: CPT | Performed by: FAMILY MEDICINE

## 2018-03-05 PROCEDURE — 83880 ASSAY OF NATRIURETIC PEPTIDE: CPT

## 2018-03-05 PROCEDURE — 84460 ALANINE AMINO (ALT) (SGPT): CPT

## 2018-03-05 PROCEDURE — G8484 FLU IMMUNIZE NO ADMIN: HCPCS | Performed by: FAMILY MEDICINE

## 2018-03-05 PROCEDURE — 80048 BASIC METABOLIC PNL TOTAL CA: CPT

## 2018-03-05 PROCEDURE — 84443 ASSAY THYROID STIM HORMONE: CPT

## 2018-03-05 PROCEDURE — 85027 COMPLETE CBC AUTOMATED: CPT

## 2018-03-05 PROCEDURE — 1123F ACP DISCUSS/DSCN MKR DOCD: CPT | Performed by: FAMILY MEDICINE

## 2018-03-05 NOTE — PROGRESS NOTES
The following note was scribed by: [facility]  [address]  [facility]  Dept: [phone]    HPI: Patient presents today with his wife for elevated blood pressure readings. He has been checking this at a local pharmacy and they have been running 175/80 and after sitting for a couple minutes he will re-check it and it is around 160/80. This has been going on for about 3 weeks. Current Outpatient Prescriptions   Medication Sig Dispense Refill    rosuvastatin (CRESTOR) 40 MG tablet Take 1 tablet by mouth every evening 90 tablet 3    baclofen (LIORESAL) 10 MG tablet TAKE ONE TABLET BY MOUTH THREE TIMES DAILY AS NEEDED 270 tablet 1    ezetimibe (ZETIA) 10 MG tablet Take 1 tablet by mouth daily 90 tablet 3    furosemide (LASIX) 80 MG tablet Take 1 tablet by mouth daily Take 1 tablet qAM and 1/2 tab in afternoon. 135 tablet 3    OXcarbazepine (TRILEPTAL) 300 MG tablet TAKE ONE TABLET BY MOUTH 4 TIMES DAILY (Patient taking differently: TAKE ONE TABLET BY MOUTH 3 TIMES DAILY) 360 tablet 1    lisinopril (PRINIVIL;ZESTRIL) 5 MG tablet Take 0.5 tablets by mouth daily 90 tablet 3    potassium chloride (KLOR-CON M) 10 MEQ extended release tablet Take 1 tablet by mouth daily 90 tablet 3    aspirin 81 MG tablet Take 81 mg by mouth nightly       EPIPEN 2-AMBAR 0.3 MG/0.3ML SOAJ injection USE AS DIRECTED 1 each 0     No current facility-administered medications for this visit. ROS:  Admits elevated blood pressure  Admits burning sensation above and below his heart, only notices this when he is sitting  Admits SOB with exertion, walking and steps  Admits lightheadedness and dizziness   Admits weight gain, started to notice this while on vacation in [place]. States he ate out a lot.   Denies headaches  Denies fever/chills    EXAM:  /84 (Site: Left Arm, Position: Sitting, Cuff Size: Large Adult)   Ht 5' 7\" (1.702 m)   Wt 211 lb (95.7 kg) soups, fast foods, pickles and olives, any restaurants. I suspect the weight gain is related to his shortness of breath. He needs to watch his weight every day to check his water weight. Orders Placed This Encounter   Procedures    ALT     Standing Status:   Future     Number of Occurrences:   1     Standing Expiration Date:   3/5/2019    AST     Standing Status:   Future     Number of Occurrences:   1     Standing Expiration Date:   3/5/2019    Basic Metabolic Panel     Standing Status:   Future     Number of Occurrences:   1     Standing Expiration Date:   3/5/2019    Brain Natriuretic Peptide     Standing Status:   Future     Number of Occurrences:   1     Standing Expiration Date:   3/5/2019    CBC     Standing Status:   Future     Number of Occurrences:   1     Standing Expiration Date:   3/5/2019    Magnesium     Standing Status:   Future     Number of Occurrences:   1     Standing Expiration Date:   3/5/2019    T4     Standing Status:   Future     Number of Occurrences:   1     Standing Expiration Date:   3/5/2019    TSH without Reflex     Standing Status:   Future     Number of Occurrences:   1     Standing Expiration Date:   3/5/2019     No orders of the defined types were placed in this encounter. The documentation recorded by the scribe accurately reflects the services I personally performed and the decisions made by me.  Peewee Aviles MD

## 2018-03-05 NOTE — PATIENT INSTRUCTIONS
PLAN:  I will check an EKG today  I also will order lab work and call with results  I talk again with him about sodium in his diet and the importance of decreasing the sodium in his diet. Cold cut and processed meats, canned vegetables and soups, fast foods, pickles and olives, any restaurants. I suspect the weight gain is related to his shortness of breath. He needs to watch his weight every day to check his water weight.

## 2018-04-11 ENCOUNTER — HOSPITAL ENCOUNTER (OUTPATIENT)
Age: 83
Discharge: HOME | End: 2018-04-11
Payer: MEDICARE

## 2018-04-11 DIAGNOSIS — E78.5 HYPERLIPIDEMIA, UNSPECIFIED HYPERLIPIDEMIA TYPE: Chronic | ICD-10-CM

## 2018-04-11 DIAGNOSIS — I50.33 ACUTE ON CHRONIC DIASTOLIC CHF (CONGESTIVE HEART FAILURE), NYHA CLASS 3 (HCC): ICD-10-CM

## 2018-04-11 LAB
ALT SERPL-CCNC: 22 U/L (ref 5–41)
ANION GAP SERPL CALCULATED.3IONS-SCNC: 14 MMOL/L (ref 9–17)
AST SERPL-CCNC: 36 U/L
BNP INTERPRETATION: ABNORMAL
BUN BLDV-MCNC: 39 MG/DL (ref 8–23)
BUN/CREAT BLD: 30 (ref 9–20)
CALCIUM SERPL-MCNC: 9.6 MG/DL (ref 8.6–10.4)
CHLORIDE BLD-SCNC: 101 MMOL/L (ref 98–107)
CO2: 27 MMOL/L (ref 20–31)
CREAT SERPL-MCNC: 1.29 MG/DL (ref 0.7–1.2)
GFR AFRICAN AMERICAN: >60 ML/MIN
GFR NON-AFRICAN AMERICAN: 53 ML/MIN
GFR SERPL CREATININE-BSD FRML MDRD: ABNORMAL ML/MIN/{1.73_M2}
GFR SERPL CREATININE-BSD FRML MDRD: ABNORMAL ML/MIN/{1.73_M2}
GLUCOSE BLD-MCNC: 92 MG/DL (ref 70–99)
HCT VFR BLD CALC: 42.3 % (ref 40.7–50.3)
HEMOGLOBIN: 13.3 G/DL (ref 13–17)
HIGH SENSITIVE C-REACTIVE PROTEIN: 12 MG/L
MCH RBC QN AUTO: 30.9 PG (ref 25.2–33.5)
MCHC RBC AUTO-ENTMCNC: 31.4 G/DL (ref 28.4–34.8)
MCV RBC AUTO: 98.1 FL (ref 82.6–102.9)
NRBC AUTOMATED: 0 PER 100 WBC
PDW BLD-RTO: 16.2 % (ref 11.8–14.4)
PLATELET # BLD: 151 K/UL (ref 138–453)
PMV BLD AUTO: 10.4 FL (ref 8.1–13.5)
POTASSIUM SERPL-SCNC: 4.3 MMOL/L (ref 3.7–5.3)
PRO-BNP: 2685 PG/ML
RBC # BLD: 4.31 M/UL (ref 4.21–5.77)
SEDIMENTATION RATE, ERYTHROCYTE: 53 MM (ref 0–20)
SODIUM BLD-SCNC: 142 MMOL/L (ref 135–144)
WBC # BLD: 6.4 K/UL (ref 3.5–11.3)

## 2018-04-11 PROCEDURE — 80048 BASIC METABOLIC PNL TOTAL CA: CPT

## 2018-04-11 PROCEDURE — 84460 ALANINE AMINO (ALT) (SGPT): CPT

## 2018-04-11 PROCEDURE — 83880 ASSAY OF NATRIURETIC PEPTIDE: CPT

## 2018-04-11 PROCEDURE — 36415 COLL VENOUS BLD VENIPUNCTURE: CPT

## 2018-04-11 PROCEDURE — 86141 C-REACTIVE PROTEIN HS: CPT

## 2018-04-11 PROCEDURE — 85651 RBC SED RATE NONAUTOMATED: CPT

## 2018-04-11 PROCEDURE — 85027 COMPLETE CBC AUTOMATED: CPT

## 2018-04-11 PROCEDURE — 84450 TRANSFERASE (AST) (SGOT): CPT

## 2018-04-17 ENCOUNTER — OFFICE VISIT (OUTPATIENT)
Dept: FAMILY MEDICINE CLINIC | Age: 83
End: 2018-04-17
Payer: MEDICARE

## 2018-04-17 VITALS
BODY MASS INDEX: 30.76 KG/M2 | SYSTOLIC BLOOD PRESSURE: 138 MMHG | DIASTOLIC BLOOD PRESSURE: 72 MMHG | HEIGHT: 67 IN | WEIGHT: 196 LBS

## 2018-04-17 DIAGNOSIS — E03.9 HYPOTHYROIDISM, UNSPECIFIED TYPE: ICD-10-CM

## 2018-04-17 DIAGNOSIS — E78.5 HYPERLIPIDEMIA, UNSPECIFIED HYPERLIPIDEMIA TYPE: Primary | Chronic | ICD-10-CM

## 2018-04-17 DIAGNOSIS — I25.10 ASHD (ARTERIOSCLEROTIC HEART DISEASE): ICD-10-CM

## 2018-04-17 DIAGNOSIS — I50.32 CHRONIC DIASTOLIC CONGESTIVE HEART FAILURE (HCC): ICD-10-CM

## 2018-04-17 DIAGNOSIS — I10 ESSENTIAL HYPERTENSION: ICD-10-CM

## 2018-04-17 DIAGNOSIS — I87.2 VENOUS INSUFFICIENCY: ICD-10-CM

## 2018-04-17 DIAGNOSIS — R73.9 HYPERGLYCEMIA: ICD-10-CM

## 2018-04-17 DIAGNOSIS — N18.30 CHRONIC RENAL FAILURE, STAGE 3 (MODERATE) (HCC): ICD-10-CM

## 2018-04-17 PROCEDURE — G8417 CALC BMI ABV UP PARAM F/U: HCPCS | Performed by: FAMILY MEDICINE

## 2018-04-17 PROCEDURE — 1036F TOBACCO NON-USER: CPT | Performed by: FAMILY MEDICINE

## 2018-04-17 PROCEDURE — G8427 DOCREV CUR MEDS BY ELIG CLIN: HCPCS | Performed by: FAMILY MEDICINE

## 2018-04-17 PROCEDURE — 4040F PNEUMOC VAC/ADMIN/RCVD: CPT | Performed by: FAMILY MEDICINE

## 2018-04-17 PROCEDURE — 99214 OFFICE O/P EST MOD 30 MIN: CPT | Performed by: FAMILY MEDICINE

## 2018-04-17 PROCEDURE — 1123F ACP DISCUSS/DSCN MKR DOCD: CPT | Performed by: FAMILY MEDICINE

## 2018-04-17 PROCEDURE — G8598 ASA/ANTIPLAT THER USED: HCPCS | Performed by: FAMILY MEDICINE

## 2018-04-17 RX ORDER — LEVOTHYROXINE SODIUM 25 UG/1
1 CAPSULE ORAL DAILY
Qty: 90 CAPSULE | Refills: 1 | Status: SHIPPED | OUTPATIENT
Start: 2018-04-17 | End: 2018-04-18 | Stop reason: CLARIF

## 2018-04-18 RX ORDER — LEVOTHYROXINE SODIUM 0.03 MG/1
25 TABLET ORAL DAILY
Qty: 90 TABLET | Refills: 3
Start: 2018-04-18 | End: 2018-10-17 | Stop reason: SDUPTHER

## 2018-05-22 ENCOUNTER — OFFICE VISIT (OUTPATIENT)
Dept: CARDIOLOGY | Age: 83
End: 2018-05-22
Payer: MEDICARE

## 2018-05-22 VITALS
OXYGEN SATURATION: 96 % | SYSTOLIC BLOOD PRESSURE: 112 MMHG | HEART RATE: 56 BPM | WEIGHT: 199 LBS | HEIGHT: 67 IN | BODY MASS INDEX: 31.23 KG/M2 | DIASTOLIC BLOOD PRESSURE: 66 MMHG | RESPIRATION RATE: 18 BRPM

## 2018-05-22 DIAGNOSIS — I25.119 CORONARY ARTERY DISEASE INVOLVING NATIVE CORONARY ARTERY OF NATIVE HEART WITH ANGINA PECTORIS (HCC): ICD-10-CM

## 2018-05-22 DIAGNOSIS — I10 ESSENTIAL HYPERTENSION: ICD-10-CM

## 2018-05-22 DIAGNOSIS — Z86.79 HISTORY OF AORTIC STENOSIS: ICD-10-CM

## 2018-05-22 DIAGNOSIS — I50.32 CHRONIC DIASTOLIC HEART FAILURE, NYHA CLASS 2 (HCC): Primary | ICD-10-CM

## 2018-05-22 PROCEDURE — 4040F PNEUMOC VAC/ADMIN/RCVD: CPT | Performed by: FAMILY MEDICINE

## 2018-05-22 PROCEDURE — 99214 OFFICE O/P EST MOD 30 MIN: CPT | Performed by: FAMILY MEDICINE

## 2018-05-22 PROCEDURE — 1123F ACP DISCUSS/DSCN MKR DOCD: CPT | Performed by: FAMILY MEDICINE

## 2018-05-22 PROCEDURE — G8427 DOCREV CUR MEDS BY ELIG CLIN: HCPCS | Performed by: FAMILY MEDICINE

## 2018-05-22 PROCEDURE — G8598 ASA/ANTIPLAT THER USED: HCPCS | Performed by: FAMILY MEDICINE

## 2018-05-22 PROCEDURE — 1036F TOBACCO NON-USER: CPT | Performed by: FAMILY MEDICINE

## 2018-05-22 PROCEDURE — G8417 CALC BMI ABV UP PARAM F/U: HCPCS | Performed by: FAMILY MEDICINE

## 2018-06-19 ENCOUNTER — HOSPITAL ENCOUNTER (OUTPATIENT)
Dept: NON INVASIVE DIAGNOSTICS | Age: 83
Discharge: HOME | End: 2018-06-19
Payer: MEDICARE

## 2018-06-19 DIAGNOSIS — I10 ESSENTIAL HYPERTENSION: ICD-10-CM

## 2018-06-19 DIAGNOSIS — Z86.79 HISTORY OF AORTIC STENOSIS: ICD-10-CM

## 2018-06-19 DIAGNOSIS — I25.119 CORONARY ARTERY DISEASE INVOLVING NATIVE CORONARY ARTERY OF NATIVE HEART WITH ANGINA PECTORIS (HCC): ICD-10-CM

## 2018-06-19 DIAGNOSIS — I50.32 CHRONIC DIASTOLIC HEART FAILURE, NYHA CLASS 2 (HCC): ICD-10-CM

## 2018-06-19 LAB
LV EF: 55 %
LVEF MODALITY: NORMAL

## 2018-06-19 PROCEDURE — 93306 TTE W/DOPPLER COMPLETE: CPT

## 2018-06-20 ENCOUNTER — TELEPHONE (OUTPATIENT)
Dept: CARDIOLOGY | Age: 83
End: 2018-06-20

## 2018-07-02 ENCOUNTER — HOSPITAL ENCOUNTER (OUTPATIENT)
Dept: CARDIAC REHAB | Age: 83
Discharge: HOME | End: 2018-07-02
Payer: MEDICARE

## 2018-07-17 RX ORDER — POTASSIUM CHLORIDE 750 MG/1
TABLET, EXTENDED RELEASE ORAL
Qty: 90 TABLET | Refills: 3 | Status: SHIPPED | OUTPATIENT
Start: 2018-07-17 | End: 2019-01-29

## 2018-07-23 ENCOUNTER — HOSPITAL ENCOUNTER (OUTPATIENT)
Dept: CARDIAC REHAB | Age: 83
Discharge: HOME | End: 2018-07-23
Payer: MEDICARE

## 2018-08-08 ENCOUNTER — HOSPITAL ENCOUNTER (OUTPATIENT)
Dept: CARDIAC REHAB | Age: 83
Discharge: HOME | End: 2018-08-08
Payer: MEDICARE

## 2018-08-13 ENCOUNTER — HOSPITAL ENCOUNTER (OUTPATIENT)
Dept: CARDIAC REHAB | Age: 83
Discharge: HOME | End: 2018-08-13
Payer: MEDICARE

## 2018-08-15 ENCOUNTER — HOSPITAL ENCOUNTER (OUTPATIENT)
Dept: CARDIAC REHAB | Age: 83
Discharge: HOME | End: 2018-08-15
Payer: MEDICARE

## 2018-08-15 ENCOUNTER — HOSPITAL ENCOUNTER (OUTPATIENT)
Age: 83
Discharge: HOME | End: 2018-08-15
Payer: MEDICARE

## 2018-08-15 DIAGNOSIS — R73.9 HYPERGLYCEMIA: ICD-10-CM

## 2018-08-15 DIAGNOSIS — E78.5 HYPERLIPIDEMIA, UNSPECIFIED HYPERLIPIDEMIA TYPE: Chronic | ICD-10-CM

## 2018-08-15 LAB
ALT SERPL-CCNC: 21 U/L (ref 5–41)
ANION GAP SERPL CALCULATED.3IONS-SCNC: 13 MMOL/L (ref 9–17)
AST SERPL-CCNC: 42 U/L
BUN BLDV-MCNC: 46 MG/DL (ref 8–23)
BUN/CREAT BLD: 33 (ref 9–20)
CALCIUM SERPL-MCNC: 9.3 MG/DL (ref 8.6–10.4)
CHLORIDE BLD-SCNC: 99 MMOL/L (ref 98–107)
CHOLESTEROL/HDL RATIO: 2.5
CHOLESTEROL: 132 MG/DL
CO2: 25 MMOL/L (ref 20–31)
CREAT SERPL-MCNC: 1.41 MG/DL (ref 0.7–1.2)
ESTIMATED AVERAGE GLUCOSE: 105 MG/DL
GFR AFRICAN AMERICAN: 58 ML/MIN
GFR NON-AFRICAN AMERICAN: 48 ML/MIN
GFR SERPL CREATININE-BSD FRML MDRD: ABNORMAL ML/MIN/{1.73_M2}
GFR SERPL CREATININE-BSD FRML MDRD: ABNORMAL ML/MIN/{1.73_M2}
GLUCOSE BLD-MCNC: 114 MG/DL (ref 70–99)
HBA1C MFR BLD: 5.3 % (ref 4.8–5.9)
HCT VFR BLD CALC: 39.7 % (ref 40.7–50.3)
HDLC SERPL-MCNC: 52 MG/DL
HEMOGLOBIN: 12.4 G/DL (ref 13–17)
HIGH SENSITIVE C-REACTIVE PROTEIN: 12.4 MG/L
LDL CHOLESTEROL: 70 MG/DL (ref 0–130)
MCH RBC QN AUTO: 28.6 PG (ref 25.2–33.5)
MCHC RBC AUTO-ENTMCNC: 31.2 G/DL (ref 28.4–34.8)
MCV RBC AUTO: 91.5 FL (ref 82.6–102.9)
NRBC AUTOMATED: 0 PER 100 WBC
PDW BLD-RTO: 17.5 % (ref 11.8–14.4)
PLATELET # BLD: 163 K/UL (ref 138–453)
PMV BLD AUTO: 9.4 FL (ref 8.1–13.5)
POTASSIUM SERPL-SCNC: 4.7 MMOL/L (ref 3.7–5.3)
RBC # BLD: 4.34 M/UL (ref 4.21–5.77)
SODIUM BLD-SCNC: 137 MMOL/L (ref 135–144)
T4 TOTAL: 5.6 UG/DL (ref 4.5–12)
TRIGL SERPL-MCNC: 49 MG/DL
TSH SERPL DL<=0.05 MIU/L-ACNC: 5.29 MIU/L (ref 0.3–5)
VLDLC SERPL CALC-MCNC: NORMAL MG/DL (ref 1–30)
WBC # BLD: 5.2 K/UL (ref 3.5–11.3)

## 2018-08-15 PROCEDURE — 84443 ASSAY THYROID STIM HORMONE: CPT

## 2018-08-15 PROCEDURE — 80048 BASIC METABOLIC PNL TOTAL CA: CPT

## 2018-08-15 PROCEDURE — 86141 C-REACTIVE PROTEIN HS: CPT

## 2018-08-15 PROCEDURE — 83036 HEMOGLOBIN GLYCOSYLATED A1C: CPT

## 2018-08-15 PROCEDURE — 84436 ASSAY OF TOTAL THYROXINE: CPT

## 2018-08-15 PROCEDURE — 80061 LIPID PANEL: CPT

## 2018-08-15 PROCEDURE — 84450 TRANSFERASE (AST) (SGOT): CPT

## 2018-08-15 PROCEDURE — 36415 COLL VENOUS BLD VENIPUNCTURE: CPT

## 2018-08-15 PROCEDURE — 84460 ALANINE AMINO (ALT) (SGPT): CPT

## 2018-08-15 PROCEDURE — 85027 COMPLETE CBC AUTOMATED: CPT

## 2018-08-15 RX ORDER — LISINOPRIL 2.5 MG/1
2.5 TABLET ORAL DAILY
Qty: 90 TABLET | Refills: 3 | Status: ON HOLD | OUTPATIENT
Start: 2018-08-15 | End: 2018-10-06 | Stop reason: HOSPADM

## 2018-08-22 ENCOUNTER — OFFICE VISIT (OUTPATIENT)
Dept: FAMILY MEDICINE CLINIC | Age: 83
End: 2018-08-22
Payer: MEDICARE

## 2018-08-22 VITALS
SYSTOLIC BLOOD PRESSURE: 120 MMHG | HEIGHT: 67 IN | BODY MASS INDEX: 31.99 KG/M2 | DIASTOLIC BLOOD PRESSURE: 62 MMHG | WEIGHT: 203.8 LBS

## 2018-08-22 DIAGNOSIS — I48.0 PAROXYSMAL ATRIAL FIBRILLATION (HCC): ICD-10-CM

## 2018-08-22 DIAGNOSIS — E78.5 HYPERLIPIDEMIA, UNSPECIFIED HYPERLIPIDEMIA TYPE: ICD-10-CM

## 2018-08-22 DIAGNOSIS — N18.30 CHRONIC RENAL FAILURE, STAGE 3 (MODERATE) (HCC): ICD-10-CM

## 2018-08-22 DIAGNOSIS — R40.0 DAYTIME SOMNOLENCE: ICD-10-CM

## 2018-08-22 DIAGNOSIS — I50.32 CHRONIC DIASTOLIC CONGESTIVE HEART FAILURE (HCC): ICD-10-CM

## 2018-08-22 DIAGNOSIS — I10 ESSENTIAL HYPERTENSION: ICD-10-CM

## 2018-08-22 DIAGNOSIS — I25.10 ASHD (ARTERIOSCLEROTIC HEART DISEASE): Primary | ICD-10-CM

## 2018-08-22 DIAGNOSIS — R73.9 HYPERGLYCEMIA: ICD-10-CM

## 2018-08-22 PROCEDURE — 1123F ACP DISCUSS/DSCN MKR DOCD: CPT | Performed by: FAMILY MEDICINE

## 2018-08-22 PROCEDURE — G8598 ASA/ANTIPLAT THER USED: HCPCS | Performed by: FAMILY MEDICINE

## 2018-08-22 PROCEDURE — 1101F PT FALLS ASSESS-DOCD LE1/YR: CPT | Performed by: FAMILY MEDICINE

## 2018-08-22 PROCEDURE — 1036F TOBACCO NON-USER: CPT | Performed by: FAMILY MEDICINE

## 2018-08-22 PROCEDURE — G8417 CALC BMI ABV UP PARAM F/U: HCPCS | Performed by: FAMILY MEDICINE

## 2018-08-22 PROCEDURE — 93000 ELECTROCARDIOGRAM COMPLETE: CPT | Performed by: FAMILY MEDICINE

## 2018-08-22 PROCEDURE — 4040F PNEUMOC VAC/ADMIN/RCVD: CPT | Performed by: FAMILY MEDICINE

## 2018-08-22 PROCEDURE — 99214 OFFICE O/P EST MOD 30 MIN: CPT | Performed by: FAMILY MEDICINE

## 2018-08-22 PROCEDURE — G8427 DOCREV CUR MEDS BY ELIG CLIN: HCPCS | Performed by: FAMILY MEDICINE

## 2018-08-22 ASSESSMENT — PATIENT HEALTH QUESTIONNAIRE - PHQ9
SUM OF ALL RESPONSES TO PHQ9 QUESTIONS 1 & 2: 0
SUM OF ALL RESPONSES TO PHQ QUESTIONS 1-9: 0
1. LITTLE INTEREST OR PLEASURE IN DOING THINGS: 0
SUM OF ALL RESPONSES TO PHQ QUESTIONS 1-9: 0
2. FEELING DOWN, DEPRESSED OR HOPELESS: 0

## 2018-08-22 NOTE — PROGRESS NOTES
Guerrero JOHNSON, [facility], am scribing for and in the presence of Dr. Fabian Martin. 08/22/18 1:32 pm Polly  67.  [address]  [place] [id number]  Dept: [phone]     Venkat Swanson is a 81 y.o. male here for 3 month f/u HTN HLD      HPI:  HYPERLIPIDEMIA   Medication compliance: compliant all of the time. Patient is  following a low fat, low cholesterol diet. He is exercising regularly (cardiac rehab 2x/week)      HYPERTENSION  He is exercising and is adherent to a low-salt diet. Blood pressure is not being monitored at home. Cardiac symptoms: none. Patient denies: chest pain, dyspnea and palpitations.     Home Medications           Prior to Admission medications    Medication Sig Start Date End Date Taking? Authorizing Provider   lisinopril (PRINIVIL;ZESTRIL) 2.5 MG tablet Take 1 tablet by mouth daily 8/15/18     Yakov Connors MD   baclofen (LIORESAL) 10 MG tablet TAKE ONE TABLET BY MOUTH THREE TIMES DAILY AS NEEDED 8/14/18     Fabian Martin MD   KLOR-CON M10 10 MEQ extended release tablet TAKE ONE TABLET BY MOUTH ONCE DAILY 7/17/18     Yakov Connors MD   levothyroxine (SYNTHROID) 25 MCG tablet Take 1 tablet by mouth Daily 4/18/18     Fabian Martni MD   rosuvastatin (CRESTOR) 40 MG tablet Take 1 tablet by mouth every evening 2/27/18     Fabian Martin MD   ezetimibe (ZETIA) 10 MG tablet Take 1 tablet by mouth daily 12/18/17     Fabian Martin MD   furosemide (LASIX) 80 MG tablet Take 1 tablet by mouth daily Take 1 tablet qAM and 1/2 tab in afternoon.  12/12/17     Yakov Connors MD   OXcarbazepine (TRILEPTAL) 300 MG tablet TAKE ONE TABLET BY MOUTH 4 TIMES DAILY  Patient taking differently: TAKE ONE TABLET BY MOUTH 3 TIMES DAILY 6/23/17     Fabian Martin MD   EPIPEN 2-AMBAR 0.3 MG/0.3ML SOAJ injection USE AS DIRECTED 9/19/16     Fabian Martin MD   aspirin 81 MG tablet Take 81 mg by mouth nightly        Historical Provider, MD            ROS:  General Left      HIP SURGERY Right 7/29/13   [facility] - Dr. Benita Palacios Right     [facility]     tube was blocked    SHOULDER SURGERY         roto cuff surgery left shoulder            Family History         Family History   Problem Relation Age of Onset    Heart Disease Mother      Heart Disease Father      Cancer Father      Heart Disease Sister      High Blood Pressure Sister      Heart Disease Sister      High Blood Pressure Sister      Heart Disease Brother      Heart Disease Brother      Heart Disease Brother              Past Medical History   Past Medical History:   Diagnosis Date    Abnormal cardiac cath 10/01/2012     30% LAD, 80% ostial stenosis-CX, RCAOccluded at its ostium. There was minimal left to right collateral flow.  Abnormal echocardiogram 11/12     EF >55%. Mild to moderate aortic stenosis. Mean gradient to 18 mmHg.  Atrial fibrillation ([address] 8/28/2017    CAD (coronary artery disease) 9/12     30% LAD, 80% ostial stenosis-CX, RCAOccluded at its ostium. There was minimal left to right collateral flow.  Chronic diastolic CHF (congestive heart failure), NYHA class 2 ([address] 9/7/2016    Chronic renal failure, stage 3 (moderate) 4/24/2017    Erectile dysfunction      H/O cardiac catheterization 06/02/2017     LMCA: Lesion on LMCA: Distal subsection. 60% stenosis. Comments: Calcified lesion in the ostial Cx extending into the distal left main and proximal LAD. LAD: lesion on Prox LAD: Ostial. 50% stenosis. Lesion on 1st Diag:Proximal subsection. 50% stenosis. LCx: Lesion on Prox CX: Osital. 95% stenosis. Lesion on Dist CX: Ostial 50% stenosis. RCA: Lesion on prox RCA: Proximal subsection. 100% stenosis.  H/O echocardiogram 12/9/15     EF:55%. LV wall thickness is moderately increased. Bi atrial enlargement noted. Mean aortic valve gradient is 33mmhg w/moderate to severe aortic stenosis. Mitral annular calcification.  Mild mitral neuralgia 7/10/2013                 Social History   Substance Use Topics    Smoking status: Former Smoker       Years: 15.00       Types: Cigars    Smokeless tobacco: Former User         Comment: quit 30-40 years ago    Alcohol use 0.0 oz/week          Comment: weekly      Current Facility-Administered Medications          Current Outpatient Prescriptions   Medication Sig Dispense Refill    lisinopril (PRINIVIL;ZESTRIL) 2.5 MG tablet Take 1 tablet by mouth daily 90 tablet 3    baclofen (LIORESAL) 10 MG tablet TAKE ONE TABLET BY MOUTH THREE TIMES DAILY AS NEEDED 270 tablet 1    KLOR-CON M10 10 MEQ extended release tablet TAKE ONE TABLET BY MOUTH ONCE DAILY 90 tablet 3    levothyroxine (SYNTHROID) 25 MCG tablet Take 1 tablet by mouth Daily 90 tablet 3    rosuvastatin (CRESTOR) 40 MG tablet Take 1 tablet by mouth every evening 90 tablet 3    ezetimibe (ZETIA) 10 MG tablet Take 1 tablet by mouth daily 90 tablet 3    furosemide (LASIX) 80 MG tablet Take 1 tablet by mouth daily Take 1 tablet qAM and 1/2 tab in afternoon. 135 tablet 3    OXcarbazepine (TRILEPTAL) 300 MG tablet TAKE ONE TABLET BY MOUTH 4 TIMES DAILY (Patient taking differently: TAKE ONE TABLET BY MOUTH 3 TIMES DAILY) 360 tablet 1    EPIPEN 2-AMBAR 0.3 MG/0.3ML SOAJ injection USE AS DIRECTED 1 each 0    aspirin 81 MG tablet Take 81 mg by mouth nightly           No current facility-administered medications for this visit.                Allergies   Allergen Reactions    Iodine Swelling       Other reaction(s): Urticaria  Patient states is alleric to Iodine and sea food    Norco [Hydrocodone-Acetaminophen]      Other Anaphylaxis    Shellfish-Derived Products Shortness Of Breath and Swelling       Throat swells shut    Shrimp Flavor Swelling       Patient states allergic to shell sea food.    shrimp     Oxycodone Other (See Comments)       Lowers BP         PHYSICAL EXAM:     Vitals:    08/22/18 1303   BP: 120/62   Weight: 203 lb 12.8 oz (92.4 somnolence            PLAN:  We discuss his unsteadiness. Both trileptal and baclofen can cause unsteadiness. He could also be on too much lasix. He may not have enough volume in his blood. His kidney numbers are starting to creep up. I will check a standing blood pressure to make sure this isn't dropping when he stands up. His pulmonary artery pressures are high so this causes the blood to pool in the head in a supine position and makes it harder for the blood to return to the right side of the heart. This could be a valve problem, primary pulmonary HTN, etc. I will get an ekg today due to the irregular rhythm to check for a-fib. His EKG shows that he is in a-fib right now. I offer to send him over to the hospital right now for cardioversion but he states that he has two appointments after he leaves here and he can't go to the hospital right now. He doesn't have that atrial kick that his heart needs. I suspect that the cause of his fatigue, SOB, and edema is caused by this. I will call Dr. Heron Richardson and consult with him and start him on eliquis 5 mg twice daily. I will hold off on an apnea link until we get his heart back in rhythm. No orders of the defined types were placed in this encounter.       Encounter Medications    No orders of the defined types were placed in this encounter.                  I, Dr. Jimy Magaña, personally performed the services described in this documentation as scribed by Georga Riedel, RMA in my presence, and it is both accurate and complete. 2. Paroxysmal atrial fibrillation (HCC)  Worsening based upon symptoms and exam. EKG shows that he is in a-fib right now; doesn't have that atrial kick that his heart needs.  I will call Dr. Heron Richardson and consult with him and start him on eliquis 5 mg twice daily.  - EKG 12 lead unit performed

## 2018-08-22 NOTE — PROGRESS NOTES
ROS:  General Constitutional: Denies chills. Denies fever. Denies headache. Denies lightheadedness. Ophthalmologic: Denies blurred vision. ENT: Denies nasal congestion. Denies sore throat. Denies ear pain and pressure. Respiratory: Denies cough. Denies shortness of breath. Denies wheezing. Cardiovascular: Denies chest pain at rest. Denies irregular heartbeat. Denies palpitations. Gastrointestinal: Denies abdominal pain. Denies blood in the stool. Denies constipation. Denies diarrhea. Denies nausea. Denies vomiting. Genitourinary: Denies blood in the urine. Denies difficulty urinating. Denies frequent urination. Denies painful urination. Denies urinary incontinence  Musculoskeletal: Denies muscle aches. Denies painful joints. Denies swollen joints. Peripheral Vascular: Denies pain/cramping in legs after exertion. Skin: Denies dry skin. Denies itching. Denies rash. Neurologic: Denies falls. Denies dizziness. Denies fainting. Denies tingling/numbness. Psychiatric: Denies sleep disturbance. Denies anxiety. Denies depressed mood.     Past Surgical History:   Procedure Laterality Date    CARDIAC CATHETERIZATION Left 05/18/2017    Right Radial/[facility]/    CARDIOVERSION  09/21/2017    [facility]/Dr Her/2 shocks at 200 joules    CORONARY ARTERY BYPASS GRAFT N/A 5/19/2017    CORONARY ARTERY BYPASS GRAFT X 2: LIMA-LAD, SVG-OM, AORTIC VALVE REPLACEMENT WITH 23 MM MEDTRONIC MOSAIC ULTRA, EVACUATION OF 1.5 LITER PLEURAL FLUID; ALLAN PER ANESTHESIA performed by Germania Harp MD at [address] Right 7/29/13    [facility] - Dr. Aman Warner Right    [facility]    tube was blocked    SHOULDER SURGERY      roto cuff surgery left shoulder       Family History   Problem Relation Age of Onset    Heart Disease Mother     Heart Disease Father     Cancer Father     Heart Disease Sister     High Blood Pressure Sister  Heart Disease Sister     High Blood Pressure Sister     Heart Disease Brother     Heart Disease Brother     Heart Disease Brother        Past Medical History:   Diagnosis Date    Abnormal cardiac cath 10/01/2012    30% LAD, 80% ostial stenosis-CX, RCAOccluded at its ostium. There was minimal left to right collateral flow.  Abnormal echocardiogram 11/12    EF >55%. Mild to moderate aortic stenosis. Mean gradient to 18 mmHg.  Atrial fibrillation ([address] 8/28/2017    CAD (coronary artery disease) 9/12    30% LAD, 80% ostial stenosis-CX, RCAOccluded at its ostium. There was minimal left to right collateral flow.  Chronic diastolic CHF (congestive heart failure), NYHA class 2 ([address] 9/7/2016    Chronic renal failure, stage 3 (moderate) 4/24/2017    Erectile dysfunction     H/O cardiac catheterization 06/02/2017    LMCA: Lesion on LMCA: Distal subsection. 60% stenosis. Comments: Calcified lesion in the ostial Cx extending into the distal left main and proximal LAD. LAD: lesion on Prox LAD: Ostial. 50% stenosis. Lesion on 1st Diag:Proximal subsection. 50% stenosis. LCx: Lesion on Prox CX: Osital. 95% stenosis. Lesion on Dist CX: Ostial 50% stenosis. RCA: Lesion on prox RCA: Proximal subsection. 100% stenosis.  H/O echocardiogram 12/9/15    EF:55%. LV wall thickness is moderately increased. Bi atrial enlargement noted. Mean aortic valve gradient is 33mmhg w/moderate to severe aortic stenosis. Mitral annular calcification. Mild mitral regurgitation. Moderate pulm hypertension w/RV systolic pressure of 43 mmhg. Mild (grade 1) diastolic dysfunction.  H/O echocardiogram 12/05/2016    EF:60%. Mildly increased LV wall thickness. Biatrial enlargement. Moderate to severe aortic stenosis. Mean gradient 36 mmHg, KRISTINA 0.08. Mild mitral regurgitation. Moderate pulmonary hypertension with an estimated RV systolic pressure of 60 mmHg. Mild diastolic dysfunction.      H/O echocardiogram 06/13/2017    EF

## 2018-08-29 ENCOUNTER — HOSPITAL ENCOUNTER (OUTPATIENT)
Age: 83
Discharge: HOME | End: 2018-08-31
Payer: MEDICARE

## 2018-08-29 ENCOUNTER — HOSPITAL ENCOUNTER (OUTPATIENT)
Dept: GENERAL RADIOLOGY | Age: 83
Discharge: HOME | End: 2018-08-31
Payer: MEDICARE

## 2018-08-29 ENCOUNTER — OFFICE VISIT (OUTPATIENT)
Dept: CARDIOLOGY | Age: 83
End: 2018-08-29
Payer: MEDICARE

## 2018-08-29 VITALS
RESPIRATION RATE: 16 BRPM | HEIGHT: 67 IN | SYSTOLIC BLOOD PRESSURE: 120 MMHG | OXYGEN SATURATION: 95 % | DIASTOLIC BLOOD PRESSURE: 64 MMHG | HEART RATE: 77 BPM | BODY MASS INDEX: 32.11 KG/M2 | WEIGHT: 204.6 LBS

## 2018-08-29 DIAGNOSIS — I10 ESSENTIAL HYPERTENSION: ICD-10-CM

## 2018-08-29 DIAGNOSIS — I50.33 ACUTE ON CHRONIC DIASTOLIC CONGESTIVE HEART FAILURE, NYHA CLASS 3 (HCC): ICD-10-CM

## 2018-08-29 DIAGNOSIS — I25.119 CORONARY ARTERY DISEASE INVOLVING NATIVE CORONARY ARTERY OF NATIVE HEART WITH ANGINA PECTORIS (HCC): ICD-10-CM

## 2018-08-29 DIAGNOSIS — Z95.2 HISTORY OF AORTIC VALVE REPLACEMENT: ICD-10-CM

## 2018-08-29 DIAGNOSIS — I48.0 PAF (PAROXYSMAL ATRIAL FIBRILLATION) (HCC): ICD-10-CM

## 2018-08-29 DIAGNOSIS — I48.0 PAF (PAROXYSMAL ATRIAL FIBRILLATION) (HCC): Primary | ICD-10-CM

## 2018-08-29 PROCEDURE — 1123F ACP DISCUSS/DSCN MKR DOCD: CPT | Performed by: FAMILY MEDICINE

## 2018-08-29 PROCEDURE — 1101F PT FALLS ASSESS-DOCD LE1/YR: CPT | Performed by: FAMILY MEDICINE

## 2018-08-29 PROCEDURE — G8428 CUR MEDS NOT DOCUMENT: HCPCS | Performed by: FAMILY MEDICINE

## 2018-08-29 PROCEDURE — G8417 CALC BMI ABV UP PARAM F/U: HCPCS | Performed by: FAMILY MEDICINE

## 2018-08-29 PROCEDURE — 4040F PNEUMOC VAC/ADMIN/RCVD: CPT | Performed by: FAMILY MEDICINE

## 2018-08-29 PROCEDURE — G8598 ASA/ANTIPLAT THER USED: HCPCS | Performed by: FAMILY MEDICINE

## 2018-08-29 PROCEDURE — 99215 OFFICE O/P EST HI 40 MIN: CPT | Performed by: FAMILY MEDICINE

## 2018-08-29 PROCEDURE — 1036F TOBACCO NON-USER: CPT | Performed by: FAMILY MEDICINE

## 2018-08-29 PROCEDURE — 71046 X-RAY EXAM CHEST 2 VIEWS: CPT

## 2018-08-29 RX ORDER — ISOSORBIDE MONONITRATE 30 MG/1
30 TABLET, EXTENDED RELEASE ORAL DAILY
COMMUNITY
End: 2018-10-01 | Stop reason: SDUPTHER

## 2018-08-29 ASSESSMENT — ENCOUNTER SYMPTOMS: SHORTNESS OF BREATH: 1

## 2018-08-29 NOTE — PROGRESS NOTES
Lucy Lowe CMA am scribing for and in the presence of Dr. Tanja Griffiths    Patient: Eliel Gutiérrez  : 1935  Date of Visit: 2018    REASON FOR VISIT / CONSULTATION: Shortness of Breath (HX: CAD, CHF, Aortic stenosis. Per Dr Judit Camp wanted him seen due to afib, shortness of breath, dizziy/lightheaded. Denies palpitations or CP. Weight at last visit was 199 lbs todays weight is 204 lb)      Dear Rosy Wells MD    I had the pleasure of seeing your patient Eliel Gutiérrez in follow up today. As you know, Mr. Meredith Crain is a 81 y.o. male with a history of severe aortic stenosis and severe 3 vessel coronary artery disease leading to a 2 vessel CABG as well as a bioprosthetic aortic valve replacement on 17 by Dr. Gracie Lomax. His last stress test that was done on 2017 had shown 150 percentile predicted for his age and gender, which is unremarkable. HPI:   Mr. Meredith Crain reports that he has been having shortness of breath with exertion as well as a 5 pound weight gain with lower extremity edema. Mr. Meredith Crain says he does have some balance issues with some lightheadedness and dizziness but also denies any falls or near falls. He denies chest pain or pressure but does report a slow but steady increase in shortness of breath over the last couple months. He denies any bleeding problems or abdominal pain. Past Medical History:   Diagnosis Date    Abnormal cardiac cath 10/01/2012    30% LAD, 80% ostial stenosis-CX, RCAOccluded at its ostium. There was minimal left to right collateral flow.  Abnormal echocardiogram     EF >55%. Mild to moderate aortic stenosis. Mean gradient to 18 mmHg.  Atrial fibrillation ([address] 2017    CAD (coronary artery disease)     30% LAD, 80% ostial stenosis-CX, RCAOccluded at its ostium. There was minimal left to right collateral flow.       Chronic diastolic CHF (congestive heart failure), NYHA class 2 ([address] 2016    Chronic renal failure, stage 3 (moderate) 4/24/2017    Erectile dysfunction     H/O cardiac catheterization 06/02/2017    LMCA: Lesion on LMCA: Distal subsection. 60% stenosis. Comments: Calcified lesion in the ostial Cx extending into the distal left main and proximal LAD. LAD: lesion on Prox LAD: Ostial. 50% stenosis. Lesion on 1st Diag:Proximal subsection. 50% stenosis. LCx: Lesion on Prox CX: Osital. 95% stenosis. Lesion on Dist CX: Ostial 50% stenosis. RCA: Lesion on prox RCA: Proximal subsection. 100% stenosis.  H/O echocardiogram 12/9/15    EF:55%. LV wall thickness is moderately increased. Bi atrial enlargement noted. Mean aortic valve gradient is 33mmhg w/moderate to severe aortic stenosis. Mitral annular calcification. Mild mitral regurgitation. Moderate pulm hypertension w/RV systolic pressure of 43 mmhg. Mild (grade 1) diastolic dysfunction.  H/O echocardiogram 12/05/2016    EF:60%. Mildly increased LV wall thickness. Biatrial enlargement. Moderate to severe aortic stenosis. Mean gradient 36 mmHg, KRISTINA 0.08. Mild mitral regurgitation. Moderate pulmonary hypertension with an estimated RV systolic pressure of 60 mmHg. Mild diastolic dysfunction.  H/O echocardiogram 06/13/2017    EF 55%. Moderately increased LV wall thickness normal LV cavity size. Severe bi-atrial enlargement. Mild mitral and moderate tricuspid regurg. Mild pulmonary hypertension estimated right ventricular systolic pressure of 35 mmHg. Bilateral pleural effusion.  History of cardiovascular stress test 12/21/2017    History of cardioversion 09/21/2017    Successful cardioversion to NSR with 200J biphasic    History of echocardiogram 11/21/13    EF >55%, mild to mod LVH, LA  is mod dilated, mild to mod AS, mild diastolic dysfunction noted.  History of echocardiogram 06/09/15    EF 55%. LV wall thickness is severely increased.  LA is moderately dilated(34-39)with a LA volume index of 36 ml.    History of echocardiogram 06/19/2018 EF 55%. Mildly increased LV wall thickness. Left atrium severely dilated (>40) w/ left atrial volume index of 46 ml/m2. Moderate aortic stenosis w/ mean gradient of 21.3 mmHg consistent w/ moderate aortis stenosis on what appears to be bioprosthetic aortic melanie, Moderate pulmonary HTN w/ RV systolic pressure of 58 mmHg. Moderate/severe tricuspid regurg. Diastoly cannot be assessed due to pt rhythm    History of Holter monitoring 08/08/2017    Atrial fibrillation throughout with fairly controlled ventricular response (HR less than 100 bpm 93% of the time), no signficant pauses. Rare isolated PVC's    History of Holter monitoring 08/08/2017    Atrial Fibrillation throughout with fairly controlled ventricular response (HR less than 100bpm 93% of the time),no significant pauses. Rare isolated PVC's    History of Holter monitoring 12/06/2017    Rare PAC's with one 4 beat run of wide complex tachycardia at 170 bpm, No symptoms reported. clinical correlation required    Hyperlipidemia 2/21/2006    Hypertension     Osteoarthritis of hip 6/20/2013    PVD (peripheral vascular disease) [address] [address]     Reported history of a 60% right carotid stenosis.  Trigeminal neuralgia 7/10/2013       CURRENT ALLERGIES: Iodine; Norco [hydrocodone-acetaminophen]; Other; Shellfish-derived products; Shrimp flavor; and Oxycodone REVIEW OF SYSTEMS: 14 systems were reviewed. Pertinent positives and negatives as above, all else negative.      Past Surgical History:   Procedure Laterality Date    CARDIAC CATHETERIZATION Left 05/18/2017    Right Radial/[email or web address]/    CARDIOVERSION  09/21/2017    [facility]/Dr Her/2 shocks at 200 joules    CORONARY ARTERY BYPASS GRAFT N/A 5/19/2017    CORONARY ARTERY BYPASS GRAFT X 2: LIMA-LAD, SVG-OM, AORTIC VALVE REPLACEMENT WITH 23 MM MEDTRONIC MOSAIC ULTRA, EVACUATION OF 1.5 LITER PLEURAL FLUID; ALLAN PER ANESTHESIA performed by Jo Ann Holly MD at [address] [place] [address] Left     HIP SURGERY Right 7/29/13    Chris Broussard    tube was blocked    SHOULDER SURGERY      roto cuff surgery left shoulder    Social History:  Social History   Substance Use Topics    Smoking status: Former Smoker     Years: 15.00     Types: Cigars    Smokeless tobacco: Former User      Comment: quit 30-40 years ago    Alcohol use 0.0 oz/week      Comment: weekly        CURRENT MEDICATIONS:  Outpatient Prescriptions Marked as Taking for the 8/29/18 encounter (Office Visit) with Erika Villegas MD   Medication Sig Dispense Refill    isosorbide mononitrate (IMDUR) 30 MG extended release tablet Take 30 mg by mouth daily      apixaban (ELIQUIS) 5 MG TABS tablet Take 1 tablet by mouth 2 times daily 84 tablet 0    lisinopril (PRINIVIL;ZESTRIL) 2.5 MG tablet Take 1 tablet by mouth daily 90 tablet 3    baclofen (LIORESAL) 10 MG tablet TAKE ONE TABLET BY MOUTH THREE TIMES DAILY AS NEEDED 270 tablet 1    KLOR-CON M10 10 MEQ extended release tablet TAKE ONE TABLET BY MOUTH ONCE DAILY 90 tablet 3    levothyroxine (SYNTHROID) 25 MCG tablet Take 1 tablet by mouth Daily 90 tablet 3    rosuvastatin (CRESTOR) 40 MG tablet Take 1 tablet by mouth every evening 90 tablet 3    ezetimibe (ZETIA) 10 MG tablet Take 1 tablet by mouth daily 90 tablet 3    furosemide (LASIX) 80 MG tablet Take 1 tablet by mouth daily Take 1 tablet qAM and 1/2 tab in afternoon. 135 tablet 3    OXcarbazepine (TRILEPTAL) 300 MG tablet TAKE ONE TABLET BY MOUTH 4 TIMES DAILY (Patient taking differently: TAKE ONE TABLET BY MOUTH 3-4 TIMES DAILY) 360 tablet 1    EPIPEN 2-AMBAR 0.3 MG/0.3ML SOAJ injection USE AS DIRECTED 1 each 0    aspirin 81 MG tablet Take 81 mg by mouth nightly          FAMILY HISTORY: family history includes Cancer in his father; Heart Disease in his brother, brother, brother, father, mother, sister, and sister; High Blood Pressure in his sister and sister. 2018

## 2018-08-30 ENCOUNTER — TELEPHONE (OUTPATIENT)
Dept: CARDIOLOGY | Age: 83
End: 2018-08-30

## 2018-08-30 NOTE — TELEPHONE ENCOUNTER
----- Message from Simone Inman MD sent at 8/29/2018 11:59 PM EDT -----  Let Mr. Lester Wilks know their test result was ok. He does have a small amount of fluid in his lungs, right more than left but we can treat with medications at least for now. Thanks.

## 2018-08-31 ENCOUNTER — HOSPITAL ENCOUNTER (OUTPATIENT)
Age: 83
Discharge: HOME | End: 2018-08-31
Payer: MEDICARE

## 2018-08-31 DIAGNOSIS — N40.2 PROSTATE NODULE: ICD-10-CM

## 2018-08-31 LAB — PROSTATE SPECIFIC ANTIGEN: 1.16 UG/L

## 2018-08-31 PROCEDURE — 84153 ASSAY OF PSA TOTAL: CPT

## 2018-08-31 PROCEDURE — 36415 COLL VENOUS BLD VENIPUNCTURE: CPT

## 2018-09-04 ENCOUNTER — TELEPHONE (OUTPATIENT)
Dept: CARDIOLOGY | Age: 83
End: 2018-09-04

## 2018-09-04 NOTE — TELEPHONE ENCOUNTER
Christine Thompson called to report his weight, this a.m. 204#. In office at last visit 204.96#  He thought it should be down further than that. He will continue to monitor & report.

## 2018-09-05 ENCOUNTER — TELEPHONE (OUTPATIENT)
Dept: CARDIOLOGY | Age: 83
End: 2018-09-05

## 2018-09-05 ENCOUNTER — OFFICE VISIT (OUTPATIENT)
Dept: UROLOGY | Age: 83
End: 2018-09-05
Payer: MEDICARE

## 2018-09-05 VITALS
WEIGHT: 205 LBS | HEIGHT: 67 IN | SYSTOLIC BLOOD PRESSURE: 120 MMHG | BODY MASS INDEX: 32.18 KG/M2 | DIASTOLIC BLOOD PRESSURE: 72 MMHG

## 2018-09-05 DIAGNOSIS — N40.2 PROSTATE NODULE: Primary | ICD-10-CM

## 2018-09-05 PROCEDURE — 4040F PNEUMOC VAC/ADMIN/RCVD: CPT | Performed by: UROLOGY

## 2018-09-05 PROCEDURE — G8427 DOCREV CUR MEDS BY ELIG CLIN: HCPCS | Performed by: UROLOGY

## 2018-09-05 PROCEDURE — 1036F TOBACCO NON-USER: CPT | Performed by: UROLOGY

## 2018-09-05 PROCEDURE — 1101F PT FALLS ASSESS-DOCD LE1/YR: CPT | Performed by: UROLOGY

## 2018-09-05 PROCEDURE — G8417 CALC BMI ABV UP PARAM F/U: HCPCS | Performed by: UROLOGY

## 2018-09-05 PROCEDURE — 1123F ACP DISCUSS/DSCN MKR DOCD: CPT | Performed by: UROLOGY

## 2018-09-05 PROCEDURE — G8598 ASA/ANTIPLAT THER USED: HCPCS | Performed by: UROLOGY

## 2018-09-05 PROCEDURE — 99213 OFFICE O/P EST LOW 20 MIN: CPT | Performed by: UROLOGY

## 2018-09-05 ASSESSMENT — ENCOUNTER SYMPTOMS
NAUSEA: 0
COUGH: 0
COLOR CHANGE: 0
VOMITING: 0
SHORTNESS OF BREATH: 0
EYE PAIN: 0
WHEEZING: 0
BACK PAIN: 0
EYE REDNESS: 0
ABDOMINAL PAIN: 0

## 2018-09-05 NOTE — PROGRESS NOTES
Dist CX: Ostial 50% stenosis. RCA: Lesion on prox RCA: Proximal subsection. 100% stenosis.  H/O echocardiogram 12/9/15    EF:55%. LV wall thickness is moderately increased. Bi atrial enlargement noted. Mean aortic valve gradient is 33mmhg w/moderate to severe aortic stenosis. Mitral annular calcification. Mild mitral regurgitation. Moderate pulm hypertension w/RV systolic pressure of 43 mmhg. Mild (grade 1) diastolic dysfunction.  H/O echocardiogram 12/05/2016    EF:60%. Mildly increased LV wall thickness. Biatrial enlargement. Moderate to severe aortic stenosis. Mean gradient 36 mmHg, KRISTINA 0.08. Mild mitral regurgitation. Moderate pulmonary hypertension with an estimated RV systolic pressure of 60 mmHg. Mild diastolic dysfunction.  H/O echocardiogram 06/13/2017    EF 55%. Moderately increased LV wall thickness normal LV cavity size. Severe bi-atrial enlargement. Mild mitral and moderate tricuspid regurg. Mild pulmonary hypertension estimated right ventricular systolic pressure of 35 mmHg. Bilateral pleural effusion.  History of cardiovascular stress test 12/21/2017    History of cardioversion 09/21/2017    Successful cardioversion to NSR with 200J biphasic    History of echocardiogram 11/21/13    EF >55%, mild to mod LVH, LA  is mod dilated, mild to mod AS, mild diastolic dysfunction noted.  History of echocardiogram 06/09/15    EF 55%. LV wall thickness is severely increased. LA is moderately dilated(34-39)with a LA volume index of 36 ml.    History of echocardiogram 06/19/2018    EF 55%. Mildly increased LV wall thickness. Left atrium severely dilated (>40) w/ left atrial volume index of 46 ml/m2. Moderate aortic stenosis w/ mean gradient of 21.3 mmHg consistent w/ moderate aortis stenosis on what appears to be bioprosthetic aortic melanie, Moderate pulmonary HTN w/ RV systolic pressure of 58 mmHg. Moderate/severe tricuspid regurg.  Diastoly cannot be assessed due to pt rhythm    History of by mouth daily 90 tablet 3    furosemide (LASIX) 80 MG tablet Take 1 tablet by mouth daily Take 1 tablet qAM and 1/2 tab in afternoon. 135 tablet 3    OXcarbazepine (TRILEPTAL) 300 MG tablet TAKE ONE TABLET BY MOUTH 4 TIMES DAILY (Patient taking differently: TAKE ONE TABLET BY MOUTH 3-4 TIMES DAILY) 360 tablet 1    EPIPEN 2-AMBAR 0.3 MG/0.3ML SOAJ injection USE AS DIRECTED 1 each 0    aspirin 81 MG tablet Take 81 mg by mouth nightly        No facility-administered encounter medications on file as of 9/5/2018. Iodine; Norco [hydrocodone-acetaminophen]; Other; Shellfish-derived products; Shrimp flavor; and Oxycodone  History   Smoking Status    Former Smoker    Years: 15.00    Types: Cigars   Smokeless Tobacco    Former User     Comment: quit 30-40 years ago       History   Alcohol Use    0.0 oz/week     Comment: weekly       Vitals:    09/05/18 0859   BP: 120/72       Constitutional: Patient in no acute distress; Neuro: alert and oriented to person place and time. Psych: Mood and affect normal.  Skin: Normal  Lungs: Respiratory effort normal  Cardiovascular:  Normal peripheral pulses  Abdomen: Soft, non-tender, non-distended with no CVA, flank pain, hepatosplenomegaly or hernia. Kidneys normal.  Bladder non-tender and not distended. Lymphatics: no palpable lymphadenopathy  Penis normal  Urethral meatus normal  Scrotal exam normal  Testicles normal bilaterally  Epididymis normal bilaterally  No evidence of inguinal hernia  Anus and perineum normal  Normal rectal tone with no masses  Asymmetric enlargement on the left  Prostate soft, non-tender to palpation. No palpable nodules. Estimated 60 grams. Seminal vesicles not palpable.      Lab Results   Component Value Date    PSA 1.16 08/31/2018    PSA 1.01 08/31/2017    PSA 1.33 09/02/2016     Lab Results   Component Value Date    BUN 46 (H) 08/15/2018     Lab Results   Component Value Date    CREATININE 1.41 (H) 08/15/2018       No results found for this visit on 09/05/18. Review of Systems   Constitutional: Negative for appetite change, chills and fever. Eyes: Negative for pain, redness and visual disturbance. Respiratory: Negative for cough, shortness of breath and wheezing. Cardiovascular: Negative for chest pain and leg swelling. Gastrointestinal: Negative for abdominal pain, nausea and vomiting. Genitourinary: Negative for difficulty urinating, discharge, dysuria, flank pain, frequency, hematuria, scrotal swelling and testicular pain. Musculoskeletal: Negative for back pain, joint swelling and myalgias. Skin: Negative for color change, rash and wound. Neurological: Negative for dizziness, tremors and numbness. Hematological: Negative for adenopathy. Does not bruise/bleed easily. Objective:   Physical Exam    Assessment: This is a 81 y.o. male with the following diagnoses:   Diagnosis Orders   1. Prostate nodule  PSA, Diagnostic         Plan: This point time we'll repeat PSA in 1 year. Patient will call us in the interim if the urinary frequency over the scrotal swelling becomes worse or more bothersome.         Albino Conner MD

## 2018-09-06 ENCOUNTER — HOSPITAL ENCOUNTER (OUTPATIENT)
Age: 83
End: 2018-09-06
Payer: MEDICARE

## 2018-09-06 ENCOUNTER — OFFICE VISIT (OUTPATIENT)
Dept: CARDIOLOGY | Age: 83
End: 2018-09-06
Payer: MEDICARE

## 2018-09-06 ENCOUNTER — HOSPITAL ENCOUNTER (OUTPATIENT)
Age: 83
Discharge: HOME | End: 2018-09-06
Payer: MEDICARE

## 2018-09-06 VITALS
OXYGEN SATURATION: 97 % | HEIGHT: 67 IN | SYSTOLIC BLOOD PRESSURE: 138 MMHG | DIASTOLIC BLOOD PRESSURE: 87 MMHG | BODY MASS INDEX: 31.83 KG/M2 | HEART RATE: 70 BPM | RESPIRATION RATE: 16 BRPM | WEIGHT: 202.8 LBS

## 2018-09-06 DIAGNOSIS — I50.32 CHRONIC DIASTOLIC CONGESTIVE HEART FAILURE (HCC): ICD-10-CM

## 2018-09-06 DIAGNOSIS — I25.10 ASHD (ARTERIOSCLEROTIC HEART DISEASE): ICD-10-CM

## 2018-09-06 DIAGNOSIS — R06.02 SOB (SHORTNESS OF BREATH): ICD-10-CM

## 2018-09-06 DIAGNOSIS — I48.0 PAF (PAROXYSMAL ATRIAL FIBRILLATION) (HCC): ICD-10-CM

## 2018-09-06 DIAGNOSIS — I10 ESSENTIAL HYPERTENSION: ICD-10-CM

## 2018-09-06 DIAGNOSIS — R06.02 SOB (SHORTNESS OF BREATH): Primary | ICD-10-CM

## 2018-09-06 LAB
ANION GAP SERPL CALCULATED.3IONS-SCNC: 13 MMOL/L (ref 9–17)
BNP INTERPRETATION: ABNORMAL
BUN BLDV-MCNC: 44 MG/DL (ref 8–23)
BUN/CREAT BLD: 29 (ref 9–20)
CALCIUM SERPL-MCNC: 9.6 MG/DL (ref 8.6–10.4)
CHLORIDE BLD-SCNC: 99 MMOL/L (ref 98–107)
CO2: 27 MMOL/L (ref 20–31)
CREAT SERPL-MCNC: 1.5 MG/DL (ref 0.7–1.2)
GFR AFRICAN AMERICAN: 54 ML/MIN
GFR NON-AFRICAN AMERICAN: 45 ML/MIN
GFR SERPL CREATININE-BSD FRML MDRD: ABNORMAL ML/MIN/{1.73_M2}
GFR SERPL CREATININE-BSD FRML MDRD: ABNORMAL ML/MIN/{1.73_M2}
GLUCOSE BLD-MCNC: 97 MG/DL (ref 70–99)
POTASSIUM SERPL-SCNC: 5.2 MMOL/L (ref 3.7–5.3)
PRO-BNP: 2660 PG/ML
SODIUM BLD-SCNC: 139 MMOL/L (ref 135–144)

## 2018-09-06 PROCEDURE — 36415 COLL VENOUS BLD VENIPUNCTURE: CPT

## 2018-09-06 PROCEDURE — G8417 CALC BMI ABV UP PARAM F/U: HCPCS | Performed by: FAMILY MEDICINE

## 2018-09-06 PROCEDURE — G8427 DOCREV CUR MEDS BY ELIG CLIN: HCPCS | Performed by: FAMILY MEDICINE

## 2018-09-06 PROCEDURE — 1036F TOBACCO NON-USER: CPT | Performed by: FAMILY MEDICINE

## 2018-09-06 PROCEDURE — 1101F PT FALLS ASSESS-DOCD LE1/YR: CPT | Performed by: FAMILY MEDICINE

## 2018-09-06 PROCEDURE — 99214 OFFICE O/P EST MOD 30 MIN: CPT | Performed by: FAMILY MEDICINE

## 2018-09-06 PROCEDURE — 83880 ASSAY OF NATRIURETIC PEPTIDE: CPT

## 2018-09-06 PROCEDURE — G8598 ASA/ANTIPLAT THER USED: HCPCS | Performed by: FAMILY MEDICINE

## 2018-09-06 PROCEDURE — 80048 BASIC METABOLIC PNL TOTAL CA: CPT

## 2018-09-06 PROCEDURE — 1123F ACP DISCUSS/DSCN MKR DOCD: CPT | Performed by: FAMILY MEDICINE

## 2018-09-06 PROCEDURE — 4040F PNEUMOC VAC/ADMIN/RCVD: CPT | Performed by: FAMILY MEDICINE

## 2018-09-06 ASSESSMENT — ENCOUNTER SYMPTOMS: SHORTNESS OF BREATH: 1

## 2018-09-06 NOTE — PROGRESS NOTES
Luis Armando Riojas am scribing for and in the presence of Lotus Mccloud MD.    Patient: Albert Carreon  : 1935  Date of Visit: 2018    REASON FOR VISIT / CONSULTATION: Follow-up (HX: PAF,CHF Patient is here today due to SOB, light headed he is down 3 lbs from yesterday. He is very tired at this time. Denies: CP )      Dear Leia Ly MD    I had the pleasure of seeing your patient Albert Carreon in follow up today. As you know, Mr. Karlos Kemp is a 81 y.o. male with a history of severe aortic stenosis and severe 3 vessel coronary artery disease leading to a 2 vessel CABG as well as a bioprosthetic aortic valve replacement on 17 by Dr. Richard. His last stress test that was done on 2017 which had shown 150 percent predicted exercise capacity for his age and gender, which was quite remarkable. Post op he developed persistent atrial fibrillation with RVR leading to a cardioversion. Around  he was found to again be in atrial fibrillation and had developed a significant increase in shortness of breath and lower extremity edema. Shortness of Breath       Mr. Karlos Kemp reports that he has been having shortness of breath with exertion as well as a 3 pound weight loss with lower extremity edema since I increased his oral lasix. . Mr. Karlos Kemp states he is very tired. He denies chest pain or pressure but does report a slow but steady increase in shortness of breath over the last couple months. He denies any bleeding problems or abdominal pain. Past Medical History:   Diagnosis Date    Abnormal cardiac cath 10/01/2012    30% LAD, 80% ostial stenosis-CX, RCAOccluded at its ostium. There was minimal left to right collateral flow.  Abnormal echocardiogram     EF >55%. Mild to moderate aortic stenosis. Mean gradient to 18 mmHg.  Atrial fibrillation ([address] 2017    CAD (coronary artery disease)     30% LAD, 80% ostial stenosis-CX, RCAOccluded at its ostium. There was minimal left to right collateral flow.  Chronic diastolic CHF (congestive heart failure), NYHA class 2 ([address] 9/7/2016    Chronic renal failure, stage 3 (moderate) 4/24/2017    Erectile dysfunction     H/O cardiac catheterization 06/02/2017    LMCA: Lesion on LMCA: Distal subsection. 60% stenosis. Comments: Calcified lesion in the ostial Cx extending into the distal left main and proximal LAD. LAD: lesion on Prox LAD: Ostial. 50% stenosis. Lesion on 1st Diag:Proximal subsection. 50% stenosis. LCx: Lesion on Prox CX: Osital. 95% stenosis. Lesion on Dist CX: Ostial 50% stenosis. RCA: Lesion on prox RCA: Proximal subsection. 100% stenosis.  H/O echocardiogram 12/9/15    EF:55%. LV wall thickness is moderately increased. Bi atrial enlargement noted. Mean aortic valve gradient is 33mmhg w/moderate to severe aortic stenosis. Mitral annular calcification. Mild mitral regurgitation. Moderate pulm hypertension w/RV systolic pressure of 43 mmhg. Mild (grade 1) diastolic dysfunction.  H/O echocardiogram 12/05/2016    EF:60%. Mildly increased LV wall thickness. Biatrial enlargement. Moderate to severe aortic stenosis. Mean gradient 36 mmHg, KRISTINA 0.08. Mild mitral regurgitation. Moderate pulmonary hypertension with an estimated RV systolic pressure of 60 mmHg. Mild diastolic dysfunction.  H/O echocardiogram 06/13/2017    EF 55%. Moderately increased LV wall thickness normal LV cavity size. Severe bi-atrial enlargement. Mild mitral and moderate tricuspid regurg. Mild pulmonary hypertension estimated right ventricular systolic pressure of 35 mmHg. Bilateral pleural effusion.  History of cardiovascular stress test 12/21/2017    History of cardioversion 09/21/2017    Successful cardioversion to NSR with 200J biphasic    History of echocardiogram 11/21/13    EF >55%, mild to mod LVH, LA  is mod dilated, mild to mod AS, mild diastolic dysfunction noted.     History of echocardiogram 06/09/15    EF 55%. LV wall thickness is severely increased. LA is moderately dilated(34-39)with a LA volume index of 36 ml.    History of echocardiogram 06/19/2018    EF 55%. Mildly increased LV wall thickness. Left atrium severely dilated (>40) w/ left atrial volume index of 46 ml/m2. Moderate aortic stenosis w/ mean gradient of 21.3 mmHg consistent w/ moderate aortis stenosis on what appears to be bioprosthetic aortic melanie, Moderate pulmonary HTN w/ RV systolic pressure of 58 mmHg. Moderate/severe tricuspid regurg. Diastoly cannot be assessed due to pt rhythm    History of Holter monitoring 08/08/2017    Atrial fibrillation throughout with fairly controlled ventricular response (HR less than 100 bpm 93% of the time), no signficant pauses. Rare isolated PVC's    History of Holter monitoring 08/08/2017    Atrial Fibrillation throughout with fairly controlled ventricular response (HR less than 100bpm 93% of the time),no significant pauses. Rare isolated PVC's    History of Holter monitoring 12/06/2017    Rare PAC's with one 4 beat run of wide complex tachycardia at 170 bpm, No symptoms reported. clinical correlation required    Hyperlipidemia 2/21/2006    Hypertension     Osteoarthritis of hip 6/20/2013    PVD (peripheral vascular disease) ([facility] [address]     Reported history of a 60% right carotid stenosis.  Trigeminal neuralgia 7/10/2013       CURRENT ALLERGIES: Iodine; Norco [hydrocodone-acetaminophen]; Other; Shellfish-derived products; Shrimp flavor; and Oxycodone REVIEW OF SYSTEMS: 14 systems were reviewed. Pertinent positives and negatives as above, all else negative.      Past Surgical History:   Procedure Laterality Date    CARDIAC CATHETERIZATION Left 05/18/2017    Right Radial/[facility]/    CARDIOVERSION  09/21/2017    [facility]/Dr Her/2 shocks at 200 joules    CORONARY ARTERY BYPASS GRAFT N/A 5/19/2017    CORONARY ARTERY BYPASS GRAFT X 2: LIMA-LAD, SVG-OM, AORTIC VALVE REPLACEMENT WITH

## 2018-09-06 NOTE — PATIENT INSTRUCTIONS
SURVEY:    You may be receiving a survey from [employer] regarding your visit today. Please complete the survey to enable us to provide the highest quality of care to you and your family. If you cannot score us a very good on any question, please call the office to discuss how we could have made your experience a very good one. Thank you.

## 2018-09-07 ENCOUNTER — TELEPHONE (OUTPATIENT)
Dept: CARDIOLOGY | Age: 83
End: 2018-09-07

## 2018-09-12 ENCOUNTER — TELEPHONE (OUTPATIENT)
Dept: CARDIOLOGY | Age: 83
End: 2018-09-12

## 2018-09-12 DIAGNOSIS — R06.02 SHORTNESS OF BREATH: ICD-10-CM

## 2018-09-12 DIAGNOSIS — I48.19 PERSISTENT ATRIAL FIBRILLATION (HCC): Primary | ICD-10-CM

## 2018-09-13 ENCOUNTER — HOSPITAL ENCOUNTER (OUTPATIENT)
Dept: CARDIAC CATH/INVASIVE PROCEDURES | Age: 83
Discharge: HOME | End: 2018-09-13
Payer: MEDICARE

## 2018-09-13 VITALS
DIASTOLIC BLOOD PRESSURE: 98 MMHG | BODY MASS INDEX: 30.49 KG/M2 | TEMPERATURE: 97.4 F | HEART RATE: 79 BPM | WEIGHT: 194.3 LBS | HEIGHT: 67 IN | RESPIRATION RATE: 16 BRPM | OXYGEN SATURATION: 95 % | SYSTOLIC BLOOD PRESSURE: 108 MMHG

## 2018-09-13 PROBLEM — I48.19 PERSISTENT ATRIAL FIBRILLATION (HCC): Status: ACTIVE | Noted: 2017-08-28

## 2018-09-13 LAB
EKG ATRIAL RATE: 74 BPM
EKG ATRIAL RATE: 74 BPM
EKG P AXIS: 66 DEGREES
EKG P-R INTERVAL: 216 MS
EKG Q-T INTERVAL: 408 MS
EKG Q-T INTERVAL: 428 MS
EKG QRS DURATION: 84 MS
EKG QRS DURATION: 90 MS
EKG QTC CALCULATION (BAZETT): 452 MS
EKG QTC CALCULATION (BAZETT): 475 MS
EKG R AXIS: 84 DEGREES
EKG R AXIS: 90 DEGREES
EKG T AXIS: 61 DEGREES
EKG T AXIS: 88 DEGREES
EKG VENTRICULAR RATE: 74 BPM
EKG VENTRICULAR RATE: 74 BPM

## 2018-09-13 PROCEDURE — 6360000002 HC RX W HCPCS

## 2018-09-13 PROCEDURE — 6360000002 HC RX W HCPCS: Performed by: FAMILY MEDICINE

## 2018-09-13 PROCEDURE — 92960 CARDIOVERSION ELECTRIC EXT: CPT | Performed by: FAMILY MEDICINE

## 2018-09-13 RX ORDER — FENTANYL CITRATE 50 UG/ML
INJECTION, SOLUTION INTRAMUSCULAR; INTRAVENOUS
Status: COMPLETED | OUTPATIENT
Start: 2018-09-13 | End: 2018-09-13

## 2018-09-13 RX ORDER — AMIODARONE HYDROCHLORIDE 200 MG/1
TABLET ORAL
Qty: 90 TABLET | Refills: 3
Start: 2018-09-13 | End: 2018-09-28 | Stop reason: SDUPTHER

## 2018-09-13 RX ORDER — MIDAZOLAM HYDROCHLORIDE 1 MG/ML
INJECTION INTRAMUSCULAR; INTRAVENOUS
Status: COMPLETED | OUTPATIENT
Start: 2018-09-13 | End: 2018-09-13

## 2018-09-13 RX ADMIN — MIDAZOLAM HYDROCHLORIDE 1 MG: 1 INJECTION, SOLUTION INTRAMUSCULAR; INTRAVENOUS at 15:26

## 2018-09-13 RX ADMIN — FENTANYL CITRATE 25 MCG: 50 INJECTION INTRAMUSCULAR; INTRAVENOUS at 15:26

## 2018-09-13 NOTE — OP NOTE
Brief Postoperative Note    Mr. Emeterio Wu   YOB: 1935   [id number]    Procedure: Cardioversion    Pre-operative Diagnosis: Persistent atrial fibrillation, symptomatic    Post-operative Diagnosis: Successful cardioversion to NSR with 150J biphasic    Anesthesia: Conscious sedation    Surgeons/Assistants: Taina    Estimated Blood Loss: None    Complications: None    I asked Mr. Emeterio Wu to call my office if he had any problems, but otherwise to follow up with me in about a month as well as follow up with his primary care physician as previously scheduled. Fred Wilkerson. Taina WILSON, MS, F.A.C.C.   [facility]  [address] [place] [address]  Phone: [phone], Fax: [phone]

## 2018-09-13 NOTE — PROGRESS NOTES
Patient received to recovery via cart, eyes open and patient is alert. Dr Tika Valencia spoke with wife regarding results.

## 2018-09-13 NOTE — H&P
Patient examined the patient and have reviewed H&P the from 9/7/18 and I agree with the current assessment and plan with no significant change in the patients condition.

## 2018-09-28 ENCOUNTER — OFFICE VISIT (OUTPATIENT)
Dept: CARDIOLOGY | Age: 83
End: 2018-09-28
Payer: MEDICARE

## 2018-09-28 VITALS
OXYGEN SATURATION: 97 % | WEIGHT: 202.2 LBS | SYSTOLIC BLOOD PRESSURE: 108 MMHG | HEART RATE: 68 BPM | BODY MASS INDEX: 31.74 KG/M2 | DIASTOLIC BLOOD PRESSURE: 62 MMHG | HEIGHT: 67 IN

## 2018-09-28 DIAGNOSIS — I25.119 CORONARY ARTERY DISEASE INVOLVING NATIVE CORONARY ARTERY OF NATIVE HEART WITH ANGINA PECTORIS (HCC): ICD-10-CM

## 2018-09-28 DIAGNOSIS — Z86.79 HISTORY OF AORTIC STENOSIS: ICD-10-CM

## 2018-09-28 DIAGNOSIS — I48.0 PAF (PAROXYSMAL ATRIAL FIBRILLATION) (HCC): Primary | ICD-10-CM

## 2018-09-28 DIAGNOSIS — I10 ESSENTIAL HYPERTENSION: ICD-10-CM

## 2018-09-28 DIAGNOSIS — I50.33 ACUTE ON CHRONIC DIASTOLIC HEART FAILURE (HCC): ICD-10-CM

## 2018-09-28 DIAGNOSIS — Z95.2 HISTORY OF AORTIC VALVE REPLACEMENT: ICD-10-CM

## 2018-09-28 PROCEDURE — 1123F ACP DISCUSS/DSCN MKR DOCD: CPT | Performed by: FAMILY MEDICINE

## 2018-09-28 PROCEDURE — 4040F PNEUMOC VAC/ADMIN/RCVD: CPT | Performed by: FAMILY MEDICINE

## 2018-09-28 PROCEDURE — 99214 OFFICE O/P EST MOD 30 MIN: CPT | Performed by: FAMILY MEDICINE

## 2018-09-28 PROCEDURE — G8598 ASA/ANTIPLAT THER USED: HCPCS | Performed by: FAMILY MEDICINE

## 2018-09-28 PROCEDURE — 1036F TOBACCO NON-USER: CPT | Performed by: FAMILY MEDICINE

## 2018-09-28 PROCEDURE — G8417 CALC BMI ABV UP PARAM F/U: HCPCS | Performed by: FAMILY MEDICINE

## 2018-09-28 PROCEDURE — G8427 DOCREV CUR MEDS BY ELIG CLIN: HCPCS | Performed by: FAMILY MEDICINE

## 2018-09-28 PROCEDURE — 1101F PT FALLS ASSESS-DOCD LE1/YR: CPT | Performed by: FAMILY MEDICINE

## 2018-09-28 PROCEDURE — 93000 ELECTROCARDIOGRAM COMPLETE: CPT | Performed by: FAMILY MEDICINE

## 2018-09-28 RX ORDER — AMIODARONE HYDROCHLORIDE 200 MG/1
TABLET ORAL
Qty: 180 TABLET | Refills: 3 | Status: ON HOLD | OUTPATIENT
Start: 2018-09-28 | End: 2018-10-06 | Stop reason: HOSPADM

## 2018-09-28 ASSESSMENT — ENCOUNTER SYMPTOMS: SHORTNESS OF BREATH: 1

## 2018-09-28 NOTE — PROGRESS NOTES
I, Arahs Guerra am scribing for and in the presence of Gayla Esposito MD.    Patient: Carter Raman  : 1935  Date of Visit: 2018    REASON FOR VISIT / CONSULTATION: Follow-up (EKG done. Cardioversion on . Pt says he is not feeling great. He continues to have SOB and very fatigued, even after sleeping well. He continues to have palpitations but they are not bothersome so he doesn't note how often or how long they are. His sense of balance is off, but doesn't think he is dizzy. Denies CP.)      Dear Sherry Simmons MD    I had the pleasure of seeing your patient Carter Raman in follow up today. As you know, Mr. Uriel Zamudio is a 81 y.o. male with a history of severe aortic stenosis and severe 3 vessel coronary artery disease leading to a 2 vessel CABG as well as a bioprosthetic aortic valve replacement on 17 by Dr. Noé Mobley. His last stress test that was done on 2017 which had shown 150 percent predicted exercise capacity for his age and gender, which was quite remarkable. Post op he developed persistent atrial fibrillation with RVR leading to a cardioversion. Around  he was found to again be in atrial fibrillation and had developed a significant increase in shortness of breath and lower extremity edema. Successful Cardioversion done on 2018. Unfortunately, since that time he continues to report moderate shortness of breath with exertion and unfortunately was found to be in atrial fibrillation in the office today. Shortness of Breath     Mr. Uriel Zamudio continue to have shortness of breath and being fatigue, and has not been sleeping well. He denies any current or recent chest discomfort,  bleeding problems, medication problems or abdominal pain or any other concerns at this time. Past Medical History:   Diagnosis Date    Abnormal cardiac cath 10/01/2012    30% LAD, 80% ostial stenosis-CX, RCAOccluded at its ostium.   There was minimal left to right tablet 3    furosemide (LASIX) 80 MG tablet Take 1 tablet by mouth daily Take 1 tablet qAM and 1/2 tab in afternoon. 135 tablet 3    OXcarbazepine (TRILEPTAL) 300 MG tablet TAKE ONE TABLET BY MOUTH 4 TIMES DAILY (Patient taking differently: TAKE ONE TABLET BY MOUTH 3-4 TIMES DAILY) 360 tablet 1    EPIPEN 2-AMBAR 0.3 MG/0.3ML SOAJ injection USE AS DIRECTED 1 each 0    aspirin 81 MG tablet Take 81 mg by mouth nightly          FAMILY HISTORY: family history includes Cancer in his father; Heart Disease in his brother, brother, brother, father, mother, sister, and sister; High Blood Pressure in his sister and sister. PHYSICAL EXAM:   /62 (Site: Left Upper Arm, Position: Sitting, Cuff Size: Medium Adult)   Pulse 68   Ht 5' 7\" (1.702 m)   Wt 202 lb 3.2 oz (91.7 kg)   SpO2 97%   BMI 31.67 kg/m²  Body mass index is 31.67 kg/m². Constitutional: He is oriented to person, place, and time. He appears well-developed and well-nourished. In no acute distress. HEENT: Normocephalic and atraumatic. No JVD present. Carotid bruit is not present. No mass and no thyromegaly present. No lymphadenopathy present. Cardiovascular: Normal rate, irregularly irregular rhythm, normal heart sounds. Exam reveals no gallop and no friction rubs. A III/VI systolic murmur at the myocardial apex  Pulmonary/Chest: Effort normal and breath sounds normal. No respiratory distress. He has no wheezes, rhonchi or rales. Abdominal: Soft, non-tender. Bowel sounds and aorta are normal. He exhibits no organomegaly, mass or bruit. Extremities: 1+ pitting pedal edema up to the knees bilaterally. No cyanosis or clubbing. Pulses are 2+ radial/carotid/dorsalis pedis and posterior tibial bilaterally. Compression stockings in place. Neurological: He is alert and oriented to person, place, and time. No evidence of gross cranial nerve deficit. Coordination appeared normal.   Skin: Skin is warm and dry. There is no rash or diaphoresis. arise. Once again, thank you for allowing me to participate in this patients care. Please do not hesitate to contact me could I be of further assistance. Sincerely,  Deborah Her MD, MS, F.A.C.C. [facility]  [address] [place] [address], [address] [place] [address]  Phone: [phone], Fax: [phone]    I believe that the risk of significant morbidity and mortality related to the patient's current medical conditions are: intermediate-high. The documentation recorded by the scribe, accurately and completely reflects the services I personally performed and the decisions made by me. Haseeb Her MD, MS, F.A.C.C.  September 28, 2018

## 2018-10-01 RX ORDER — ISOSORBIDE MONONITRATE 30 MG/1
30 TABLET, EXTENDED RELEASE ORAL DAILY
Qty: 90 TABLET | Refills: 3 | Status: SHIPPED | OUTPATIENT
Start: 2018-10-01 | End: 2018-11-14

## 2018-10-03 ENCOUNTER — HOSPITAL ENCOUNTER (INPATIENT)
Age: 83
LOS: 3 days | Discharge: HOME | DRG: 871 | End: 2018-10-06
Attending: EMERGENCY MEDICINE | Admitting: FAMILY MEDICINE
Payer: MEDICARE

## 2018-10-03 ENCOUNTER — APPOINTMENT (OUTPATIENT)
Dept: CT IMAGING | Age: 83
DRG: 871 | End: 2018-10-03
Payer: MEDICARE

## 2018-10-03 ENCOUNTER — APPOINTMENT (OUTPATIENT)
Dept: GENERAL RADIOLOGY | Age: 83
DRG: 871 | End: 2018-10-03
Payer: MEDICARE

## 2018-10-03 DIAGNOSIS — J18.9 PNEUMONIA OF RIGHT LOWER LOBE DUE TO INFECTIOUS ORGANISM: ICD-10-CM

## 2018-10-03 DIAGNOSIS — G93.40 ACUTE ENCEPHALOPATHY: Primary | ICD-10-CM

## 2018-10-03 PROBLEM — A41.9 SEPSIS (HCC): Status: ACTIVE | Noted: 2018-10-03

## 2018-10-03 LAB
-: ABNORMAL
ABSOLUTE EOS #: 0 K/UL (ref 0–0.44)
ABSOLUTE IMMATURE GRANULOCYTE: 0 K/UL (ref 0–0.3)
ABSOLUTE LYMPH #: 0.73 K/UL (ref 1.1–3.7)
ABSOLUTE MONO #: 0.18 K/UL (ref 0.1–1.2)
ALBUMIN SERPL-MCNC: 4.2 G/DL (ref 3.5–5.2)
ALBUMIN/GLOBULIN RATIO: 1 (ref 1–2.5)
ALP BLD-CCNC: 137 U/L (ref 40–129)
ALT SERPL-CCNC: 27 U/L (ref 5–41)
AMMONIA: 34 UMOL/L (ref 16–60)
AMORPHOUS: ABNORMAL
ANION GAP SERPL CALCULATED.3IONS-SCNC: 14 MMOL/L (ref 9–17)
AST SERPL-CCNC: 46 U/L
BACTERIA: ABNORMAL
BASOPHILS # BLD: 0 % (ref 0–2)
BASOPHILS ABSOLUTE: 0 K/UL (ref 0–0.2)
BILIRUB SERPL-MCNC: 1.23 MG/DL (ref 0.3–1.2)
BILIRUBIN URINE: NEGATIVE
BUN BLDV-MCNC: 56 MG/DL (ref 8–23)
BUN/CREAT BLD: 26 (ref 9–20)
CALCIUM SERPL-MCNC: 9.5 MG/DL (ref 8.6–10.4)
CASTS UA: ABNORMAL /LPF
CHLORIDE BLD-SCNC: 95 MMOL/L (ref 98–107)
CO2: 24 MMOL/L (ref 20–31)
COLOR: YELLOW
COMMENT UA: ABNORMAL
CREAT SERPL-MCNC: 2.15 MG/DL (ref 0.7–1.2)
CRYSTALS, UA: ABNORMAL /HPF
DIFFERENTIAL TYPE: ABNORMAL
EOSINOPHILS RELATIVE PERCENT: 0 % (ref 1–4)
EPITHELIAL CELLS UA: ABNORMAL /HPF (ref 0–5)
GFR AFRICAN AMERICAN: 36 ML/MIN
GFR NON-AFRICAN AMERICAN: 30 ML/MIN
GFR SERPL CREATININE-BSD FRML MDRD: ABNORMAL ML/MIN/{1.73_M2}
GFR SERPL CREATININE-BSD FRML MDRD: ABNORMAL ML/MIN/{1.73_M2}
GLUCOSE BLD-MCNC: 98 MG/DL (ref 70–99)
GLUCOSE URINE: NEGATIVE
HCT VFR BLD CALC: 42 % (ref 40.7–50.3)
HEMOGLOBIN: 13.2 G/DL (ref 13–17)
IMMATURE GRANULOCYTES: 0 %
INR BLD: 1.2 (ref 0.9–1.2)
KETONES, URINE: NEGATIVE
LACTIC ACID, SEPSIS WHOLE BLOOD: ABNORMAL MMOL/L (ref 0.5–1.9)
LACTIC ACID, SEPSIS WHOLE BLOOD: NORMAL MMOL/L (ref 0.5–1.9)
LACTIC ACID, SEPSIS: 1.9 MMOL/L (ref 0.5–1.9)
LACTIC ACID, SEPSIS: 2 MMOL/L (ref 0.5–1.9)
LACTIC ACID, WHOLE BLOOD: NORMAL MMOL/L (ref 0.7–2.1)
LACTIC ACID: 1.5 MMOL/L (ref 0.5–2.2)
LEUKOCYTE ESTERASE, URINE: NEGATIVE
LIPASE: 53 U/L (ref 13–60)
LYMPHOCYTES # BLD: 8 % (ref 24–43)
MCH RBC QN AUTO: 29.1 PG (ref 25.2–33.5)
MCHC RBC AUTO-ENTMCNC: 31.4 G/DL (ref 28.4–34.8)
MCV RBC AUTO: 92.7 FL (ref 82.6–102.9)
MONOCYTES # BLD: 2 % (ref 3–12)
MORPHOLOGY: NORMAL
MUCUS: ABNORMAL
NITRITE, URINE: NEGATIVE
NRBC AUTOMATED: 0 PER 100 WBC
OTHER OBSERVATIONS UA: ABNORMAL
PDW BLD-RTO: 18.1 % (ref 11.8–14.4)
PH UA: 7 (ref 5–9)
PLATELET # BLD: 153 K/UL (ref 138–453)
PLATELET ESTIMATE: ABNORMAL
PMV BLD AUTO: 10.1 FL (ref 8.1–13.5)
POTASSIUM SERPL-SCNC: 4.8 MMOL/L (ref 3.7–5.3)
PROTEIN UA: ABNORMAL
PROTHROMBIN TIME: 12.8 SEC (ref 9.7–12.2)
RBC # BLD: 4.53 M/UL (ref 4.21–5.77)
RBC # BLD: ABNORMAL 10*6/UL
RBC UA: ABNORMAL /HPF (ref 0–2)
RENAL EPITHELIAL, UA: ABNORMAL /HPF
SEG NEUTROPHILS: 90 % (ref 36–65)
SEGMENTED NEUTROPHILS ABSOLUTE COUNT: 8.19 K/UL (ref 1.5–8.1)
SODIUM BLD-SCNC: 133 MMOL/L (ref 135–144)
SPECIFIC GRAVITY UA: 1.01 (ref 1.01–1.02)
TOTAL PROTEIN: 8.5 G/DL (ref 6.4–8.3)
TRICHOMONAS: ABNORMAL
TURBIDITY: CLEAR
URINE HGB: ABNORMAL
UROBILINOGEN, URINE: ABNORMAL
WBC # BLD: 9.1 K/UL (ref 3.5–11.3)
WBC # BLD: ABNORMAL 10*3/UL
WBC UA: ABNORMAL /HPF (ref 0–5)
YEAST: ABNORMAL

## 2018-10-03 PROCEDURE — 2000000000 HC ICU R&B

## 2018-10-03 PROCEDURE — 70450 CT HEAD/BRAIN W/O DYE: CPT

## 2018-10-03 PROCEDURE — 71045 X-RAY EXAM CHEST 1 VIEW: CPT

## 2018-10-03 PROCEDURE — 6370000000 HC RX 637 (ALT 250 FOR IP): Performed by: FAMILY MEDICINE

## 2018-10-03 PROCEDURE — 6360000002 HC RX W HCPCS: Performed by: FAMILY MEDICINE

## 2018-10-03 PROCEDURE — 36415 COLL VENOUS BLD VENIPUNCTURE: CPT

## 2018-10-03 PROCEDURE — 87077 CULTURE AEROBIC IDENTIFY: CPT

## 2018-10-03 PROCEDURE — 80053 COMPREHEN METABOLIC PANEL: CPT

## 2018-10-03 PROCEDURE — 87205 SMEAR GRAM STAIN: CPT

## 2018-10-03 PROCEDURE — 87449 NOS EACH ORGANISM AG IA: CPT

## 2018-10-03 PROCEDURE — 87186 SC STD MICRODIL/AGAR DIL: CPT

## 2018-10-03 PROCEDURE — 81001 URINALYSIS AUTO W/SCOPE: CPT

## 2018-10-03 PROCEDURE — 6360000002 HC RX W HCPCS: Performed by: EMERGENCY MEDICINE

## 2018-10-03 PROCEDURE — 99285 EMERGENCY DEPT VISIT HI MDM: CPT

## 2018-10-03 PROCEDURE — 87086 URINE CULTURE/COLONY COUNT: CPT

## 2018-10-03 PROCEDURE — 85610 PROTHROMBIN TIME: CPT

## 2018-10-03 PROCEDURE — 87899 AGENT NOS ASSAY W/OPTIC: CPT

## 2018-10-03 PROCEDURE — 2580000003 HC RX 258: Performed by: EMERGENCY MEDICINE

## 2018-10-03 PROCEDURE — 2580000003 HC RX 258: Performed by: FAMILY MEDICINE

## 2018-10-03 PROCEDURE — 87040 BLOOD CULTURE FOR BACTERIA: CPT

## 2018-10-03 PROCEDURE — 83605 ASSAY OF LACTIC ACID: CPT

## 2018-10-03 PROCEDURE — 82140 ASSAY OF AMMONIA: CPT

## 2018-10-03 PROCEDURE — 74176 CT ABD & PELVIS W/O CONTRAST: CPT

## 2018-10-03 PROCEDURE — 99223 1ST HOSP IP/OBS HIGH 75: CPT | Performed by: FAMILY MEDICINE

## 2018-10-03 PROCEDURE — 94640 AIRWAY INHALATION TREATMENT: CPT

## 2018-10-03 PROCEDURE — 87181 SC STD AGAR DILUTION PER AGT: CPT

## 2018-10-03 PROCEDURE — 87149 DNA/RNA DIRECT PROBE: CPT

## 2018-10-03 PROCEDURE — 85025 COMPLETE CBC W/AUTO DIFF WBC: CPT

## 2018-10-03 PROCEDURE — 96365 THER/PROPH/DIAG IV INF INIT: CPT

## 2018-10-03 PROCEDURE — 83690 ASSAY OF LIPASE: CPT

## 2018-10-03 RX ORDER — IPRATROPIUM BROMIDE AND ALBUTEROL SULFATE 2.5; .5 MG/3ML; MG/3ML
1 SOLUTION RESPIRATORY (INHALATION)
Status: DISCONTINUED | OUTPATIENT
Start: 2018-10-03 | End: 2018-10-04

## 2018-10-03 RX ORDER — LEVOTHYROXINE SODIUM 0.03 MG/1
25 TABLET ORAL DAILY
Status: DISCONTINUED | OUTPATIENT
Start: 2018-10-04 | End: 2018-10-06 | Stop reason: HOSPADM

## 2018-10-03 RX ORDER — POTASSIUM CHLORIDE 750 MG/1
10 TABLET, EXTENDED RELEASE ORAL DAILY
Status: DISCONTINUED | OUTPATIENT
Start: 2018-10-04 | End: 2018-10-06 | Stop reason: HOSPADM

## 2018-10-03 RX ORDER — 0.9 % SODIUM CHLORIDE 0.9 %
1000 INTRAVENOUS SOLUTION INTRAVENOUS ONCE
Status: COMPLETED | OUTPATIENT
Start: 2018-10-03 | End: 2018-10-03

## 2018-10-03 RX ORDER — BACLOFEN 10 MG/1
10 TABLET ORAL 3 TIMES DAILY
Status: DISCONTINUED | OUTPATIENT
Start: 2018-10-03 | End: 2018-10-06 | Stop reason: HOSPADM

## 2018-10-03 RX ORDER — SODIUM CHLORIDE 9 MG/ML
INJECTION, SOLUTION INTRAVENOUS CONTINUOUS
Status: DISCONTINUED | OUTPATIENT
Start: 2018-10-03 | End: 2018-10-06 | Stop reason: HOSPADM

## 2018-10-03 RX ORDER — ONDANSETRON 2 MG/ML
4 INJECTION INTRAMUSCULAR; INTRAVENOUS EVERY 6 HOURS PRN
Status: DISCONTINUED | OUTPATIENT
Start: 2018-10-03 | End: 2018-10-06 | Stop reason: HOSPADM

## 2018-10-03 RX ORDER — ASPIRIN 81 MG/1
81 TABLET, CHEWABLE ORAL NIGHTLY
Status: DISCONTINUED | OUTPATIENT
Start: 2018-10-03 | End: 2018-10-06 | Stop reason: HOSPADM

## 2018-10-03 RX ORDER — ACETAMINOPHEN 325 MG/1
650 TABLET ORAL EVERY 4 HOURS PRN
Status: DISCONTINUED | OUTPATIENT
Start: 2018-10-03 | End: 2018-10-06 | Stop reason: HOSPADM

## 2018-10-03 RX ORDER — AMIODARONE HYDROCHLORIDE 200 MG/1
200 TABLET ORAL DAILY
Status: DISCONTINUED | OUTPATIENT
Start: 2018-10-03 | End: 2018-10-06 | Stop reason: HOSPADM

## 2018-10-03 RX ORDER — ATORVASTATIN CALCIUM 40 MG/1
80 TABLET, FILM COATED ORAL NIGHTLY
Status: DISCONTINUED | OUTPATIENT
Start: 2018-10-03 | End: 2018-10-06 | Stop reason: HOSPADM

## 2018-10-03 RX ORDER — ISOSORBIDE MONONITRATE 30 MG/1
30 TABLET, EXTENDED RELEASE ORAL DAILY
Status: DISCONTINUED | OUTPATIENT
Start: 2018-10-03 | End: 2018-10-04

## 2018-10-03 RX ORDER — SODIUM CHLORIDE 0.9 % (FLUSH) 0.9 %
10 SYRINGE (ML) INJECTION PRN
Status: DISCONTINUED | OUTPATIENT
Start: 2018-10-03 | End: 2018-10-06 | Stop reason: HOSPADM

## 2018-10-03 RX ORDER — FUROSEMIDE 40 MG/1
40 TABLET ORAL DAILY
Status: DISCONTINUED | OUTPATIENT
Start: 2018-10-03 | End: 2018-10-06 | Stop reason: HOSPADM

## 2018-10-03 RX ORDER — EZETIMIBE 10 MG/1
10 TABLET ORAL DAILY
Status: DISCONTINUED | OUTPATIENT
Start: 2018-10-04 | End: 2018-10-04

## 2018-10-03 RX ORDER — 0.9 % SODIUM CHLORIDE 0.9 %
500 INTRAVENOUS SOLUTION INTRAVENOUS ONCE
Status: COMPLETED | OUTPATIENT
Start: 2018-10-03 | End: 2018-10-03

## 2018-10-03 RX ORDER — LEVOFLOXACIN 5 MG/ML
750 INJECTION, SOLUTION INTRAVENOUS
Status: DISCONTINUED | OUTPATIENT
Start: 2018-10-03 | End: 2018-10-05

## 2018-10-03 RX ORDER — OXCARBAZEPINE 300 MG/1
300 TABLET, FILM COATED ORAL 3 TIMES DAILY
Status: DISCONTINUED | OUTPATIENT
Start: 2018-10-03 | End: 2018-10-06 | Stop reason: HOSPADM

## 2018-10-03 RX ORDER — SODIUM CHLORIDE 0.9 % (FLUSH) 0.9 %
10 SYRINGE (ML) INJECTION EVERY 12 HOURS SCHEDULED
Status: DISCONTINUED | OUTPATIENT
Start: 2018-10-03 | End: 2018-10-06 | Stop reason: HOSPADM

## 2018-10-03 RX ADMIN — SODIUM CHLORIDE 500 ML: 9 INJECTION, SOLUTION INTRAVENOUS at 17:55

## 2018-10-03 RX ADMIN — BACLOFEN 10 MG: 10 TABLET ORAL at 20:19

## 2018-10-03 RX ADMIN — ATORVASTATIN CALCIUM 80 MG: 40 TABLET, FILM COATED ORAL at 20:19

## 2018-10-03 RX ADMIN — OXCARBAZEPINE 300 MG: 300 TABLET, FILM COATED ORAL at 20:19

## 2018-10-03 RX ADMIN — LEVOFLOXACIN 750 MG: 5 INJECTION, SOLUTION INTRAVENOUS at 20:20

## 2018-10-03 RX ADMIN — ASPIRIN 81 MG CHEWABLE TABLET 81 MG: 81 TABLET CHEWABLE at 20:19

## 2018-10-03 RX ADMIN — CEFTRIAXONE 1 G: 1 INJECTION, POWDER, FOR SOLUTION INTRAMUSCULAR; INTRAVENOUS at 14:51

## 2018-10-03 RX ADMIN — APIXABAN 2.5 MG: 2.5 TABLET, FILM COATED ORAL at 20:19

## 2018-10-03 RX ADMIN — AZITHROMYCIN MONOHYDRATE 500 MG: 500 INJECTION, POWDER, LYOPHILIZED, FOR SOLUTION INTRAVENOUS at 14:55

## 2018-10-03 RX ADMIN — IPRATROPIUM BROMIDE AND ALBUTEROL SULFATE 1 AMPULE: .5; 3 SOLUTION RESPIRATORY (INHALATION) at 19:51

## 2018-10-03 RX ADMIN — SODIUM CHLORIDE 1000 ML: 9 INJECTION, SOLUTION INTRAVENOUS at 13:55

## 2018-10-03 ASSESSMENT — ENCOUNTER SYMPTOMS
SHORTNESS OF BREATH: 0
ABDOMINAL PAIN: 0
EYE PAIN: 0
ABDOMINAL DISTENTION: 1

## 2018-10-03 NOTE — ED NOTES
Dr. Yasmin Godfrey aware patient and family would like to speak with him.       Luis Fernando Law RN  10/03/18 4666

## 2018-10-03 NOTE — H&P
apixaban (ELIQUIS) 5 MG TABS tablet Take 1 tablet by mouth 2 times daily 10/2/18  Yes Elidia Galloway MD   isosorbide mononitrate (IMDUR) 30 MG extended release tablet Take 1 tablet by mouth daily 10/1/18  Yes Elidia Galloway MD   amiodarone (CORDARONE) 200 MG tablet Take 400 mg every 12 hours for 1 week, then go back to taking 200 mg once daily 9/28/18  Yes Elidia Galloway MD   lisinopril (PRINIVIL;ZESTRIL) 2.5 MG tablet Take 1 tablet by mouth daily 8/15/18  Yes Elidia Galloway MD   baclofen (LIORESAL) 10 MG tablet TAKE ONE TABLET BY MOUTH THREE TIMES DAILY AS NEEDED 8/14/18  Yes Shabnam Burgos MD   KLOR-CON M10 10 MEQ extended release tablet TAKE ONE TABLET BY MOUTH ONCE DAILY 7/17/18  Yes Elidia Galloway MD   levothyroxine (SYNTHROID) 25 MCG tablet Take 1 tablet by mouth Daily 4/18/18  Yes Shabnam Burgos MD   rosuvastatin (CRESTOR) 40 MG tablet Take 1 tablet by mouth every evening 2/27/18  Yes Shabnam Burgos MD   ezetimibe (ZETIA) 10 MG tablet Take 1 tablet by mouth daily 12/18/17  Yes Shabnam Burgos MD   furosemide (LASIX) 80 MG tablet Take 1 tablet by mouth daily Take 1 tablet qAM and 1/2 tab in afternoon. 12/12/17  Yes Elidia Galloway MD   OXcarbazepine (TRILEPTAL) 300 MG tablet TAKE ONE TABLET BY MOUTH 4 TIMES DAILY  Patient taking differently: TAKE ONE TABLET BY MOUTH 3-4 TIMES DAILY 6/23/17  Yes Shabnam Burgos MD   aspirin 81 MG tablet Take 81 mg by mouth nightly    Yes Historical Provider, MD   EPIPEN 2-AMBAR 0.3 MG/0.3ML SOAJ injection USE AS DIRECTED 9/19/16   Shabnam Burgos MD       Allergies:  Iodine; Norco [hydrocodone-acetaminophen]; Other; Shellfish-derived products; Shrimp flavor; and Oxycodone    Social History:   TOBACCO:   reports that he has quit smoking. His smoking use included Cigars. He quit after 15.00 years of use. He has quit using smokeless tobacco.  ETOH:   reports that he drinks about 0.6 oz of alcohol per week .        Family History:   Family History   Problem Relation Age of Onset    Heart Disease Mother     Heart Disease Father     Cancer Father     Heart Disease Sister     High Blood Pressure Sister     Heart Disease Sister     High Blood Pressure Sister     Heart Disease Brother     Heart Disease Brother     Heart Disease Brother        Review of Systems:    Constitutional: positive for chills, fevers and malaise, negative for anorexia  Eyes: positive for proptosis, negative for visual disturbance  Ears, nose, mouth, throat, and face: negative for nasal congestion and sore throat  Respiratory: positive for dyspnea on exertion, negative for hemoptysis and sputum  Cardiovascular: positive for dyspnea, irregular heart beat and lower extremity edema, negative for orthopnea and syncope  Gastrointestinal: negative for abdominal pain, change in bowel habits, diarrhea, jaundice, melena and vomiting  Genitourinary:negative for dysuria, frequency and hematuria  Integument/breast: negative for rash  Hematologic/lymphatic: negative for bleeding and petechiae  Musculoskeletal:positive for arthralgias and muscle weakness, negative for muscle weakness and neck pain  Neurological: negative for headaches  Behavioral/Psych: negative  Endocrine: negative  Allergic/Immunologic: negative        Objective:    Vitals:   Vitals:    10/03/18 1817   BP:    Pulse: 75   Resp:    Temp:    SpO2:      Weight: 203 lb (92.1 kg)   Height: 5' 7\" (170.2 cm)   -----------------------------------------------------------------    Exam:  GEN:    Awake, alert and oriented x 3. moderate acute distress. Well developed / well nourished. EYES:  EOMI, pupils equal appear to be bulging and he is staring when not engaged   ENT:  Neck supple. No lymphadenopathy. No carotid bruit  CVS:    RRR, no murmur, rub or gallop  PULM: diminished sounds in bases adn scattered rhonchi  ABD:    Bowels sounds normal.  Abdomen is soft. No distention. No tenderness. EXT:   1+ and no edema bilaterally . No calf tenderness.  No joint swelling or effusions or rednes   NEURO: he is periodically having myoclonic jerking of his arms  He is weak and cannot bear his weight on his feet    SKIN:  No rashes. No skin lesions. PSYCH:  Flat affect and limited content of answers to questions    -----------------------------------------------------------------  Diagnostic Data:   · All diagnostic data was reviewed    Assessment:         Principal Problem:    Sepsis ([address]  Active Problems:    CAD (coronary artery disease)    Trigeminal neuralgia    Pulmonary hypertension (HCC)    Chronic diastolic congestive heart failure (HCC)    Chronic renal failure, stage 3 (moderate) (HCC)    Persistent atrial fibrillation (HCC)    Pneumonia  Resolved Problems:    * No resolved hospital problems.  *      Plan:     · This patient requires inpatient admission because of apparent early sepsis of likely pulmonic origin    · Factors affecting the medical complexity of this patient include his ongoing a fib and CHF    · Estimated length of stay is 4 days  · We have cultured his blood and will give him a bolus as his bp is low and his renal function is impaired from his baseline  I suspect his myoclonus may be due to lamictal dosing missed and sepsis and possible amiodarone toxicity   Cardiology will be consulted    Juan Barry MD

## 2018-10-04 ENCOUNTER — APPOINTMENT (OUTPATIENT)
Dept: NON INVASIVE DIAGNOSTICS | Age: 83
DRG: 871 | End: 2018-10-04
Payer: MEDICARE

## 2018-10-04 LAB
ALBUMIN SERPL-MCNC: 3 G/DL (ref 3.5–5.2)
ALBUMIN/GLOBULIN RATIO: 0.8 (ref 1–2.5)
ALP BLD-CCNC: 105 U/L (ref 40–129)
ALT SERPL-CCNC: 21 U/L (ref 5–41)
ANION GAP SERPL CALCULATED.3IONS-SCNC: 12 MMOL/L (ref 9–17)
AST SERPL-CCNC: 38 U/L
BILIRUB SERPL-MCNC: 0.76 MG/DL (ref 0.3–1.2)
BUN BLDV-MCNC: 58 MG/DL (ref 8–23)
BUN/CREAT BLD: 28 (ref 9–20)
CALCIUM SERPL-MCNC: 8.7 MG/DL (ref 8.6–10.4)
CHLORIDE BLD-SCNC: 97 MMOL/L (ref 98–107)
CO2: 24 MMOL/L (ref 20–31)
CREAT SERPL-MCNC: 2.07 MG/DL (ref 0.7–1.2)
CULTURE: NO GROWTH
DIRECT EXAM: NORMAL
GFR AFRICAN AMERICAN: 38 ML/MIN
GFR NON-AFRICAN AMERICAN: 31 ML/MIN
GFR SERPL CREATININE-BSD FRML MDRD: ABNORMAL ML/MIN/{1.73_M2}
GFR SERPL CREATININE-BSD FRML MDRD: ABNORMAL ML/MIN/{1.73_M2}
GLUCOSE BLD-MCNC: 102 MG/DL (ref 70–99)
HCT VFR BLD CALC: 36.8 % (ref 40.7–50.3)
HEMOGLOBIN: 11.7 G/DL (ref 13–17)
LACTIC ACID, WHOLE BLOOD: NORMAL MMOL/L (ref 0.7–2.1)
LACTIC ACID: 1 MMOL/L (ref 0.5–2.2)
LV EF: 55 %
LVEF MODALITY: NORMAL
Lab: NORMAL
MCH RBC QN AUTO: 29.8 PG (ref 25.2–33.5)
MCHC RBC AUTO-ENTMCNC: 31.8 G/DL (ref 28.4–34.8)
MCV RBC AUTO: 93.6 FL (ref 82.6–102.9)
NRBC AUTOMATED: 0 PER 100 WBC
PDW BLD-RTO: 18.2 % (ref 11.8–14.4)
PLATELET # BLD: 91 K/UL (ref 138–453)
PMV BLD AUTO: 9.2 FL (ref 8.1–13.5)
POTASSIUM SERPL-SCNC: 4.8 MMOL/L (ref 3.7–5.3)
PROCALCITONIN: 0.56 NG/ML
RBC # BLD: 3.93 M/UL (ref 4.21–5.77)
SODIUM BLD-SCNC: 133 MMOL/L (ref 135–144)
SPECIMEN DESCRIPTION: NORMAL
STATUS: NORMAL
TOTAL PROTEIN: 6.9 G/DL (ref 6.4–8.3)
WBC # BLD: 7.1 K/UL (ref 3.5–11.3)

## 2018-10-04 PROCEDURE — 84145 PROCALCITONIN (PCT): CPT

## 2018-10-04 PROCEDURE — 94664 DEMO&/EVAL PT USE INHALER: CPT

## 2018-10-04 PROCEDURE — 99223 1ST HOSP IP/OBS HIGH 75: CPT | Performed by: FAMILY MEDICINE

## 2018-10-04 PROCEDURE — 94668 MNPJ CHEST WALL SBSQ: CPT

## 2018-10-04 PROCEDURE — 94667 MNPJ CHEST WALL 1ST: CPT

## 2018-10-04 PROCEDURE — 6360000002 HC RX W HCPCS: Performed by: FAMILY MEDICINE

## 2018-10-04 PROCEDURE — G8988 SELF CARE GOAL STATUS: HCPCS

## 2018-10-04 PROCEDURE — 80053 COMPREHEN METABOLIC PANEL: CPT

## 2018-10-04 PROCEDURE — G8996 SWALLOW CURRENT STATUS: HCPCS

## 2018-10-04 PROCEDURE — 2580000003 HC RX 258: Performed by: FAMILY MEDICINE

## 2018-10-04 PROCEDURE — 92610 EVALUATE SWALLOWING FUNCTION: CPT

## 2018-10-04 PROCEDURE — G8978 MOBILITY CURRENT STATUS: HCPCS

## 2018-10-04 PROCEDURE — 99233 SBSQ HOSP IP/OBS HIGH 50: CPT | Performed by: FAMILY MEDICINE

## 2018-10-04 PROCEDURE — 36415 COLL VENOUS BLD VENIPUNCTURE: CPT

## 2018-10-04 PROCEDURE — G8998 SWALLOW D/C STATUS: HCPCS

## 2018-10-04 PROCEDURE — 97166 OT EVAL MOD COMPLEX 45 MIN: CPT

## 2018-10-04 PROCEDURE — 94640 AIRWAY INHALATION TREATMENT: CPT

## 2018-10-04 PROCEDURE — 85027 COMPLETE CBC AUTOMATED: CPT

## 2018-10-04 PROCEDURE — 2000000000 HC ICU R&B

## 2018-10-04 PROCEDURE — 6370000000 HC RX 637 (ALT 250 FOR IP): Performed by: FAMILY MEDICINE

## 2018-10-04 PROCEDURE — 83605 ASSAY OF LACTIC ACID: CPT

## 2018-10-04 PROCEDURE — 97162 PT EVAL MOD COMPLEX 30 MIN: CPT

## 2018-10-04 PROCEDURE — 97110 THERAPEUTIC EXERCISES: CPT

## 2018-10-04 PROCEDURE — 97116 GAIT TRAINING THERAPY: CPT

## 2018-10-04 PROCEDURE — G8987 SELF CARE CURRENT STATUS: HCPCS

## 2018-10-04 PROCEDURE — G8997 SWALLOW GOAL STATUS: HCPCS

## 2018-10-04 PROCEDURE — 93306 TTE W/DOPPLER COMPLETE: CPT

## 2018-10-04 PROCEDURE — G8979 MOBILITY GOAL STATUS: HCPCS

## 2018-10-04 RX ORDER — IPRATROPIUM BROMIDE AND ALBUTEROL SULFATE 2.5; .5 MG/3ML; MG/3ML
1 SOLUTION RESPIRATORY (INHALATION) 3 TIMES DAILY
Status: DISCONTINUED | OUTPATIENT
Start: 2018-10-04 | End: 2018-10-06 | Stop reason: HOSPADM

## 2018-10-04 RX ORDER — ALBUTEROL SULFATE 2.5 MG/3ML
2.5 SOLUTION RESPIRATORY (INHALATION) EVERY 4 HOURS PRN
Status: DISCONTINUED | OUTPATIENT
Start: 2018-10-04 | End: 2018-10-06 | Stop reason: HOSPADM

## 2018-10-04 RX ORDER — 0.9 % SODIUM CHLORIDE 0.9 %
500 INTRAVENOUS SOLUTION INTRAVENOUS ONCE
Status: COMPLETED | OUTPATIENT
Start: 2018-10-04 | End: 2018-10-04

## 2018-10-04 RX ORDER — EZETIMIBE 10 MG/1
10 TABLET ORAL DAILY
Status: DISCONTINUED | OUTPATIENT
Start: 2018-10-04 | End: 2018-10-04

## 2018-10-04 RX ADMIN — IPRATROPIUM BROMIDE AND ALBUTEROL SULFATE 1 AMPULE: .5; 3 SOLUTION RESPIRATORY (INHALATION) at 16:01

## 2018-10-04 RX ADMIN — BACLOFEN 10 MG: 10 TABLET ORAL at 20:24

## 2018-10-04 RX ADMIN — BACLOFEN 10 MG: 10 TABLET ORAL at 10:11

## 2018-10-04 RX ADMIN — APIXABAN 2.5 MG: 2.5 TABLET, FILM COATED ORAL at 10:11

## 2018-10-04 RX ADMIN — OXCARBAZEPINE 300 MG: 300 TABLET, FILM COATED ORAL at 20:24

## 2018-10-04 RX ADMIN — AMIODARONE HYDROCHLORIDE 200 MG: 200 TABLET ORAL at 10:26

## 2018-10-04 RX ADMIN — FUROSEMIDE 40 MG: 40 TABLET ORAL at 10:12

## 2018-10-04 RX ADMIN — IPRATROPIUM BROMIDE AND ALBUTEROL SULFATE 1 AMPULE: .5; 3 SOLUTION RESPIRATORY (INHALATION) at 09:55

## 2018-10-04 RX ADMIN — POTASSIUM CHLORIDE 10 MEQ: 10 TABLET, EXTENDED RELEASE ORAL at 10:12

## 2018-10-04 RX ADMIN — DEXTROSE MONOHYDRATE 1500 MG: 50 INJECTION, SOLUTION INTRAVENOUS at 04:00

## 2018-10-04 RX ADMIN — IPRATROPIUM BROMIDE AND ALBUTEROL SULFATE 1 AMPULE: .5; 3 SOLUTION RESPIRATORY (INHALATION) at 19:59

## 2018-10-04 RX ADMIN — APIXABAN 2.5 MG: 2.5 TABLET, FILM COATED ORAL at 20:24

## 2018-10-04 RX ADMIN — OXCARBAZEPINE 300 MG: 300 TABLET, FILM COATED ORAL at 10:11

## 2018-10-04 RX ADMIN — OXCARBAZEPINE 300 MG: 300 TABLET, FILM COATED ORAL at 15:31

## 2018-10-04 RX ADMIN — SODIUM CHLORIDE: 9 INJECTION, SOLUTION INTRAVENOUS at 13:16

## 2018-10-04 RX ADMIN — Medication 10 ML: at 20:25

## 2018-10-04 RX ADMIN — ATORVASTATIN CALCIUM 80 MG: 40 TABLET, FILM COATED ORAL at 20:24

## 2018-10-04 RX ADMIN — CEFTRIAXONE 1 G: 1 INJECTION, POWDER, FOR SOLUTION INTRAMUSCULAR; INTRAVENOUS at 15:28

## 2018-10-04 RX ADMIN — LEVOTHYROXINE SODIUM 25 MCG: 0.03 TABLET ORAL at 07:16

## 2018-10-04 RX ADMIN — BACLOFEN 10 MG: 10 TABLET ORAL at 15:58

## 2018-10-04 RX ADMIN — ASPIRIN 81 MG CHEWABLE TABLET 81 MG: 81 TABLET CHEWABLE at 20:24

## 2018-10-04 RX ADMIN — SODIUM CHLORIDE 500 ML: 9 INJECTION, SOLUTION INTRAVENOUS at 03:15

## 2018-10-04 RX ADMIN — ISOSORBIDE MONONITRATE 30 MG: 30 TABLET, EXTENDED RELEASE ORAL at 10:12

## 2018-10-04 RX ADMIN — AMPICILLIN SODIUM 2 G: 2 INJECTION, POWDER, FOR SOLUTION INTRAMUSCULAR; INTRAVENOUS at 15:45

## 2018-10-04 ASSESSMENT — PAIN SCALES - GENERAL
PAINLEVEL_OUTOF10: 0

## 2018-10-04 NOTE — PROGRESS NOTES
Dr. Janice Jones aware of Blood Pressure and low urine output. Lab called with critical Blood culture. Dr. Janice Jones aware. Orders received.

## 2018-10-04 NOTE — PROGRESS NOTES
Occupational Therapy   Occupational Therapy Initial Assessment  Date: 10/4/2018   Patient Name: Constance Minaya  MRN: [id number]     : 1935    Date of Service: 10/4/2018    Discharge Recommendations:  Continue to assess pending progress       Patient Diagnosis(es): The primary encounter diagnosis was Acute encephalopathy. A diagnosis of Pneumonia of right lower lobe due to infectious organism [facility]) was also pertinent to this visit. has a past medical history of Abnormal cardiac cath; Abnormal echocardiogram; Atrial fibrillation ([address] CAD (coronary artery disease); Chronic diastolic CHF (congestive heart failure), NYHA class 2 ([address] Chronic renal failure, stage 3 (moderate) ([address] Erectile dysfunction; H/O cardiac catheterization; H/O echocardiogram; H/O echocardiogram; H/O echocardiogram; History of cardiovascular stress test; History of cardioversion; History of cardioversion; History of echocardiogram; History of echocardiogram; History of echocardiogram; History of Holter monitoring; History of Holter monitoring; History of Holter monitoring; Hyperlipidemia; Hypertension; Osteoarthritis of hip; PVD (peripheral vascular disease) ([address] and Trigeminal neuralgia. has a past surgical history that includes shoulder surgery; Kidney surgery (); Hand surgery (Left); hip surgery (Right, 13); joint replacement (Right); Cardiac catheterization (Left, 2017); Coronary artery bypass graft (N/A, 2017); and Cardioversion (2017).            Restrictions  Restrictions/Precautions  Restrictions/Precautions: General Precautions, Fall Risk      Subjective   General  Patient assessed for rehabilitation services?: Yes  Diagnosis: Pneumonia  Pain Assessment  Patient Currently in Pain: Denies  Pain Assessment: 0-10  Pain Level: 0  Oxygen Therapy  SpO2: 94 %  Pulse Oximeter Device Mode: Intermittent  Pulse Oximeter Device Location: Finger;Right  O2 Device: None (Room air)       Social/Functional endurance;Decreased high-level IADLs  Prognosis: Good  Decision Making: Medium Complexity  REQUIRES OT FOLLOW UP: Yes  Activity Tolerance  Activity Tolerance: Patient Tolerated treatment well  Safety Devices  Safety Devices in place: Yes  Type of devices:  (patient left with PT upon departure)         Plan   Plan  Times per week: 7x/wk  Times per day: Daily  Current Treatment Recommendations: Strengthening, Balance Training, Functional Mobility Training, Safety Education & Training, Self-Care / ADL    G-Code  OT G-codes  Functional Limitation: Self care  Self Care Current Status (): At least 20 percent but less than 40 percent impaired, limited or restricted  Self Care Goal Status (): At least 1 percent but less than 20 percent impaired, limited or restricted       Goals  Short term goals  Time Frame for Short term goals: 10 days  Short term goal 1: Pt. will tolerate 15 mins of BUE ther ex/act w/o SOB to improve overall strength/activity tolerance for fxl tasks. Short term goal 2: Pt. will complete self care routine with SUP, including footwear, using AE/EC techs PRN to increase (I). Short term goal 3: Pt. will demo functional ADL transfers/mobility with SUP and G safety for safe return to PLOF.        Therapy Time   Individual Concurrent Group Co-treatment   Time In 1315         Time Out 1330         Minutes [address] [place], [place]

## 2018-10-04 NOTE — PROGRESS NOTES
RESPIRATORY ASSESSMENT PROTOCOL                                                                                              Patient Name: Hedwig Night Room#: [id number] : 1935     Admitting diagnosis: Pneumonia [J18.9]  Pneumonia [J18.9]       Medical History:   Past Medical History:   Diagnosis Date    Abnormal cardiac cath 10/01/2012    30% LAD, 80% ostial stenosis-CX, RCAOccluded at its ostium. There was minimal left to right collateral flow.  Abnormal echocardiogram     EF >55%. Mild to moderate aortic stenosis. Mean gradient to 18 mmHg.  Atrial fibrillation ([address] 2017    CAD (coronary artery disease)     30% LAD, 80% ostial stenosis-CX, RCAOccluded at its ostium. There was minimal left to right collateral flow.  Chronic diastolic CHF (congestive heart failure), NYHA class 2 ([address] 2016    Chronic renal failure, stage 3 (moderate) ([address] 2017    Erectile dysfunction     H/O cardiac catheterization 2017    LMCA: Lesion on LMCA: Distal subsection. 60% stenosis. Comments: Calcified lesion in the ostial Cx extending into the distal left main and proximal LAD. LAD: lesion on Prox LAD: Ostial. 50% stenosis. Lesion on 1st Diag:Proximal subsection. 50% stenosis. LCx: Lesion on Prox CX: Osital. 95% stenosis. Lesion on Dist CX: Ostial 50% stenosis. RCA: Lesion on prox RCA: Proximal subsection. 100% stenosis.  H/O echocardiogram 12/9/15    EF:55%. LV wall thickness is moderately increased. Bi atrial enlargement noted. Mean aortic valve gradient is 33mmhg w/moderate to severe aortic stenosis. Mitral annular calcification. Mild mitral regurgitation. Moderate pulm hypertension w/RV systolic pressure of 43 mmhg. Mild (grade 1) diastolic dysfunction.  H/O echocardiogram 2016    EF:60%. Mildly increased LV wall thickness. Biatrial enlargement. Moderate to severe aortic stenosis. Mean gradient 36 mmHg, KRISTINA 0.08. Mild mitral regurgitation.  Moderate [physician-ordered bronchodilator(s)] TID and Albuterol PRN q4 hrs. Notify physician if condition deteriorates. MDI THERAPY with  [physician-ordered bronchodilator(s)] via spacer TID PRN. If unable to utilize MDI: HHN [physician-ordered bronchodilator(s)] TID PRN. Notify physician if condition deteriorates. If Acuity Level is 2, 3, or 4 in any of the following:    [] COUGH     [] SURGICAL HISTORY (SURG HX)  [x] CHEST XRAY (CXR)    Goal: Improvement in sputum mobilization in patients with ineffective airway clearance. Reverse atelectasis. [x] Bronchopulmonary Hygiene Protocol    Total Acuity:   16-32  []  Secondary Assessment in 24 hrs Total Acuity:  9-15  []  Secondary Assessment in 24 hrs Total Acuity:  4-8  [x]  Secondary Assessment in 48 hrs Total Acuity:  0-3  []  Secondary Assessment in 72 hrs   METANEB QID with [physician-ordered bronchodilator(s)] if CXR Acuity = 4; otherwise:  PD&P, PEP, or Vest QID & PRN  NT Sxn PRN for ineffective cough  METANEB QID with [physician-ordered bronchodilator(s)] if CXR Acuity = 4; otherwise:  PD&P, PEP, or Vest TID & PRN  NT Sxn PRN for ineffective cough  Instruct patient to self-perform IS q1hr WA   Directed Cough self-performed q1hr WA     If Acuity Level is 2 or above in the following:    [] PULMONARY HISTORY (PULM HX)    Goal: Assist patient in quitting smoking to slow or stop the progression of lung disease.     [] Smoking Cessation Protocol    SMOKING CESSATION EDUCATION provided according to policy [id number]: (ciaran with an X)  ____Yes    ____ No     ____ NA    Smoking Cessation Booklet given:  ____Yes  ____No ____Nory Urena

## 2018-10-04 NOTE — PLAN OF CARE
Problem: Nutrition  Goal: Optimal nutrition therapy  Outcome: Ongoing  Nutrition Problem: Overweight/Obese, in context of chronic illness  Intervention: Food and/or Nutrient Delivery: Modify current diet (low sodium)  Nutritional Goals: PO>75% meals with low sodium choices  Low sodium diet

## 2018-10-04 NOTE — PROGRESS NOTES
Discussed discharge plans with the patient and spouse. Patient is a 81year old male here with  Sepsis . He is alert and oriented. Patient is  and lives at home with his wife. He has a cane and walker at home if needed. Both him and his wife do the cooking. They have a house keeper. Patient manages his medications and drives. His PCP is Dr. Ronald Alvarado. He has medical insurance that helps with medication costs. Patient is a . The discharge plan at this time is home with no services.     VERONICA Mansfield

## 2018-10-04 NOTE — PROGRESS NOTES
PHARMACY NOTE  Jordyn Keys was ordered Zetia. Per the [address], this medication is non-formulary and not stocked by pharmacy. The medication can be reordered at discharge.    Cherry Hartman Rp., 10/4/2018, 6:59 AM

## 2018-10-04 NOTE — ED PROVIDER NOTES
Moderate pulmonary HTN w/ RV systolic pressure of 58 mmHg. Moderate/severe tricuspid regurg. Diastoly cannot be assessed due to pt rhythm    History of Holter monitoring 08/08/2017    Atrial fibrillation throughout with fairly controlled ventricular response (HR less than 100 bpm 93% of the time), no signficant pauses. Rare isolated PVC's    History of Holter monitoring 08/08/2017    Atrial Fibrillation throughout with fairly controlled ventricular response (HR less than 100bpm 93% of the time),no significant pauses. Rare isolated PVC's    History of Holter monitoring 12/06/2017    Rare PAC's with one 4 beat run of wide complex tachycardia at 170 bpm, No symptoms reported. clinical correlation required    Hyperlipidemia 2/21/2006    Hypertension     Osteoarthritis of hip 6/20/2013    PVD (peripheral vascular disease) [facility]     Reported history of a 60% right carotid stenosis.     Trigeminal neuralgia 7/10/2013       SURGICAL HISTORY       Past Surgical History:   Procedure Laterality Date    CARDIAC CATHETERIZATION Left 05/18/2017    Right Radial/[employer] Maurizio/    CARDIOVERSION  09/21/2017    [employer] Maurizio/Dr Her/2 shocks at 200 joules    CORONARY ARTERY BYPASS GRAFT N/A 5/19/2017    CORONARY ARTERY BYPASS GRAFT X 2: LIMA-LAD, SVG-OM, AORTIC VALVE REPLACEMENT WITH 23 MM MEDTRONIC MOSAIC ULTRA, EVACUATION OF 1.5 LITER PLEURAL FLUID; ALLAN PER ANESTHESIA performed by Ryan Gray MD at [address] Right 7/29/13    [facility] -  Paralee Tamara    tube was blocked    SHOULDER SURGERY      roto cuff surgery left shoulder       CURRENT MEDICATIONS       Current Discharge Medication List      CONTINUE these medications which have NOT CHANGED    Details   apixaban (ELIQUIS) 5 MG TABS tablet Take 1 tablet by mouth 2 times daily  Qty: 60 tablet, Refills: 0      isosorbide mononitrate (IMDUR) 30 MG administered)   furosemide (LASIX) tablet 40 mg (40 mg Oral Not Given 10/3/18 1837)   0.9 % sodium chloride bolus (0 mLs Intravenous Stopped 10/3/18 1455)   cefTRIAXone (ROCEPHIN) 1 g in sterile water 10 mL IV syringe (1 g Intravenous New Bag 10/3/18 1451)   azithromycin (ZITHROMAX) 500 mg in D5W 250ml addavial (0 mg Intravenous Stopped 10/3/18 1600)   0.9 % sodium chloride bolus (500 mLs Intravenous New Bag 10/3/18 1755)       CLINICAL IMPRESSION    Acute encephalopathy  Lower lobe pneumonia    DISPOSITION/PLAN   DISPOSITION Admitted 10/03/2018 04:31:44 PM      PATIENT REFERRED TO:  Ysiel Anand MD  [address] [facility]  [address]  [place] [phone]            DISCHARGE MEDICATIONS:  Current Discharge Medication List               Cindy Miller MD (electronically signed)  Attending Emergency Department Provider           Cindy Miller MD  10/03/18 2020

## 2018-10-04 NOTE — CONSULTS
stenosis. Lesion on Dist CX: Ostial 50% stenosis. RCA: Lesion on prox RCA: Proximal subsection. 100% stenosis.  H/O echocardiogram 12/9/15    EF:55%. LV wall thickness is moderately increased. Bi atrial enlargement noted. Mean aortic valve gradient is 33mmhg w/moderate to severe aortic stenosis. Mitral annular calcification. Mild mitral regurgitation. Moderate pulm hypertension w/RV systolic pressure of 43 mmhg. Mild (grade 1) diastolic dysfunction.  H/O echocardiogram 12/05/2016    EF:60%. Mildly increased LV wall thickness. Biatrial enlargement. Moderate to severe aortic stenosis. Mean gradient 36 mmHg, KRISTINA 0.08. Mild mitral regurgitation. Moderate pulmonary hypertension with an estimated RV systolic pressure of 60 mmHg. Mild diastolic dysfunction.  H/O echocardiogram 06/13/2017    EF 55%. Moderately increased LV wall thickness normal LV cavity size. Severe bi-atrial enlargement. Mild mitral and moderate tricuspid regurg. Mild pulmonary hypertension estimated right ventricular systolic pressure of 35 mmHg. Bilateral pleural effusion.  History of cardiovascular stress test 12/21/2017    History of cardioversion 09/21/2017    Successful cardioversion to NSR with 200J biphasic    History of cardioversion 09/13/2018    Dr Kris Diaz History of echocardiogram 11/21/13    EF >55%, mild to mod LVH, LA  is mod dilated, mild to mod AS, mild diastolic dysfunction noted.  History of echocardiogram 06/09/15    EF 55%. LV wall thickness is severely increased. LA is moderately dilated(34-39)with a LA volume index of 36 ml.    History of echocardiogram 06/19/2018    EF 55%. Mildly increased LV wall thickness. Left atrium severely dilated (>40) w/ left atrial volume index of 46 ml/m2. Moderate aortic stenosis w/ mean gradient of 21.3 mmHg consistent w/ moderate aortis stenosis on what appears to be bioprosthetic aortic melanie, Moderate pulmonary HTN w/ RV systolic pressure of 58 mmHg. TID   isosorbide mononitrate (IMDUR) extended release tablet 30 mg Daily   potassium chloride (KLOR-CON M) extended release tablet 10 mEq Daily   levothyroxine (SYNTHROID) tablet 25 mcg Daily   OXcarbazepine (TRILEPTAL) tablet 300 mg TID   atorvastatin (LIPITOR) tablet 80 mg Nightly   0.9 % sodium chloride infusion Continuous   sodium chloride flush 0.9 % injection 10 mL 2 times per day   sodium chloride flush 0.9 % injection 10 mL PRN   magnesium hydroxide (MILK OF MAGNESIA) 400 MG/5ML suspension 30 mL Daily PRN   ondansetron (ZOFRAN) injection 4 mg Q6H PRN   acetaminophen (TYLENOL) tablet 650 mg Q4H PRN   cefTRIAXone (ROCEPHIN) 1 g in sterile water 10 mL IV syringe Q24H   And    levofloxacin (LEVAQUIN) 750 MG/150ML infusion 750 mg Q48H   furosemide (LASIX) tablet 40 mg Daily       FAMILY HISTORY: family history includes Cancer in his father; Heart Disease in his brother, brother, brother, father, mother, sister, and sister; High Blood Pressure in his sister and sister. PHYSICAL EXAM:   /71   Pulse 82   Temp 98.7 °F (37.1 °C) (Temporal)   Resp 24   Ht 5' 7\" (1.702 m)   Wt 205 lb 11.2 oz (93.3 kg)   SpO2 94%   BMI 32.22 kg/m²  Body mass index is 32.22 kg/m². Constitutional: He is oriented to person, place, and time. He appears well-developed and well-nourished. In no acute distress. HEENT: Normocephalic and atraumatic. No JVD present. Carotid bruit is not present. No mass and no thyromegaly present. No lymphadenopathy present. Cardiovascular: Normal rate, regular rhythm, normal heart sounds. Exam reveals no gallop and no friction rubs. No heart murmur heard. Pulmonary/Chest: Effort normal and breath sounds normal. No respiratory distress. He has no rhonchi or rales. Mild wheeze present in low left base. Abdominal: Soft, non-tender. Bowel sounds and aorta are normal. He exhibits no organomegaly, mass or bruit. Extremities: 1+ lower extremity pitting pedal edema.   1/2 way up to the knees bilaterally No cyanosis and no clubbing. Pulses are 2+ radial and carotid pulses. 2+ dorsalis pedis and posterior tibial pulses bilaterally. Neurological: He is alert and oriented to person, place, and time. No evidence of gross cranial nerve deficit. Coordination appeared normal.   Skin: Skin is warm and dry. There is no rash or diaphoresis. Psychiatric: He has a normal mood and affect.  His speech is normal and behavior is normal.        MOST RECENT LABS ON RECORD:   Lab Results   Component Value Date    WBC 7.1 10/04/2018    HGB 11.7 (L) 10/04/2018    HCT 36.8 (L) 10/04/2018    PLT 91 (L) 10/04/2018    CHOL 132 08/15/2018    TRIG 49 08/15/2018    HDL 52 08/15/2018    ALT 21 10/04/2018    AST 38 10/04/2018     (L) 10/04/2018    K 4.8 10/04/2018    CL 97 (L) 10/04/2018    CREATININE 2.07 (H) 10/04/2018    BUN 58 (H) 10/04/2018    CO2 24 10/04/2018    TSH 5.29 (H) 08/15/2018    PSA 1.16 08/31/2018    INR 1.2 10/03/2018    LABA1C 5.3 08/15/2018       ASSESSMENT:  Patient Active Problem List    Diagnosis Date Noted    Pneumonia 10/03/2018    Sepsis ([address] 10/03/2018    Daytime somnolence 08/22/2018    Hyperglycemia 08/22/2018    History of atrial fibrillation 12/12/2017    Dyspnea 12/12/2017    Acute right-sided CHF (congestive heart failure) ([address] 08/28/2017    Anemia 08/28/2017    Persistent atrial fibrillation (HCC) 08/28/2017    Hypotension due to drugs 08/21/2017    Status post aortic valve replacement 06/02/2017    Chronic renal failure, stage 3 (moderate) (HCC) 04/24/2017    Chronic diastolic congestive heart failure ([address] 12/20/2016    Essential hypertension 12/20/2016    Pulmonary hypertension ([address] 12/13/2016    Nocturia 11/28/2016    ASHD (arteriosclerotic heart disease) 11/28/2016    Prostate nodule 03/07/2016    Abnormal cardiovascular stress test 01/06/2016    Angina, class III ([address] 12/16/2015    Erectile dysfunction 03/06/2014    History of total hip replacement 08/28/2013

## 2018-10-04 NOTE — PLAN OF CARE
Problem: Falls - Risk of:  Goal: Will remain free from falls  Will remain free from falls   Outcome: Ongoing  Continuing to assess and monitor. Patient is able to call for assistance as needed. Fall risk assessed daily. Problem: Risk for Impaired Skin Integrity  Goal: Tissue integrity - skin and mucous membranes  Structural intactness and normal physiological function of skin and  mucous membranes. Outcome: Ongoing  Continuing to assess and monitor. No areas of concern at this time. Problem: Infection:  Goal: Will remain free from infection  Will remain free from infection  Outcome: Ongoing  IV ATB therapy in place. Continuing to assess and monitor. Problem: Safety:  Goal: Free from accidental physical injury  Free from accidental physical injury  Outcome: Ongoing  Continuing to assess and monitor. Patient able to call for assistance. Fall risk assessed daily. Bed alarm in place. Problem: Daily Care:  Goal: Daily care needs are met  Daily care needs are met  Outcome: Ongoing  Continuing to assess and monitor. Problem: Pain:  Goal: Patient's pain/discomfort is manageable  Patient's pain/discomfort is manageable  Outcome: Ongoing  Continuing to assess and monitor. Problem: Skin Integrity:  Goal: Skin integrity will stabilize  Skin integrity will stabilize  Outcome: Ongoing  Continuing to assess and monitor.

## 2018-10-04 NOTE — PROGRESS NOTES
assistance  Ambulation  Ambulation?: Yes  WB Status: FWB  Ambulation 1  Surface: level tile  Device: Rolling Walker  Assistance: Contact guard assistance  Quality of Gait: Pt demo's reciprocal gait pattern with flexed posture  Distance: Amb 150\" fw and 150\" bw  Stairs/Curb  Stairs?: No     Balance  Posture: Fair  Sitting - Static: Good  Sitting - Dynamic: Good  Standing - Static: Good  Standing - Dynamic: Fair;+  Exercises  Hip Flexion: marches x20 x2  Hip Abduction: 20x2  Knee Long Arc Quad: x20  Ankle Pumps: x20  Comments: Peformed seated and standing ther-ex (min squats,      Assessment   Body structures, Functions, Activity limitations: Decreased functional mobility ; Decreased ADL status; Decreased strength;Decreased endurance;Decreased balance  Assessment: Pt kalpesh tx good with performing standing exercises req CGA for safety  Treatment Diagnosis: General weakness, decreased activity endurance  Prognosis: Good  Patient Education: Pt educated on safety when performing sts     G-Code  PT G-Codes  Functional Limitation: Mobility: Walking and moving around  Mobility: Walking and Moving Around Current Status (): At least 40 percent but less than 60 percent impaired, limited or restricted  Mobility: Walking and Moving Around Goal Status (): At least 20 percent but less than 40 percent impaired, limited or restricted    Goals  Short term goals  Time Frame for Short term goals: 10 days  Short term goal 1: Patient will amb 36' with least restrictive device, supervision, without LOB  Short term goal 2: Patient will be independent with transfers and bed mobility  Short term goal 3: Patient will tolerate 20-30 minutes of therex/act to improve endurance for ADLs. Short term goal 4: Patient will improve dynamic standing balance from fair to good for safety in home environment.   Patient Goals   Patient goals : Feel better to return home    Plan    Plan  Times per week: 7  Times per day: Twice a day  Plan Comment:

## 2018-10-04 NOTE — PROGRESS NOTES
BSE completed this morning. Patient alert and cooperative throughout assessment. Completed trials of thin via straw, puree, mech soft, and regular solid with no s/sx of pen/asp. Patient demonstrated independent use of strategies including appropriate bite size and alternating between solids/liquids. ST recommends patient continue with regular solids and thin liquids. Reviewed recommendations with patient and RN.         Nathalia Sal M.A., CF-SLP   10/4/2018 9:11 AM

## 2018-10-05 LAB
ABSOLUTE EOS #: 0 K/UL (ref 0–0.44)
ABSOLUTE IMMATURE GRANULOCYTE: 0 K/UL (ref 0–0.3)
ABSOLUTE LYMPH #: 0.37 K/UL (ref 1.1–3.7)
ABSOLUTE MONO #: 0.8 K/UL (ref 0.1–1.2)
ANION GAP SERPL CALCULATED.3IONS-SCNC: 15 MMOL/L (ref 9–17)
BASOPHILS # BLD: 0 % (ref 0–2)
BASOPHILS ABSOLUTE: 0 K/UL (ref 0–0.2)
BUN BLDV-MCNC: 48 MG/DL (ref 8–23)
BUN/CREAT BLD: 26 (ref 9–20)
CALCIUM SERPL-MCNC: 9 MG/DL (ref 8.6–10.4)
CHLORIDE BLD-SCNC: 103 MMOL/L (ref 98–107)
CO2: 21 MMOL/L (ref 20–31)
CREAT SERPL-MCNC: 1.83 MG/DL (ref 0.7–1.2)
DIFFERENTIAL TYPE: ABNORMAL
EOSINOPHILS RELATIVE PERCENT: 0 % (ref 1–4)
GFR AFRICAN AMERICAN: 43 ML/MIN
GFR NON-AFRICAN AMERICAN: 36 ML/MIN
GFR SERPL CREATININE-BSD FRML MDRD: ABNORMAL ML/MIN/{1.73_M2}
GFR SERPL CREATININE-BSD FRML MDRD: ABNORMAL ML/MIN/{1.73_M2}
GLUCOSE BLD-MCNC: 130 MG/DL (ref 70–99)
HCT VFR BLD CALC: 39.1 % (ref 40.7–50.3)
HEMOGLOBIN: 12.5 G/DL (ref 13–17)
IMMATURE GRANULOCYTES: 0 %
LYMPHOCYTES # BLD: 7 % (ref 24–43)
MCH RBC QN AUTO: 29.6 PG (ref 25.2–33.5)
MCHC RBC AUTO-ENTMCNC: 32 G/DL (ref 28.4–34.8)
MCV RBC AUTO: 92.7 FL (ref 82.6–102.9)
MONOCYTES # BLD: 15 % (ref 3–12)
MORPHOLOGY: NORMAL
NRBC AUTOMATED: 0 PER 100 WBC
PDW BLD-RTO: 18.3 % (ref 11.8–14.4)
PLATELET # BLD: ABNORMAL K/UL (ref 138–453)
PLATELET ESTIMATE: ABNORMAL
PLATELET, FLUORESCENCE: 119 K/UL (ref 138–453)
PLATELET, IMMATURE FRACTION: 1.7 % (ref 1.1–10.3)
PMV BLD AUTO: ABNORMAL FL (ref 8.1–13.5)
POTASSIUM SERPL-SCNC: 4.3 MMOL/L (ref 3.7–5.3)
RBC # BLD: 4.22 M/UL (ref 4.21–5.77)
RBC # BLD: ABNORMAL 10*6/UL
SEG NEUTROPHILS: 78 % (ref 36–65)
SEGMENTED NEUTROPHILS ABSOLUTE COUNT: 4.13 K/UL (ref 1.5–8.1)
SODIUM BLD-SCNC: 139 MMOL/L (ref 135–144)
WBC # BLD: 5.3 K/UL (ref 3.5–11.3)
WBC # BLD: ABNORMAL 10*3/UL

## 2018-10-05 PROCEDURE — 97530 THERAPEUTIC ACTIVITIES: CPT

## 2018-10-05 PROCEDURE — 6370000000 HC RX 637 (ALT 250 FOR IP): Performed by: FAMILY MEDICINE

## 2018-10-05 PROCEDURE — 02HV33Z INSERTION OF INFUSION DEVICE INTO SUPERIOR VENA CAVA, PERCUTANEOUS APPROACH: ICD-10-PCS | Performed by: FAMILY MEDICINE

## 2018-10-05 PROCEDURE — 97535 SELF CARE MNGMENT TRAINING: CPT

## 2018-10-05 PROCEDURE — 1200000000 HC SEMI PRIVATE

## 2018-10-05 PROCEDURE — 2580000003 HC RX 258: Performed by: FAMILY MEDICINE

## 2018-10-05 PROCEDURE — 80048 BASIC METABOLIC PNL TOTAL CA: CPT

## 2018-10-05 PROCEDURE — 99232 SBSQ HOSP IP/OBS MODERATE 35: CPT | Performed by: FAMILY MEDICINE

## 2018-10-05 PROCEDURE — 94640 AIRWAY INHALATION TREATMENT: CPT

## 2018-10-05 PROCEDURE — 6360000002 HC RX W HCPCS: Performed by: FAMILY MEDICINE

## 2018-10-05 PROCEDURE — 94668 MNPJ CHEST WALL SBSQ: CPT

## 2018-10-05 PROCEDURE — 99233 SBSQ HOSP IP/OBS HIGH 50: CPT | Performed by: FAMILY MEDICINE

## 2018-10-05 PROCEDURE — 36415 COLL VENOUS BLD VENIPUNCTURE: CPT

## 2018-10-05 PROCEDURE — 97116 GAIT TRAINING THERAPY: CPT

## 2018-10-05 PROCEDURE — 85025 COMPLETE CBC W/AUTO DIFF WBC: CPT

## 2018-10-05 PROCEDURE — 97110 THERAPEUTIC EXERCISES: CPT

## 2018-10-05 RX ORDER — SODIUM CHLORIDE 0.9 % (FLUSH) 0.9 %
10 SYRINGE (ML) INJECTION EVERY 12 HOURS SCHEDULED
Status: DISCONTINUED | OUTPATIENT
Start: 2018-10-05 | End: 2018-10-06 | Stop reason: HOSPADM

## 2018-10-05 RX ORDER — LIDOCAINE HYDROCHLORIDE 10 MG/ML
5 INJECTION, SOLUTION INFILTRATION; PERINEURAL ONCE
Status: DISCONTINUED | OUTPATIENT
Start: 2018-10-05 | End: 2018-10-06 | Stop reason: HOSPADM

## 2018-10-05 RX ORDER — SODIUM CHLORIDE 0.9 % (FLUSH) 0.9 %
10 SYRINGE (ML) INJECTION PRN
Status: DISCONTINUED | OUTPATIENT
Start: 2018-10-05 | End: 2018-10-06 | Stop reason: HOSPADM

## 2018-10-05 RX ADMIN — OXCARBAZEPINE 300 MG: 300 TABLET, FILM COATED ORAL at 17:21

## 2018-10-05 RX ADMIN — LEVOTHYROXINE SODIUM 25 MCG: 0.03 TABLET ORAL at 07:19

## 2018-10-05 RX ADMIN — CEFTRIAXONE SODIUM 2 G: 2 INJECTION, POWDER, FOR SOLUTION INTRAMUSCULAR; INTRAVENOUS at 17:19

## 2018-10-05 RX ADMIN — BACLOFEN 10 MG: 10 TABLET ORAL at 21:04

## 2018-10-05 RX ADMIN — IPRATROPIUM BROMIDE AND ALBUTEROL SULFATE 1 AMPULE: .5; 3 SOLUTION RESPIRATORY (INHALATION) at 20:13

## 2018-10-05 RX ADMIN — SODIUM CHLORIDE: 9 INJECTION, SOLUTION INTRAVENOUS at 17:30

## 2018-10-05 RX ADMIN — APIXABAN 2.5 MG: 2.5 TABLET, FILM COATED ORAL at 08:51

## 2018-10-05 RX ADMIN — BACLOFEN 10 MG: 10 TABLET ORAL at 08:51

## 2018-10-05 RX ADMIN — POTASSIUM CHLORIDE 10 MEQ: 10 TABLET, EXTENDED RELEASE ORAL at 08:51

## 2018-10-05 RX ADMIN — OXCARBAZEPINE 300 MG: 300 TABLET, FILM COATED ORAL at 08:51

## 2018-10-05 RX ADMIN — ATORVASTATIN CALCIUM 80 MG: 40 TABLET, FILM COATED ORAL at 21:03

## 2018-10-05 RX ADMIN — SODIUM CHLORIDE: 9 INJECTION, SOLUTION INTRAVENOUS at 02:30

## 2018-10-05 RX ADMIN — FUROSEMIDE 40 MG: 40 TABLET ORAL at 08:52

## 2018-10-05 RX ADMIN — APIXABAN 2.5 MG: 2.5 TABLET, FILM COATED ORAL at 21:04

## 2018-10-05 RX ADMIN — BACLOFEN 10 MG: 10 TABLET ORAL at 17:19

## 2018-10-05 RX ADMIN — ASPIRIN 81 MG CHEWABLE TABLET 81 MG: 81 TABLET CHEWABLE at 21:03

## 2018-10-05 RX ADMIN — IPRATROPIUM BROMIDE AND ALBUTEROL SULFATE 1 AMPULE: .5; 3 SOLUTION RESPIRATORY (INHALATION) at 09:20

## 2018-10-05 RX ADMIN — IPRATROPIUM BROMIDE AND ALBUTEROL SULFATE 1 AMPULE: .5; 3 SOLUTION RESPIRATORY (INHALATION) at 16:07

## 2018-10-05 RX ADMIN — OXCARBAZEPINE 300 MG: 300 TABLET, FILM COATED ORAL at 21:04

## 2018-10-05 RX ADMIN — AMIODARONE HYDROCHLORIDE 200 MG: 200 TABLET ORAL at 08:52

## 2018-10-05 ASSESSMENT — PAIN SCALES - GENERAL
PAINLEVEL_OUTOF10: 0
PAINLEVEL_OUTOF10: 0

## 2018-10-05 NOTE — PROGRESS NOTES
Patient wanted to do home health for his IV's but wife said no because of the home bound status. Wife wants 9 AM daily for the rocephin. Explain where to go on the weekends and during the week for the IV's. Notified out-patient of the referral and the time. Will need an order from Dr. Charmayne Chou for out-patient rocephin and the PICC care.     VERONICA Ji

## 2018-10-05 NOTE — PROGRESS NOTES
Ambulation  Ambulation?: Yes  WB Status: FWB  Ambulation 1  Surface: level tile  Device: Rolling Walker  Assistance: Contact guard assistance  Distance: 150 feet x 1  Comments: Cues needed for posture. Stairs/Curb  Stairs?: No     Balance  Posture: Fair  Sitting - Static: Good  Sitting - Dynamic: Good  Standing - Static: Good  Standing - Dynamic: Fair;+  Exercises  Comments: Standing ther ex x 15-20 of: heel/toe raises, marching, hip abd, hip ext, knee flexion, hip flexion, and partial squats. Pt needed 2-3 short rest breaks d/t fatigue. Assessment      Patient Education: Reviewed safety with transfers/amb. Pt with good understanding. REQUIRES PT FOLLOW UP: Yes  Activity Tolerance  Activity Tolerance: Patient Tolerated treatment well;Patient limited by endurance     G-Code     OutComes Score    AM-PAC Score    Goals  Short term goals  Time Frame for Short term goals: 10 days  Short term goal 1: Patient will amb 36' with least restrictive device, supervision, without LOB  Short term goal 2: Patient will be independent with transfers and bed mobility  Short term goal 3: Patient will tolerate 20-30 minutes of therex/act to improve endurance for ADLs. Short term goal 4: Patient will improve dynamic standing balance from fair to good for safety in home environment. Patient Goals   Patient goals : Feel better to return home    Plan    Plan  Times per week: 7  Times per day: Twice a day  Plan Comment: 1x/day on weekends  Safety Devices  Type of devices:  All fall risk precautions in place, Left in chair, Nurse notified, Gait belt, Call light within reach (No alarm in place upon entry.)     Therapy Time   Individual Concurrent Group Co-treatment   Time In 1510         Time Out 1550         Minutes [place], [place]

## 2018-10-05 NOTE — PROGRESS NOTES
Physical Therapy  Facility/Department: [facility] ICU  Daily Treatment Note  NAME: Antonio Agee  : 1935  MRN: [id number]    Date of Service: 10/5/2018    Discharge Recommendations:  Continue to assess pending progress, Home with assist PRN        Patient Diagnosis(es): The primary encounter diagnosis was Acute encephalopathy. A diagnosis of Pneumonia of right lower lobe due to infectious organism [facility]) was also pertinent to this visit. has a past medical history of Abnormal cardiac cath; Abnormal echocardiogram; Atrial fibrillation ([address] CAD (coronary artery disease); Chronic diastolic CHF (congestive heart failure), NYHA class 2 ([address] Chronic renal failure, stage 3 (moderate) ([address] Erectile dysfunction; H/O cardiac catheterization; H/O echocardiogram; H/O echocardiogram; H/O echocardiogram; History of cardiovascular stress test; History of cardioversion; History of cardioversion; History of echocardiogram; History of echocardiogram; History of echocardiogram; History of Holter monitoring; History of Holter monitoring; History of Holter monitoring; Hyperlipidemia; Hypertension; Osteoarthritis of hip; PVD (peripheral vascular disease) ([address] and Trigeminal neuralgia. has a past surgical history that includes shoulder surgery; Kidney surgery (); Hand surgery (Left); hip surgery (Right, 13); joint replacement (Right); Cardiac catheterization (Left, 2017); Coronary artery bypass graft (N/A, 2017); and Cardioversion (2017). Restrictions  Restrictions/Precautions  Restrictions/Precautions: General Precautions, Fall Risk  Subjective   General  Chart Reviewed: Yes  Referring Practitioner: Bijan Garcia MD  Subjective  Subjective: Pt with no complaints of pain. Orientation  Orientation  Overall Orientation Status: Within Normal Limits  Objective      Transfers  Sit to Stand: Contact guard assistance  Stand to sit: Contact guard assistance  Comment: Minimal cues for safety.

## 2018-10-06 VITALS
HEIGHT: 67 IN | DIASTOLIC BLOOD PRESSURE: 66 MMHG | BODY MASS INDEX: 33.59 KG/M2 | WEIGHT: 214 LBS | OXYGEN SATURATION: 96 % | HEART RATE: 76 BPM | RESPIRATION RATE: 20 BRPM | SYSTOLIC BLOOD PRESSURE: 145 MMHG | TEMPERATURE: 97.5 F

## 2018-10-06 PROBLEM — I33.0 ACUTE BACTERIAL ENDOCARDITIS: Status: ACTIVE | Noted: 2018-10-06

## 2018-10-06 LAB
ABSOLUTE EOS #: 0.1 K/UL (ref 0–0.44)
ABSOLUTE IMMATURE GRANULOCYTE: 0.1 K/UL (ref 0–0.3)
ABSOLUTE LYMPH #: 0.67 K/UL (ref 1.1–3.7)
ABSOLUTE MONO #: 0.19 K/UL (ref 0.1–1.2)
ANION GAP SERPL CALCULATED.3IONS-SCNC: 11 MMOL/L (ref 9–17)
BASOPHILS # BLD: 0 % (ref 0–2)
BASOPHILS ABSOLUTE: 0 K/UL (ref 0–0.2)
BUN BLDV-MCNC: 39 MG/DL (ref 8–23)
BUN/CREAT BLD: 25 (ref 9–20)
CALCIUM SERPL-MCNC: 8.5 MG/DL (ref 8.6–10.4)
CHLORIDE BLD-SCNC: 103 MMOL/L (ref 98–107)
CO2: 23 MMOL/L (ref 20–31)
CREAT SERPL-MCNC: 1.55 MG/DL (ref 0.7–1.2)
CULTURE: ABNORMAL
DIFFERENTIAL TYPE: ABNORMAL
EOSINOPHILS RELATIVE PERCENT: 2 % (ref 1–4)
GFR AFRICAN AMERICAN: 52 ML/MIN
GFR NON-AFRICAN AMERICAN: 43 ML/MIN
GFR SERPL CREATININE-BSD FRML MDRD: ABNORMAL ML/MIN/{1.73_M2}
GFR SERPL CREATININE-BSD FRML MDRD: ABNORMAL ML/MIN/{1.73_M2}
GLUCOSE BLD-MCNC: 104 MG/DL (ref 70–99)
HCT VFR BLD CALC: 36.7 % (ref 40.7–50.3)
HEMOGLOBIN: 11.4 G/DL (ref 13–17)
HIGH SENSITIVE C-REACTIVE PROTEIN: 29.9 MG/L
IMMATURE GRANULOCYTES: 2 %
LYMPHOCYTES # BLD: 14 % (ref 24–43)
Lab: ABNORMAL
Lab: ABNORMAL
MCH RBC QN AUTO: 29.2 PG (ref 25.2–33.5)
MCHC RBC AUTO-ENTMCNC: 31.1 G/DL (ref 28.4–34.8)
MCV RBC AUTO: 94.1 FL (ref 82.6–102.9)
MONOCYTES # BLD: 4 % (ref 3–12)
MORPHOLOGY: ABNORMAL
NRBC AUTOMATED: 0 PER 100 WBC
PDW BLD-RTO: 18.2 % (ref 11.8–14.4)
PLATELET # BLD: 112 K/UL (ref 138–453)
PLATELET ESTIMATE: ABNORMAL
PMV BLD AUTO: 10.1 FL (ref 8.1–13.5)
POTASSIUM SERPL-SCNC: 4.4 MMOL/L (ref 3.7–5.3)
PROCALCITONIN: 0.29 NG/ML
RBC # BLD: 3.9 M/UL (ref 4.21–5.77)
RBC # BLD: ABNORMAL 10*6/UL
SEDIMENTATION RATE, ERYTHROCYTE: 23 MM (ref 0–20)
SEG NEUTROPHILS: 78 % (ref 36–65)
SEGMENTED NEUTROPHILS ABSOLUTE COUNT: 3.74 K/UL (ref 1.5–8.1)
SODIUM BLD-SCNC: 137 MMOL/L (ref 135–144)
SPECIMEN DESCRIPTION: ABNORMAL
SPECIMEN DESCRIPTION: ABNORMAL
STATUS: ABNORMAL
STATUS: ABNORMAL
WBC # BLD: 4.8 K/UL (ref 3.5–11.3)
WBC # BLD: ABNORMAL 10*3/UL

## 2018-10-06 PROCEDURE — 36415 COLL VENOUS BLD VENIPUNCTURE: CPT

## 2018-10-06 PROCEDURE — 97116 GAIT TRAINING THERAPY: CPT

## 2018-10-06 PROCEDURE — 99239 HOSP IP/OBS DSCHRG MGMT >30: CPT | Performed by: FAMILY MEDICINE

## 2018-10-06 PROCEDURE — 86141 C-REACTIVE PROTEIN HS: CPT

## 2018-10-06 PROCEDURE — 85025 COMPLETE CBC W/AUTO DIFF WBC: CPT

## 2018-10-06 PROCEDURE — 2580000003 HC RX 258: Performed by: FAMILY MEDICINE

## 2018-10-06 PROCEDURE — 85651 RBC SED RATE NONAUTOMATED: CPT

## 2018-10-06 PROCEDURE — 6360000002 HC RX W HCPCS: Performed by: FAMILY MEDICINE

## 2018-10-06 PROCEDURE — 80048 BASIC METABOLIC PNL TOTAL CA: CPT

## 2018-10-06 PROCEDURE — 97110 THERAPEUTIC EXERCISES: CPT

## 2018-10-06 PROCEDURE — 97535 SELF CARE MNGMENT TRAINING: CPT

## 2018-10-06 PROCEDURE — 6370000000 HC RX 637 (ALT 250 FOR IP): Performed by: FAMILY MEDICINE

## 2018-10-06 PROCEDURE — 84145 PROCALCITONIN (PCT): CPT

## 2018-10-06 RX ORDER — SODIUM CHLORIDE 0.9 % (FLUSH) 0.9 %
10 SYRINGE (ML) INJECTION PRN
Status: CANCELLED | OUTPATIENT
Start: 2018-10-07

## 2018-10-06 RX ORDER — CEFTRIAXONE 1 G/1
2 INJECTION, POWDER, FOR SOLUTION INTRAMUSCULAR; INTRAVENOUS EVERY 24 HOURS
Qty: 84 G | Refills: 0
Start: 2018-10-07 | End: 2018-10-06

## 2018-10-06 RX ORDER — CEFTRIAXONE 1 G/1
2 INJECTION, POWDER, FOR SOLUTION INTRAMUSCULAR; INTRAVENOUS EVERY 24 HOURS
Qty: 84 G | Refills: 0 | Status: SHIPPED | OUTPATIENT
Start: 2018-10-07 | End: 2019-03-12 | Stop reason: ALTCHOICE

## 2018-10-06 RX ORDER — ACETAMINOPHEN 325 MG/1
650 TABLET ORAL EVERY 4 HOURS PRN
Qty: 120 TABLET | Refills: 3 | Status: SHIPPED | OUTPATIENT
Start: 2018-10-06 | End: 2019-04-23

## 2018-10-06 RX ORDER — FUROSEMIDE 40 MG/1
40 TABLET ORAL DAILY
Qty: 60 TABLET | Refills: 3 | Status: SHIPPED | OUTPATIENT
Start: 2018-10-06 | End: 2018-11-05 | Stop reason: SDUPTHER

## 2018-10-06 RX ORDER — AMIODARONE HYDROCHLORIDE 200 MG/1
200 TABLET ORAL DAILY
Qty: 30 TABLET | Refills: 3 | Status: SHIPPED | OUTPATIENT
Start: 2018-10-06 | End: 2019-01-29 | Stop reason: ALTCHOICE

## 2018-10-06 RX ADMIN — LEVOTHYROXINE SODIUM 25 MCG: 0.03 TABLET ORAL at 07:47

## 2018-10-06 RX ADMIN — OXCARBAZEPINE 300 MG: 300 TABLET, FILM COATED ORAL at 14:35

## 2018-10-06 RX ADMIN — FUROSEMIDE 40 MG: 40 TABLET ORAL at 09:58

## 2018-10-06 RX ADMIN — SODIUM CHLORIDE: 9 INJECTION, SOLUTION INTRAVENOUS at 07:17

## 2018-10-06 RX ADMIN — APIXABAN 2.5 MG: 2.5 TABLET, FILM COATED ORAL at 09:58

## 2018-10-06 RX ADMIN — AMIODARONE HYDROCHLORIDE 200 MG: 200 TABLET ORAL at 09:58

## 2018-10-06 RX ADMIN — BACLOFEN 10 MG: 10 TABLET ORAL at 14:35

## 2018-10-06 RX ADMIN — BACLOFEN 10 MG: 10 TABLET ORAL at 09:58

## 2018-10-06 RX ADMIN — POTASSIUM CHLORIDE 10 MEQ: 10 TABLET, EXTENDED RELEASE ORAL at 09:59

## 2018-10-06 RX ADMIN — CEFTRIAXONE SODIUM 2 G: 2 INJECTION, POWDER, FOR SOLUTION INTRAMUSCULAR; INTRAVENOUS at 14:35

## 2018-10-06 RX ADMIN — MAGNESIUM HYDROXIDE 30 ML: 400 SUSPENSION ORAL at 07:47

## 2018-10-06 RX ADMIN — OXCARBAZEPINE 300 MG: 300 TABLET, FILM COATED ORAL at 09:59

## 2018-10-06 ASSESSMENT — PAIN SCALES - GENERAL
PAINLEVEL_OUTOF10: 0
PAINLEVEL_OUTOF10: 0

## 2018-10-06 NOTE — DISCHARGE SUMMARY
Date    WBC 4.8 10/06/2018    HGB 11.4 (L) 10/06/2018     (L) 10/06/2018       Lab Results   Component Value Date    BUN 39 (H) 10/06/2018    CREATININE 1.55 (H) 10/06/2018     10/06/2018    K 4.4 10/06/2018    CALCIUM 8.5 (L) 10/06/2018     10/06/2018    CO2 23 10/06/2018    LABGLOM 43 (L) 10/06/2018       Lab Results   Component Value Date    WBCUA None 10/03/2018    RBCUA None 10/03/2018    EPITHUA 0 TO 2 10/03/2018    LEUKOCYTESUR NEGATIVE 10/03/2018    SPECGRAV 1.010 10/03/2018    GLUCOSEU NEGATIVE 10/03/2018    KETUA NEGATIVE 10/03/2018    PROTEINU TRACE (A) 10/03/2018    HGBUR TRACE (A) 10/03/2018    CASTUA NOT REPORTED 10/03/2018    CRYSTUA NOT REPORTED 10/03/2018    BACTERIA TRACE (A) 10/03/2018    YEAST NOT REPORTED 10/03/2018       Ct Abdomen Pelvis Wo Contrast Additional Contrast? None    Result Date: 10/3/2018  EXAMINATION: CT OF THE ABDOMEN AND PELVIS WITHOUT CONTRAST 10/3/2018 3:22 pm TECHNIQUE: CT of the abdomen and pelvis was performed without the administration of intravenous contrast. Multiplanar reformatted images are provided for review. Dose modulation, iterative reconstruction, and/or weight based adjustment of the mA/kV was utilized to reduce the radiation dose to as low as reasonably achievable. COMPARISON: None. HISTORY: ORDERING SYSTEM PROVIDED HISTORY: distension TECHNOLOGIST PROVIDED HISTORY: FINDINGS: Lower Chest: Moderate size effusions, left greater than right. Dependent atelectasis is noted. Sequela of old granulomatous disease in the right lung base is present. The patient is status post median sternotomy. Organs: Evaluation of the abdominal and pelvic viscera is limited in the absence of contrast.  The liver has a lobulated contour and there widening of the periportal space, suggestive of chronic liver disease. Calcified granulomas are present in the liver and spleen.   Cholelithiasis is present without CT evidence for acute cholecystitis or biliary dilatation. The pancreas, adrenals and kidneys reveal no acute findings. GI/Bowel: There is no bowel dilatation or wall thickening identified. Diverticulosis. Moderate stool burden. Pelvis: Streak artifact from a right total hip arthroplasty is noted. No acute findings. Small fat containing right inguinal hernia. Peritoneum/Retroperitoneum: Small volume ascites. No free air. The aorta is normal in caliber. Calcified atheromatous plaque is present. No lymphadenopathy. Bones/Soft Tissues: No acute findings. Advanced degenerative change in the lumbar spine is noted. Degenerative changes also present in the sacroiliac joints, pubic symphysis and left hip. Moderate-sized pleural effusions and small volume ascites. Morphologic findings suggestive of chronic liver disease. Cholelithiasis. Diverticulosis. Ct Head Wo Contrast    Result Date: 10/3/2018  EXAMINATION: CT OF THE HEAD WITHOUT CONTRAST  10/3/2018 2:28 pm TECHNIQUE: CT of the head was performed without the administration of intravenous contrast. Dose modulation, iterative reconstruction, and/or weight based adjustment of the mA/kV was utilized to reduce the radiation dose to as low as reasonably achievable. COMPARISON: None. HISTORY: ORDERING SYSTEM PROVIDED HISTORY: Head injury TECHNOLOGIST PROVIDED HISTORY: FINDINGS: BRAIN/VENTRICLES: There is no acute intracranial hemorrhage, mass effect or midline shift. No abnormal extra-axial fluid collection. The gray-white differentiation is maintained without evidence of an acute infarct. There is no evidence of hydrocephalus. 0.6 cm meningioma in the left medial temporal fossa, medial to the left temporal lobe. ORBITS: The visualized portion of the orbits demonstrate no acute abnormality. SINUSES: The visualized paranasal sinuses and mastoid air cells demonstrate no acute abnormality. SOFT TISSUES/SKULL:  No acute abnormality of the visualized skull or soft tissues.      No acute intracranial

## 2018-10-06 NOTE — PLAN OF CARE
Problem: Falls - Risk of:  Goal: Will remain free from falls  Will remain free from falls   Outcome: Ongoing  Pt A&Ox4. Pt uses call light appropriately and call light and bedside table remain in reach. Rails up x2 bed in lowest position. Bed alarm is on. Pt wears nonskid socks when standing or ambulating. Fall sign in place. Room remains free of clutter. Pt knows when and how to use assistive device when ambulating. Problem: Infection:  Goal: Will remain free from infection  Will remain free from infection   Outcome: Ongoing  Labs are being drawn daily. Vitals are assessed twice each shift or PRN. Problem: Daily Care:  Goal: Daily care needs are met  Daily care needs are met   Outcome: Ongoing  Daily care needs are being met. Items for personal hygiene are supplied and help provided with bathing. Problem: Skin Integrity:  Goal: Skin integrity will stabilize  Skin integrity will stabilize   Outcome: Ongoing  Patient is educated on the importance of turning and repositioning often. Skin remains warm dry and intact with no skin issues noted. Will continue to monitor.

## 2018-10-07 ENCOUNTER — HOSPITAL ENCOUNTER (OUTPATIENT)
Dept: INFUSION THERAPY | Age: 83
Discharge: HOME | DRG: 871 | End: 2018-10-07
Payer: MEDICARE

## 2018-10-07 VITALS
HEART RATE: 77 BPM | SYSTOLIC BLOOD PRESSURE: 128 MMHG | TEMPERATURE: 97.2 F | DIASTOLIC BLOOD PRESSURE: 63 MMHG | RESPIRATION RATE: 18 BRPM

## 2018-10-07 DIAGNOSIS — I33.0 INFECTIVE ENDOCARDITIS OF AORTIC VALVE: ICD-10-CM

## 2018-10-07 DIAGNOSIS — I33.0 ACUTE BACTERIAL ENDOCARDITIS: ICD-10-CM

## 2018-10-07 PROCEDURE — 2580000003 HC RX 258: Performed by: FAMILY MEDICINE

## 2018-10-07 PROCEDURE — 96374 THER/PROPH/DIAG INJ IV PUSH: CPT

## 2018-10-07 PROCEDURE — 6360000002 HC RX W HCPCS: Performed by: FAMILY MEDICINE

## 2018-10-07 RX ORDER — SODIUM CHLORIDE 0.9 % (FLUSH) 0.9 %
10 SYRINGE (ML) INJECTION PRN
Status: CANCELLED | OUTPATIENT
Start: 2018-10-07

## 2018-10-07 RX ADMIN — CEFTRIAXONE SODIUM 2 G: 2 INJECTION, POWDER, FOR SOLUTION INTRAMUSCULAR; INTRAVENOUS at 09:10

## 2018-10-07 ASSESSMENT — PAIN SCALES - GENERAL: PAINLEVEL_OUTOF10: 0

## 2018-10-07 NOTE — PLAN OF CARE
Pt. Here for O.P. Rocephin. Pt. Denies fever/chills, denies pain or diff. Breathing. (R) basilic PICC intact, skin site with CHG dressing intact with noted small amount of blood at site with moderate eccymosis. PICC flushed with 20 ml NS pulsatile with ease after confirmed blood return. Rocephin given IVP over 5 minutes. Pt. Tolerated. PICC flushed with 20 ml with ease. Swabcap applied, stockinette reapplied securing PICC. Pt. Discharged ambulatory with wife without complaint.

## 2018-10-08 ENCOUNTER — CARE COORDINATION (OUTPATIENT)
Dept: CASE MANAGEMENT | Age: 83
End: 2018-10-08

## 2018-10-08 ENCOUNTER — HOSPITAL ENCOUNTER (OUTPATIENT)
Dept: INFUSION THERAPY | Age: 83
Discharge: HOME | DRG: 871 | End: 2018-10-08
Payer: MEDICARE

## 2018-10-08 VITALS
HEART RATE: 82 BPM | RESPIRATION RATE: 20 BRPM | TEMPERATURE: 97.5 F | SYSTOLIC BLOOD PRESSURE: 165 MMHG | DIASTOLIC BLOOD PRESSURE: 76 MMHG

## 2018-10-08 DIAGNOSIS — I33.0 ACUTE BACTERIAL ENDOCARDITIS: ICD-10-CM

## 2018-10-08 DIAGNOSIS — I33.0 INFECTIVE ENDOCARDITIS OF AORTIC VALVE: ICD-10-CM

## 2018-10-08 PROCEDURE — 2580000003 HC RX 258: Performed by: FAMILY MEDICINE

## 2018-10-08 PROCEDURE — 6360000002 HC RX W HCPCS: Performed by: FAMILY MEDICINE

## 2018-10-08 PROCEDURE — 96374 THER/PROPH/DIAG INJ IV PUSH: CPT

## 2018-10-08 PROCEDURE — 99213 OFFICE O/P EST LOW 20 MIN: CPT

## 2018-10-08 RX ORDER — SODIUM CHLORIDE 0.9 % (FLUSH) 0.9 %
10 SYRINGE (ML) INJECTION PRN
Status: DISCONTINUED | OUTPATIENT
Start: 2018-10-08 | End: 2018-10-09 | Stop reason: HOSPADM

## 2018-10-08 RX ORDER — SODIUM CHLORIDE 0.9 % (FLUSH) 0.9 %
10 SYRINGE (ML) INJECTION PRN
Status: CANCELLED | OUTPATIENT
Start: 2018-10-08

## 2018-10-08 RX ADMIN — Medication 10 ML: at 09:05

## 2018-10-08 RX ADMIN — Medication 10 ML: at 09:27

## 2018-10-08 RX ADMIN — CEFTRIAXONE SODIUM 2 G: 2 INJECTION, POWDER, FOR SOLUTION INTRAMUSCULAR; INTRAVENOUS at 09:18

## 2018-10-08 RX ADMIN — Medication 10 ML: at 09:26

## 2018-10-08 NOTE — PLAN OF CARE
Problem: Infection - Risk of, Central Catheter-Associated Bloodstream Infection  Goal: Remain free from symptoms of infection  Patient will remain free from symptoms of infection:  - redness  - warmth  - increased temperature  - discharge     Outcome: Met This Shift

## 2018-10-08 NOTE — CARE COORDINATION
AM MTH OP TREATMENT RM 01 [facility] [place]   10/12/2018 10:30 AM M[facility] OP TREATMENT RM 01 [facility] [place]   10/13/2018 10:30 AM [facility] OP TREATMENT RM 01 [facility] [place]   10/14/2018 10:30 AM [facility] OP TREATMENT RM 01 [facility] [place]   10/15/2018 10:30 AM M[facility] OP TREATMENT RM 01 [facility] [place]   10/16/2018 10:30 AM M[facility] OP TREATMENT RM 01 [facility] [place]   10/17/2018 10:30 AM M[facility] OP TREATMENT RM 01 [facility] [place]   10/18/2018 10:30 AM M[facility] OP TREATMENT RM 01 [facility] [place]   10/19/2018 10:30 AM M[facility] OP TREATMENT RM 01 [facility] [place]   10/20/2018 10:30 AM M[facility] OP TREATMENT RM 01 [facility] [place]   10/21/2018 10:30 AM M[facility] OP TREATMENT RM 01 [facility] [place]   10/22/2018 10:30 AM M[facility] OP TREATMENT RM 01 [facility] [place]   10/23/2018 10:30 AM M[facility] OP TREATMENT RM 01 [facility] [place]   10/24/2018 10:30 AM M[facility] OP TREATMENT RM 01 [facility] [place]   10/25/2018 10:30 AM MTH OP TREATMENT RM 01 [facility] [place]   10/26/2018 10:30 AM M[facility] OP TREATMENT RM 01 [facility] [place]   10/27/2018 10:30 AM MTH OP TREATMENT RM 01 [facility] [place]   10/28/2018 10:30 AM MTH OP TREATMENT RM 01 [facility] [place]   10/29/2018 10:30 AM M[facility] OP TREATMENT RM 01 [facility] [place]   10/30/2018 10:30 AM M[facility] OP TREATMENT RM 01 [facility] [place]   10/31/2018 10:30 AM M[facility] OP TREATMENT RM 01 [facility] [place]   11/1/2018 10:30 AM [facility] OP TREATMENT RM 01 [facility] [place]   11/2/2018 10:30 AM M[facility] OP TREATMENT RM 01 [facility] [place]   11/3/2018 10:30 AM M[facility] OP TREATMENT RM 01 [facility] [place]   11/4/2018 10:30 AM M[facility] OP TREATMENT RM 01 [facility] [place]   11/5/2018 9:00 AM MD YANDEL [facility]   11/5/2018 10:30 AM M[facility] OP TREATMENT RM 01 [facility] [place]   11/6/2018 10:30 AM M[facility] OP TREATMENT RM 01 [facility] [place]   11/7/2018 10:30 AM M[facility] OP TREATMENT RM 01 [facility] [place]   11/8/2018 10:30 AM [facility] OP TREATMENT RM 01 [facility] [place]   11/9/2018 10:30 AM [facility] OP TREATMENT RM 01 [facility] [place]   11/10/2018 10:30 AM [facility]

## 2018-10-09 ENCOUNTER — HOSPITAL ENCOUNTER (OUTPATIENT)
Dept: INFUSION THERAPY | Age: 83
Discharge: HOME | End: 2018-10-09
Payer: MEDICARE

## 2018-10-09 VITALS
TEMPERATURE: 97.5 F | SYSTOLIC BLOOD PRESSURE: 146 MMHG | DIASTOLIC BLOOD PRESSURE: 83 MMHG | RESPIRATION RATE: 20 BRPM | HEART RATE: 85 BPM

## 2018-10-09 DIAGNOSIS — I33.0 INFECTIVE ENDOCARDITIS OF AORTIC VALVE: ICD-10-CM

## 2018-10-09 DIAGNOSIS — I33.0 ACUTE BACTERIAL ENDOCARDITIS: ICD-10-CM

## 2018-10-09 PROCEDURE — 96374 THER/PROPH/DIAG INJ IV PUSH: CPT

## 2018-10-09 PROCEDURE — 2580000003 HC RX 258: Performed by: FAMILY MEDICINE

## 2018-10-09 PROCEDURE — 6360000002 HC RX W HCPCS: Performed by: FAMILY MEDICINE

## 2018-10-09 RX ORDER — SODIUM CHLORIDE 0.9 % (FLUSH) 0.9 %
10 SYRINGE (ML) INJECTION PRN
Status: CANCELLED | OUTPATIENT
Start: 2018-10-09

## 2018-10-09 RX ORDER — SODIUM CHLORIDE 0.9 % (FLUSH) 0.9 %
10 SYRINGE (ML) INJECTION PRN
Status: DISCONTINUED | OUTPATIENT
Start: 2018-10-09 | End: 2018-10-10 | Stop reason: HOSPADM

## 2018-10-09 RX ADMIN — Medication 10 ML: at 08:48

## 2018-10-09 RX ADMIN — Medication 10 ML: at 08:47

## 2018-10-09 RX ADMIN — Medication 10 ML: at 08:33

## 2018-10-09 RX ADMIN — CEFTRIAXONE SODIUM 2 G: 2 INJECTION, POWDER, FOR SOLUTION INTRAMUSCULAR; INTRAVENOUS at 08:38

## 2018-10-10 ENCOUNTER — OFFICE VISIT (OUTPATIENT)
Dept: FAMILY MEDICINE CLINIC | Age: 83
End: 2018-10-10
Payer: MEDICARE

## 2018-10-10 ENCOUNTER — HOSPITAL ENCOUNTER (OUTPATIENT)
Dept: INFUSION THERAPY | Age: 83
Discharge: HOME | End: 2018-10-10
Payer: MEDICARE

## 2018-10-10 VITALS
SYSTOLIC BLOOD PRESSURE: 132 MMHG | BODY MASS INDEX: 32.74 KG/M2 | WEIGHT: 208.6 LBS | DIASTOLIC BLOOD PRESSURE: 68 MMHG | HEIGHT: 67 IN

## 2018-10-10 VITALS
HEART RATE: 73 BPM | TEMPERATURE: 97.4 F | DIASTOLIC BLOOD PRESSURE: 77 MMHG | RESPIRATION RATE: 20 BRPM | SYSTOLIC BLOOD PRESSURE: 137 MMHG

## 2018-10-10 DIAGNOSIS — I33.0 INFECTIVE ENDOCARDITIS OF AORTIC VALVE: ICD-10-CM

## 2018-10-10 DIAGNOSIS — I50.42 CHRONIC COMBINED SYSTOLIC AND DIASTOLIC CONGESTIVE HEART FAILURE (HCC): ICD-10-CM

## 2018-10-10 DIAGNOSIS — Z95.2 STATUS POST AORTIC VALVE REPLACEMENT: ICD-10-CM

## 2018-10-10 DIAGNOSIS — I33.0 BACTERIAL ENDOCARDITIS, UNSPECIFIED CHRONICITY: Primary | ICD-10-CM

## 2018-10-10 DIAGNOSIS — I33.0 ACUTE BACTERIAL ENDOCARDITIS: ICD-10-CM

## 2018-10-10 DIAGNOSIS — I25.10 ASHD (ARTERIOSCLEROTIC HEART DISEASE): ICD-10-CM

## 2018-10-10 PROCEDURE — 6360000002 HC RX W HCPCS: Performed by: FAMILY MEDICINE

## 2018-10-10 PROCEDURE — 2580000003 HC RX 258: Performed by: FAMILY MEDICINE

## 2018-10-10 PROCEDURE — 96374 THER/PROPH/DIAG INJ IV PUSH: CPT

## 2018-10-10 PROCEDURE — 1111F DSCHRG MED/CURRENT MED MERGE: CPT | Performed by: FAMILY MEDICINE

## 2018-10-10 PROCEDURE — 99496 TRANSJ CARE MGMT HIGH F2F 7D: CPT | Performed by: FAMILY MEDICINE

## 2018-10-10 RX ORDER — SODIUM CHLORIDE 0.9 % (FLUSH) 0.9 %
10 SYRINGE (ML) INJECTION PRN
Status: CANCELLED | OUTPATIENT
Start: 2018-10-10

## 2018-10-10 RX ORDER — SODIUM CHLORIDE 0.9 % (FLUSH) 0.9 %
10 SYRINGE (ML) INJECTION PRN
Status: DISCONTINUED | OUTPATIENT
Start: 2018-10-10 | End: 2018-10-11 | Stop reason: HOSPADM

## 2018-10-10 RX ADMIN — Medication 10 ML: at 10:44

## 2018-10-10 RX ADMIN — Medication 10 ML: at 10:31

## 2018-10-10 RX ADMIN — CEFTRIAXONE SODIUM 2 G: 2 INJECTION, POWDER, FOR SOLUTION INTRAMUSCULAR; INTRAVENOUS at 10:33

## 2018-10-10 RX ADMIN — Medication 10 ML: at 10:43

## 2018-10-10 NOTE — PROGRESS NOTES
mouth 2 times daily 60 tablet 0    baclofen (LIORESAL) 10 MG tablet TAKE ONE TABLET BY MOUTH THREE TIMES DAILY AS NEEDED 270 tablet 1    KLOR-CON M10 10 MEQ extended release tablet TAKE ONE TABLET BY MOUTH ONCE DAILY 90 tablet 3    levothyroxine (SYNTHROID) 25 MCG tablet Take 1 tablet by mouth Daily 90 tablet 3    rosuvastatin (CRESTOR) 40 MG tablet Take 1 tablet by mouth every evening 90 tablet 3    ezetimibe (ZETIA) 10 MG tablet Take 1 tablet by mouth daily 90 tablet 3    OXcarbazepine (TRILEPTAL) 300 MG tablet TAKE ONE TABLET BY MOUTH 4 TIMES DAILY (Patient taking differently: TAKE ONE TABLET BY MOUTH 3-4 TIMES DAILY) 360 tablet 1    aspirin 81 MG tablet Take 81 mg by mouth nightly       acetaminophen (TYLENOL) 325 MG tablet Take 2 tablets by mouth every 4 hours as needed for Fever (For temp greater than 100.5 F (38 C)) 120 tablet 3    isosorbide mononitrate (IMDUR) 30 MG extended release tablet Take 1 tablet by mouth daily 90 tablet 3    EPIPEN 2-AMBAR 0.3 MG/0.3ML SOAJ injection USE AS DIRECTED 1 each 0     No current facility-administered medications for this visit. Facility-Administered Medications Ordered in Other Visits   Medication Dose Route Frequency Provider Last Rate Last Dose    sodium chloride flush 0.9 % injection 10 mL  10 mL Intravenous PRN Jaelyn Bird MD   10 mL at 10/15/18 1103       ROS:  General Constitutional: Denies chills. Denies fever. Denies headache. Admits intemittent lightheadedness when getting up. Ophthalmologic: Denies blurred vision. ENT: Denies nasal congestion. Denies sore throat. Denies ear pain and pressure. Respiratory: Admits cough. Admits intemittent shortness of breath. Admits intermittent wheezing. Cardiovascular: Denies chest pain at rest. Denies irregular heartbeat. Denies palpitations. Gastrointestinal: Denies abdominal pain. Denies blood in the stool. Denies constipation. Denies diarrhea. Denies nausea. Denies vomiting. Admits bloating. management risk score Rising risk (score 2-5) and Complex Care (Scores >=6): 6     Non face to face  following discharge, date last encounter closed (first attempt may have been earlier): 10/8/2018  4:01 PM    Call initiated 2 business days of discharge: Yes    Patient Active Problem List   Diagnosis    CAD (coronary artery disease)    Erectile dysfunction    Angina, class III ([address]    Abnormal cardiovascular stress test    Benign essential hypertension    Osteoarthritis of hip    Hyperlipidemia    History of total hip replacement    Trigeminal neuralgia    Prostate nodule    Nocturia    ASHD (arteriosclerotic heart disease)    Pulmonary hypertension (HCC)    Chronic combined systolic and diastolic congestive heart failure (HCC)    Essential hypertension    Chronic renal failure, stage 3 (moderate) (HCC)    Status post aortic valve replacement    PAF (paroxysmal atrial fibrillation) (HCC)    Hypotension due to drugs    Acute right-sided CHF (congestive heart failure) (HCC)    Anemia    Persistent atrial fibrillation (HCC)    History of atrial fibrillation    Dyspnea    Daytime somnolence    Hyperglycemia    Pneumonia    Sepsis ([address]    Infective endocarditis of aortic valve    Bacterial endocarditis       Allergies   Allergen Reactions    Iodine Swelling     Other reaction(s): Urticaria  Patient states is alleric to Iodine and sea food    Norco [Hydrocodone-Acetaminophen]     Other Anaphylaxis    Shellfish-Derived Products Shortness Of Breath and Swelling     Throat swells shut    Shrimp Flavor Swelling     Patient states allergic to shell sea food.    shrimp     Oxycodone Other (See Comments)     Lowers BP       Medications listed as ordered at the time of discharge from [facility] Medication Instructions BARBIE:    Printed on:10/15/18 9693   Medication Information                      acetaminophen (TYLENOL) 325 MG tablet  Take 2 tablets by mouth every 4 hours as needed for Fever (For temp greater than 100.5 F (38 C))             amiodarone (CORDARONE) 200 MG tablet  Take 1 tablet by mouth daily             apixaban (ELIQUIS) 5 MG TABS tablet  Take 1 tablet by mouth 2 times daily             aspirin 81 MG tablet  Take 81 mg by mouth nightly              baclofen (LIORESAL) 10 MG tablet  TAKE ONE TABLET BY MOUTH THREE TIMES DAILY AS NEEDED             cefTRIAXone (ROCEPHIN) 1 g injection  Infuse 2 g intravenously every 24 hours for 42 doses             EPIPEN 2-AMBAR 0.3 MG/0.3ML SOAJ injection  USE AS DIRECTED             ezetimibe (ZETIA) 10 MG tablet  Take 1 tablet by mouth daily             furosemide (LASIX) 40 MG tablet  Take 1 tablet by mouth daily             isosorbide mononitrate (IMDUR) 30 MG extended release tablet  Take 1 tablet by mouth daily             KLOR-CON M10 10 MEQ extended release tablet  TAKE ONE TABLET BY MOUTH ONCE DAILY             levothyroxine (SYNTHROID) 25 MCG tablet  Take 1 tablet by mouth Daily             OXcarbazepine (TRILEPTAL) 300 MG tablet  TAKE ONE TABLET BY MOUTH 4 TIMES DAILY             rosuvastatin (CRESTOR) 40 MG tablet  Take 1 tablet by mouth every evening                   Medications marked \"taking\" at this time  Outpatient Prescriptions Marked as Taking for the 10/10/18 encounter (Office Visit) with Johnie Guzman MD   Medication Sig Dispense Refill    amiodarone (CORDARONE) 200 MG tablet Take 1 tablet by mouth daily 30 tablet 3    furosemide (LASIX) 40 MG tablet Take 1 tablet by mouth daily 60 tablet 3    cefTRIAXone (ROCEPHIN) 1 g injection Infuse 2 g intravenously every 24 hours for 42 doses 84 g 0    apixaban (ELIQUIS) 5 MG TABS tablet Take 1 tablet by mouth 2 times daily 60 tablet 0    baclofen (LIORESAL) 10 MG tablet TAKE ONE TABLET BY MOUTH THREE TIMES DAILY AS NEEDED 270 tablet 1    KLOR-CON M10 10 MEQ extended release tablet TAKE ONE TABLET BY MOUTH ONCE DAILY 90 tablet 3    levothyroxine (SYNTHROID) 25

## 2018-10-11 ENCOUNTER — HOSPITAL ENCOUNTER (OUTPATIENT)
Dept: INFUSION THERAPY | Age: 83
Discharge: HOME | End: 2018-10-11
Payer: MEDICARE

## 2018-10-11 VITALS
RESPIRATION RATE: 18 BRPM | SYSTOLIC BLOOD PRESSURE: 133 MMHG | TEMPERATURE: 97.5 F | HEART RATE: 85 BPM | DIASTOLIC BLOOD PRESSURE: 75 MMHG

## 2018-10-11 DIAGNOSIS — I33.0 INFECTIVE ENDOCARDITIS OF AORTIC VALVE: ICD-10-CM

## 2018-10-11 PROCEDURE — 2580000003 HC RX 258: Performed by: FAMILY MEDICINE

## 2018-10-11 PROCEDURE — 96374 THER/PROPH/DIAG INJ IV PUSH: CPT

## 2018-10-11 PROCEDURE — 6360000002 HC RX W HCPCS: Performed by: FAMILY MEDICINE

## 2018-10-11 RX ORDER — SODIUM CHLORIDE 0.9 % (FLUSH) 0.9 %
10 SYRINGE (ML) INJECTION PRN
Status: DISCONTINUED | OUTPATIENT
Start: 2018-10-11 | End: 2018-10-12 | Stop reason: HOSPADM

## 2018-10-11 RX ORDER — SODIUM CHLORIDE 0.9 % (FLUSH) 0.9 %
10 SYRINGE (ML) INJECTION PRN
Status: CANCELLED | OUTPATIENT
Start: 2018-10-11

## 2018-10-11 RX ADMIN — CEFTRIAXONE SODIUM 2 G: 2 INJECTION, POWDER, FOR SOLUTION INTRAMUSCULAR; INTRAVENOUS at 10:31

## 2018-10-11 RX ADMIN — Medication 10 ML: at 10:30

## 2018-10-11 RX ADMIN — Medication 10 ML: at 10:40

## 2018-10-12 ENCOUNTER — HOSPITAL ENCOUNTER (OUTPATIENT)
Dept: INFUSION THERAPY | Age: 83
Discharge: HOME | End: 2018-10-12
Payer: MEDICARE

## 2018-10-12 VITALS
HEART RATE: 79 BPM | RESPIRATION RATE: 20 BRPM | SYSTOLIC BLOOD PRESSURE: 130 MMHG | DIASTOLIC BLOOD PRESSURE: 76 MMHG | TEMPERATURE: 97.3 F

## 2018-10-12 DIAGNOSIS — I33.0 INFECTIVE ENDOCARDITIS OF AORTIC VALVE: ICD-10-CM

## 2018-10-12 PROCEDURE — 6360000002 HC RX W HCPCS: Performed by: FAMILY MEDICINE

## 2018-10-12 PROCEDURE — 2580000003 HC RX 258: Performed by: FAMILY MEDICINE

## 2018-10-12 PROCEDURE — 96374 THER/PROPH/DIAG INJ IV PUSH: CPT

## 2018-10-12 RX ORDER — SODIUM CHLORIDE 0.9 % (FLUSH) 0.9 %
10 SYRINGE (ML) INJECTION PRN
Status: CANCELLED | OUTPATIENT
Start: 2018-10-12

## 2018-10-12 RX ORDER — SODIUM CHLORIDE 0.9 % (FLUSH) 0.9 %
10 SYRINGE (ML) INJECTION PRN
Status: DISCONTINUED | OUTPATIENT
Start: 2018-10-12 | End: 2018-10-13 | Stop reason: HOSPADM

## 2018-10-12 RX ADMIN — Medication 10 ML: at 10:35

## 2018-10-12 RX ADMIN — CEFTRIAXONE SODIUM 2 G: 2 INJECTION, POWDER, FOR SOLUTION INTRAMUSCULAR; INTRAVENOUS at 10:36

## 2018-10-12 RX ADMIN — Medication 10 ML: at 10:47

## 2018-10-12 RX ADMIN — Medication 10 ML: at 10:46

## 2018-10-13 ENCOUNTER — HOSPITAL ENCOUNTER (OUTPATIENT)
Dept: INFUSION THERAPY | Age: 83
Discharge: HOME | End: 2018-10-13
Payer: MEDICARE

## 2018-10-13 VITALS
RESPIRATION RATE: 18 BRPM | DIASTOLIC BLOOD PRESSURE: 73 MMHG | TEMPERATURE: 97.8 F | SYSTOLIC BLOOD PRESSURE: 138 MMHG | HEART RATE: 81 BPM | OXYGEN SATURATION: 96 %

## 2018-10-13 DIAGNOSIS — I33.0 INFECTIVE ENDOCARDITIS OF AORTIC VALVE: ICD-10-CM

## 2018-10-13 PROCEDURE — 6360000002 HC RX W HCPCS: Performed by: FAMILY MEDICINE

## 2018-10-13 PROCEDURE — 2580000003 HC RX 258: Performed by: FAMILY MEDICINE

## 2018-10-13 PROCEDURE — 96374 THER/PROPH/DIAG INJ IV PUSH: CPT

## 2018-10-13 RX ORDER — SODIUM CHLORIDE 0.9 % (FLUSH) 0.9 %
10 SYRINGE (ML) INJECTION PRN
Status: DISCONTINUED | OUTPATIENT
Start: 2018-10-13 | End: 2018-10-14 | Stop reason: HOSPADM

## 2018-10-13 RX ORDER — SODIUM CHLORIDE 0.9 % (FLUSH) 0.9 %
10 SYRINGE (ML) INJECTION PRN
Status: CANCELLED | OUTPATIENT
Start: 2018-10-13

## 2018-10-13 RX ADMIN — Medication 10 ML: at 10:45

## 2018-10-13 RX ADMIN — WATER 2 G: 1 INJECTION INTRAMUSCULAR; INTRAVENOUS; SUBCUTANEOUS at 10:45

## 2018-10-13 NOTE — PLAN OF CARE
IV ATB administered over 5-6 minutes. Patient tolerated well. IV dressing in place well and has old dried blood on it. IV flushes easily. No new drainage noted. Reviewed appointment time for tomorrow. Ambulated to [facility] ([facility]) waiting room to spouse.  Ian Camarillo RN

## 2018-10-14 ENCOUNTER — HOSPITAL ENCOUNTER (OUTPATIENT)
Dept: INFUSION THERAPY | Age: 83
Discharge: HOME | End: 2018-10-14
Payer: MEDICARE

## 2018-10-14 VITALS
DIASTOLIC BLOOD PRESSURE: 64 MMHG | RESPIRATION RATE: 16 BRPM | TEMPERATURE: 98.4 F | SYSTOLIC BLOOD PRESSURE: 160 MMHG | HEART RATE: 77 BPM

## 2018-10-14 DIAGNOSIS — I33.0 INFECTIVE ENDOCARDITIS OF AORTIC VALVE: ICD-10-CM

## 2018-10-14 PROCEDURE — 96374 THER/PROPH/DIAG INJ IV PUSH: CPT

## 2018-10-14 PROCEDURE — 6360000002 HC RX W HCPCS: Performed by: FAMILY MEDICINE

## 2018-10-14 PROCEDURE — 2580000003 HC RX 258: Performed by: FAMILY MEDICINE

## 2018-10-14 RX ORDER — SODIUM CHLORIDE 0.9 % (FLUSH) 0.9 %
10 SYRINGE (ML) INJECTION PRN
Status: DISCONTINUED | OUTPATIENT
Start: 2018-10-14 | End: 2018-10-15 | Stop reason: HOSPADM

## 2018-10-14 RX ORDER — SODIUM CHLORIDE 0.9 % (FLUSH) 0.9 %
10 SYRINGE (ML) INJECTION PRN
Status: CANCELLED | OUTPATIENT
Start: 2018-10-14

## 2018-10-14 RX ADMIN — WATER 2 G: 1 INJECTION INTRAMUSCULAR; INTRAVENOUS; SUBCUTANEOUS at 08:42

## 2018-10-14 RX ADMIN — Medication 10 ML: at 08:42

## 2018-10-14 NOTE — PLAN OF CARE
Problem: Infection - Risk of, Central Venous Catheter-Associated Bloodstream Infection  Goal: Remain free from symptoms of infection  Patient will remain free from symptoms of infection:  - redness  - warmth  - increased temperature  - discharge  at PICC site   Outcome: Met This Shift           Pt ambulated to and from the room for the antibiotic injection to take place and was given over 5-6 minutes without difficulty. PICC lne was flushed with normal saline of 20 ml before and after the injection of the antibiotic. Entire procedure of the infusion and the flush of the PICC was tolerated well.

## 2018-10-15 ENCOUNTER — HOSPITAL ENCOUNTER (OUTPATIENT)
Dept: INFUSION THERAPY | Age: 83
Discharge: HOME | End: 2018-10-15
Payer: MEDICARE

## 2018-10-15 VITALS
TEMPERATURE: 98.6 F | DIASTOLIC BLOOD PRESSURE: 75 MMHG | RESPIRATION RATE: 16 BRPM | HEART RATE: 80 BPM | SYSTOLIC BLOOD PRESSURE: 135 MMHG

## 2018-10-15 DIAGNOSIS — I33.0 INFECTIVE ENDOCARDITIS OF AORTIC VALVE: ICD-10-CM

## 2018-10-15 DIAGNOSIS — I33.0 ACUTE BACTERIAL ENDOCARDITIS: ICD-10-CM

## 2018-10-15 PROCEDURE — 99213 OFFICE O/P EST LOW 20 MIN: CPT

## 2018-10-15 PROCEDURE — 6360000002 HC RX W HCPCS: Performed by: FAMILY MEDICINE

## 2018-10-15 PROCEDURE — 96374 THER/PROPH/DIAG INJ IV PUSH: CPT

## 2018-10-15 PROCEDURE — 2580000003 HC RX 258: Performed by: FAMILY MEDICINE

## 2018-10-15 RX ORDER — SODIUM CHLORIDE 0.9 % (FLUSH) 0.9 %
10 SYRINGE (ML) INJECTION PRN
Status: DISCONTINUED | OUTPATIENT
Start: 2018-10-15 | End: 2018-10-16 | Stop reason: HOSPADM

## 2018-10-15 RX ORDER — SODIUM CHLORIDE 0.9 % (FLUSH) 0.9 %
10 SYRINGE (ML) INJECTION PRN
Status: CANCELLED | OUTPATIENT
Start: 2018-10-15

## 2018-10-15 RX ADMIN — Medication 10 ML: at 11:03

## 2018-10-15 RX ADMIN — WATER 2 G: 1 INJECTION INTRAMUSCULAR; INTRAVENOUS; SUBCUTANEOUS at 10:56

## 2018-10-15 RX ADMIN — Medication 10 ML: at 10:50

## 2018-10-15 NOTE — PLAN OF CARE
Problem: KNOWLEDGE DEFICIT  Goal: Patient/S.O. demonstrates understanding of disease process, treatment plan, medications, and discharge instructions.   Outcome: Met This Shift      Problem: Infection - Risk of, Central Catheter-Associated Bloodstream Infection  Goal: Remain free from symptoms of infection  Patient will remain free from symptoms of infection:  - redness  - warmth  - increased temperature  - discharge     Outcome: Met This Shift

## 2018-10-16 ENCOUNTER — HOSPITAL ENCOUNTER (OUTPATIENT)
Dept: INFUSION THERAPY | Age: 83
Discharge: HOME | End: 2018-10-16
Payer: MEDICARE

## 2018-10-16 VITALS
DIASTOLIC BLOOD PRESSURE: 82 MMHG | SYSTOLIC BLOOD PRESSURE: 130 MMHG | RESPIRATION RATE: 20 BRPM | TEMPERATURE: 97.1 F | HEART RATE: 81 BPM

## 2018-10-16 DIAGNOSIS — I33.0 INFECTIVE ENDOCARDITIS OF AORTIC VALVE: ICD-10-CM

## 2018-10-16 DIAGNOSIS — I33.0 ACUTE BACTERIAL ENDOCARDITIS: ICD-10-CM

## 2018-10-16 PROCEDURE — 96374 THER/PROPH/DIAG INJ IV PUSH: CPT

## 2018-10-16 PROCEDURE — 2580000003 HC RX 258: Performed by: FAMILY MEDICINE

## 2018-10-16 PROCEDURE — 6360000002 HC RX W HCPCS: Performed by: FAMILY MEDICINE

## 2018-10-16 RX ORDER — SODIUM CHLORIDE 0.9 % (FLUSH) 0.9 %
10 SYRINGE (ML) INJECTION PRN
Status: CANCELLED | OUTPATIENT
Start: 2018-10-16

## 2018-10-16 RX ORDER — SODIUM CHLORIDE 0.9 % (FLUSH) 0.9 %
10 SYRINGE (ML) INJECTION PRN
Status: DISCONTINUED | OUTPATIENT
Start: 2018-10-16 | End: 2018-10-17 | Stop reason: HOSPADM

## 2018-10-16 RX ADMIN — WATER 2 G: 1 INJECTION INTRAMUSCULAR; INTRAVENOUS; SUBCUTANEOUS at 10:39

## 2018-10-16 RX ADMIN — Medication 10 ML: at 10:35

## 2018-10-16 RX ADMIN — Medication 10 ML: at 10:49

## 2018-10-16 RX ADMIN — Medication 10 ML: at 10:48

## 2018-10-17 ENCOUNTER — HOSPITAL ENCOUNTER (OUTPATIENT)
Dept: INFUSION THERAPY | Age: 83
Discharge: HOME | End: 2018-10-17
Payer: MEDICARE

## 2018-10-17 ENCOUNTER — CARE COORDINATION (OUTPATIENT)
Dept: CASE MANAGEMENT | Age: 83
End: 2018-10-17

## 2018-10-17 ENCOUNTER — OFFICE VISIT (OUTPATIENT)
Dept: FAMILY MEDICINE CLINIC | Age: 83
End: 2018-10-17
Payer: MEDICARE

## 2018-10-17 VITALS
SYSTOLIC BLOOD PRESSURE: 135 MMHG | DIASTOLIC BLOOD PRESSURE: 71 MMHG | HEART RATE: 77 BPM | TEMPERATURE: 97.1 F | RESPIRATION RATE: 20 BRPM

## 2018-10-17 DIAGNOSIS — Z23 NEEDS FLU SHOT: Primary | ICD-10-CM

## 2018-10-17 DIAGNOSIS — I33.0 ACUTE BACTERIAL ENDOCARDITIS: ICD-10-CM

## 2018-10-17 DIAGNOSIS — I33.0 INFECTIVE ENDOCARDITIS OF AORTIC VALVE: ICD-10-CM

## 2018-10-17 PROCEDURE — 96374 THER/PROPH/DIAG INJ IV PUSH: CPT

## 2018-10-17 PROCEDURE — 2580000003 HC RX 258: Performed by: FAMILY MEDICINE

## 2018-10-17 PROCEDURE — G0008 ADMIN INFLUENZA VIRUS VAC: HCPCS | Performed by: FAMILY MEDICINE

## 2018-10-17 PROCEDURE — 90662 IIV NO PRSV INCREASED AG IM: CPT | Performed by: FAMILY MEDICINE

## 2018-10-17 PROCEDURE — 6360000002 HC RX W HCPCS: Performed by: FAMILY MEDICINE

## 2018-10-17 RX ORDER — OXCARBAZEPINE 300 MG/1
TABLET, FILM COATED ORAL
Qty: 360 TABLET | Refills: 1 | Status: SHIPPED | OUTPATIENT
Start: 2018-10-17 | End: 2019-01-01 | Stop reason: SDUPTHER

## 2018-10-17 RX ORDER — SODIUM CHLORIDE 0.9 % (FLUSH) 0.9 %
10 SYRINGE (ML) INJECTION PRN
Status: DISCONTINUED | OUTPATIENT
Start: 2018-10-17 | End: 2018-10-18 | Stop reason: HOSPADM

## 2018-10-17 RX ORDER — SODIUM CHLORIDE 0.9 % (FLUSH) 0.9 %
10 SYRINGE (ML) INJECTION PRN
Status: CANCELLED | OUTPATIENT
Start: 2018-10-17

## 2018-10-17 RX ORDER — LEVOTHYROXINE SODIUM 0.03 MG/1
TABLET ORAL
Qty: 90 TABLET | Refills: 1 | Status: SHIPPED | OUTPATIENT
Start: 2018-10-17 | End: 2019-03-12 | Stop reason: DRUGHIGH

## 2018-10-17 RX ADMIN — WATER 2 G: 1 INJECTION INTRAMUSCULAR; INTRAVENOUS; SUBCUTANEOUS at 10:37

## 2018-10-17 RX ADMIN — Medication 10 ML: at 10:46

## 2018-10-17 RX ADMIN — Medication 10 ML: at 10:45

## 2018-10-17 RX ADMIN — Medication 10 ML: at 10:35

## 2018-10-18 ENCOUNTER — HOSPITAL ENCOUNTER (OUTPATIENT)
Dept: INFUSION THERAPY | Age: 83
Discharge: HOME | End: 2018-10-18
Payer: MEDICARE

## 2018-10-18 VITALS
DIASTOLIC BLOOD PRESSURE: 74 MMHG | SYSTOLIC BLOOD PRESSURE: 140 MMHG | RESPIRATION RATE: 20 BRPM | HEART RATE: 75 BPM | TEMPERATURE: 97.7 F

## 2018-10-18 DIAGNOSIS — I33.0 ACUTE BACTERIAL ENDOCARDITIS: ICD-10-CM

## 2018-10-18 DIAGNOSIS — I33.0 INFECTIVE ENDOCARDITIS OF AORTIC VALVE: ICD-10-CM

## 2018-10-18 PROCEDURE — 6360000002 HC RX W HCPCS: Performed by: FAMILY MEDICINE

## 2018-10-18 PROCEDURE — 96374 THER/PROPH/DIAG INJ IV PUSH: CPT

## 2018-10-18 PROCEDURE — 2580000003 HC RX 258: Performed by: FAMILY MEDICINE

## 2018-10-18 RX ORDER — SODIUM CHLORIDE 0.9 % (FLUSH) 0.9 %
10 SYRINGE (ML) INJECTION PRN
Status: DISCONTINUED | OUTPATIENT
Start: 2018-10-18 | End: 2018-10-19 | Stop reason: HOSPADM

## 2018-10-18 RX ORDER — SODIUM CHLORIDE 0.9 % (FLUSH) 0.9 %
10 SYRINGE (ML) INJECTION PRN
Status: CANCELLED | OUTPATIENT
Start: 2018-10-18

## 2018-10-18 RX ADMIN — Medication 10 ML: at 10:41

## 2018-10-18 RX ADMIN — WATER 2 G: 1 INJECTION INTRAMUSCULAR; INTRAVENOUS; SUBCUTANEOUS at 10:45

## 2018-10-18 RX ADMIN — Medication 10 ML: at 10:51

## 2018-10-18 RX ADMIN — Medication 10 ML: at 10:52

## 2018-10-19 ENCOUNTER — HOSPITAL ENCOUNTER (OUTPATIENT)
Dept: INFUSION THERAPY | Age: 83
Discharge: HOME | End: 2018-10-19
Payer: MEDICARE

## 2018-10-19 VITALS
TEMPERATURE: 96.9 F | SYSTOLIC BLOOD PRESSURE: 165 MMHG | HEART RATE: 77 BPM | DIASTOLIC BLOOD PRESSURE: 58 MMHG | RESPIRATION RATE: 20 BRPM

## 2018-10-19 DIAGNOSIS — I33.0 INFECTIVE ENDOCARDITIS OF AORTIC VALVE: ICD-10-CM

## 2018-10-19 DIAGNOSIS — I33.0 ACUTE BACTERIAL ENDOCARDITIS: ICD-10-CM

## 2018-10-19 PROCEDURE — 2580000003 HC RX 258: Performed by: FAMILY MEDICINE

## 2018-10-19 PROCEDURE — 6360000002 HC RX W HCPCS: Performed by: FAMILY MEDICINE

## 2018-10-19 PROCEDURE — 96374 THER/PROPH/DIAG INJ IV PUSH: CPT

## 2018-10-19 RX ORDER — SODIUM CHLORIDE 0.9 % (FLUSH) 0.9 %
10 SYRINGE (ML) INJECTION PRN
Status: DISCONTINUED | OUTPATIENT
Start: 2018-10-19 | End: 2018-10-20 | Stop reason: HOSPADM

## 2018-10-19 RX ORDER — SODIUM CHLORIDE 0.9 % (FLUSH) 0.9 %
10 SYRINGE (ML) INJECTION PRN
Status: CANCELLED | OUTPATIENT
Start: 2018-10-19

## 2018-10-19 RX ADMIN — Medication 10 ML: at 10:41

## 2018-10-19 RX ADMIN — Medication 10 ML: at 10:40

## 2018-10-19 RX ADMIN — WATER 2 G: 1 INJECTION INTRAMUSCULAR; INTRAVENOUS; SUBCUTANEOUS at 10:33

## 2018-10-19 RX ADMIN — Medication 10 ML: at 10:31

## 2018-10-20 ENCOUNTER — HOSPITAL ENCOUNTER (OUTPATIENT)
Dept: INFUSION THERAPY | Age: 83
Discharge: HOME | End: 2018-10-20
Payer: MEDICARE

## 2018-10-20 VITALS
SYSTOLIC BLOOD PRESSURE: 161 MMHG | HEART RATE: 72 BPM | RESPIRATION RATE: 16 BRPM | DIASTOLIC BLOOD PRESSURE: 86 MMHG | OXYGEN SATURATION: 97 % | TEMPERATURE: 96.3 F

## 2018-10-20 DIAGNOSIS — I33.0 ACUTE BACTERIAL ENDOCARDITIS: ICD-10-CM

## 2018-10-20 DIAGNOSIS — I33.0 INFECTIVE ENDOCARDITIS OF AORTIC VALVE: ICD-10-CM

## 2018-10-20 PROCEDURE — 2580000003 HC RX 258: Performed by: FAMILY MEDICINE

## 2018-10-20 PROCEDURE — 6360000002 HC RX W HCPCS: Performed by: FAMILY MEDICINE

## 2018-10-20 PROCEDURE — 96374 THER/PROPH/DIAG INJ IV PUSH: CPT

## 2018-10-20 RX ORDER — SODIUM CHLORIDE 0.9 % (FLUSH) 0.9 %
10 SYRINGE (ML) INJECTION PRN
Status: CANCELLED | OUTPATIENT
Start: 2018-10-20

## 2018-10-20 RX ORDER — SODIUM CHLORIDE 0.9 % (FLUSH) 0.9 %
10 SYRINGE (ML) INJECTION PRN
Status: DISCONTINUED | OUTPATIENT
Start: 2018-10-20 | End: 2018-10-21 | Stop reason: HOSPADM

## 2018-10-20 RX ADMIN — WATER 2 G: 1 INJECTION INTRAMUSCULAR; INTRAVENOUS; SUBCUTANEOUS at 10:38

## 2018-10-20 RX ADMIN — Medication 10 ML: at 10:50

## 2018-10-21 ENCOUNTER — HOSPITAL ENCOUNTER (OUTPATIENT)
Dept: INFUSION THERAPY | Age: 83
End: 2018-10-21
Payer: MEDICARE

## 2018-10-21 ENCOUNTER — HOSPITAL ENCOUNTER (OUTPATIENT)
Dept: INFUSION THERAPY | Age: 83
Discharge: HOME | End: 2018-10-21
Payer: MEDICARE

## 2018-10-21 VITALS
OXYGEN SATURATION: 96 % | SYSTOLIC BLOOD PRESSURE: 156 MMHG | DIASTOLIC BLOOD PRESSURE: 84 MMHG | RESPIRATION RATE: 16 BRPM | TEMPERATURE: 97.7 F | HEART RATE: 76 BPM

## 2018-10-21 DIAGNOSIS — I33.0 INFECTIVE ENDOCARDITIS OF AORTIC VALVE: ICD-10-CM

## 2018-10-21 DIAGNOSIS — I33.0 ACUTE BACTERIAL ENDOCARDITIS: ICD-10-CM

## 2018-10-21 PROCEDURE — 6360000002 HC RX W HCPCS: Performed by: FAMILY MEDICINE

## 2018-10-21 PROCEDURE — 96374 THER/PROPH/DIAG INJ IV PUSH: CPT

## 2018-10-21 PROCEDURE — 2580000003 HC RX 258: Performed by: FAMILY MEDICINE

## 2018-10-21 RX ORDER — SODIUM CHLORIDE 0.9 % (FLUSH) 0.9 %
10 SYRINGE (ML) INJECTION PRN
Status: DISCONTINUED | OUTPATIENT
Start: 2018-10-21 | End: 2018-10-22 | Stop reason: HOSPADM

## 2018-10-21 RX ORDER — SODIUM CHLORIDE 0.9 % (FLUSH) 0.9 %
10 SYRINGE (ML) INJECTION PRN
Status: CANCELLED | OUTPATIENT
Start: 2018-10-21

## 2018-10-21 RX ADMIN — WATER 2 G: 1 INJECTION INTRAMUSCULAR; INTRAVENOUS; SUBCUTANEOUS at 08:49

## 2018-10-21 RX ADMIN — Medication 10 ML: at 09:00

## 2018-10-22 ENCOUNTER — HOSPITAL ENCOUNTER (OUTPATIENT)
Dept: INFUSION THERAPY | Age: 83
Discharge: HOME | End: 2018-10-22
Payer: MEDICARE

## 2018-10-22 VITALS
DIASTOLIC BLOOD PRESSURE: 87 MMHG | HEART RATE: 83 BPM | TEMPERATURE: 97.8 F | SYSTOLIC BLOOD PRESSURE: 141 MMHG | RESPIRATION RATE: 20 BRPM

## 2018-10-22 DIAGNOSIS — I33.0 ACUTE BACTERIAL ENDOCARDITIS: ICD-10-CM

## 2018-10-22 DIAGNOSIS — I33.0 INFECTIVE ENDOCARDITIS OF AORTIC VALVE: ICD-10-CM

## 2018-10-22 PROCEDURE — 6360000002 HC RX W HCPCS: Performed by: FAMILY MEDICINE

## 2018-10-22 PROCEDURE — 96374 THER/PROPH/DIAG INJ IV PUSH: CPT

## 2018-10-22 PROCEDURE — 2580000003 HC RX 258: Performed by: FAMILY MEDICINE

## 2018-10-22 PROCEDURE — 99213 OFFICE O/P EST LOW 20 MIN: CPT

## 2018-10-22 RX ORDER — SODIUM CHLORIDE 0.9 % (FLUSH) 0.9 %
10 SYRINGE (ML) INJECTION PRN
Status: CANCELLED | OUTPATIENT
Start: 2018-10-22

## 2018-10-22 RX ORDER — SODIUM CHLORIDE 0.9 % (FLUSH) 0.9 %
10 SYRINGE (ML) INJECTION PRN
Status: DISCONTINUED | OUTPATIENT
Start: 2018-10-22 | End: 2018-10-23 | Stop reason: HOSPADM

## 2018-10-22 RX ADMIN — Medication 10 ML: at 10:35

## 2018-10-22 RX ADMIN — WATER 2 G: 1 INJECTION INTRAMUSCULAR; INTRAVENOUS; SUBCUTANEOUS at 10:44

## 2018-10-22 RX ADMIN — Medication 10 ML: at 10:53

## 2018-10-22 RX ADMIN — Medication 10 ML: at 10:52

## 2018-10-23 ENCOUNTER — HOSPITAL ENCOUNTER (OUTPATIENT)
Dept: INFUSION THERAPY | Age: 83
Discharge: HOME | End: 2018-10-23
Payer: MEDICARE

## 2018-10-23 VITALS
DIASTOLIC BLOOD PRESSURE: 69 MMHG | HEART RATE: 77 BPM | SYSTOLIC BLOOD PRESSURE: 132 MMHG | TEMPERATURE: 97.6 F | RESPIRATION RATE: 20 BRPM

## 2018-10-23 DIAGNOSIS — I33.0 ACUTE BACTERIAL ENDOCARDITIS: ICD-10-CM

## 2018-10-23 DIAGNOSIS — I33.0 INFECTIVE ENDOCARDITIS OF AORTIC VALVE: ICD-10-CM

## 2018-10-23 PROCEDURE — 2580000003 HC RX 258: Performed by: FAMILY MEDICINE

## 2018-10-23 PROCEDURE — 6360000002 HC RX W HCPCS: Performed by: FAMILY MEDICINE

## 2018-10-23 PROCEDURE — 96374 THER/PROPH/DIAG INJ IV PUSH: CPT

## 2018-10-23 RX ORDER — SODIUM CHLORIDE 0.9 % (FLUSH) 0.9 %
10 SYRINGE (ML) INJECTION PRN
Status: CANCELLED | OUTPATIENT
Start: 2018-10-23

## 2018-10-23 RX ORDER — SODIUM CHLORIDE 0.9 % (FLUSH) 0.9 %
10 SYRINGE (ML) INJECTION PRN
Status: DISCONTINUED | OUTPATIENT
Start: 2018-10-23 | End: 2018-10-24 | Stop reason: HOSPADM

## 2018-10-23 RX ADMIN — Medication 10 ML: at 10:53

## 2018-10-23 RX ADMIN — Medication 10 ML: at 10:54

## 2018-10-23 RX ADMIN — WATER 2 G: 1 INJECTION INTRAMUSCULAR; INTRAVENOUS; SUBCUTANEOUS at 10:45

## 2018-10-23 RX ADMIN — Medication 10 ML: at 10:40

## 2018-10-24 ENCOUNTER — HOSPITAL ENCOUNTER (OUTPATIENT)
Dept: INFUSION THERAPY | Age: 83
Discharge: HOME | End: 2018-10-24
Payer: MEDICARE

## 2018-10-24 VITALS
HEART RATE: 72 BPM | DIASTOLIC BLOOD PRESSURE: 69 MMHG | RESPIRATION RATE: 20 BRPM | TEMPERATURE: 97 F | SYSTOLIC BLOOD PRESSURE: 136 MMHG

## 2018-10-24 DIAGNOSIS — I33.0 INFECTIVE ENDOCARDITIS OF AORTIC VALVE: ICD-10-CM

## 2018-10-24 DIAGNOSIS — I33.0 ACUTE BACTERIAL ENDOCARDITIS: ICD-10-CM

## 2018-10-24 PROCEDURE — 96374 THER/PROPH/DIAG INJ IV PUSH: CPT

## 2018-10-24 PROCEDURE — 2580000003 HC RX 258: Performed by: FAMILY MEDICINE

## 2018-10-24 PROCEDURE — 6360000002 HC RX W HCPCS: Performed by: FAMILY MEDICINE

## 2018-10-24 RX ORDER — SODIUM CHLORIDE 0.9 % (FLUSH) 0.9 %
10 SYRINGE (ML) INJECTION PRN
Status: CANCELLED | OUTPATIENT
Start: 2018-10-24

## 2018-10-24 RX ORDER — SODIUM CHLORIDE 0.9 % (FLUSH) 0.9 %
10 SYRINGE (ML) INJECTION PRN
Status: DISCONTINUED | OUTPATIENT
Start: 2018-10-24 | End: 2018-10-25 | Stop reason: HOSPADM

## 2018-10-24 RX ADMIN — WATER 2 G: 1 INJECTION INTRAMUSCULAR; INTRAVENOUS; SUBCUTANEOUS at 10:34

## 2018-10-24 RX ADMIN — Medication 10 ML: at 10:44

## 2018-10-24 RX ADMIN — Medication 10 ML: at 10:30

## 2018-10-24 RX ADMIN — Medication 10 ML: at 10:43

## 2018-10-25 ENCOUNTER — HOSPITAL ENCOUNTER (OUTPATIENT)
Dept: INFUSION THERAPY | Age: 83
Discharge: HOME | End: 2018-10-25
Payer: MEDICARE

## 2018-10-25 VITALS
RESPIRATION RATE: 20 BRPM | HEART RATE: 86 BPM | TEMPERATURE: 96.2 F | DIASTOLIC BLOOD PRESSURE: 73 MMHG | SYSTOLIC BLOOD PRESSURE: 147 MMHG

## 2018-10-25 DIAGNOSIS — I33.0 INFECTIVE ENDOCARDITIS OF AORTIC VALVE: ICD-10-CM

## 2018-10-25 DIAGNOSIS — I33.0 ACUTE BACTERIAL ENDOCARDITIS: ICD-10-CM

## 2018-10-25 PROCEDURE — 2580000003 HC RX 258: Performed by: FAMILY MEDICINE

## 2018-10-25 PROCEDURE — 6360000002 HC RX W HCPCS: Performed by: FAMILY MEDICINE

## 2018-10-25 PROCEDURE — 96374 THER/PROPH/DIAG INJ IV PUSH: CPT

## 2018-10-25 RX ORDER — SODIUM CHLORIDE 0.9 % (FLUSH) 0.9 %
10 SYRINGE (ML) INJECTION PRN
Status: DISCONTINUED | OUTPATIENT
Start: 2018-10-25 | End: 2018-10-26 | Stop reason: HOSPADM

## 2018-10-25 RX ORDER — SODIUM CHLORIDE 0.9 % (FLUSH) 0.9 %
10 SYRINGE (ML) INJECTION PRN
Status: CANCELLED | OUTPATIENT
Start: 2018-10-25

## 2018-10-25 RX ADMIN — WATER 2 G: 1 INJECTION INTRAMUSCULAR; INTRAVENOUS; SUBCUTANEOUS at 10:31

## 2018-10-25 RX ADMIN — Medication 10 ML: at 10:40

## 2018-10-25 RX ADMIN — Medication 10 ML: at 10:41

## 2018-10-25 RX ADMIN — Medication 10 ML: at 10:30

## 2018-10-26 ENCOUNTER — HOSPITAL ENCOUNTER (OUTPATIENT)
Dept: INFUSION THERAPY | Age: 83
Discharge: HOME | End: 2018-10-26
Payer: MEDICARE

## 2018-10-26 VITALS
HEART RATE: 75 BPM | SYSTOLIC BLOOD PRESSURE: 139 MMHG | RESPIRATION RATE: 18 BRPM | DIASTOLIC BLOOD PRESSURE: 71 MMHG | TEMPERATURE: 97 F

## 2018-10-26 DIAGNOSIS — I33.0 INFECTIVE ENDOCARDITIS OF AORTIC VALVE: ICD-10-CM

## 2018-10-26 DIAGNOSIS — I33.0 ACUTE BACTERIAL ENDOCARDITIS: ICD-10-CM

## 2018-10-26 PROCEDURE — 96374 THER/PROPH/DIAG INJ IV PUSH: CPT

## 2018-10-26 PROCEDURE — 2580000003 HC RX 258: Performed by: FAMILY MEDICINE

## 2018-10-26 PROCEDURE — 6360000002 HC RX W HCPCS: Performed by: FAMILY MEDICINE

## 2018-10-26 RX ORDER — SODIUM CHLORIDE 0.9 % (FLUSH) 0.9 %
10 SYRINGE (ML) INJECTION PRN
Status: DISCONTINUED | OUTPATIENT
Start: 2018-10-26 | End: 2018-10-27 | Stop reason: HOSPADM

## 2018-10-26 RX ORDER — SODIUM CHLORIDE 0.9 % (FLUSH) 0.9 %
10 SYRINGE (ML) INJECTION PRN
Status: CANCELLED | OUTPATIENT
Start: 2018-10-26

## 2018-10-26 RX ADMIN — Medication 20 ML: at 11:05

## 2018-10-26 RX ADMIN — CEFTRIAXONE SODIUM 2 G: 2 INJECTION, POWDER, FOR SOLUTION INTRAMUSCULAR; INTRAVENOUS at 10:55

## 2018-10-26 RX ADMIN — Medication 10 ML: at 10:50

## 2018-10-27 ENCOUNTER — HOSPITAL ENCOUNTER (OUTPATIENT)
Dept: INFUSION THERAPY | Age: 83
Discharge: HOME | End: 2018-10-27
Payer: MEDICARE

## 2018-10-27 VITALS
TEMPERATURE: 97.3 F | SYSTOLIC BLOOD PRESSURE: 142 MMHG | DIASTOLIC BLOOD PRESSURE: 71 MMHG | HEART RATE: 81 BPM | RESPIRATION RATE: 18 BRPM

## 2018-10-27 DIAGNOSIS — I33.0 ACUTE BACTERIAL ENDOCARDITIS: ICD-10-CM

## 2018-10-27 DIAGNOSIS — I33.0 INFECTIVE ENDOCARDITIS OF AORTIC VALVE: ICD-10-CM

## 2018-10-27 PROCEDURE — 6360000002 HC RX W HCPCS: Performed by: FAMILY MEDICINE

## 2018-10-27 PROCEDURE — 96374 THER/PROPH/DIAG INJ IV PUSH: CPT

## 2018-10-27 PROCEDURE — 2580000003 HC RX 258: Performed by: FAMILY MEDICINE

## 2018-10-27 PROCEDURE — 96376 TX/PRO/DX INJ SAME DRUG ADON: CPT

## 2018-10-27 RX ORDER — SODIUM CHLORIDE 0.9 % (FLUSH) 0.9 %
10 SYRINGE (ML) INJECTION PRN
Status: CANCELLED | OUTPATIENT
Start: 2018-10-27

## 2018-10-27 RX ADMIN — CEFTRIAXONE SODIUM 2 G: 2 INJECTION, POWDER, FOR SOLUTION INTRAMUSCULAR; INTRAVENOUS at 10:37

## 2018-10-27 NOTE — PLAN OF CARE
Pt. Here for O.P. Rocephin. (R) basilic PICC intact, skin without induration. PICC flushed pulsatile with 20 ml NS with ease after confirming positive blood return. Rocephin given IVP over 5 minutes with ease. PICC flushed again with 20 ml NS with ease. Swabcap applied. PICC further secure with stockinette. Pt. Discharged ambulatory without complaint.

## 2018-10-28 ENCOUNTER — HOSPITAL ENCOUNTER (OUTPATIENT)
Dept: INFUSION THERAPY | Age: 83
Discharge: HOME | End: 2018-10-28
Payer: MEDICARE

## 2018-10-28 VITALS
DIASTOLIC BLOOD PRESSURE: 67 MMHG | SYSTOLIC BLOOD PRESSURE: 137 MMHG | TEMPERATURE: 98.1 F | HEART RATE: 76 BPM | RESPIRATION RATE: 18 BRPM

## 2018-10-28 DIAGNOSIS — I33.0 INFECTIVE ENDOCARDITIS OF AORTIC VALVE: ICD-10-CM

## 2018-10-28 DIAGNOSIS — I33.0 ACUTE BACTERIAL ENDOCARDITIS: ICD-10-CM

## 2018-10-28 PROCEDURE — 6360000002 HC RX W HCPCS: Performed by: FAMILY MEDICINE

## 2018-10-28 PROCEDURE — 2580000003 HC RX 258: Performed by: FAMILY MEDICINE

## 2018-10-28 PROCEDURE — 99213 OFFICE O/P EST LOW 20 MIN: CPT

## 2018-10-28 PROCEDURE — 96374 THER/PROPH/DIAG INJ IV PUSH: CPT

## 2018-10-28 PROCEDURE — 96376 TX/PRO/DX INJ SAME DRUG ADON: CPT

## 2018-10-28 RX ORDER — SODIUM CHLORIDE 0.9 % (FLUSH) 0.9 %
10 SYRINGE (ML) INJECTION PRN
Status: CANCELLED | OUTPATIENT
Start: 2018-10-28

## 2018-10-28 RX ADMIN — CEFTRIAXONE SODIUM 2 G: 2 INJECTION, POWDER, FOR SOLUTION INTRAMUSCULAR; INTRAVENOUS at 08:54

## 2018-10-28 NOTE — PLAN OF CARE
Pt. Here for O.P. Rocephin. Denies fever/chills, pain or diff. Breathing. (R) basilic PICC intact skin site wo/ induration. PICC with positive blood return, flushed pulsatile with 20 ml NS with ease. Rocephin given over 5 minutes, pt. Tolerated. PICC flushed with 20 ml NS again with ease. Swabcap applied. PICC further secure with stockinette. Pt. Was discharged ambulatory without complaint.

## 2018-10-29 ENCOUNTER — HOSPITAL ENCOUNTER (OUTPATIENT)
Dept: INFUSION THERAPY | Age: 83
Discharge: HOME | End: 2018-10-29
Payer: MEDICARE

## 2018-10-29 VITALS
DIASTOLIC BLOOD PRESSURE: 95 MMHG | SYSTOLIC BLOOD PRESSURE: 151 MMHG | HEART RATE: 84 BPM | RESPIRATION RATE: 20 BRPM | TEMPERATURE: 97.2 F

## 2018-10-29 DIAGNOSIS — I33.0 INFECTIVE ENDOCARDITIS OF AORTIC VALVE: ICD-10-CM

## 2018-10-29 DIAGNOSIS — I33.0 ACUTE BACTERIAL ENDOCARDITIS: ICD-10-CM

## 2018-10-29 PROCEDURE — 96374 THER/PROPH/DIAG INJ IV PUSH: CPT

## 2018-10-29 PROCEDURE — 2580000003 HC RX 258: Performed by: FAMILY MEDICINE

## 2018-10-29 PROCEDURE — 6360000002 HC RX W HCPCS: Performed by: FAMILY MEDICINE

## 2018-10-29 PROCEDURE — 99213 OFFICE O/P EST LOW 20 MIN: CPT

## 2018-10-29 RX ORDER — SODIUM CHLORIDE 0.9 % (FLUSH) 0.9 %
10 SYRINGE (ML) INJECTION PRN
Status: CANCELLED | OUTPATIENT
Start: 2018-10-29

## 2018-10-29 RX ORDER — SODIUM CHLORIDE 0.9 % (FLUSH) 0.9 %
10 SYRINGE (ML) INJECTION PRN
Status: DISCONTINUED | OUTPATIENT
Start: 2018-10-29 | End: 2018-10-30 | Stop reason: HOSPADM

## 2018-10-29 RX ORDER — SODIUM CHLORIDE 0.9 % (FLUSH) 0.9 %
10 SYRINGE (ML) INJECTION PRN
Status: CANCELLED | OUTPATIENT
Start: 2018-10-30

## 2018-10-29 RX ADMIN — Medication 10 ML: at 10:40

## 2018-10-29 RX ADMIN — Medication 20 ML: at 10:50

## 2018-10-29 RX ADMIN — WATER 2 G: 1 INJECTION INTRAMUSCULAR; INTRAVENOUS; SUBCUTANEOUS at 10:41

## 2018-10-29 NOTE — PROGRESS NOTES
1055-While changing pts. PICC dressing it was noted that skin was coming off with the stat lock. Alcohol swabs x's 2 used prior to removal and while continuing to remove. Skin tear noted. Triple antibiotic ointment applied et. 2 2x2's applied then new tegaderm. Skin barrier and new stat lock also applied.

## 2018-10-30 ENCOUNTER — HOSPITAL ENCOUNTER (OUTPATIENT)
Dept: INFUSION THERAPY | Age: 83
Discharge: HOME | End: 2018-10-30
Payer: MEDICARE

## 2018-10-30 VITALS
RESPIRATION RATE: 20 BRPM | SYSTOLIC BLOOD PRESSURE: 153 MMHG | HEART RATE: 82 BPM | TEMPERATURE: 97.2 F | DIASTOLIC BLOOD PRESSURE: 80 MMHG

## 2018-10-30 DIAGNOSIS — I33.0 ACUTE BACTERIAL ENDOCARDITIS: ICD-10-CM

## 2018-10-30 DIAGNOSIS — I33.0 INFECTIVE ENDOCARDITIS OF AORTIC VALVE: ICD-10-CM

## 2018-10-30 PROCEDURE — 96374 THER/PROPH/DIAG INJ IV PUSH: CPT

## 2018-10-30 PROCEDURE — 6360000002 HC RX W HCPCS: Performed by: FAMILY MEDICINE

## 2018-10-30 PROCEDURE — 2580000003 HC RX 258: Performed by: FAMILY MEDICINE

## 2018-10-30 RX ORDER — SODIUM CHLORIDE 0.9 % (FLUSH) 0.9 %
10 SYRINGE (ML) INJECTION PRN
Status: CANCELLED | OUTPATIENT
Start: 2018-10-30

## 2018-10-30 RX ORDER — SODIUM CHLORIDE 0.9 % (FLUSH) 0.9 %
10 SYRINGE (ML) INJECTION PRN
Status: DISCONTINUED | OUTPATIENT
Start: 2018-10-30 | End: 2018-10-31 | Stop reason: HOSPADM

## 2018-10-30 RX ADMIN — CEFTRIAXONE SODIUM 2 G: 2 INJECTION, POWDER, FOR SOLUTION INTRAMUSCULAR; INTRAVENOUS at 10:38

## 2018-10-30 RX ADMIN — Medication 10 ML: at 10:46

## 2018-10-30 RX ADMIN — Medication 10 ML: at 10:47

## 2018-10-30 RX ADMIN — Medication 10 ML: at 10:30

## 2018-10-31 ENCOUNTER — HOSPITAL ENCOUNTER (OUTPATIENT)
Dept: INFUSION THERAPY | Age: 83
Discharge: HOME | End: 2018-10-31
Payer: MEDICARE

## 2018-10-31 VITALS
DIASTOLIC BLOOD PRESSURE: 77 MMHG | SYSTOLIC BLOOD PRESSURE: 129 MMHG | HEART RATE: 80 BPM | RESPIRATION RATE: 20 BRPM | TEMPERATURE: 97.4 F

## 2018-10-31 DIAGNOSIS — I33.0 ACUTE BACTERIAL ENDOCARDITIS: ICD-10-CM

## 2018-10-31 DIAGNOSIS — I33.0 INFECTIVE ENDOCARDITIS OF AORTIC VALVE: ICD-10-CM

## 2018-10-31 PROCEDURE — 99213 OFFICE O/P EST LOW 20 MIN: CPT

## 2018-10-31 PROCEDURE — 6360000002 HC RX W HCPCS: Performed by: FAMILY MEDICINE

## 2018-10-31 PROCEDURE — 96374 THER/PROPH/DIAG INJ IV PUSH: CPT

## 2018-10-31 PROCEDURE — 2580000003 HC RX 258: Performed by: FAMILY MEDICINE

## 2018-10-31 RX ORDER — SODIUM CHLORIDE 0.9 % (FLUSH) 0.9 %
10 SYRINGE (ML) INJECTION PRN
Status: DISCONTINUED | OUTPATIENT
Start: 2018-10-31 | End: 2018-11-01 | Stop reason: HOSPADM

## 2018-10-31 RX ORDER — SODIUM CHLORIDE 0.9 % (FLUSH) 0.9 %
10 SYRINGE (ML) INJECTION PRN
Status: CANCELLED | OUTPATIENT
Start: 2018-10-31

## 2018-10-31 RX ADMIN — Medication 10 ML: at 10:51

## 2018-10-31 RX ADMIN — Medication 10 ML: at 10:50

## 2018-10-31 RX ADMIN — CEFTRIAXONE SODIUM 2 G: 2 INJECTION, POWDER, FOR SOLUTION INTRAMUSCULAR; INTRAVENOUS at 10:45

## 2018-10-31 RX ADMIN — Medication 10 ML: at 10:42

## 2018-10-31 NOTE — PROGRESS NOTES
[id number] arrives for IV antibiotic. States\" It started bleeding at 3 a.m\". PICC site, blood noted on 2x2 and behind tegaderm. Old dressing removed very slowly and carefully. Normal saline used to soak  the 2x2,before removing it. Skin tear oozing 2x2 placed on top of skin tear and bleeding stops. Area around PICC cleansed with cholra prep avoiding skin tear area. Skin barrier prep applied and dried . Stat lock placed away from skin tear. Mepitel AG placed on skin tear area and 2x2 placed on top covered with a tegaderm. When talking with patient he states\"I sleep on that side, that's the only side I can sleep on\". Mr Gabbie Jones was referring to his PICC arm. Informed  not to lay on his right arm,because we want wound to heal and don't want extra pressure on it. Implies understanding. PICC arm covered with a stockinette.  Left per ambulatory

## 2018-11-01 ENCOUNTER — HOSPITAL ENCOUNTER (OUTPATIENT)
Dept: INFUSION THERAPY | Age: 83
Discharge: HOME | End: 2018-11-01
Payer: MEDICARE

## 2018-11-01 VITALS
RESPIRATION RATE: 20 BRPM | HEART RATE: 84 BPM | TEMPERATURE: 97.6 F | DIASTOLIC BLOOD PRESSURE: 83 MMHG | SYSTOLIC BLOOD PRESSURE: 146 MMHG

## 2018-11-01 DIAGNOSIS — I33.0 ACUTE BACTERIAL ENDOCARDITIS: ICD-10-CM

## 2018-11-01 DIAGNOSIS — I33.0 INFECTIVE ENDOCARDITIS OF AORTIC VALVE: ICD-10-CM

## 2018-11-01 PROCEDURE — 96374 THER/PROPH/DIAG INJ IV PUSH: CPT

## 2018-11-01 PROCEDURE — 99213 OFFICE O/P EST LOW 20 MIN: CPT

## 2018-11-01 PROCEDURE — 2580000003 HC RX 258: Performed by: FAMILY MEDICINE

## 2018-11-01 PROCEDURE — 6360000002 HC RX W HCPCS: Performed by: FAMILY MEDICINE

## 2018-11-01 RX ORDER — SODIUM CHLORIDE 0.9 % (FLUSH) 0.9 %
10 SYRINGE (ML) INJECTION PRN
Status: DISCONTINUED | OUTPATIENT
Start: 2018-11-01 | End: 2018-11-02 | Stop reason: HOSPADM

## 2018-11-01 RX ORDER — SODIUM CHLORIDE 0.9 % (FLUSH) 0.9 %
10 SYRINGE (ML) INJECTION PRN
Status: CANCELLED | OUTPATIENT
Start: 2018-11-01

## 2018-11-01 RX ADMIN — Medication 10 ML: at 10:55

## 2018-11-01 RX ADMIN — Medication 10 ML: at 10:56

## 2018-11-01 RX ADMIN — CEFTRIAXONE SODIUM 2 G: 2 INJECTION, POWDER, FOR SOLUTION INTRAMUSCULAR; INTRAVENOUS at 10:47

## 2018-11-01 RX ADMIN — Medication 10 ML: at 10:46

## 2018-11-01 NOTE — PROGRESS NOTES
[phone] arrived for his IV antibiotic. States\"arm was fine until this morning,was up at 3 and went to bathroom was ok. When woke up it was bleeding\". \"Put extra guaze on it and wrapped it\". Blood noted on guaze and wound dressing. Dressing on wound removed slowly and carefully while being soaked with normal saline. Small amount of oozing noted from wound. Light pressure applied. 36 Dr. Chacho Yoder office notified about skin tear. 4830 Dr. Flor Lencathy here to look at wound. States \"my apply a non adhering dressing\". 56 Dr. Quirino Anderson was in our department and agreed to look at wound.  applied darnell mistry and surgicel on wound and counter pressure with a pressure dressing applied. Pt informed to leave dressing on. May loosen if become too tight. No bleeding noted at this time. Pt informed to sleep in his recliner with a pillow between body and arm.  Pt states understanding

## 2018-11-02 ENCOUNTER — HOSPITAL ENCOUNTER (OUTPATIENT)
Dept: INFUSION THERAPY | Age: 83
Discharge: HOME | End: 2018-11-02
Payer: MEDICARE

## 2018-11-02 VITALS
DIASTOLIC BLOOD PRESSURE: 70 MMHG | RESPIRATION RATE: 20 BRPM | TEMPERATURE: 97.5 F | SYSTOLIC BLOOD PRESSURE: 137 MMHG | HEART RATE: 79 BPM

## 2018-11-02 DIAGNOSIS — I33.0 INFECTIVE ENDOCARDITIS OF AORTIC VALVE: ICD-10-CM

## 2018-11-02 DIAGNOSIS — I33.0 ACUTE BACTERIAL ENDOCARDITIS: ICD-10-CM

## 2018-11-02 PROCEDURE — 6360000002 HC RX W HCPCS: Performed by: FAMILY MEDICINE

## 2018-11-02 PROCEDURE — 2580000003 HC RX 258: Performed by: FAMILY MEDICINE

## 2018-11-02 PROCEDURE — 96374 THER/PROPH/DIAG INJ IV PUSH: CPT

## 2018-11-02 RX ORDER — SODIUM CHLORIDE 0.9 % (FLUSH) 0.9 %
10 SYRINGE (ML) INJECTION PRN
Status: CANCELLED | OUTPATIENT
Start: 2018-11-02

## 2018-11-02 RX ORDER — SODIUM CHLORIDE 0.9 % (FLUSH) 0.9 %
10 SYRINGE (ML) INJECTION PRN
Status: DISCONTINUED | OUTPATIENT
Start: 2018-11-02 | End: 2018-11-03 | Stop reason: HOSPADM

## 2018-11-02 RX ADMIN — Medication 10 ML: at 10:56

## 2018-11-02 RX ADMIN — Medication 10 ML: at 10:40

## 2018-11-02 RX ADMIN — CEFTRIAXONE SODIUM 2 G: 2 INJECTION, POWDER, FOR SOLUTION INTRAMUSCULAR; INTRAVENOUS at 10:48

## 2018-11-02 RX ADMIN — Medication 10 ML: at 10:57

## 2018-11-03 ENCOUNTER — HOSPITAL ENCOUNTER (OUTPATIENT)
Dept: INFUSION THERAPY | Age: 83
Discharge: HOME | End: 2018-11-03
Payer: MEDICARE

## 2018-11-03 VITALS
RESPIRATION RATE: 16 BRPM | DIASTOLIC BLOOD PRESSURE: 78 MMHG | TEMPERATURE: 97.5 F | SYSTOLIC BLOOD PRESSURE: 141 MMHG | HEART RATE: 75 BPM

## 2018-11-03 DIAGNOSIS — I33.0 INFECTIVE ENDOCARDITIS OF AORTIC VALVE: ICD-10-CM

## 2018-11-03 DIAGNOSIS — I33.0 ACUTE BACTERIAL ENDOCARDITIS: ICD-10-CM

## 2018-11-03 PROCEDURE — 96374 THER/PROPH/DIAG INJ IV PUSH: CPT

## 2018-11-03 PROCEDURE — 6360000002 HC RX W HCPCS: Performed by: FAMILY MEDICINE

## 2018-11-03 PROCEDURE — 2580000003 HC RX 258: Performed by: FAMILY MEDICINE

## 2018-11-03 RX ORDER — SODIUM CHLORIDE 0.9 % (FLUSH) 0.9 %
10 SYRINGE (ML) INJECTION PRN
Status: CANCELLED | OUTPATIENT
Start: 2018-11-03

## 2018-11-03 RX ADMIN — CEFTRIAXONE SODIUM 2 G: 2 INJECTION, POWDER, FOR SOLUTION INTRAMUSCULAR; INTRAVENOUS at 10:36

## 2018-11-04 ENCOUNTER — HOSPITAL ENCOUNTER (OUTPATIENT)
Dept: INFUSION THERAPY | Age: 83
Discharge: HOME | End: 2018-11-04
Payer: MEDICARE

## 2018-11-04 VITALS
TEMPERATURE: 97.5 F | SYSTOLIC BLOOD PRESSURE: 151 MMHG | HEART RATE: 80 BPM | DIASTOLIC BLOOD PRESSURE: 78 MMHG | RESPIRATION RATE: 16 BRPM

## 2018-11-04 DIAGNOSIS — I33.0 ACUTE BACTERIAL ENDOCARDITIS: ICD-10-CM

## 2018-11-04 DIAGNOSIS — I33.0 INFECTIVE ENDOCARDITIS OF AORTIC VALVE: ICD-10-CM

## 2018-11-04 PROCEDURE — 96374 THER/PROPH/DIAG INJ IV PUSH: CPT

## 2018-11-04 PROCEDURE — 2580000003 HC RX 258: Performed by: FAMILY MEDICINE

## 2018-11-04 PROCEDURE — 6360000002 HC RX W HCPCS: Performed by: FAMILY MEDICINE

## 2018-11-04 RX ORDER — SODIUM CHLORIDE 0.9 % (FLUSH) 0.9 %
10 SYRINGE (ML) INJECTION PRN
Status: CANCELLED | OUTPATIENT
Start: 2018-11-04

## 2018-11-04 RX ORDER — SODIUM CHLORIDE 0.9 % (FLUSH) 0.9 %
10 SYRINGE (ML) INJECTION PRN
Status: DISCONTINUED | OUTPATIENT
Start: 2018-11-04 | End: 2018-11-05 | Stop reason: HOSPADM

## 2018-11-04 RX ADMIN — CEFTRIAXONE SODIUM 2 G: 2 INJECTION, POWDER, FOR SOLUTION INTRAMUSCULAR; INTRAVENOUS at 08:37

## 2018-11-04 NOTE — PLAN OF CARE
Problem: Infection - Risk of, Central Venous Catheter-Associated Bloodstream Infection  Goal: Remain free from symptoms of infection  Patient will remain free from symptoms of infection:  - redness  - warmth  - increased temperature  - discharge  at PICC site   Outcome: Met This Shift           No further problems noted and pt ambulated to and form room 10 without difficulty. Pt voiced no new concerns with the treatment plan at the present time.

## 2018-11-05 ENCOUNTER — HOSPITAL ENCOUNTER (OUTPATIENT)
Dept: INFUSION THERAPY | Age: 83
Discharge: HOME | End: 2018-11-05
Payer: MEDICARE

## 2018-11-05 ENCOUNTER — OFFICE VISIT (OUTPATIENT)
Dept: CARDIOLOGY | Age: 83
End: 2018-11-05
Payer: MEDICARE

## 2018-11-05 VITALS
HEIGHT: 67 IN | HEART RATE: 76 BPM | SYSTOLIC BLOOD PRESSURE: 125 MMHG | BODY MASS INDEX: 32.55 KG/M2 | OXYGEN SATURATION: 95 % | DIASTOLIC BLOOD PRESSURE: 74 MMHG | WEIGHT: 207.4 LBS

## 2018-11-05 VITALS
HEART RATE: 78 BPM | TEMPERATURE: 97.1 F | SYSTOLIC BLOOD PRESSURE: 130 MMHG | RESPIRATION RATE: 20 BRPM | DIASTOLIC BLOOD PRESSURE: 68 MMHG

## 2018-11-05 DIAGNOSIS — I33.0 INFECTIVE ENDOCARDITIS OF AORTIC VALVE: ICD-10-CM

## 2018-11-05 DIAGNOSIS — I33.0 SUBACUTE BACTERIAL ENDOCARDITIS: Primary | ICD-10-CM

## 2018-11-05 DIAGNOSIS — Z95.2 S/P AVR (AORTIC VALVE REPLACEMENT): ICD-10-CM

## 2018-11-05 DIAGNOSIS — I33.0 ACUTE BACTERIAL ENDOCARDITIS: ICD-10-CM

## 2018-11-05 DIAGNOSIS — I10 ESSENTIAL HYPERTENSION: ICD-10-CM

## 2018-11-05 DIAGNOSIS — I50.33 ACUTE ON CHRONIC DIASTOLIC HEART FAILURE (HCC): ICD-10-CM

## 2018-11-05 DIAGNOSIS — I48.0 PAF (PAROXYSMAL ATRIAL FIBRILLATION) (HCC): ICD-10-CM

## 2018-11-05 LAB
ABSOLUTE EOS #: 0.11 K/UL (ref 0–0.44)
ABSOLUTE IMMATURE GRANULOCYTE: 0 K/UL (ref 0–0.3)
ABSOLUTE LYMPH #: 1.1 K/UL (ref 1.1–3.7)
ABSOLUTE MONO #: 0.33 K/UL (ref 0.1–1.2)
ANION GAP SERPL CALCULATED.3IONS-SCNC: 11 MMOL/L (ref 9–17)
BASOPHILS # BLD: 0 % (ref 0–2)
BASOPHILS ABSOLUTE: 0 K/UL (ref 0–0.2)
BUN BLDV-MCNC: 53 MG/DL (ref 8–23)
BUN/CREAT BLD: 37 (ref 9–20)
CALCIUM SERPL-MCNC: 8.7 MG/DL (ref 8.6–10.4)
CHLORIDE BLD-SCNC: 103 MMOL/L (ref 98–107)
CO2: 27 MMOL/L (ref 20–31)
CREAT SERPL-MCNC: 1.42 MG/DL (ref 0.7–1.2)
DIFFERENTIAL TYPE: ABNORMAL
EOSINOPHILS RELATIVE PERCENT: 2 % (ref 1–4)
GFR AFRICAN AMERICAN: 58 ML/MIN
GFR NON-AFRICAN AMERICAN: 48 ML/MIN
GFR SERPL CREATININE-BSD FRML MDRD: ABNORMAL ML/MIN/{1.73_M2}
GFR SERPL CREATININE-BSD FRML MDRD: ABNORMAL ML/MIN/{1.73_M2}
GLUCOSE BLD-MCNC: 109 MG/DL (ref 70–99)
HCT VFR BLD CALC: 38.7 % (ref 40.7–50.3)
HEMOGLOBIN: 11.9 G/DL (ref 13–17)
IMMATURE GRANULOCYTES: 0 %
LYMPHOCYTES # BLD: 20 % (ref 24–43)
MCH RBC QN AUTO: 29.2 PG (ref 25.2–33.5)
MCHC RBC AUTO-ENTMCNC: 30.7 G/DL (ref 28.4–34.8)
MCV RBC AUTO: 94.9 FL (ref 82.6–102.9)
MONOCYTES # BLD: 6 % (ref 3–12)
MORPHOLOGY: NORMAL
NRBC AUTOMATED: 0 PER 100 WBC
PDW BLD-RTO: 17.9 % (ref 11.8–14.4)
PLATELET # BLD: 160 K/UL (ref 138–453)
PLATELET ESTIMATE: ABNORMAL
PMV BLD AUTO: 9.3 FL (ref 8.1–13.5)
POTASSIUM SERPL-SCNC: 4.8 MMOL/L (ref 3.7–5.3)
RBC # BLD: 4.08 M/UL (ref 4.21–5.77)
RBC # BLD: ABNORMAL 10*6/UL
SEG NEUTROPHILS: 72 % (ref 36–65)
SEGMENTED NEUTROPHILS ABSOLUTE COUNT: 3.96 K/UL (ref 1.5–8.1)
SODIUM BLD-SCNC: 141 MMOL/L (ref 135–144)
WBC # BLD: 5.5 K/UL (ref 3.5–11.3)
WBC # BLD: ABNORMAL 10*3/UL

## 2018-11-05 PROCEDURE — 2580000003 HC RX 258: Performed by: FAMILY MEDICINE

## 2018-11-05 PROCEDURE — G8598 ASA/ANTIPLAT THER USED: HCPCS | Performed by: FAMILY MEDICINE

## 2018-11-05 PROCEDURE — 1036F TOBACCO NON-USER: CPT | Performed by: FAMILY MEDICINE

## 2018-11-05 PROCEDURE — G8417 CALC BMI ABV UP PARAM F/U: HCPCS | Performed by: FAMILY MEDICINE

## 2018-11-05 PROCEDURE — 36415 COLL VENOUS BLD VENIPUNCTURE: CPT

## 2018-11-05 PROCEDURE — 80048 BASIC METABOLIC PNL TOTAL CA: CPT

## 2018-11-05 PROCEDURE — 1111F DSCHRG MED/CURRENT MED MERGE: CPT | Performed by: FAMILY MEDICINE

## 2018-11-05 PROCEDURE — 1101F PT FALLS ASSESS-DOCD LE1/YR: CPT | Performed by: FAMILY MEDICINE

## 2018-11-05 PROCEDURE — 96374 THER/PROPH/DIAG INJ IV PUSH: CPT

## 2018-11-05 PROCEDURE — G8482 FLU IMMUNIZE ORDER/ADMIN: HCPCS | Performed by: FAMILY MEDICINE

## 2018-11-05 PROCEDURE — 99215 OFFICE O/P EST HI 40 MIN: CPT | Performed by: FAMILY MEDICINE

## 2018-11-05 PROCEDURE — 1123F ACP DISCUSS/DSCN MKR DOCD: CPT | Performed by: FAMILY MEDICINE

## 2018-11-05 PROCEDURE — G8428 CUR MEDS NOT DOCUMENT: HCPCS | Performed by: FAMILY MEDICINE

## 2018-11-05 PROCEDURE — 6360000002 HC RX W HCPCS: Performed by: FAMILY MEDICINE

## 2018-11-05 PROCEDURE — 4040F PNEUMOC VAC/ADMIN/RCVD: CPT | Performed by: FAMILY MEDICINE

## 2018-11-05 PROCEDURE — 85025 COMPLETE CBC W/AUTO DIFF WBC: CPT

## 2018-11-05 RX ORDER — SODIUM CHLORIDE 0.9 % (FLUSH) 0.9 %
10 SYRINGE (ML) INJECTION PRN
Status: DISCONTINUED | OUTPATIENT
Start: 2018-11-05 | End: 2018-11-06 | Stop reason: HOSPADM

## 2018-11-05 RX ORDER — FUROSEMIDE 80 MG
TABLET ORAL
Qty: 120 TABLET | Refills: 3
Start: 2018-11-05 | End: 2019-01-29 | Stop reason: ALTCHOICE

## 2018-11-05 RX ORDER — SODIUM CHLORIDE 0.9 % (FLUSH) 0.9 %
10 SYRINGE (ML) INJECTION PRN
Status: CANCELLED | OUTPATIENT
Start: 2018-11-05

## 2018-11-05 RX ADMIN — CEFTRIAXONE SODIUM 2 G: 2 INJECTION, POWDER, FOR SOLUTION INTRAMUSCULAR; INTRAVENOUS at 10:18

## 2018-11-05 RX ADMIN — Medication 10 ML: at 10:26

## 2018-11-05 RX ADMIN — Medication 10 ML: at 10:16

## 2018-11-05 RX ADMIN — Medication 10 ML: at 10:27

## 2018-11-05 RX ADMIN — Medication 10 ML: at 10:15

## 2018-11-05 ASSESSMENT — ENCOUNTER SYMPTOMS: SHORTNESS OF BREATH: 1

## 2018-11-05 NOTE — PROGRESS NOTES
 History of Holter monitoring 08/08/2017    Atrial Fibrillation throughout with fairly controlled ventricular response (HR less than 100bpm 93% of the time),no significant pauses. Rare isolated PVC's    History of Holter monitoring 12/06/2017    Rare PAC's with one 4 beat run of wide complex tachycardia at 170 bpm, No symptoms reported. clinical correlation required    Hyperlipidemia 2/21/2006    Hypertension     Osteoarthritis of hip 6/20/2013    PVD (peripheral vascular disease) ([facility] [address]     Reported history of a 60% right carotid stenosis.  Trigeminal neuralgia 7/10/2013       CURRENT ALLERGIES: Iodine; Norco [hydrocodone-acetaminophen]; Other; Shellfish-derived products; Shrimp flavor; and Oxycodone REVIEW OF SYSTEMS: 14 systems were reviewed. Pertinent positives and negatives as above, all else negative.      Past Surgical History:   Procedure Laterality Date    CARDIAC CATHETERIZATION Left 05/18/2017    Right Radial/mydeco Maurizio/    CARDIOVERSION  09/21/2017    mydeco Maurizio/Dr Her/2 shocks at 200 joules    CORONARY ARTERY BYPASS GRAFT N/A 5/19/2017    CORONARY ARTERY BYPASS GRAFT X 2: LIMA-LAD, SVG-OM, AORTIC VALVE REPLACEMENT WITH 23 MM MEDTRONIC MOSAIC ULTRA, EVACUATION OF 1.5 LITER PLEURAL FLUID; ALLAN PER ANESTHESIA performed by Mc Zamudio MD at [address] Right 7/29/13    [facility] - Dr. Delia Yap    tube was blocked    SHOULDER SURGERY      roto cuff surgery left shoulder    Social History:  Social History   Substance Use Topics    Smoking status: Former Smoker     Years: 15.00     Types: Cigars    Smokeless tobacco: Former User      Comment: quit 30-40 years ago    Alcohol use 0.6 oz/week     1 Cans of beer per week      Comment: weekly        CURRENT MEDICATIONS:  Outpatient Prescriptions Marked as Taking for the 11/5/18 encounter (Office Visit) with Lia Vázquez no acute distress. HEENT: Normocephalic and atraumatic. No JVD present. Carotid bruit is not present. No mass and no thyromegaly present. No lymphadenopathy present. Cardiovascular: Normal rate, regular rhythm, normal heart sounds. Exam reveals no gallop and no friction rubs. A III/VI systolic murmur at the myocardial apex  Pulmonary/Chest: Effort normal and breath sounds normal. No respiratory distress. He has no wheezes, rhonchi or rales. Abdominal: Soft, non-tender. Bowel sounds and aorta are normal. He exhibits no organomegaly, mass or bruit. Extremities: 2+ edema on the right leg. 1-2+ edema on the left. No cyanosis or clubbing. Pulses are 2+ radial/carotid/dorsalis pedis and posterior tibial bilaterally. Compression stockings in place. Neurological: He is alert and oriented to person, place, and time. No evidence of gross cranial nerve deficit. Coordination appeared normal.   Skin: Skin is warm and dry. There is no rash or diaphoresis. Psychiatric: He has a normal mood and affect. His speech is normal and behavior is normal.      MOST RECENT LABS ON RECORD:   Lab Results   Component Value Date    WBC 4.8 10/06/2018    HGB 11.4 (L) 10/06/2018    HCT 36.7 (L) 10/06/2018     (L) 10/06/2018    CHOL 132 08/15/2018    TRIG 49 08/15/2018    HDL 52 08/15/2018    ALT 21 10/04/2018    AST 38 10/04/2018     10/06/2018    K 4.4 10/06/2018     10/06/2018    CREATININE 1.55 (H) 10/06/2018    BUN 39 (H) 10/06/2018    CO2 23 10/06/2018    TSH 5.29 (H) 08/15/2018    PSA 1.16 08/31/2018    INR 1.2 10/03/2018    LABA1C 5.3 08/15/2018         ASSESSMENT:  1. Acute bacterial endocarditis    2. PAF (paroxysmal atrial fibrillation) ([facility] [address]    3. Acute on chronic diastolic heart failure ([facility] [address]    4. S/P AVR (aortic valve replacement)    5. Essential hypertension       PLAN:   · Endocarditis  · Continue antibiotics for the full 6 weeks. · Once stopping, I will have him do repeat labs.   · I ordered a BMP and

## 2018-11-06 ENCOUNTER — HOSPITAL ENCOUNTER (OUTPATIENT)
Dept: INFUSION THERAPY | Age: 83
Discharge: HOME | End: 2018-11-06
Payer: MEDICARE

## 2018-11-06 VITALS
TEMPERATURE: 97.2 F | RESPIRATION RATE: 20 BRPM | DIASTOLIC BLOOD PRESSURE: 90 MMHG | SYSTOLIC BLOOD PRESSURE: 143 MMHG | HEART RATE: 89 BPM

## 2018-11-06 DIAGNOSIS — I33.0 SUBACUTE BACTERIAL ENDOCARDITIS: ICD-10-CM

## 2018-11-06 DIAGNOSIS — I33.0 INFECTIVE ENDOCARDITIS OF AORTIC VALVE: ICD-10-CM

## 2018-11-06 PROCEDURE — 96374 THER/PROPH/DIAG INJ IV PUSH: CPT

## 2018-11-06 PROCEDURE — 6360000002 HC RX W HCPCS: Performed by: FAMILY MEDICINE

## 2018-11-06 PROCEDURE — 2580000003 HC RX 258: Performed by: FAMILY MEDICINE

## 2018-11-06 RX ORDER — SODIUM CHLORIDE 0.9 % (FLUSH) 0.9 %
10 SYRINGE (ML) INJECTION PRN
Status: CANCELLED | OUTPATIENT
Start: 2018-11-06

## 2018-11-06 RX ORDER — SODIUM CHLORIDE 0.9 % (FLUSH) 0.9 %
10 SYRINGE (ML) INJECTION PRN
Status: DISCONTINUED | OUTPATIENT
Start: 2018-11-06 | End: 2018-11-07 | Stop reason: HOSPADM

## 2018-11-06 RX ADMIN — CEFTRIAXONE SODIUM 2 G: 2 INJECTION, POWDER, FOR SOLUTION INTRAMUSCULAR; INTRAVENOUS at 10:32

## 2018-11-06 RX ADMIN — Medication 10 ML: at 10:30

## 2018-11-06 RX ADMIN — Medication 10 ML: at 10:40

## 2018-11-06 RX ADMIN — Medication 10 ML: at 10:41

## 2018-11-07 ENCOUNTER — TELEPHONE (OUTPATIENT)
Dept: CARDIOLOGY | Age: 83
End: 2018-11-07

## 2018-11-07 ENCOUNTER — HOSPITAL ENCOUNTER (OUTPATIENT)
Dept: INFUSION THERAPY | Age: 83
Discharge: HOME | End: 2018-11-07
Payer: MEDICARE

## 2018-11-07 VITALS
HEART RATE: 79 BPM | SYSTOLIC BLOOD PRESSURE: 130 MMHG | RESPIRATION RATE: 20 BRPM | TEMPERATURE: 97.4 F | DIASTOLIC BLOOD PRESSURE: 65 MMHG

## 2018-11-07 DIAGNOSIS — I33.0 INFECTIVE ENDOCARDITIS OF AORTIC VALVE: ICD-10-CM

## 2018-11-07 DIAGNOSIS — I33.0 SUBACUTE BACTERIAL ENDOCARDITIS: ICD-10-CM

## 2018-11-07 PROCEDURE — 6360000002 HC RX W HCPCS: Performed by: FAMILY MEDICINE

## 2018-11-07 PROCEDURE — 2580000003 HC RX 258: Performed by: FAMILY MEDICINE

## 2018-11-07 PROCEDURE — 96374 THER/PROPH/DIAG INJ IV PUSH: CPT

## 2018-11-07 RX ORDER — SODIUM CHLORIDE 0.9 % (FLUSH) 0.9 %
10 SYRINGE (ML) INJECTION PRN
Status: DISCONTINUED | OUTPATIENT
Start: 2018-11-07 | End: 2018-11-08 | Stop reason: HOSPADM

## 2018-11-07 RX ORDER — SODIUM CHLORIDE 0.9 % (FLUSH) 0.9 %
10 SYRINGE (ML) INJECTION PRN
Status: CANCELLED | OUTPATIENT
Start: 2018-11-07

## 2018-11-07 RX ADMIN — CEFTRIAXONE SODIUM 2 G: 2 INJECTION, POWDER, FOR SOLUTION INTRAMUSCULAR; INTRAVENOUS at 10:43

## 2018-11-07 RX ADMIN — Medication 10 ML: at 10:54

## 2018-11-07 RX ADMIN — Medication 10 ML: at 10:55

## 2018-11-07 RX ADMIN — Medication 10 ML: at 10:40

## 2018-11-08 ENCOUNTER — HOSPITAL ENCOUNTER (OUTPATIENT)
Dept: INFUSION THERAPY | Age: 83
Discharge: HOME | End: 2018-11-08
Payer: MEDICARE

## 2018-11-08 VITALS
DIASTOLIC BLOOD PRESSURE: 85 MMHG | RESPIRATION RATE: 20 BRPM | TEMPERATURE: 96.5 F | SYSTOLIC BLOOD PRESSURE: 156 MMHG | HEART RATE: 79 BPM

## 2018-11-08 DIAGNOSIS — I33.0 SUBACUTE BACTERIAL ENDOCARDITIS: ICD-10-CM

## 2018-11-08 DIAGNOSIS — I33.0 INFECTIVE ENDOCARDITIS OF AORTIC VALVE: ICD-10-CM

## 2018-11-08 PROCEDURE — 2580000003 HC RX 258: Performed by: FAMILY MEDICINE

## 2018-11-08 PROCEDURE — 6360000002 HC RX W HCPCS: Performed by: FAMILY MEDICINE

## 2018-11-08 PROCEDURE — 99213 OFFICE O/P EST LOW 20 MIN: CPT

## 2018-11-08 PROCEDURE — 96374 THER/PROPH/DIAG INJ IV PUSH: CPT

## 2018-11-08 RX ORDER — SODIUM CHLORIDE 0.9 % (FLUSH) 0.9 %
10 SYRINGE (ML) INJECTION PRN
Status: CANCELLED | OUTPATIENT
Start: 2018-11-08

## 2018-11-08 RX ORDER — SODIUM CHLORIDE 0.9 % (FLUSH) 0.9 %
10 SYRINGE (ML) INJECTION PRN
Status: DISCONTINUED | OUTPATIENT
Start: 2018-11-08 | End: 2018-11-09 | Stop reason: HOSPADM

## 2018-11-08 RX ADMIN — Medication 10 ML: at 10:39

## 2018-11-08 RX ADMIN — Medication 10 ML: at 10:40

## 2018-11-08 RX ADMIN — CEFTRIAXONE SODIUM 2 G: 2 INJECTION, POWDER, FOR SOLUTION INTRAMUSCULAR; INTRAVENOUS at 10:28

## 2018-11-08 RX ADMIN — Medication 10 ML: at 10:27

## 2018-11-08 NOTE — PLAN OF CARE
Problem: Infection - Risk of, Central Catheter-Associated Bloodstream Infection  Goal: Remain free from symptoms of infection  Patient will remain free from symptoms of infection:  - redness  - warmth  - increased temperature  - discharge     Outcome: Ongoing

## 2018-11-08 NOTE — PROGRESS NOTES
1040 PICC dressing changed and dressing over skin tear. Skin tear,no bleeding noted scabbed over. New mepliex placed on skin tear site.

## 2018-11-09 ENCOUNTER — HOSPITAL ENCOUNTER (OUTPATIENT)
Dept: INFUSION THERAPY | Age: 83
Discharge: HOME | End: 2018-11-09
Payer: MEDICARE

## 2018-11-09 VITALS
RESPIRATION RATE: 20 BRPM | SYSTOLIC BLOOD PRESSURE: 142 MMHG | HEART RATE: 79 BPM | DIASTOLIC BLOOD PRESSURE: 82 MMHG | TEMPERATURE: 97.4 F

## 2018-11-09 DIAGNOSIS — I33.0 INFECTIVE ENDOCARDITIS OF AORTIC VALVE: ICD-10-CM

## 2018-11-09 DIAGNOSIS — I33.0 SUBACUTE BACTERIAL ENDOCARDITIS: ICD-10-CM

## 2018-11-09 PROCEDURE — 6360000002 HC RX W HCPCS: Performed by: FAMILY MEDICINE

## 2018-11-09 PROCEDURE — 96374 THER/PROPH/DIAG INJ IV PUSH: CPT

## 2018-11-09 PROCEDURE — 2580000003 HC RX 258: Performed by: FAMILY MEDICINE

## 2018-11-09 RX ORDER — SODIUM CHLORIDE 0.9 % (FLUSH) 0.9 %
10 SYRINGE (ML) INJECTION PRN
Status: DISCONTINUED | OUTPATIENT
Start: 2018-11-09 | End: 2018-11-10 | Stop reason: HOSPADM

## 2018-11-09 RX ORDER — SODIUM CHLORIDE 0.9 % (FLUSH) 0.9 %
10 SYRINGE (ML) INJECTION PRN
Status: CANCELLED | OUTPATIENT
Start: 2018-11-09

## 2018-11-09 RX ADMIN — CEFTRIAXONE SODIUM 2 G: 2 INJECTION, POWDER, FOR SOLUTION INTRAMUSCULAR; INTRAVENOUS at 10:30

## 2018-11-09 RX ADMIN — Medication 10 ML: at 10:30

## 2018-11-09 RX ADMIN — Medication 10 ML: at 10:42

## 2018-11-09 RX ADMIN — Medication 10 ML: at 10:41

## 2018-11-10 ENCOUNTER — HOSPITAL ENCOUNTER (OUTPATIENT)
Dept: INFUSION THERAPY | Age: 83
Discharge: HOME | End: 2018-11-10
Payer: MEDICARE

## 2018-11-10 VITALS
HEART RATE: 76 BPM | SYSTOLIC BLOOD PRESSURE: 156 MMHG | OXYGEN SATURATION: 97 % | DIASTOLIC BLOOD PRESSURE: 83 MMHG | TEMPERATURE: 96.9 F

## 2018-11-10 DIAGNOSIS — I33.0 INFECTIVE ENDOCARDITIS OF AORTIC VALVE: ICD-10-CM

## 2018-11-10 DIAGNOSIS — I33.0 SUBACUTE BACTERIAL ENDOCARDITIS: ICD-10-CM

## 2018-11-10 PROCEDURE — 96374 THER/PROPH/DIAG INJ IV PUSH: CPT

## 2018-11-10 PROCEDURE — 2580000003 HC RX 258: Performed by: FAMILY MEDICINE

## 2018-11-10 PROCEDURE — 6360000002 HC RX W HCPCS: Performed by: FAMILY MEDICINE

## 2018-11-10 RX ORDER — SODIUM CHLORIDE 0.9 % (FLUSH) 0.9 %
10 SYRINGE (ML) INJECTION PRN
Status: DISCONTINUED | OUTPATIENT
Start: 2018-11-10 | End: 2018-11-11 | Stop reason: HOSPADM

## 2018-11-10 RX ORDER — SODIUM CHLORIDE 0.9 % (FLUSH) 0.9 %
10 SYRINGE (ML) INJECTION PRN
Status: CANCELLED | OUTPATIENT
Start: 2018-11-10

## 2018-11-10 RX ADMIN — CEFTRIAXONE SODIUM 2 G: 2 INJECTION, POWDER, FOR SOLUTION INTRAMUSCULAR; INTRAVENOUS at 10:42

## 2018-11-10 RX ADMIN — Medication 10 ML: at 10:56

## 2018-11-11 ENCOUNTER — HOSPITAL ENCOUNTER (OUTPATIENT)
Dept: INFUSION THERAPY | Age: 83
Discharge: HOME | End: 2018-11-11
Payer: MEDICARE

## 2018-11-11 VITALS
SYSTOLIC BLOOD PRESSURE: 153 MMHG | OXYGEN SATURATION: 97 % | HEART RATE: 79 BPM | DIASTOLIC BLOOD PRESSURE: 82 MMHG | TEMPERATURE: 97.6 F

## 2018-11-11 DIAGNOSIS — I33.0 SUBACUTE BACTERIAL ENDOCARDITIS: ICD-10-CM

## 2018-11-11 DIAGNOSIS — I33.0 INFECTIVE ENDOCARDITIS OF AORTIC VALVE: ICD-10-CM

## 2018-11-11 PROCEDURE — 2580000003 HC RX 258: Performed by: FAMILY MEDICINE

## 2018-11-11 PROCEDURE — 6360000002 HC RX W HCPCS: Performed by: FAMILY MEDICINE

## 2018-11-11 PROCEDURE — 96374 THER/PROPH/DIAG INJ IV PUSH: CPT

## 2018-11-11 RX ORDER — SODIUM CHLORIDE 0.9 % (FLUSH) 0.9 %
10 SYRINGE (ML) INJECTION PRN
Status: DISCONTINUED | OUTPATIENT
Start: 2018-11-11 | End: 2018-11-12 | Stop reason: HOSPADM

## 2018-11-11 RX ORDER — SODIUM CHLORIDE 0.9 % (FLUSH) 0.9 %
10 SYRINGE (ML) INJECTION PRN
Status: CANCELLED | OUTPATIENT
Start: 2018-11-11

## 2018-11-11 RX ADMIN — Medication 10 ML: at 08:44

## 2018-11-11 RX ADMIN — CEFTRIAXONE SODIUM 2 G: 2 INJECTION, POWDER, FOR SOLUTION INTRAMUSCULAR; INTRAVENOUS at 08:34

## 2018-11-12 ENCOUNTER — HOSPITAL ENCOUNTER (OUTPATIENT)
Dept: INFUSION THERAPY | Age: 83
Discharge: HOME | End: 2018-11-12
Payer: MEDICARE

## 2018-11-12 DIAGNOSIS — I33.0 SUBACUTE BACTERIAL ENDOCARDITIS: ICD-10-CM

## 2018-11-12 DIAGNOSIS — I33.0 INFECTIVE ENDOCARDITIS OF AORTIC VALVE: ICD-10-CM

## 2018-11-12 PROCEDURE — 6360000002 HC RX W HCPCS: Performed by: FAMILY MEDICINE

## 2018-11-12 PROCEDURE — 96374 THER/PROPH/DIAG INJ IV PUSH: CPT

## 2018-11-12 PROCEDURE — 2580000003 HC RX 258: Performed by: FAMILY MEDICINE

## 2018-11-12 RX ORDER — SODIUM CHLORIDE 0.9 % (FLUSH) 0.9 %
10 SYRINGE (ML) INJECTION PRN
Status: CANCELLED | OUTPATIENT
Start: 2018-11-12

## 2018-11-12 RX ORDER — SODIUM CHLORIDE 0.9 % (FLUSH) 0.9 %
10 SYRINGE (ML) INJECTION PRN
Status: DISCONTINUED | OUTPATIENT
Start: 2018-11-12 | End: 2018-11-13 | Stop reason: HOSPADM

## 2018-11-12 RX ADMIN — CEFTRIAXONE SODIUM 2 G: 2 INJECTION, POWDER, FOR SOLUTION INTRAMUSCULAR; INTRAVENOUS at 10:33

## 2018-11-12 RX ADMIN — Medication 10 ML: at 10:41

## 2018-11-12 RX ADMIN — Medication 10 ML: at 10:40

## 2018-11-13 ENCOUNTER — HOSPITAL ENCOUNTER (OUTPATIENT)
Dept: INFUSION THERAPY | Age: 83
Discharge: HOME | End: 2018-11-13
Payer: MEDICARE

## 2018-11-13 VITALS
SYSTOLIC BLOOD PRESSURE: 140 MMHG | RESPIRATION RATE: 20 BRPM | HEART RATE: 77 BPM | DIASTOLIC BLOOD PRESSURE: 83 MMHG | TEMPERATURE: 96.8 F

## 2018-11-13 DIAGNOSIS — I33.0 SUBACUTE BACTERIAL ENDOCARDITIS: ICD-10-CM

## 2018-11-13 DIAGNOSIS — I33.0 INFECTIVE ENDOCARDITIS OF AORTIC VALVE: ICD-10-CM

## 2018-11-13 PROCEDURE — 2580000003 HC RX 258: Performed by: FAMILY MEDICINE

## 2018-11-13 PROCEDURE — 6360000002 HC RX W HCPCS: Performed by: FAMILY MEDICINE

## 2018-11-13 PROCEDURE — 96374 THER/PROPH/DIAG INJ IV PUSH: CPT

## 2018-11-13 RX ORDER — SODIUM CHLORIDE 0.9 % (FLUSH) 0.9 %
10 SYRINGE (ML) INJECTION PRN
Status: DISCONTINUED | OUTPATIENT
Start: 2018-11-13 | End: 2018-11-14 | Stop reason: HOSPADM

## 2018-11-13 RX ORDER — SODIUM CHLORIDE 0.9 % (FLUSH) 0.9 %
10 SYRINGE (ML) INJECTION PRN
Status: CANCELLED | OUTPATIENT
Start: 2018-11-13

## 2018-11-13 RX ADMIN — CEFTRIAXONE SODIUM 2 G: 2 INJECTION, POWDER, FOR SOLUTION INTRAMUSCULAR; INTRAVENOUS at 10:37

## 2018-11-13 RX ADMIN — Medication 10 ML: at 10:43

## 2018-11-13 RX ADMIN — Medication 10 ML: at 10:30

## 2018-11-13 RX ADMIN — Medication 10 ML: at 10:44

## 2018-11-14 ENCOUNTER — OFFICE VISIT (OUTPATIENT)
Dept: FAMILY MEDICINE CLINIC | Age: 83
End: 2018-11-14
Payer: MEDICARE

## 2018-11-14 ENCOUNTER — HOSPITAL ENCOUNTER (OUTPATIENT)
Dept: INFUSION THERAPY | Age: 83
Discharge: HOME | End: 2018-11-14
Payer: MEDICARE

## 2018-11-14 VITALS
TEMPERATURE: 96.6 F | DIASTOLIC BLOOD PRESSURE: 68 MMHG | SYSTOLIC BLOOD PRESSURE: 130 MMHG | RESPIRATION RATE: 20 BRPM | HEART RATE: 83 BPM

## 2018-11-14 VITALS
BODY MASS INDEX: 32.8 KG/M2 | WEIGHT: 209 LBS | SYSTOLIC BLOOD PRESSURE: 118 MMHG | HEIGHT: 67 IN | DIASTOLIC BLOOD PRESSURE: 74 MMHG

## 2018-11-14 DIAGNOSIS — I25.10 ASHD (ARTERIOSCLEROTIC HEART DISEASE): ICD-10-CM

## 2018-11-14 DIAGNOSIS — I50.32 CHRONIC DIASTOLIC CONGESTIVE HEART FAILURE (HCC): ICD-10-CM

## 2018-11-14 DIAGNOSIS — I33.0 INFECTIVE ENDOCARDITIS OF AORTIC VALVE: ICD-10-CM

## 2018-11-14 DIAGNOSIS — N18.30 CHRONIC RENAL FAILURE, STAGE 3 (MODERATE) (HCC): ICD-10-CM

## 2018-11-14 DIAGNOSIS — I50.810 RIGHT-SIDED HEART FAILURE, UNSPECIFIED HF CHRONICITY (HCC): ICD-10-CM

## 2018-11-14 DIAGNOSIS — I33.0 SUBACUTE BACTERIAL ENDOCARDITIS: ICD-10-CM

## 2018-11-14 DIAGNOSIS — I33.0 SUBACUTE BACTERIAL ENDOCARDITIS: Primary | ICD-10-CM

## 2018-11-14 DIAGNOSIS — E78.5 HYPERLIPIDEMIA, UNSPECIFIED HYPERLIPIDEMIA TYPE: Chronic | ICD-10-CM

## 2018-11-14 DIAGNOSIS — R06.09 DYSPNEA ON EXERTION: ICD-10-CM

## 2018-11-14 PROCEDURE — 1036F TOBACCO NON-USER: CPT | Performed by: FAMILY MEDICINE

## 2018-11-14 PROCEDURE — 1101F PT FALLS ASSESS-DOCD LE1/YR: CPT | Performed by: FAMILY MEDICINE

## 2018-11-14 PROCEDURE — G8427 DOCREV CUR MEDS BY ELIG CLIN: HCPCS | Performed by: FAMILY MEDICINE

## 2018-11-14 PROCEDURE — G8417 CALC BMI ABV UP PARAM F/U: HCPCS | Performed by: FAMILY MEDICINE

## 2018-11-14 PROCEDURE — 99214 OFFICE O/P EST MOD 30 MIN: CPT | Performed by: FAMILY MEDICINE

## 2018-11-14 PROCEDURE — 96374 THER/PROPH/DIAG INJ IV PUSH: CPT

## 2018-11-14 PROCEDURE — G8598 ASA/ANTIPLAT THER USED: HCPCS | Performed by: FAMILY MEDICINE

## 2018-11-14 PROCEDURE — 4040F PNEUMOC VAC/ADMIN/RCVD: CPT | Performed by: FAMILY MEDICINE

## 2018-11-14 PROCEDURE — G8482 FLU IMMUNIZE ORDER/ADMIN: HCPCS | Performed by: FAMILY MEDICINE

## 2018-11-14 PROCEDURE — 2580000003 HC RX 258: Performed by: FAMILY MEDICINE

## 2018-11-14 PROCEDURE — 1123F ACP DISCUSS/DSCN MKR DOCD: CPT | Performed by: FAMILY MEDICINE

## 2018-11-14 PROCEDURE — 6360000002 HC RX W HCPCS: Performed by: FAMILY MEDICINE

## 2018-11-14 RX ORDER — SODIUM CHLORIDE 0.9 % (FLUSH) 0.9 %
10 SYRINGE (ML) INJECTION PRN
Status: DISCONTINUED | OUTPATIENT
Start: 2018-11-14 | End: 2018-11-15 | Stop reason: HOSPADM

## 2018-11-14 RX ORDER — SODIUM CHLORIDE 0.9 % (FLUSH) 0.9 %
10 SYRINGE (ML) INJECTION PRN
Status: CANCELLED | OUTPATIENT
Start: 2018-11-14

## 2018-11-14 RX ADMIN — Medication 10 ML: at 10:45

## 2018-11-14 RX ADMIN — Medication 10 ML: at 10:34

## 2018-11-14 RX ADMIN — CEFTRIAXONE SODIUM 2 G: 2 INJECTION, POWDER, FOR SOLUTION INTRAMUSCULAR; INTRAVENOUS at 10:37

## 2018-11-14 RX ADMIN — Medication 10 ML: at 10:46

## 2018-11-14 NOTE — PROGRESS NOTES
fibrillation ([facility] [address] 8/28/2017    CAD (coronary artery disease) 9/12    30% LAD, 80% ostial stenosis-CX, RCAOccluded at its ostium. There was minimal left to right collateral flow.  Chronic diastolic CHF (congestive heart failure), NYHA class 2 ([facility] [address] 9/7/2016    Chronic renal failure, stage 3 (moderate) ([facility] [address] 4/24/2017    Erectile dysfunction     H/O cardiac catheterization 06/02/2017    LMCA: Lesion on LMCA: Distal subsection. 60% stenosis. Comments: Calcified lesion in the ostial Cx extending into the distal left main and proximal LAD. LAD: lesion on Prox LAD: Ostial. 50% stenosis. Lesion on 1st Diag:Proximal subsection. 50% stenosis. LCx: Lesion on Prox CX: Osital. 95% stenosis. Lesion on Dist CX: Ostial 50% stenosis. RCA: Lesion on prox RCA: Proximal subsection. 100% stenosis.  H/O echocardiogram 12/9/15    EF:55%. LV wall thickness is moderately increased. Bi atrial enlargement noted. Mean aortic valve gradient is 33mmhg w/moderate to severe aortic stenosis. Mitral annular calcification. Mild mitral regurgitation. Moderate pulm hypertension w/RV systolic pressure of 43 mmhg. Mild (grade 1) diastolic dysfunction.  H/O echocardiogram 12/05/2016    EF:60%. Mildly increased LV wall thickness. Biatrial enlargement. Moderate to severe aortic stenosis. Mean gradient 36 mmHg, KRISTINA 0.08. Mild mitral regurgitation. Moderate pulmonary hypertension with an estimated RV systolic pressure of 60 mmHg. Mild diastolic dysfunction.  H/O echocardiogram 06/13/2017    EF 55%. Moderately increased LV wall thickness normal LV cavity size. Severe bi-atrial enlargement. Mild mitral and moderate tricuspid regurg. Mild pulmonary hypertension estimated right ventricular systolic pressure of 35 mmHg. Bilateral pleural effusion.     History of cardiovascular stress test 12/21/2017    History of cardioversion 09/21/2017    Successful cardioversion to NSR with 200J biphasic    History of cardioversion 09/13/2018    
Osteoarthritis of hip 6/20/2013    PVD (peripheral vascular disease) ([facility] [address]       Reported history of a 60% right carotid stenosis.     Trigeminal neuralgia 7/10/2013                 Social History   Substance Use Topics    Smoking status: Former Smoker       Years: 15.00       Types: Cigars    Smokeless tobacco: Former User         Comment: quit 30-40 years ago    Alcohol use 0.6 oz/week        1 Cans of beer per week          Comment: weekly      Current Facility-Administered Medications          Current Outpatient Prescriptions   Medication Sig Dispense Refill    apixaban (ELIQUIS) 5 MG TABS tablet Take 1 tablet by mouth 2 times daily for 14 days EXP 2/21 Lot [id number] 28 tablet 0    apixaban (ELIQUIS) 5 MG TABS tablet Take 1 tablet by mouth 2 times daily 180 tablet 3    apixaban (ELIQUIS) 5 MG TABS tablet Take by mouth 2 times daily        furosemide (LASIX) 80 MG tablet 1 tablet every morning with an additional 80 mg on Monday, Wednesday and Friday 120 tablet 3    levothyroxine (SYNTHROID) 25 MCG tablet TAKE 1 TABLET BY MOUTH ONCE DAILY 90 tablet 1    OXcarbazepine (TRILEPTAL) 300 MG tablet TAKE ONE TABLET BY MOUTH 4 TIMES DAILY (Patient taking differently: TAKE ONE TABLET BY MOUTH 3 TIMES DAILY) 360 tablet 1    acetaminophen (TYLENOL) 325 MG tablet Take 2 tablets by mouth every 4 hours as needed for Fever (For temp greater than 100.5 F (38 C)) 120 tablet 3    amiodarone (CORDARONE) 200 MG tablet Take 1 tablet by mouth daily 30 tablet 3    cefTRIAXone (ROCEPHIN) 1 g injection Infuse 2 g intravenously every 24 hours for 42 doses 84 g 0    isosorbide mononitrate (IMDUR) 30 MG extended release tablet Take 1 tablet by mouth daily 90 tablet 3    baclofen (LIORESAL) 10 MG tablet TAKE ONE TABLET BY MOUTH THREE TIMES DAILY AS NEEDED 270 tablet 1    KLOR-CON M10 10 MEQ extended release tablet TAKE ONE TABLET BY MOUTH ONCE DAILY 90 tablet 3    rosuvastatin (CRESTOR) 40 MG tablet Take 1 tablet by mouth every

## 2018-11-15 ENCOUNTER — HOSPITAL ENCOUNTER (OUTPATIENT)
Dept: INFUSION THERAPY | Age: 83
Discharge: HOME | End: 2018-11-15
Payer: MEDICARE

## 2018-11-15 ENCOUNTER — HOSPITAL ENCOUNTER (OUTPATIENT)
Age: 83
Discharge: HOME | End: 2018-11-17
Payer: MEDICARE

## 2018-11-15 ENCOUNTER — HOSPITAL ENCOUNTER (OUTPATIENT)
Dept: GENERAL RADIOLOGY | Age: 83
Discharge: HOME | End: 2018-11-17
Payer: MEDICARE

## 2018-11-15 VITALS
DIASTOLIC BLOOD PRESSURE: 75 MMHG | HEART RATE: 81 BPM | RESPIRATION RATE: 20 BRPM | TEMPERATURE: 97.2 F | SYSTOLIC BLOOD PRESSURE: 145 MMHG

## 2018-11-15 DIAGNOSIS — I33.0 SUBACUTE BACTERIAL ENDOCARDITIS: ICD-10-CM

## 2018-11-15 DIAGNOSIS — R06.09 DYSPNEA ON EXERTION: ICD-10-CM

## 2018-11-15 DIAGNOSIS — I33.0 INFECTIVE ENDOCARDITIS OF AORTIC VALVE: ICD-10-CM

## 2018-11-15 LAB
ABSOLUTE EOS #: 0 K/UL (ref 0–0.44)
ABSOLUTE IMMATURE GRANULOCYTE: 0 K/UL (ref 0–0.3)
ABSOLUTE LYMPH #: 0.11 K/UL (ref 1.1–3.7)
ABSOLUTE MONO #: 0.9 K/UL (ref 0.1–1.2)
ANION GAP SERPL CALCULATED.3IONS-SCNC: 14 MMOL/L (ref 9–17)
BASOPHILS # BLD: 1 % (ref 0–2)
BASOPHILS ABSOLUTE: 0.05 K/UL (ref 0–0.2)
BNP INTERPRETATION: ABNORMAL
BUN BLDV-MCNC: 59 MG/DL (ref 8–23)
BUN/CREAT BLD: 35 (ref 9–20)
CALCIUM SERPL-MCNC: 8.7 MG/DL (ref 8.6–10.4)
CHLORIDE BLD-SCNC: 98 MMOL/L (ref 98–107)
CO2: 27 MMOL/L (ref 20–31)
CREAT SERPL-MCNC: 1.69 MG/DL (ref 0.7–1.2)
DIFFERENTIAL TYPE: ABNORMAL
EOSINOPHILS RELATIVE PERCENT: 0 % (ref 1–4)
GFR AFRICAN AMERICAN: 47 ML/MIN
GFR NON-AFRICAN AMERICAN: 39 ML/MIN
GFR SERPL CREATININE-BSD FRML MDRD: ABNORMAL ML/MIN/{1.73_M2}
GFR SERPL CREATININE-BSD FRML MDRD: ABNORMAL ML/MIN/{1.73_M2}
GLUCOSE BLD-MCNC: 144 MG/DL (ref 70–99)
HCT VFR BLD CALC: 37.9 % (ref 40.7–50.3)
HEMOGLOBIN: 11.9 G/DL (ref 13–17)
HIGH SENSITIVE C-REACTIVE PROTEIN: 19.5 MG/L
IMMATURE GRANULOCYTES: 0 %
LYMPHOCYTES # BLD: 2 % (ref 24–43)
MCH RBC QN AUTO: 29.2 PG (ref 25.2–33.5)
MCHC RBC AUTO-ENTMCNC: 31.4 G/DL (ref 28.4–34.8)
MCV RBC AUTO: 93.1 FL (ref 82.6–102.9)
MONOCYTES # BLD: 17 % (ref 3–12)
MORPHOLOGY: NORMAL
NRBC AUTOMATED: 0 PER 100 WBC
PDW BLD-RTO: 17.3 % (ref 11.8–14.4)
PLATELET # BLD: 169 K/UL (ref 138–453)
PLATELET ESTIMATE: ABNORMAL
PMV BLD AUTO: 9.4 FL (ref 8.1–13.5)
POTASSIUM SERPL-SCNC: 4.4 MMOL/L (ref 3.7–5.3)
PRO-BNP: 2783 PG/ML
PROLACTIN: 18.14 UG/L (ref 4.04–15.2)
RBC # BLD: 4.07 M/UL (ref 4.21–5.77)
RBC # BLD: ABNORMAL 10*6/UL
SEDIMENTATION RATE, ERYTHROCYTE: 34 MM (ref 0–20)
SEG NEUTROPHILS: 80 % (ref 36–65)
SEGMENTED NEUTROPHILS ABSOLUTE COUNT: 4.24 K/UL (ref 1.5–8.1)
SODIUM BLD-SCNC: 139 MMOL/L (ref 135–144)
WBC # BLD: 5.3 K/UL (ref 3.5–11.3)
WBC # BLD: ABNORMAL 10*3/UL

## 2018-11-15 PROCEDURE — 99213 OFFICE O/P EST LOW 20 MIN: CPT

## 2018-11-15 PROCEDURE — 87040 BLOOD CULTURE FOR BACTERIA: CPT

## 2018-11-15 PROCEDURE — 80048 BASIC METABOLIC PNL TOTAL CA: CPT

## 2018-11-15 PROCEDURE — 83880 ASSAY OF NATRIURETIC PEPTIDE: CPT

## 2018-11-15 PROCEDURE — 71046 X-RAY EXAM CHEST 2 VIEWS: CPT

## 2018-11-15 PROCEDURE — 2580000003 HC RX 258: Performed by: FAMILY MEDICINE

## 2018-11-15 PROCEDURE — 85651 RBC SED RATE NONAUTOMATED: CPT

## 2018-11-15 PROCEDURE — 6360000002 HC RX W HCPCS: Performed by: FAMILY MEDICINE

## 2018-11-15 PROCEDURE — 96374 THER/PROPH/DIAG INJ IV PUSH: CPT

## 2018-11-15 PROCEDURE — 85025 COMPLETE CBC W/AUTO DIFF WBC: CPT

## 2018-11-15 PROCEDURE — 36415 COLL VENOUS BLD VENIPUNCTURE: CPT

## 2018-11-15 PROCEDURE — 84146 ASSAY OF PROLACTIN: CPT

## 2018-11-15 PROCEDURE — 86141 C-REACTIVE PROTEIN HS: CPT

## 2018-11-15 RX ORDER — SODIUM CHLORIDE 0.9 % (FLUSH) 0.9 %
10 SYRINGE (ML) INJECTION PRN
Status: CANCELLED | OUTPATIENT
Start: 2018-11-15

## 2018-11-15 RX ORDER — SODIUM CHLORIDE 0.9 % (FLUSH) 0.9 %
10 SYRINGE (ML) INJECTION PRN
Status: DISCONTINUED | OUTPATIENT
Start: 2018-11-15 | End: 2018-11-16 | Stop reason: HOSPADM

## 2018-11-15 RX ADMIN — Medication 10 ML: at 10:32

## 2018-11-15 RX ADMIN — Medication 10 ML: at 11:25

## 2018-11-15 RX ADMIN — CEFTRIAXONE SODIUM 2 G: 2 INJECTION, POWDER, FOR SOLUTION INTRAMUSCULAR; INTRAVENOUS at 11:14

## 2018-11-15 RX ADMIN — Medication 10 ML: at 11:24

## 2018-11-16 ENCOUNTER — HOSPITAL ENCOUNTER (OUTPATIENT)
Dept: INFUSION THERAPY | Age: 83
Discharge: HOME | End: 2018-11-16
Payer: MEDICARE

## 2018-11-16 ENCOUNTER — TELEPHONE (OUTPATIENT)
Dept: FAMILY MEDICINE CLINIC | Age: 83
End: 2018-11-16

## 2018-11-16 VITALS
HEART RATE: 82 BPM | TEMPERATURE: 96.1 F | RESPIRATION RATE: 20 BRPM | SYSTOLIC BLOOD PRESSURE: 152 MMHG | DIASTOLIC BLOOD PRESSURE: 72 MMHG

## 2018-11-16 DIAGNOSIS — I33.0 SUBACUTE BACTERIAL ENDOCARDITIS: ICD-10-CM

## 2018-11-16 DIAGNOSIS — I33.0 SUBACUTE BACTERIAL ENDOCARDITIS: Primary | ICD-10-CM

## 2018-11-16 DIAGNOSIS — I33.0 INFECTIVE ENDOCARDITIS OF AORTIC VALVE: ICD-10-CM

## 2018-11-16 PROCEDURE — 6360000002 HC RX W HCPCS: Performed by: FAMILY MEDICINE

## 2018-11-16 PROCEDURE — 96374 THER/PROPH/DIAG INJ IV PUSH: CPT

## 2018-11-16 PROCEDURE — 2580000003 HC RX 258: Performed by: FAMILY MEDICINE

## 2018-11-16 RX ORDER — SODIUM CHLORIDE 0.9 % (FLUSH) 0.9 %
10 SYRINGE (ML) INJECTION PRN
Status: DISCONTINUED | OUTPATIENT
Start: 2018-11-16 | End: 2018-11-17 | Stop reason: HOSPADM

## 2018-11-16 RX ORDER — SODIUM CHLORIDE 0.9 % (FLUSH) 0.9 %
10 SYRINGE (ML) INJECTION PRN
Status: CANCELLED | OUTPATIENT
Start: 2018-11-16

## 2018-11-16 RX ADMIN — CEFTRIAXONE SODIUM 2 G: 2 INJECTION, POWDER, FOR SOLUTION INTRAMUSCULAR; INTRAVENOUS at 10:36

## 2018-11-16 RX ADMIN — Medication 10 ML: at 10:33

## 2018-11-16 RX ADMIN — Medication 10 ML: at 10:45

## 2018-11-16 RX ADMIN — Medication 10 ML: at 10:46

## 2018-11-16 NOTE — PROGRESS NOTES
Notify lab is ok but inflammatory markers are still high  We shoulld  Repeat blood cultures times 2 next week on Tuesday to see if anything growing off antibiotics

## 2018-11-16 NOTE — TELEPHONE ENCOUNTER
----- Message from Clarance Denver, MD sent at 11/16/2018 12:57 PM EST -----  Notify lab is ok but inflammatory markers are still high  We shoulld  Repeat blood cultures times 2 next week on Tuesday to see if anything growing off antibiotics

## 2018-11-17 ENCOUNTER — HOSPITAL ENCOUNTER (OUTPATIENT)
Dept: INFUSION THERAPY | Age: 83
Discharge: HOME | End: 2018-11-17
Payer: MEDICARE

## 2018-11-17 VITALS
TEMPERATURE: 97.7 F | RESPIRATION RATE: 18 BRPM | HEART RATE: 76 BPM | SYSTOLIC BLOOD PRESSURE: 149 MMHG | DIASTOLIC BLOOD PRESSURE: 76 MMHG

## 2018-11-17 DIAGNOSIS — I33.0 SUBACUTE BACTERIAL ENDOCARDITIS: ICD-10-CM

## 2018-11-17 DIAGNOSIS — I33.0 INFECTIVE ENDOCARDITIS OF AORTIC VALVE: ICD-10-CM

## 2018-11-17 PROCEDURE — 2580000003 HC RX 258: Performed by: FAMILY MEDICINE

## 2018-11-17 PROCEDURE — 96376 TX/PRO/DX INJ SAME DRUG ADON: CPT

## 2018-11-17 PROCEDURE — 96374 THER/PROPH/DIAG INJ IV PUSH: CPT

## 2018-11-17 PROCEDURE — 6360000002 HC RX W HCPCS: Performed by: FAMILY MEDICINE

## 2018-11-17 RX ORDER — SODIUM CHLORIDE 0.9 % (FLUSH) 0.9 %
10 SYRINGE (ML) INJECTION PRN
Status: CANCELLED | OUTPATIENT
Start: 2018-11-17

## 2018-11-17 RX ADMIN — CEFTRIAXONE SODIUM 2 G: 2 INJECTION, POWDER, FOR SOLUTION INTRAMUSCULAR; INTRAVENOUS at 10:37

## 2018-11-17 NOTE — PLAN OF CARE
Pt. Here for O.P. Rocephin. (R) basilic PICC intact, skin site without induration. PICC flushed with 20 ml NS pulsatile with ease after confirmed blood return. Rocephin given. PICC then flushed again with 20 ml NS with ease and clamped after. Swab cap applied, PICC further secure with stockinette. Pt. Discharged ambulatory without complaint.

## 2018-11-20 ENCOUNTER — HOSPITAL ENCOUNTER (OUTPATIENT)
Dept: INFUSION THERAPY | Age: 83
Discharge: HOME | DRG: 292 | End: 2018-11-20
Payer: MEDICARE

## 2018-11-20 ENCOUNTER — APPOINTMENT (OUTPATIENT)
Dept: GENERAL RADIOLOGY | Age: 83
DRG: 292 | End: 2018-11-20
Payer: MEDICARE

## 2018-11-20 ENCOUNTER — APPOINTMENT (OUTPATIENT)
Dept: CT IMAGING | Age: 83
DRG: 292 | End: 2018-11-20
Payer: MEDICARE

## 2018-11-20 ENCOUNTER — HOSPITAL ENCOUNTER (INPATIENT)
Age: 83
LOS: 1 days | Discharge: ANOTHER HOSPITAL | DRG: 292 | End: 2018-11-20
Attending: FAMILY MEDICINE | Admitting: FAMILY MEDICINE
Payer: MEDICARE

## 2018-11-20 VITALS
DIASTOLIC BLOOD PRESSURE: 85 MMHG | TEMPERATURE: 97.6 F | RESPIRATION RATE: 20 BRPM | HEART RATE: 83 BPM | OXYGEN SATURATION: 96 % | SYSTOLIC BLOOD PRESSURE: 143 MMHG

## 2018-11-20 VITALS
BODY MASS INDEX: 33.09 KG/M2 | SYSTOLIC BLOOD PRESSURE: 135 MMHG | TEMPERATURE: 98.2 F | HEIGHT: 67 IN | HEART RATE: 67 BPM | RESPIRATION RATE: 18 BRPM | WEIGHT: 210.8 LBS | OXYGEN SATURATION: 94 % | DIASTOLIC BLOOD PRESSURE: 84 MMHG

## 2018-11-20 DIAGNOSIS — I33.0 SUBACUTE BACTERIAL ENDOCARDITIS: ICD-10-CM

## 2018-11-20 DIAGNOSIS — I33.0 INFECTIVE ENDOCARDITIS OF AORTIC VALVE: ICD-10-CM

## 2018-11-20 PROBLEM — R77.8 ELEVATED TROPONIN: Status: ACTIVE | Noted: 2018-11-20

## 2018-11-20 PROBLEM — I50.41 ACUTE COMBINED SYSTOLIC AND DIASTOLIC CHF, NYHA CLASS 1 (HCC): Status: ACTIVE | Noted: 2018-11-20

## 2018-11-20 LAB
ANION GAP SERPL CALCULATED.3IONS-SCNC: 13 MMOL/L (ref 9–17)
BNP INTERPRETATION: ABNORMAL
BUN BLDV-MCNC: 57 MG/DL (ref 8–23)
BUN/CREAT BLD: 35 (ref 9–20)
CALCIUM SERPL-MCNC: 8.9 MG/DL (ref 8.6–10.4)
CHLORIDE BLD-SCNC: 97 MMOL/L (ref 98–107)
CO2: 24 MMOL/L (ref 20–31)
CREAT SERPL-MCNC: 1.61 MG/DL (ref 0.7–1.2)
CULTURE: NORMAL
CULTURE: NORMAL
GFR AFRICAN AMERICAN: 50 ML/MIN
GFR NON-AFRICAN AMERICAN: 41 ML/MIN
GFR SERPL CREATININE-BSD FRML MDRD: ABNORMAL ML/MIN/{1.73_M2}
GFR SERPL CREATININE-BSD FRML MDRD: ABNORMAL ML/MIN/{1.73_M2}
GLUCOSE BLD-MCNC: 96 MG/DL (ref 70–99)
HCT VFR BLD CALC: 38.1 % (ref 40.7–50.3)
HEMOGLOBIN: 12 G/DL (ref 13–17)
Lab: NORMAL
Lab: NORMAL
MAGNESIUM: 2.8 MG/DL (ref 1.6–2.6)
MCH RBC QN AUTO: 29.1 PG (ref 25.2–33.5)
MCHC RBC AUTO-ENTMCNC: 31.5 G/DL (ref 28.4–34.8)
MCV RBC AUTO: 92.3 FL (ref 82.6–102.9)
NRBC AUTOMATED: 0 PER 100 WBC
PDW BLD-RTO: 17.1 % (ref 11.8–14.4)
PLATELET # BLD: 198 K/UL (ref 138–453)
PMV BLD AUTO: 9.6 FL (ref 8.1–13.5)
POTASSIUM SERPL-SCNC: 4.8 MMOL/L (ref 3.7–5.3)
PRO-BNP: 2840 PG/ML
RBC # BLD: 4.13 M/UL (ref 4.21–5.77)
SODIUM BLD-SCNC: 134 MMOL/L (ref 135–144)
SPECIMEN DESCRIPTION: NORMAL
SPECIMEN DESCRIPTION: NORMAL
STATUS: NORMAL
STATUS: NORMAL
TROPONIN INTERP: ABNORMAL
TROPONIN INTERP: ABNORMAL
TROPONIN T: 0.13 NG/ML
TROPONIN T: 0.13 NG/ML
WBC # BLD: 7.2 K/UL (ref 3.5–11.3)

## 2018-11-20 PROCEDURE — 99285 EMERGENCY DEPT VISIT HI MDM: CPT

## 2018-11-20 PROCEDURE — 83735 ASSAY OF MAGNESIUM: CPT

## 2018-11-20 PROCEDURE — 99213 OFFICE O/P EST LOW 20 MIN: CPT

## 2018-11-20 PROCEDURE — 6370000000 HC RX 637 (ALT 250 FOR IP): Performed by: FAMILY MEDICINE

## 2018-11-20 PROCEDURE — 2580000003 HC RX 258: Performed by: PHYSICIAN ASSISTANT

## 2018-11-20 PROCEDURE — 96375 TX/PRO/DX INJ NEW DRUG ADDON: CPT

## 2018-11-20 PROCEDURE — 84484 ASSAY OF TROPONIN QUANT: CPT

## 2018-11-20 PROCEDURE — 36415 COLL VENOUS BLD VENIPUNCTURE: CPT

## 2018-11-20 PROCEDURE — 72125 CT NECK SPINE W/O DYE: CPT

## 2018-11-20 PROCEDURE — 2580000003 HC RX 258: Performed by: FAMILY MEDICINE

## 2018-11-20 PROCEDURE — 83880 ASSAY OF NATRIURETIC PEPTIDE: CPT

## 2018-11-20 PROCEDURE — 2000000000 HC ICU R&B

## 2018-11-20 PROCEDURE — 96374 THER/PROPH/DIAG INJ IV PUSH: CPT

## 2018-11-20 PROCEDURE — 71046 X-RAY EXAM CHEST 2 VIEWS: CPT

## 2018-11-20 PROCEDURE — 70450 CT HEAD/BRAIN W/O DYE: CPT

## 2018-11-20 PROCEDURE — 70486 CT MAXILLOFACIAL W/O DYE: CPT

## 2018-11-20 PROCEDURE — 80048 BASIC METABOLIC PNL TOTAL CA: CPT

## 2018-11-20 PROCEDURE — 6360000002 HC RX W HCPCS: Performed by: PHYSICIAN ASSISTANT

## 2018-11-20 PROCEDURE — 85027 COMPLETE CBC AUTOMATED: CPT

## 2018-11-20 PROCEDURE — 99223 1ST HOSP IP/OBS HIGH 75: CPT | Performed by: FAMILY MEDICINE

## 2018-11-20 PROCEDURE — 93005 ELECTROCARDIOGRAM TRACING: CPT

## 2018-11-20 PROCEDURE — 87040 BLOOD CULTURE FOR BACTERIA: CPT

## 2018-11-20 RX ORDER — ASPIRIN 81 MG/1
81 TABLET ORAL NIGHTLY
Status: DISCONTINUED | OUTPATIENT
Start: 2018-11-20 | End: 2018-11-21 | Stop reason: HOSPADM

## 2018-11-20 RX ORDER — LEVOTHYROXINE SODIUM 0.03 MG/1
25 TABLET ORAL DAILY
Status: DISCONTINUED | OUTPATIENT
Start: 2018-11-21 | End: 2018-11-21 | Stop reason: HOSPADM

## 2018-11-20 RX ORDER — BACLOFEN 10 MG/1
10 TABLET ORAL 3 TIMES DAILY
Status: DISCONTINUED | OUTPATIENT
Start: 2018-11-20 | End: 2018-11-21 | Stop reason: HOSPADM

## 2018-11-20 RX ORDER — CEFTRIAXONE 1 G/1
2 INJECTION, POWDER, FOR SOLUTION INTRAMUSCULAR; INTRAVENOUS EVERY 24 HOURS
Status: DISCONTINUED | OUTPATIENT
Start: 2018-11-20 | End: 2018-11-21 | Stop reason: HOSPADM

## 2018-11-20 RX ORDER — ROSUVASTATIN CALCIUM 40 MG/1
40 TABLET, COATED ORAL NIGHTLY
Status: DISCONTINUED | OUTPATIENT
Start: 2018-11-20 | End: 2018-11-21 | Stop reason: HOSPADM

## 2018-11-20 RX ORDER — SODIUM CHLORIDE 0.9 % (FLUSH) 0.9 %
10 SYRINGE (ML) INJECTION EVERY 12 HOURS SCHEDULED
Status: DISCONTINUED | OUTPATIENT
Start: 2018-11-20 | End: 2018-11-21 | Stop reason: HOSPADM

## 2018-11-20 RX ORDER — SODIUM CHLORIDE 0.9 % (FLUSH) 0.9 %
10 SYRINGE (ML) INJECTION PRN
Status: DISCONTINUED | OUTPATIENT
Start: 2018-11-20 | End: 2018-11-21 | Stop reason: HOSPADM

## 2018-11-20 RX ORDER — OXCARBAZEPINE 300 MG/1
300 TABLET, FILM COATED ORAL 3 TIMES DAILY
Status: DISCONTINUED | OUTPATIENT
Start: 2018-11-20 | End: 2018-11-21 | Stop reason: HOSPADM

## 2018-11-20 RX ORDER — POTASSIUM CHLORIDE 750 MG/1
10 TABLET, EXTENDED RELEASE ORAL 2 TIMES DAILY
Status: DISCONTINUED | OUTPATIENT
Start: 2018-11-20 | End: 2018-11-21 | Stop reason: HOSPADM

## 2018-11-20 RX ORDER — AMIODARONE HYDROCHLORIDE 200 MG/1
200 TABLET ORAL DAILY
Status: DISCONTINUED | OUTPATIENT
Start: 2018-11-21 | End: 2018-11-21 | Stop reason: HOSPADM

## 2018-11-20 RX ORDER — ACETAMINOPHEN 325 MG/1
650 TABLET ORAL EVERY 4 HOURS PRN
Status: DISCONTINUED | OUTPATIENT
Start: 2018-11-20 | End: 2018-11-21 | Stop reason: HOSPADM

## 2018-11-20 RX ORDER — FUROSEMIDE 10 MG/ML
40 INJECTION INTRAMUSCULAR; INTRAVENOUS ONCE
Status: COMPLETED | OUTPATIENT
Start: 2018-11-20 | End: 2018-11-20

## 2018-11-20 RX ORDER — SODIUM CHLORIDE 0.9 % (FLUSH) 0.9 %
10 SYRINGE (ML) INJECTION PRN
Status: CANCELLED | OUTPATIENT
Start: 2018-11-20

## 2018-11-20 RX ADMIN — OXCARBAZEPINE 300 MG: 300 TABLET ORAL at 22:13

## 2018-11-20 RX ADMIN — FUROSEMIDE 40 MG: 10 INJECTION, SOLUTION INTRAMUSCULAR; INTRAVENOUS at 18:51

## 2018-11-20 RX ADMIN — CEFTRIAXONE 1 G: 1 INJECTION, POWDER, FOR SOLUTION INTRAMUSCULAR; INTRAVENOUS at 20:11

## 2018-11-20 RX ADMIN — Medication 10 ML: at 14:00

## 2018-11-20 RX ADMIN — APIXABAN 5 MG: 5 TABLET, FILM COATED ORAL at 22:13

## 2018-11-20 RX ADMIN — Medication 10 ML: at 14:01

## 2018-11-20 RX ADMIN — POTASSIUM CHLORIDE 10 MEQ: 10 TABLET, EXTENDED RELEASE ORAL at 22:13

## 2018-11-20 RX ADMIN — ASPIRIN 81 MG: 81 TABLET, COATED ORAL at 22:13

## 2018-11-20 RX ADMIN — BACLOFEN 10 MG: 10 TABLET ORAL at 22:13

## 2018-11-20 RX ADMIN — Medication 10 ML: at 22:13

## 2018-11-20 ASSESSMENT — ENCOUNTER SYMPTOMS
RHINORRHEA: 0
EYE REDNESS: 0
COUGH: 0
NAUSEA: 0
BACK PAIN: 0
VOMITING: 0
ABDOMINAL PAIN: 0
BLOOD IN STOOL: 0
SHORTNESS OF BREATH: 1
CHEST TIGHTNESS: 0
WHEEZING: 0
EYE DISCHARGE: 0
DIARRHEA: 0
CONSTIPATION: 0
SORE THROAT: 0

## 2018-11-20 ASSESSMENT — PAIN SCALES - GENERAL: PAINLEVEL_OUTOF10: 0

## 2018-11-20 NOTE — CONSULTS
diastolic dysfunction.  H/O echocardiogram 06/13/2017    EF 55%. Moderately increased LV wall thickness normal LV cavity size. Severe bi-atrial enlargement. Mild mitral and moderate tricuspid regurg. Mild pulmonary hypertension estimated right ventricular systolic pressure of 35 mmHg. Bilateral pleural effusion.  History of cardiovascular stress test 12/21/2017    History of cardioversion 09/21/2017    Successful cardioversion to NSR with 200J biphasic    History of cardioversion 09/13/2018    Dr Kia Montalvo History of echocardiogram 11/21/13    EF >55%, mild to mod LVH, LA  is mod dilated, mild to mod AS, mild diastolic dysfunction noted.  History of echocardiogram 06/09/15    EF 55%. LV wall thickness is severely increased. LA is moderately dilated(34-39)with a LA volume index of 36 ml.    History of echocardiogram 06/19/2018    EF 55%. Mildly increased LV wall thickness. Left atrium severely dilated (>40) w/ left atrial volume index of 46 ml/m2. Moderate aortic stenosis w/ mean gradient of 21.3 mmHg consistent w/ moderate aortis stenosis on what appears to be bioprosthetic aortic melanie, Moderate pulmonary HTN w/ RV systolic pressure of 58 mmHg. Moderate/severe tricuspid regurg. Diastoly cannot be assessed due to pt rhythm    History of echocardiogram 10/04/2018    EF 55%. Mildly increased LA wall thickness. LA severely dilatedw/ LA volume index of 46. Bioprosthetic AV seen. Mean gradient is 20, which is unchanged from previous. Mild mitral regurg. Moderate/severe tricuspid regurg, Moderate pulmonary HTN w/ estimated RV systolic pressure fo 47. Mild diastolic dysfunction.  History of Holter monitoring 08/08/2017    Atrial fibrillation throughout with fairly controlled ventricular response (HR less than 100 bpm 93% of the time), no signficant pauses.  Rare isolated PVC's    History of Holter monitoring 08/08/2017    Atrial Fibrillation throughout with fairly controlled ventricular response (HR less than 100bpm 93% of the time),no significant pauses. Rare isolated PVC's    History of Holter monitoring 12/06/2017    Rare PAC's with one 4 beat run of wide complex tachycardia at 170 bpm, No symptoms reported. clinical correlation required    Hyperlipidemia 2/21/2006    Hypertension     Osteoarthritis of hip 6/20/2013    PVD (peripheral vascular disease) ([facility] [address]     Reported history of a 60% right carotid stenosis.  Trigeminal neuralgia 7/10/2013       CURRENT ALLERGIES: Iodine; Norco [hydrocodone-acetaminophen]; Other; Shellfish-derived products; Shrimp flavor; and Oxycodone REVIEW OF SYSTEMS: 14 systems were reviewed. Pertinent positives and negatives as above, all else negative.      Past Surgical History:   Procedure Laterality Date    CARDIAC CATHETERIZATION Left 05/18/2017    Right Radial/[employer] Maurizio/    CARDIOVERSION  09/21/2017    microDimensions Maurizio/Dr Her/2 shocks at 200 joules    CORONARY ARTERY BYPASS GRAFT N/A 5/19/2017    CORONARY ARTERY BYPASS GRAFT X 2: LIMA-LAD, SVG-OM, AORTIC VALVE REPLACEMENT WITH 23 MM MEDTRONIC MOSAIC ULTRA, EVACUATION OF 1.5 LITER PLEURAL FLUID; ALLAN PER ANESTHESIA performed by Beena Hoskins MD at [address] Right 7/29/13    [facility] - Dr. Rosas Leone    tube was blocked    SHOULDER SURGERY      roto cuff surgery left shoulder    Social History:  Social History   Substance Use Topics    Smoking status: Former Smoker     Years: 15.00     Types: Cigars    Smokeless tobacco: Former User      Comment: quit 30-40 years ago    Alcohol use 0.6 oz/week     1 Cans of beer per week      Comment: weekly        CURRENT MEDICATIONS:  Outpatient Prescriptions Marked as Taking for the 11/20/18 encounter [facility] Encounter)   Medication Sig Dispense Refill    apixaban (ELIQUIS) 5 MG TABS tablet Take 1 tablet by mouth 2 times daily 180 wheezes, rhonchi or rales. Abdominal: Soft, non-tender. Bowel sounds and aorta are normal. He exhibits no organomegaly, mass or bruit. Distended evidence of asides   Extremities: 1+ lower extremity pitting pedal edema. No cyanosis and no clubbing. Pulses are 2+ radial and carotid pulses. 2+ dorsalis pedis and posterior tibial pulses bilaterally. Neurological: He is alert and oriented to person, place, and time. No evidence of gross cranial nerve deficit. Coordination appeared normal.   Skin: Skin is warm and dry. There is no rash or diaphoresis. Psychiatric: He has a normal mood and affect.  His speech is normal and behavior is normal.      MOST RECENT LABS ON RECORD:   Lab Results   Component Value Date    WBC 7.2 11/20/2018    HGB 12.0 (L) 11/20/2018    HCT 38.1 (L) 11/20/2018     11/20/2018    CHOL 132 08/15/2018    TRIG 49 08/15/2018    HDL 52 08/15/2018    LDLCHOLESTEROL 70 08/15/2018    ALT 21 10/04/2018    AST 38 10/04/2018     (L) 11/20/2018    K 4.8 11/20/2018    CL 97 (L) 11/20/2018    CREATININE 1.61 (H) 11/20/2018    BUN 57 (H) 11/20/2018    CO2 24 11/20/2018    TSH 5.29 (H) 08/15/2018    PSA 1.16 08/31/2018    INR 1.2 10/03/2018    LABA1C 5.3 08/15/2018        ASSESSMENT:  Patient Active Problem List    Diagnosis Date Noted    Infective endocarditis of aortic valve      Priority: High    Persistent atrial fibrillation ([address] 08/28/2017     Priority: High    Abnormal cardiovascular stress test 01/06/2016     Priority: High    Subacute bacterial endocarditis 10/06/2018     Priority: Low    Pneumonia 10/03/2018     Priority: Low    Sepsis ([address] 10/03/2018     Priority: Low    Daytime somnolence 08/22/2018     Priority: Low    Hyperglycemia 08/22/2018     Priority: Low    History of atrial fibrillation 12/12/2017     Priority: Low    Dyspnea 12/12/2017     Priority: Low    Right-sided heart failure ([address] 08/28/2017     Priority: Low    Anemia 08/28/2017     Priority: Low    PAF

## 2018-11-20 NOTE — PROGRESS NOTES
Arrived to outpatient dept for blood culture. States \"I don't feel well,SOB when I get up and move\". \"I fell last night, I don't know what happened\". Cough noted, C/O of swelling in legs mostly right leg. Also c/o of abdomen \"being tight,full,uncomfortable\". States \"haven't felt well for couple days\". 56 Dr. Pankaj Deleon office nurse  notified of all his c/o and findings. She states \" I will talk to Dr. Pankaj Deleon and call back'. 56 Dr. Pankaj Deleon office calls back ,the nurse states\"Dr. Pankaj Deleon wants you to take him to the Emergency Room\". PICC dressing changed. 1430 Transferred to the Emergency Room and report given.

## 2018-11-21 LAB
EKG ATRIAL RATE: 70 BPM
EKG ATRIAL RATE: 71 BPM
EKG P AXIS: 100 DEGREES
EKG P AXIS: 108 DEGREES
EKG P-R INTERVAL: 258 MS
EKG P-R INTERVAL: 262 MS
EKG Q-T INTERVAL: 440 MS
EKG Q-T INTERVAL: 446 MS
EKG QRS DURATION: 96 MS
EKG QRS DURATION: 96 MS
EKG QTC CALCULATION (BAZETT): 478 MS
EKG QTC CALCULATION (BAZETT): 481 MS
EKG R AXIS: 85 DEGREES
EKG R AXIS: 91 DEGREES
EKG T AXIS: 89 DEGREES
EKG T AXIS: 93 DEGREES
EKG VENTRICULAR RATE: 70 BPM
EKG VENTRICULAR RATE: 71 BPM

## 2018-11-21 NOTE — ED PROVIDER NOTES
echocardiogram 12/9/15    EF:55%. LV wall thickness is moderately increased. Bi atrial enlargement noted. Mean aortic valve gradient is 33mmhg w/moderate to severe aortic stenosis. Mitral annular calcification. Mild mitral regurgitation. Moderate pulm hypertension w/RV systolic pressure of 43 mmhg. Mild (grade 1) diastolic dysfunction.  H/O echocardiogram 12/05/2016    EF:60%. Mildly increased LV wall thickness. Biatrial enlargement. Moderate to severe aortic stenosis. Mean gradient 36 mmHg, KRISTINA 0.08. Mild mitral regurgitation. Moderate pulmonary hypertension with an estimated RV systolic pressure of 60 mmHg. Mild diastolic dysfunction.  H/O echocardiogram 06/13/2017    EF 55%. Moderately increased LV wall thickness normal LV cavity size. Severe bi-atrial enlargement. Mild mitral and moderate tricuspid regurg. Mild pulmonary hypertension estimated right ventricular systolic pressure of 35 mmHg. Bilateral pleural effusion.  History of cardiovascular stress test 12/21/2017    History of cardioversion 09/21/2017    Successful cardioversion to NSR with 200J biphasic    History of cardioversion 09/13/2018    Dr Greg Robert History of echocardiogram 11/21/13    EF >55%, mild to mod LVH, LA  is mod dilated, mild to mod AS, mild diastolic dysfunction noted.  History of echocardiogram 06/09/15    EF 55%. LV wall thickness is severely increased. LA is moderately dilated(34-39)with a LA volume index of 36 ml.    History of echocardiogram 06/19/2018    EF 55%. Mildly increased LV wall thickness. Left atrium severely dilated (>40) w/ left atrial volume index of 46 ml/m2. Moderate aortic stenosis w/ mean gradient of 21.3 mmHg consistent w/ moderate aortis stenosis on what appears to be bioprosthetic aortic melanie, Moderate pulmonary HTN w/ RV systolic pressure of 58 mmHg. Moderate/severe tricuspid regurg.  Diastoly cannot be assessed due to pt rhythm    History of echocardiogram 10/04/2018    EF 55%. Mildly increased LA wall thickness. LA severely dilatedw/ LA volume index of 46. Bioprosthetic AV seen. Mean gradient is 20, which is unchanged from previous. Mild mitral regurg. Moderate/severe tricuspid regurg, Moderate pulmonary HTN w/ estimated RV systolic pressure fo 47. Mild diastolic dysfunction.  History of Holter monitoring 08/08/2017    Atrial fibrillation throughout with fairly controlled ventricular response (HR less than 100 bpm 93% of the time), no signficant pauses. Rare isolated PVC's    History of Holter monitoring 08/08/2017    Atrial Fibrillation throughout with fairly controlled ventricular response (HR less than 100bpm 93% of the time),no significant pauses. Rare isolated PVC's    History of Holter monitoring 12/06/2017    Rare PAC's with one 4 beat run of wide complex tachycardia at 170 bpm, No symptoms reported. clinical correlation required    Hyperlipidemia 2/21/2006    Hypertension     Osteoarthritis of hip 6/20/2013    PVD (peripheral vascular disease) ([facility] [address]     Reported history of a 60% right carotid stenosis.     Trigeminal neuralgia 7/10/2013         SURGICALHISTORY       Past Surgical History:   Procedure Laterality Date    CARDIAC CATHETERIZATION Left 05/18/2017    Right Radial/CardiOx Maurizio/    CARDIOVERSION  09/21/2017    CardiOx Maurizio/Dr Her/2 shocks at 200 joules    CORONARY ARTERY BYPASS GRAFT N/A 5/19/2017    CORONARY ARTERY BYPASS GRAFT X 2: LIMA-LAD, SVG-OM, AORTIC VALVE REPLACEMENT WITH 23 MM MEDTRONIC MOSAIC ULTRA, EVACUATION OF 1.5 LITER PLEURAL FLUID; ALLAN PER ANESTHESIA performed by Le Gotti MD at [address] Right 7/29/13    [facility] - Dr. Massey Records Right    [address]    tube was blocked    SHOULDER SURGERY      roto cuff surgery left shoulder         CURRENT MEDICATIONS       Current Discharge Medication List      CONTINUE these medications which Heart Disease Mother     Heart Disease Father     Cancer Father     Heart Disease Sister     High Blood Pressure Sister     Heart Disease Sister     High Blood Pressure Sister     Heart Disease Brother     Heart Disease Brother     Heart Disease Brother           SOCIAL HISTORY       Social History     Social History    Marital status:      Spouse name: N/A    Number of children: N/A    Years of education: N/A     Social History Main Topics    Smoking status: Former Smoker     Years: 15.00     Types: Cigars    Smokeless tobacco: Former User      Comment: quit 30-40 years ago    Alcohol use 0.6 oz/week     1 Cans of beer per week      Comment: weekly    Drug use: No    Sexual activity: Not Asked     Other Topics Concern    None     Social History Narrative    None       SCREENINGS    Arcadio Coma Scale  Eye Opening: Spontaneous  Best Verbal Response: Oriented  Best Motor Response: Obeys commands  Arcadio Coma Scale Score: 15 @FLOW([id number])@      PHYSICAL EXAM    (up to 7 for level 4, 8 or more for level 5)     ED Triage Vitals [11/20/18 1449]   BP Temp Temp src Pulse Resp SpO2 Height Weight   127/78 97.9 °F (36.6 °C) -- 71 18 95 % 5' 7\" (1.702 m) 208 lb (94.3 kg)       Physical Exam   Constitutional: He is oriented to person, place, and time. He appears well-developed and well-nourished. No distress. HENT:   Head: Normocephalic. Head is with contusion. Right Ear: External ear normal.   Left Ear: External ear normal.   Mouth/Throat: Oropharynx is clear and moist. No oropharyngeal exudate. Eyes: Pupils are equal, round, and reactive to light. Conjunctivae and EOM are normal. Right eye exhibits no discharge. Left eye exhibits no discharge. No scleral icterus. Neck: Normal range of motion and full passive range of motion without pain. Neck supple. No spinous process tenderness and no muscular tenderness present. No neck rigidity.  No tracheal deviation, no edema, no erythema and normal range of motion present. Cardiovascular: Normal rate, regular rhythm and intact distal pulses. Exam reveals no gallop and no friction rub. Pulmonary/Chest: Effort normal and breath sounds normal. No stridor. No respiratory distress. He has no wheezes. He has no rales. Abdominal: Soft. Bowel sounds are normal. He exhibits no distension and no mass. There is no tenderness. There is no rebound and no guarding. Musculoskeletal: Normal range of motion. He exhibits no edema, tenderness or deformity. Patient has no midline bony spinal tenderness. [facility] Moves bilateral upper and lower extremity is without any tenderness. Neurological: He is alert and oriented to person, place, and time. He has normal strength and normal reflexes. He is not disoriented. He displays normal reflexes. No cranial nerve deficit or sensory deficit. He exhibits normal muscle tone. GCS eye subscore is 4. GCS verbal subscore is 5. GCS motor subscore is 6. Skin: Skin is warm and dry. No rash noted. He is not diaphoretic. No erythema. Psychiatric: He has a normal mood and affect. His behavior is normal.   Nursing note and vitals reviewed. DIAGNOSTIC RESULTS     EKG: All EKG's are interpreted by the Emergency Department Physician who either signs orCo-signs this chart in the absence of a cardiologist.      RADIOLOGY:   Non-plainfilm images such as CT, Ultrasound and MRI are read by the radiologist. Plain radiographic images are visualized and preliminarily interpreted by the emergency physician with the below findings:      Interpretationper the Radiologist below, if available at the time of this note:    CT FACIAL BONES WO CONTRAST   Final Result   No acute traumatic injury of the facial bones. CT Cervical Spine WO Contrast   Final Result   Brain CT: No acute intracranial abnormality. Cervical CT: No fractures. Degenerative changes.          CT Head WO Contrast   Final Result   Brain CT: No Shortness of breath, CHF, subacute endocarditis          (Please note that portions of this note were completed with a voice recognition program.  Efforts were made to edit the dictations but occasionally words are mis-transcribed.)            Praneeth Randhawa PA-C  11/20/18 2121

## 2018-11-21 NOTE — PROGRESS NOTES
2223-Received [facility] with a bed assignment for patient. 2234-Arranged for transportation via [employer]. 2254-Informed patient of bed assignment and transportation plans, informed patient that this nurse would contact wife and update her.

## 2018-11-25 LAB
CULTURE: NORMAL
CULTURE: NORMAL
Lab: NORMAL
Lab: NORMAL
SPECIMEN DESCRIPTION: NORMAL
SPECIMEN DESCRIPTION: NORMAL
STATUS: NORMAL
STATUS: NORMAL

## 2018-12-06 ENCOUNTER — TELEPHONE (OUTPATIENT)
Dept: CARDIOLOGY | Age: 83
End: 2018-12-06

## 2018-12-06 ENCOUNTER — HOSPITAL ENCOUNTER (OUTPATIENT)
Age: 83
Setting detail: SPECIMEN
Discharge: HOME | End: 2018-12-06
Payer: MEDICARE

## 2018-12-06 LAB
ABSOLUTE EOS #: 0 K/UL (ref 0–0.44)
ABSOLUTE IMMATURE GRANULOCYTE: 0 K/UL (ref 0–0.3)
ABSOLUTE LYMPH #: 0.64 K/UL (ref 1.1–3.7)
ABSOLUTE MONO #: 0.64 K/UL (ref 0.1–1.2)
ALBUMIN SERPL-MCNC: 3.1 G/DL (ref 3.5–5.2)
ALBUMIN/GLOBULIN RATIO: 0.8 (ref 1–2.5)
ALP BLD-CCNC: 121 U/L (ref 40–129)
ALT SERPL-CCNC: 37 U/L (ref 5–41)
ANION GAP SERPL CALCULATED.3IONS-SCNC: 14 MMOL/L (ref 9–17)
AST SERPL-CCNC: 67 U/L
BASOPHILS # BLD: 1 % (ref 0–2)
BASOPHILS ABSOLUTE: 0.05 K/UL (ref 0–0.2)
BILIRUB SERPL-MCNC: 0.53 MG/DL (ref 0.3–1.2)
BUN BLDV-MCNC: 54 MG/DL (ref 8–23)
BUN/CREAT BLD: 34 (ref 9–20)
CALCIUM SERPL-MCNC: 8.5 MG/DL (ref 8.6–10.4)
CHLORIDE BLD-SCNC: 94 MMOL/L (ref 98–107)
CO2: 26 MMOL/L (ref 20–31)
CREAT SERPL-MCNC: 1.57 MG/DL (ref 0.7–1.2)
DIFFERENTIAL TYPE: ABNORMAL
EOSINOPHILS RELATIVE PERCENT: 0 % (ref 1–4)
GFR AFRICAN AMERICAN: 52 ML/MIN
GFR NON-AFRICAN AMERICAN: 43 ML/MIN
GFR SERPL CREATININE-BSD FRML MDRD: ABNORMAL ML/MIN/{1.73_M2}
GFR SERPL CREATININE-BSD FRML MDRD: ABNORMAL ML/MIN/{1.73_M2}
GLUCOSE BLD-MCNC: 78 MG/DL (ref 70–99)
HCT VFR BLD CALC: 30.8 % (ref 40.7–50.3)
HEMOGLOBIN: 9.6 G/DL (ref 13–17)
IMMATURE GRANULOCYTES: 0 %
LYMPHOCYTES # BLD: 12 % (ref 24–43)
MCH RBC QN AUTO: 28.5 PG (ref 25.2–33.5)
MCHC RBC AUTO-ENTMCNC: 31.2 G/DL (ref 28.4–34.8)
MCV RBC AUTO: 91.4 FL (ref 82.6–102.9)
MONOCYTES # BLD: 12 % (ref 3–12)
MORPHOLOGY: NORMAL
NRBC AUTOMATED: 0 PER 100 WBC
PDW BLD-RTO: 17.6 % (ref 11.8–14.4)
PLATELET # BLD: 206 K/UL (ref 138–453)
PLATELET ESTIMATE: ABNORMAL
PMV BLD AUTO: 10.2 FL (ref 8.1–13.5)
POTASSIUM SERPL-SCNC: 3.9 MMOL/L (ref 3.7–5.3)
RBC # BLD: 3.37 M/UL (ref 4.21–5.77)
RBC # BLD: ABNORMAL 10*6/UL
SEG NEUTROPHILS: 75 % (ref 36–65)
SEGMENTED NEUTROPHILS ABSOLUTE COUNT: 3.97 K/UL (ref 1.5–8.1)
SODIUM BLD-SCNC: 134 MMOL/L (ref 135–144)
TOTAL PROTEIN: 7 G/DL (ref 6.4–8.3)
WBC # BLD: 5.3 K/UL (ref 3.5–11.3)
WBC # BLD: ABNORMAL 10*3/UL

## 2018-12-06 PROCEDURE — 36415 COLL VENOUS BLD VENIPUNCTURE: CPT

## 2018-12-06 PROCEDURE — 85025 COMPLETE CBC W/AUTO DIFF WBC: CPT

## 2018-12-06 PROCEDURE — P9604 ONE-WAY ALLOW PRORATED TRIP: HCPCS

## 2018-12-06 PROCEDURE — 80053 COMPREHEN METABOLIC PANEL: CPT

## 2018-12-06 NOTE — TELEPHONE ENCOUNTER
Susan from [address] phoned to report Mr. Aston Palacios has gained 6.4# in the last 2 days with lower extremity edema bilaterally. SOB with activity which is not unusual for the pt. His bp this a.m. Was 111/57    [facility] would like to know if a med change is needed. Please advise. Thank you!

## 2018-12-10 ENCOUNTER — HOSPITAL ENCOUNTER (OUTPATIENT)
Age: 83
Setting detail: SPECIMEN
Discharge: HOME | End: 2018-12-10
Payer: MEDICARE

## 2018-12-10 LAB
ABSOLUTE EOS #: 0 K/UL (ref 0–0.44)
ABSOLUTE IMMATURE GRANULOCYTE: 0.05 K/UL (ref 0–0.3)
ABSOLUTE LYMPH #: 0.5 K/UL (ref 1.1–3.7)
ABSOLUTE MONO #: 0.59 K/UL (ref 0.1–1.2)
ALBUMIN SERPL-MCNC: 3.2 G/DL (ref 3.5–5.2)
ALBUMIN/GLOBULIN RATIO: 0.8 (ref 1–2.5)
ALP BLD-CCNC: 132 U/L (ref 40–129)
ALT SERPL-CCNC: 31 U/L (ref 5–41)
ANION GAP SERPL CALCULATED.3IONS-SCNC: 14 MMOL/L (ref 9–17)
AST SERPL-CCNC: 48 U/L
BASOPHILS # BLD: 1 % (ref 0–2)
BASOPHILS ABSOLUTE: 0.05 K/UL (ref 0–0.2)
BILIRUB SERPL-MCNC: 0.49 MG/DL (ref 0.3–1.2)
BUN BLDV-MCNC: 62 MG/DL (ref 8–23)
BUN/CREAT BLD: 38 (ref 9–20)
CALCIUM SERPL-MCNC: 8.7 MG/DL (ref 8.6–10.4)
CHLORIDE BLD-SCNC: 100 MMOL/L (ref 98–107)
CO2: 25 MMOL/L (ref 20–31)
CREAT SERPL-MCNC: 1.62 MG/DL (ref 0.7–1.2)
DIFFERENTIAL TYPE: ABNORMAL
EOSINOPHILS RELATIVE PERCENT: 0 % (ref 1–4)
GFR AFRICAN AMERICAN: 50 ML/MIN
GFR NON-AFRICAN AMERICAN: 41 ML/MIN
GFR SERPL CREATININE-BSD FRML MDRD: ABNORMAL ML/MIN/{1.73_M2}
GFR SERPL CREATININE-BSD FRML MDRD: ABNORMAL ML/MIN/{1.73_M2}
GLUCOSE BLD-MCNC: 83 MG/DL (ref 70–99)
HCT VFR BLD CALC: 29.2 % (ref 40.7–50.3)
HEMOGLOBIN: 9.3 G/DL (ref 13–17)
IMMATURE GRANULOCYTES: 1 %
LYMPHOCYTES # BLD: 11 % (ref 24–43)
MCH RBC QN AUTO: 29.1 PG (ref 25.2–33.5)
MCHC RBC AUTO-ENTMCNC: 31.8 G/DL (ref 28.4–34.8)
MCV RBC AUTO: 91.3 FL (ref 82.6–102.9)
MONOCYTES # BLD: 13 % (ref 3–12)
MORPHOLOGY: NORMAL
NRBC AUTOMATED: 0 PER 100 WBC
PDW BLD-RTO: 18 % (ref 11.8–14.4)
PLATELET # BLD: 213 K/UL (ref 138–453)
PLATELET ESTIMATE: ABNORMAL
PMV BLD AUTO: 9.8 FL (ref 8.1–13.5)
POTASSIUM SERPL-SCNC: 3.8 MMOL/L (ref 3.7–5.3)
RBC # BLD: 3.2 M/UL (ref 4.21–5.77)
RBC # BLD: ABNORMAL 10*6/UL
SEG NEUTROPHILS: 74 % (ref 36–65)
SEGMENTED NEUTROPHILS ABSOLUTE COUNT: 3.31 K/UL (ref 1.5–8.1)
SODIUM BLD-SCNC: 139 MMOL/L (ref 135–144)
TOTAL PROTEIN: 7.3 G/DL (ref 6.4–8.3)
WBC # BLD: 4.5 K/UL (ref 3.5–11.3)
WBC # BLD: ABNORMAL 10*3/UL

## 2018-12-10 PROCEDURE — 80053 COMPREHEN METABOLIC PANEL: CPT

## 2018-12-10 PROCEDURE — 85025 COMPLETE CBC W/AUTO DIFF WBC: CPT

## 2018-12-10 PROCEDURE — P9604 ONE-WAY ALLOW PRORATED TRIP: HCPCS

## 2018-12-10 PROCEDURE — 36415 COLL VENOUS BLD VENIPUNCTURE: CPT

## 2018-12-11 ENCOUNTER — HOSPITAL ENCOUNTER (OUTPATIENT)
Age: 83
Setting detail: SPECIMEN
Discharge: HOME | End: 2018-12-11
Payer: MEDICARE

## 2018-12-11 LAB
ALBUMIN SERPL-MCNC: 3.5 G/DL (ref 3.5–5.2)
ALBUMIN/GLOBULIN RATIO: 0.7 (ref 1–2.5)
ALP BLD-CCNC: 155 U/L (ref 40–129)
ALT SERPL-CCNC: 33 U/L (ref 5–41)
ANION GAP SERPL CALCULATED.3IONS-SCNC: 13 MMOL/L (ref 9–17)
AST SERPL-CCNC: 48 U/L
BILIRUB SERPL-MCNC: 0.55 MG/DL (ref 0.3–1.2)
BUN BLDV-MCNC: 67 MG/DL (ref 8–23)
BUN/CREAT BLD: 38 (ref 9–20)
CALCIUM SERPL-MCNC: 9.3 MG/DL (ref 8.6–10.4)
CHLORIDE BLD-SCNC: 97 MMOL/L (ref 98–107)
CO2: 26 MMOL/L (ref 20–31)
CREAT SERPL-MCNC: 1.78 MG/DL (ref 0.7–1.2)
GFR AFRICAN AMERICAN: 45 ML/MIN
GFR NON-AFRICAN AMERICAN: 37 ML/MIN
GFR SERPL CREATININE-BSD FRML MDRD: ABNORMAL ML/MIN/{1.73_M2}
GFR SERPL CREATININE-BSD FRML MDRD: ABNORMAL ML/MIN/{1.73_M2}
GLUCOSE BLD-MCNC: 99 MG/DL (ref 70–99)
HCT VFR BLD CALC: 34.8 % (ref 40.7–50.3)
HEMOGLOBIN: 11 G/DL (ref 13–17)
MCH RBC QN AUTO: 28.8 PG (ref 25.2–33.5)
MCHC RBC AUTO-ENTMCNC: 31.6 G/DL (ref 28.4–34.8)
MCV RBC AUTO: 91.1 FL (ref 82.6–102.9)
NRBC AUTOMATED: 0 PER 100 WBC
PDW BLD-RTO: 18.6 % (ref 11.8–14.4)
PLATELET # BLD: 250 K/UL (ref 138–453)
PMV BLD AUTO: 9.7 FL (ref 8.1–13.5)
POTASSIUM SERPL-SCNC: 4.1 MMOL/L (ref 3.7–5.3)
RBC # BLD: 3.82 M/UL (ref 4.21–5.77)
SODIUM BLD-SCNC: 136 MMOL/L (ref 135–144)
TOTAL PROTEIN: 8.3 G/DL (ref 6.4–8.3)
WBC # BLD: 6.1 K/UL (ref 3.5–11.3)

## 2018-12-11 PROCEDURE — 80053 COMPREHEN METABOLIC PANEL: CPT

## 2018-12-11 PROCEDURE — 85027 COMPLETE CBC AUTOMATED: CPT

## 2018-12-14 ENCOUNTER — HOSPITAL ENCOUNTER (OUTPATIENT)
Age: 83
Setting detail: SPECIMEN
Discharge: HOME | End: 2018-12-14
Payer: MEDICARE

## 2018-12-14 LAB
ALBUMIN SERPL-MCNC: 3 G/DL (ref 3.5–5.2)
ALBUMIN/GLOBULIN RATIO: 0.7 (ref 1–2.5)
ALP BLD-CCNC: 129 U/L (ref 40–129)
ALT SERPL-CCNC: 24 U/L (ref 5–41)
ANION GAP SERPL CALCULATED.3IONS-SCNC: 12 MMOL/L (ref 9–17)
AST SERPL-CCNC: 40 U/L
BILIRUB SERPL-MCNC: 0.47 MG/DL (ref 0.3–1.2)
BUN BLDV-MCNC: 64 MG/DL (ref 8–23)
BUN/CREAT BLD: 40 (ref 9–20)
CALCIUM SERPL-MCNC: 9.3 MG/DL (ref 8.6–10.4)
CHLORIDE BLD-SCNC: 99 MMOL/L (ref 98–107)
CO2: 26 MMOL/L (ref 20–31)
CREAT SERPL-MCNC: 1.61 MG/DL (ref 0.7–1.2)
GFR AFRICAN AMERICAN: 50 ML/MIN
GFR NON-AFRICAN AMERICAN: 41 ML/MIN
GFR SERPL CREATININE-BSD FRML MDRD: ABNORMAL ML/MIN/{1.73_M2}
GFR SERPL CREATININE-BSD FRML MDRD: ABNORMAL ML/MIN/{1.73_M2}
GLUCOSE BLD-MCNC: 79 MG/DL (ref 70–99)
POTASSIUM SERPL-SCNC: 4 MMOL/L (ref 3.7–5.3)
SODIUM BLD-SCNC: 137 MMOL/L (ref 135–144)
TOTAL PROTEIN: 7.1 G/DL (ref 6.4–8.3)

## 2018-12-14 PROCEDURE — 36415 COLL VENOUS BLD VENIPUNCTURE: CPT

## 2018-12-14 PROCEDURE — P9604 ONE-WAY ALLOW PRORATED TRIP: HCPCS

## 2018-12-14 PROCEDURE — 80053 COMPREHEN METABOLIC PANEL: CPT

## 2018-12-20 PROBLEM — R77.8 ELEVATED TROPONIN: Status: RESOLVED | Noted: 2018-11-20 | Resolved: 2018-12-20

## 2019-01-01 ENCOUNTER — HOSPITAL ENCOUNTER (OUTPATIENT)
Dept: LAB | Age: 84
Discharge: HOME | End: 2019-12-04
Payer: MEDICARE

## 2019-01-01 ENCOUNTER — HOSPITAL ENCOUNTER (OUTPATIENT)
Age: 84
Discharge: HOME | End: 2019-08-05
Payer: MEDICARE

## 2019-01-01 ENCOUNTER — OFFICE VISIT (OUTPATIENT)
Dept: FAMILY MEDICINE CLINIC | Age: 84
End: 2019-01-01
Payer: MEDICARE

## 2019-01-01 ENCOUNTER — TELEPHONE (OUTPATIENT)
Dept: FAMILY MEDICINE CLINIC | Age: 84
End: 2019-01-01

## 2019-01-01 ENCOUNTER — HOSPITAL ENCOUNTER (OUTPATIENT)
Age: 84
Discharge: HOME | End: 2019-08-12
Payer: MEDICARE

## 2019-01-01 ENCOUNTER — HOSPITAL ENCOUNTER (OUTPATIENT)
Age: 84
Discharge: HOME | End: 2019-08-19
Payer: MEDICARE

## 2019-01-01 ENCOUNTER — OFFICE VISIT (OUTPATIENT)
Dept: UROLOGY | Age: 84
End: 2019-01-01
Payer: MEDICARE

## 2019-01-01 ENCOUNTER — HOSPITAL ENCOUNTER (OUTPATIENT)
Dept: LAB | Age: 84
Discharge: HOME | End: 2019-09-10
Payer: MEDICARE

## 2019-01-01 ENCOUNTER — HOSPITAL ENCOUNTER (OUTPATIENT)
Age: 84
Discharge: HOME | End: 2019-08-21
Payer: MEDICARE

## 2019-01-01 ENCOUNTER — HOSPITAL ENCOUNTER (OUTPATIENT)
Age: 84
Discharge: HOME | End: 2019-09-09
Payer: MEDICARE

## 2019-01-01 ENCOUNTER — HOSPITAL ENCOUNTER (OUTPATIENT)
Age: 84
Discharge: HOME | End: 2019-10-25
Payer: MEDICARE

## 2019-01-01 ENCOUNTER — HOSPITAL ENCOUNTER (OUTPATIENT)
Age: 84
Discharge: HOME | End: 2019-10-08
Payer: MEDICARE

## 2019-01-01 ENCOUNTER — HOSPITAL ENCOUNTER (OUTPATIENT)
Age: 84
Discharge: HOME | End: 2019-11-04
Payer: MEDICARE

## 2019-01-01 ENCOUNTER — HOSPITAL ENCOUNTER (OUTPATIENT)
Age: 84
Discharge: HOME | End: 2019-11-27
Payer: MEDICARE

## 2019-01-01 ENCOUNTER — HOSPITAL ENCOUNTER (OUTPATIENT)
Age: 84
Setting detail: SPECIMEN
Discharge: HOME | End: 2019-08-30
Payer: MEDICARE

## 2019-01-01 ENCOUNTER — HOSPITAL ENCOUNTER (OUTPATIENT)
Age: 84
Discharge: HOME | End: 2019-07-15
Payer: MEDICARE

## 2019-01-01 ENCOUNTER — HOSPITAL ENCOUNTER (OUTPATIENT)
Age: 84
Discharge: HOME | End: 2019-12-23
Payer: MEDICARE

## 2019-01-01 ENCOUNTER — HOSPITAL ENCOUNTER (OUTPATIENT)
Age: 84
Discharge: HOME | End: 2019-09-25
Payer: MEDICARE

## 2019-01-01 ENCOUNTER — HOSPITAL ENCOUNTER (OUTPATIENT)
Dept: LAB | Age: 84
Discharge: HOME | End: 2019-07-08
Payer: MEDICARE

## 2019-01-01 ENCOUNTER — HOSPITAL ENCOUNTER (OUTPATIENT)
Dept: ULTRASOUND IMAGING | Age: 84
Discharge: HOME | End: 2019-09-05
Payer: MEDICARE

## 2019-01-01 ENCOUNTER — HOSPITAL ENCOUNTER (OUTPATIENT)
Age: 84
Discharge: HOME | End: 2019-12-16
Payer: MEDICARE

## 2019-01-01 ENCOUNTER — HOSPITAL ENCOUNTER (OUTPATIENT)
Age: 84
Discharge: HOME | End: 2019-06-24
Payer: MEDICARE

## 2019-01-01 ENCOUNTER — HOSPITAL ENCOUNTER (OUTPATIENT)
Dept: NON INVASIVE DIAGNOSTICS | Age: 84
Discharge: HOME | End: 2019-07-15
Payer: MEDICARE

## 2019-01-01 ENCOUNTER — HOSPITAL ENCOUNTER (OUTPATIENT)
Age: 84
Discharge: HOME | End: 2019-09-17
Payer: MEDICARE

## 2019-01-01 VITALS
SYSTOLIC BLOOD PRESSURE: 112 MMHG | WEIGHT: 183 LBS | BODY MASS INDEX: 28.72 KG/M2 | DIASTOLIC BLOOD PRESSURE: 64 MMHG | HEIGHT: 67 IN

## 2019-01-01 VITALS
SYSTOLIC BLOOD PRESSURE: 116 MMHG | WEIGHT: 173 LBS | BODY MASS INDEX: 27.15 KG/M2 | HEIGHT: 67 IN | DIASTOLIC BLOOD PRESSURE: 60 MMHG

## 2019-01-01 VITALS
BODY MASS INDEX: 27 KG/M2 | WEIGHT: 172 LBS | SYSTOLIC BLOOD PRESSURE: 102 MMHG | DIASTOLIC BLOOD PRESSURE: 52 MMHG | HEIGHT: 67 IN

## 2019-01-01 VITALS
DIASTOLIC BLOOD PRESSURE: 64 MMHG | WEIGHT: 186 LBS | SYSTOLIC BLOOD PRESSURE: 110 MMHG | HEIGHT: 67 IN | BODY MASS INDEX: 29.19 KG/M2

## 2019-01-01 VITALS
SYSTOLIC BLOOD PRESSURE: 122 MMHG | WEIGHT: 194 LBS | DIASTOLIC BLOOD PRESSURE: 70 MMHG | HEIGHT: 67 IN | BODY MASS INDEX: 30.45 KG/M2

## 2019-01-01 VITALS
DIASTOLIC BLOOD PRESSURE: 57 MMHG | SYSTOLIC BLOOD PRESSURE: 112 MMHG | RESPIRATION RATE: 16 BRPM | HEART RATE: 73 BPM | OXYGEN SATURATION: 96 %

## 2019-01-01 DIAGNOSIS — K76.9 CHRONIC LIVER DISEASE AND CIRRHOSIS (HCC): ICD-10-CM

## 2019-01-01 DIAGNOSIS — R18.8 FLUID FILLED ABDOMEN: Primary | ICD-10-CM

## 2019-01-01 DIAGNOSIS — M17.0 OSTEOARTHRITIS OF BOTH KNEES, UNSPECIFIED OSTEOARTHRITIS TYPE: ICD-10-CM

## 2019-01-01 DIAGNOSIS — I10 ESSENTIAL HYPERTENSION: ICD-10-CM

## 2019-01-01 DIAGNOSIS — N40.2 PROSTATE NODULE: Primary | ICD-10-CM

## 2019-01-01 DIAGNOSIS — R18.8 CIRRHOSIS OF LIVER WITH ASCITES, UNSPECIFIED HEPATIC CIRRHOSIS TYPE (HCC): ICD-10-CM

## 2019-01-01 DIAGNOSIS — I25.10 ASHD (ARTERIOSCLEROTIC HEART DISEASE): ICD-10-CM

## 2019-01-01 DIAGNOSIS — N18.30 STAGE 3 CHRONIC KIDNEY DISEASE (HCC): ICD-10-CM

## 2019-01-01 DIAGNOSIS — D64.9 ANEMIA, UNSPECIFIED TYPE: ICD-10-CM

## 2019-01-01 DIAGNOSIS — I50.32 CHRONIC DIASTOLIC CONGESTIVE HEART FAILURE (HCC): ICD-10-CM

## 2019-01-01 DIAGNOSIS — I48.20 CHRONIC ATRIAL FIBRILLATION (HCC): ICD-10-CM

## 2019-01-01 DIAGNOSIS — K74.60 CIRRHOSIS OF LIVER WITH ASCITES, UNSPECIFIED HEPATIC CIRRHOSIS TYPE (HCC): Primary | ICD-10-CM

## 2019-01-01 DIAGNOSIS — N40.1 BPH WITH OBSTRUCTION/LOWER URINARY TRACT SYMPTOMS: ICD-10-CM

## 2019-01-01 DIAGNOSIS — K76.9 CHRONIC LIVER DISEASE: ICD-10-CM

## 2019-01-01 DIAGNOSIS — R18.8 CIRRHOSIS OF LIVER WITH ASCITES, UNSPECIFIED HEPATIC CIRRHOSIS TYPE (HCC): Primary | ICD-10-CM

## 2019-01-01 DIAGNOSIS — K76.9 CHRONIC LIVER DISEASE: Primary | ICD-10-CM

## 2019-01-01 DIAGNOSIS — E85.9 AMYLOIDOSIS, UNSPECIFIED TYPE (HCC): ICD-10-CM

## 2019-01-01 DIAGNOSIS — R18.8 FLUID FILLED ABDOMEN: ICD-10-CM

## 2019-01-01 DIAGNOSIS — I50.22 CHRONIC SYSTOLIC CONGESTIVE HEART FAILURE (HCC): ICD-10-CM

## 2019-01-01 DIAGNOSIS — K74.60 CHRONIC LIVER DISEASE AND CIRRHOSIS (HCC): ICD-10-CM

## 2019-01-01 DIAGNOSIS — E85.9 AMYLOIDOSIS, UNSPECIFIED TYPE (HCC): Primary | ICD-10-CM

## 2019-01-01 DIAGNOSIS — I50.810 RIGHT-SIDED CONGESTIVE HEART FAILURE, UNSPECIFIED HF CHRONICITY (HCC): Primary | ICD-10-CM

## 2019-01-01 DIAGNOSIS — N18.30 CHRONIC RENAL FAILURE, STAGE 3 (MODERATE) (HCC): ICD-10-CM

## 2019-01-01 DIAGNOSIS — I27.20 PULMONARY HYPERTENSION (HCC): ICD-10-CM

## 2019-01-01 DIAGNOSIS — D63.8 ANEMIA DUE TO CHRONIC ILLNESS: Primary | ICD-10-CM

## 2019-01-01 DIAGNOSIS — N40.2 PROSTATE NODULE: ICD-10-CM

## 2019-01-01 DIAGNOSIS — R18.8 OTHER ASCITES: ICD-10-CM

## 2019-01-01 DIAGNOSIS — I50.42 CHRONIC COMBINED SYSTOLIC AND DIASTOLIC HEART FAILURE (HCC): ICD-10-CM

## 2019-01-01 DIAGNOSIS — I48.20 CHRONIC A-FIB (HCC): ICD-10-CM

## 2019-01-01 DIAGNOSIS — N13.8 BPH WITH OBSTRUCTION/LOWER URINARY TRACT SYMPTOMS: ICD-10-CM

## 2019-01-01 DIAGNOSIS — I50.810 RIGHT-SIDED CONGESTIVE HEART FAILURE, UNSPECIFIED HF CHRONICITY (HCC): ICD-10-CM

## 2019-01-01 DIAGNOSIS — K74.60 CIRRHOSIS OF LIVER WITH ASCITES, UNSPECIFIED HEPATIC CIRRHOSIS TYPE (HCC): ICD-10-CM

## 2019-01-01 DIAGNOSIS — I34.0 MITRAL VALVE INSUFFICIENCY, UNSPECIFIED ETIOLOGY: ICD-10-CM

## 2019-01-01 LAB
ALBUMIN SERPL-MCNC: 3 G/DL (ref 3.5–5.2)
ALBUMIN SERPL-MCNC: 3.1 G/DL (ref 3.5–5.2)
ALBUMIN SERPL-MCNC: 3.1 G/DL (ref 3.5–5.2)
ALBUMIN SERPL-MCNC: 3.2 G/DL (ref 3.5–5.2)
ALBUMIN SERPL-MCNC: 3.2 G/DL (ref 3.5–5.2)
ALBUMIN SERPL-MCNC: 3.4 G/DL (ref 3.5–5.2)
ALBUMIN SERPL-MCNC: 3.4 G/DL (ref 3.5–5.2)
ALBUMIN SERPL-MCNC: 3.5 G/DL (ref 3.5–5.2)
ALBUMIN SERPL-MCNC: 3.5 G/DL (ref 3.5–5.2)
ALBUMIN SERPL-MCNC: 3.6 G/DL (ref 3.5–5.2)
ALBUMIN SERPL-MCNC: 3.7 G/DL (ref 3.5–5.2)
ALBUMIN SERPL-MCNC: 3.8 G/DL (ref 3.5–5.2)
ALBUMIN/GLOBULIN RATIO: 0.6 (ref 1–2.5)
ALBUMIN/GLOBULIN RATIO: 0.7 (ref 1–2.5)
ALBUMIN/GLOBULIN RATIO: 0.8 (ref 1–2.5)
ALBUMIN/GLOBULIN RATIO: 0.9 (ref 1–2.5)
ALP BLD-CCNC: 111 U/L (ref 40–129)
ALP BLD-CCNC: 112 U/L (ref 40–129)
ALP BLD-CCNC: 114 U/L (ref 40–129)
ALP BLD-CCNC: 115 U/L (ref 40–129)
ALP BLD-CCNC: 116 U/L (ref 40–129)
ALP BLD-CCNC: 117 U/L (ref 40–129)
ALP BLD-CCNC: 120 U/L (ref 40–129)
ALP BLD-CCNC: 121 U/L (ref 40–129)
ALP BLD-CCNC: 124 U/L (ref 40–129)
ALP BLD-CCNC: 125 U/L (ref 40–129)
ALP BLD-CCNC: 127 U/L (ref 40–129)
ALP BLD-CCNC: 135 U/L (ref 40–129)
ALT SERPL-CCNC: 15 U/L (ref 5–41)
ALT SERPL-CCNC: 15 U/L (ref 5–41)
ALT SERPL-CCNC: 16 U/L (ref 5–41)
ALT SERPL-CCNC: 17 U/L (ref 5–41)
ALT SERPL-CCNC: 17 U/L (ref 5–41)
ALT SERPL-CCNC: 20 U/L (ref 5–41)
ALT SERPL-CCNC: 20 U/L (ref 5–41)
ALT SERPL-CCNC: 21 U/L (ref 5–41)
ALT SERPL-CCNC: 22 U/L (ref 5–41)
ALT SERPL-CCNC: 25 U/L (ref 5–41)
ANION GAP SERPL CALCULATED.3IONS-SCNC: 12 MMOL/L (ref 9–17)
ANION GAP SERPL CALCULATED.3IONS-SCNC: 13 MMOL/L (ref 9–17)
ANION GAP SERPL CALCULATED.3IONS-SCNC: 14 MMOL/L (ref 9–17)
ANION GAP SERPL CALCULATED.3IONS-SCNC: 15 MMOL/L (ref 9–17)
ANION GAP SERPL CALCULATED.3IONS-SCNC: 16 MMOL/L (ref 9–17)
ANION GAP SERPL CALCULATED.3IONS-SCNC: 17 MMOL/L (ref 9–17)
APPEARANCE FLUID: NORMAL
AST SERPL-CCNC: 31 U/L
AST SERPL-CCNC: 32 U/L
AST SERPL-CCNC: 33 U/L
AST SERPL-CCNC: 33 U/L
AST SERPL-CCNC: 34 U/L
AST SERPL-CCNC: 36 U/L
AST SERPL-CCNC: 41 U/L
AST SERPL-CCNC: 43 U/L
AST SERPL-CCNC: 46 U/L
AST SERPL-CCNC: 48 U/L
AST SERPL-CCNC: 51 U/L
AST SERPL-CCNC: 52 U/L
AST SERPL-CCNC: 56 U/L
AST SERPL-CCNC: 56 U/L
BILIRUB SERPL-MCNC: 0.38 MG/DL (ref 0.3–1.2)
BILIRUB SERPL-MCNC: 0.43 MG/DL (ref 0.3–1.2)
BILIRUB SERPL-MCNC: 0.44 MG/DL (ref 0.3–1.2)
BILIRUB SERPL-MCNC: 0.46 MG/DL (ref 0.3–1.2)
BILIRUB SERPL-MCNC: 0.48 MG/DL (ref 0.3–1.2)
BILIRUB SERPL-MCNC: 0.49 MG/DL (ref 0.3–1.2)
BILIRUB SERPL-MCNC: 0.49 MG/DL (ref 0.3–1.2)
BILIRUB SERPL-MCNC: 0.5 MG/DL (ref 0.3–1.2)
BILIRUB SERPL-MCNC: 0.53 MG/DL (ref 0.3–1.2)
BILIRUB SERPL-MCNC: 0.54 MG/DL (ref 0.3–1.2)
BILIRUB SERPL-MCNC: 0.57 MG/DL (ref 0.3–1.2)
BILIRUB SERPL-MCNC: 0.6 MG/DL (ref 0.3–1.2)
BNP INTERPRETATION: ABNORMAL
BUN BLDV-MCNC: 103 MG/DL (ref 8–23)
BUN BLDV-MCNC: 111 MG/DL (ref 8–23)
BUN BLDV-MCNC: 43 MG/DL (ref 8–23)
BUN BLDV-MCNC: 44 MG/DL (ref 8–23)
BUN BLDV-MCNC: 45 MG/DL (ref 8–23)
BUN BLDV-MCNC: 46 MG/DL (ref 8–23)
BUN BLDV-MCNC: 46 MG/DL (ref 8–23)
BUN BLDV-MCNC: 52 MG/DL (ref 8–23)
BUN BLDV-MCNC: 53 MG/DL (ref 8–23)
BUN BLDV-MCNC: 55 MG/DL (ref 8–23)
BUN BLDV-MCNC: 62 MG/DL (ref 8–23)
BUN BLDV-MCNC: 64 MG/DL (ref 8–23)
BUN BLDV-MCNC: 67 MG/DL (ref 8–23)
BUN BLDV-MCNC: 68 MG/DL (ref 8–23)
BUN BLDV-MCNC: 76 MG/DL (ref 8–23)
BUN BLDV-MCNC: 79 MG/DL (ref 8–23)
BUN BLDV-MCNC: 89 MG/DL (ref 8–23)
BUN/CREAT BLD: 30 (ref 9–20)
BUN/CREAT BLD: 32 (ref 9–20)
BUN/CREAT BLD: 32 (ref 9–20)
BUN/CREAT BLD: 34 (ref 9–20)
BUN/CREAT BLD: 35 (ref 9–20)
BUN/CREAT BLD: 36 (ref 9–20)
BUN/CREAT BLD: 38 (ref 9–20)
BUN/CREAT BLD: 39 (ref 9–20)
BUN/CREAT BLD: 40 (ref 9–20)
BUN/CREAT BLD: 42 (ref 9–20)
BUN/CREAT BLD: 49 (ref 9–20)
CALCIUM SERPL-MCNC: 8.9 MG/DL (ref 8.6–10.4)
CALCIUM SERPL-MCNC: 9 MG/DL (ref 8.6–10.4)
CALCIUM SERPL-MCNC: 9.1 MG/DL (ref 8.6–10.4)
CALCIUM SERPL-MCNC: 9.2 MG/DL (ref 8.6–10.4)
CALCIUM SERPL-MCNC: 9.3 MG/DL (ref 8.6–10.4)
CALCIUM SERPL-MCNC: 9.3 MG/DL (ref 8.6–10.4)
CALCIUM SERPL-MCNC: 9.4 MG/DL (ref 8.6–10.4)
CHLORIDE BLD-SCNC: 101 MMOL/L (ref 98–107)
CHLORIDE BLD-SCNC: 101 MMOL/L (ref 98–107)
CHLORIDE BLD-SCNC: 86 MMOL/L (ref 98–107)
CHLORIDE BLD-SCNC: 93 MMOL/L (ref 98–107)
CHLORIDE BLD-SCNC: 94 MMOL/L (ref 98–107)
CHLORIDE BLD-SCNC: 94 MMOL/L (ref 98–107)
CHLORIDE BLD-SCNC: 95 MMOL/L (ref 98–107)
CHLORIDE BLD-SCNC: 96 MMOL/L (ref 98–107)
CHLORIDE BLD-SCNC: 97 MMOL/L (ref 98–107)
CHLORIDE BLD-SCNC: 97 MMOL/L (ref 98–107)
CHLORIDE BLD-SCNC: 98 MMOL/L (ref 98–107)
CHLORIDE BLD-SCNC: 98 MMOL/L (ref 98–107)
CO2: 23 MMOL/L (ref 20–31)
CO2: 24 MMOL/L (ref 20–31)
CO2: 26 MMOL/L (ref 20–31)
CO2: 27 MMOL/L (ref 20–31)
CO2: 29 MMOL/L (ref 20–31)
COLOR FLUID: NORMAL
CREAT SERPL-MCNC: 1.38 MG/DL (ref 0.7–1.2)
CREAT SERPL-MCNC: 1.41 MG/DL (ref 0.7–1.2)
CREAT SERPL-MCNC: 1.44 MG/DL (ref 0.7–1.2)
CREAT SERPL-MCNC: 1.48 MG/DL (ref 0.7–1.2)
CREAT SERPL-MCNC: 1.55 MG/DL (ref 0.7–1.2)
CREAT SERPL-MCNC: 1.56 MG/DL (ref 0.7–1.2)
CREAT SERPL-MCNC: 1.57 MG/DL (ref 0.7–1.2)
CREAT SERPL-MCNC: 1.59 MG/DL (ref 0.7–1.2)
CREAT SERPL-MCNC: 1.62 MG/DL (ref 0.7–1.2)
CREAT SERPL-MCNC: 1.73 MG/DL (ref 0.7–1.2)
CREAT SERPL-MCNC: 1.73 MG/DL (ref 0.7–1.2)
CREAT SERPL-MCNC: 1.85 MG/DL (ref 0.7–1.2)
CREAT SERPL-MCNC: 1.95 MG/DL (ref 0.7–1.2)
CREAT SERPL-MCNC: 2.1 MG/DL (ref 0.7–1.2)
CREAT SERPL-MCNC: 2.27 MG/DL (ref 0.7–1.2)
CREAT SERPL-MCNC: 2.3 MG/DL (ref 0.7–1.2)
CREAT SERPL-MCNC: 2.47 MG/DL (ref 0.7–1.2)
CULTURE: ABNORMAL
DIRECT EXAM: ABNORMAL
GFR AFRICAN AMERICAN: 30 ML/MIN
GFR AFRICAN AMERICAN: 33 ML/MIN
GFR AFRICAN AMERICAN: 34 ML/MIN
GFR AFRICAN AMERICAN: 37 ML/MIN
GFR AFRICAN AMERICAN: 40 ML/MIN
GFR AFRICAN AMERICAN: 43 ML/MIN
GFR AFRICAN AMERICAN: 46 ML/MIN
GFR AFRICAN AMERICAN: 46 ML/MIN
GFR AFRICAN AMERICAN: 50 ML/MIN
GFR AFRICAN AMERICAN: 51 ML/MIN
GFR AFRICAN AMERICAN: 52 ML/MIN
GFR AFRICAN AMERICAN: 55 ML/MIN
GFR AFRICAN AMERICAN: 57 ML/MIN
GFR AFRICAN AMERICAN: 58 ML/MIN
GFR AFRICAN AMERICAN: 60 ML/MIN
GFR NON-AFRICAN AMERICAN: 25 ML/MIN
GFR NON-AFRICAN AMERICAN: 27 ML/MIN
GFR NON-AFRICAN AMERICAN: 28 ML/MIN
GFR NON-AFRICAN AMERICAN: 30 ML/MIN
GFR NON-AFRICAN AMERICAN: 33 ML/MIN
GFR NON-AFRICAN AMERICAN: 35 ML/MIN
GFR NON-AFRICAN AMERICAN: 38 ML/MIN
GFR NON-AFRICAN AMERICAN: 38 ML/MIN
GFR NON-AFRICAN AMERICAN: 41 ML/MIN
GFR NON-AFRICAN AMERICAN: 42 ML/MIN
GFR NON-AFRICAN AMERICAN: 43 ML/MIN
GFR NON-AFRICAN AMERICAN: 45 ML/MIN
GFR NON-AFRICAN AMERICAN: 47 ML/MIN
GFR NON-AFRICAN AMERICAN: 48 ML/MIN
GFR NON-AFRICAN AMERICAN: 49 ML/MIN
GFR SERPL CREATININE-BSD FRML MDRD: ABNORMAL ML/MIN/{1.73_M2}
GLUCOSE BLD-MCNC: 100 MG/DL (ref 70–99)
GLUCOSE BLD-MCNC: 103 MG/DL (ref 70–99)
GLUCOSE BLD-MCNC: 107 MG/DL (ref 70–99)
GLUCOSE BLD-MCNC: 115 MG/DL (ref 70–99)
GLUCOSE BLD-MCNC: 115 MG/DL (ref 70–99)
GLUCOSE BLD-MCNC: 131 MG/DL (ref 70–99)
GLUCOSE BLD-MCNC: 134 MG/DL (ref 70–99)
GLUCOSE BLD-MCNC: 141 MG/DL (ref 70–99)
GLUCOSE BLD-MCNC: 150 MG/DL (ref 70–99)
GLUCOSE BLD-MCNC: 151 MG/DL (ref 70–99)
GLUCOSE BLD-MCNC: 153 MG/DL (ref 70–99)
GLUCOSE BLD-MCNC: 169 MG/DL (ref 70–99)
GLUCOSE BLD-MCNC: 85 MG/DL (ref 70–99)
GLUCOSE BLD-MCNC: 85 MG/DL (ref 70–99)
GLUCOSE BLD-MCNC: 91 MG/DL (ref 70–99)
GLUCOSE BLD-MCNC: 95 MG/DL (ref 70–99)
GLUCOSE BLD-MCNC: 95 MG/DL (ref 70–99)
HCT VFR BLD CALC: 31.4 % (ref 40.7–50.3)
HCT VFR BLD CALC: 34.4 % (ref 40.7–50.3)
HEMOGLOBIN: 10.4 G/DL (ref 13–17)
HEMOGLOBIN: 9.7 G/DL (ref 13–17)
LV EF: 55 %
LVEF MODALITY: NORMAL
Lab: ABNORMAL
MCH RBC QN AUTO: 26.1 PG (ref 25.2–33.5)
MCH RBC QN AUTO: 29.1 PG (ref 25.2–33.5)
MCHC RBC AUTO-ENTMCNC: 30.2 G/DL (ref 28.4–34.8)
MCHC RBC AUTO-ENTMCNC: 30.9 G/DL (ref 28.4–34.8)
MCV RBC AUTO: 86.2 FL (ref 82.6–102.9)
MCV RBC AUTO: 94.3 FL (ref 82.6–102.9)
NRBC AUTOMATED: 0 PER 100 WBC
NRBC AUTOMATED: 0 PER 100 WBC
PDW BLD-RTO: 18.6 % (ref 11.8–14.4)
PDW BLD-RTO: 19.2 % (ref 11.8–14.4)
PLATELET # BLD: 164 K/UL (ref 138–453)
PLATELET # BLD: 197 K/UL (ref 138–453)
PMV BLD AUTO: 8.5 FL (ref 8.1–13.5)
PMV BLD AUTO: 8.5 FL (ref 8.1–13.5)
POTASSIUM SERPL-SCNC: 3.5 MMOL/L (ref 3.7–5.3)
POTASSIUM SERPL-SCNC: 3.9 MMOL/L (ref 3.7–5.3)
POTASSIUM SERPL-SCNC: 4 MMOL/L (ref 3.7–5.3)
POTASSIUM SERPL-SCNC: 4 MMOL/L (ref 3.7–5.3)
POTASSIUM SERPL-SCNC: 4.1 MMOL/L (ref 3.7–5.3)
POTASSIUM SERPL-SCNC: 4.2 MMOL/L (ref 3.7–5.3)
POTASSIUM SERPL-SCNC: 4.3 MMOL/L (ref 3.7–5.3)
POTASSIUM SERPL-SCNC: 4.6 MMOL/L (ref 3.7–5.3)
POTASSIUM SERPL-SCNC: 4.8 MMOL/L (ref 3.7–5.3)
POTASSIUM SERPL-SCNC: 4.9 MMOL/L (ref 3.7–5.3)
POTASSIUM SERPL-SCNC: 5.1 MMOL/L (ref 3.7–5.3)
PRO-BNP: 3545 PG/ML
PRO-BNP: 3689 PG/ML
PRO-BNP: 4243 PG/ML
PRO-BNP: 4351 PG/ML
PRO-BNP: 4472 PG/ML
PRO-BNP: 4502 PG/ML
PRO-BNP: 4839 PG/ML
PRO-BNP: 5015 PG/ML
PRO-BNP: 5253 PG/ML
PRO-BNP: 5336 PG/ML
PRO-BNP: 6639 PG/ML
PRO-BNP: 6839 PG/ML
PRO-BNP: 6907 PG/ML
PRO-BNP: 6937 PG/ML
PRO-BNP: 7338 PG/ML
PRO-BNP: 8051 PG/ML
PROSTATE SPECIFIC ANTIGEN: 0.48 UG/L
RBC # BLD: 3.33 M/UL (ref 4.21–5.77)
RBC # BLD: 3.99 M/UL (ref 4.21–5.77)
RBC FLUID: NORMAL /MM3
SODIUM BLD-SCNC: 129 MMOL/L (ref 135–144)
SODIUM BLD-SCNC: 131 MMOL/L (ref 135–144)
SODIUM BLD-SCNC: 133 MMOL/L (ref 135–144)
SODIUM BLD-SCNC: 134 MMOL/L (ref 135–144)
SODIUM BLD-SCNC: 135 MMOL/L (ref 135–144)
SODIUM BLD-SCNC: 136 MMOL/L (ref 135–144)
SODIUM BLD-SCNC: 137 MMOL/L (ref 135–144)
SPECIMEN DESCRIPTION: ABNORMAL
SPECIMEN TYPE: NORMAL
TOTAL PROTEIN: 7.7 G/DL (ref 6.4–8.3)
TOTAL PROTEIN: 7.7 G/DL (ref 6.4–8.3)
TOTAL PROTEIN: 7.8 G/DL (ref 6.4–8.3)
TOTAL PROTEIN: 7.9 G/DL (ref 6.4–8.3)
TOTAL PROTEIN: 8 G/DL (ref 6.4–8.3)
TOTAL PROTEIN: 8 G/DL (ref 6.4–8.3)
TOTAL PROTEIN: 8.1 G/DL (ref 6.4–8.3)
TOTAL PROTEIN: 8.1 G/DL (ref 6.4–8.3)
TOTAL PROTEIN: 8.3 G/DL (ref 6.4–8.3)
TOTAL PROTEIN: 8.4 G/DL (ref 6.4–8.3)
TOTAL PROTEIN: 8.5 G/DL (ref 6.4–8.3)
TOTAL PROTEIN: 8.7 G/DL (ref 6.4–8.3)
WBC # BLD: 5.1 K/UL (ref 3.5–11.3)
WBC # BLD: 6.5 K/UL (ref 3.5–11.3)
WBC FLUID: 152 /MM3

## 2019-01-01 PROCEDURE — 80053 COMPREHEN METABOLIC PANEL: CPT

## 2019-01-01 PROCEDURE — 4040F PNEUMOC VAC/ADMIN/RCVD: CPT | Performed by: UROLOGY

## 2019-01-01 PROCEDURE — 80048 BASIC METABOLIC PNL TOTAL CA: CPT

## 2019-01-01 PROCEDURE — 99213 OFFICE O/P EST LOW 20 MIN: CPT | Performed by: UROLOGY

## 2019-01-01 PROCEDURE — 36415 COLL VENOUS BLD VENIPUNCTURE: CPT

## 2019-01-01 PROCEDURE — 83880 ASSAY OF NATRIURETIC PEPTIDE: CPT

## 2019-01-01 PROCEDURE — 99214 OFFICE O/P EST MOD 30 MIN: CPT | Performed by: FAMILY MEDICINE

## 2019-01-01 PROCEDURE — 51798 US URINE CAPACITY MEASURE: CPT | Performed by: UROLOGY

## 2019-01-01 PROCEDURE — 1036F TOBACCO NON-USER: CPT | Performed by: UROLOGY

## 2019-01-01 PROCEDURE — 1123F ACP DISCUSS/DSCN MKR DOCD: CPT | Performed by: UROLOGY

## 2019-01-01 PROCEDURE — G8598 ASA/ANTIPLAT THER USED: HCPCS | Performed by: FAMILY MEDICINE

## 2019-01-01 PROCEDURE — 1036F TOBACCO NON-USER: CPT | Performed by: FAMILY MEDICINE

## 2019-01-01 PROCEDURE — 4040F PNEUMOC VAC/ADMIN/RCVD: CPT | Performed by: FAMILY MEDICINE

## 2019-01-01 PROCEDURE — 1123F ACP DISCUSS/DSCN MKR DOCD: CPT | Performed by: FAMILY MEDICINE

## 2019-01-01 PROCEDURE — G8598 ASA/ANTIPLAT THER USED: HCPCS | Performed by: UROLOGY

## 2019-01-01 PROCEDURE — G8427 DOCREV CUR MEDS BY ELIG CLIN: HCPCS | Performed by: FAMILY MEDICINE

## 2019-01-01 PROCEDURE — G8428 CUR MEDS NOT DOCUMENT: HCPCS | Performed by: FAMILY MEDICINE

## 2019-01-01 PROCEDURE — G8417 CALC BMI ABV UP PARAM F/U: HCPCS | Performed by: FAMILY MEDICINE

## 2019-01-01 PROCEDURE — G8482 FLU IMMUNIZE ORDER/ADMIN: HCPCS | Performed by: FAMILY MEDICINE

## 2019-01-01 PROCEDURE — 2709999900 US GUIDED PARACENTESIS

## 2019-01-01 PROCEDURE — G0008 ADMIN INFLUENZA VIRUS VAC: HCPCS | Performed by: FAMILY MEDICINE

## 2019-01-01 PROCEDURE — G8427 DOCREV CUR MEDS BY ELIG CLIN: HCPCS | Performed by: UROLOGY

## 2019-01-01 PROCEDURE — 85027 COMPLETE CBC AUTOMATED: CPT

## 2019-01-01 PROCEDURE — 90653 IIV ADJUVANT VACCINE IM: CPT | Performed by: FAMILY MEDICINE

## 2019-01-01 PROCEDURE — 2500000003 HC RX 250 WO HCPCS: Performed by: RADIOLOGY

## 2019-01-01 PROCEDURE — 87070 CULTURE OTHR SPECIMN AEROBIC: CPT

## 2019-01-01 PROCEDURE — 93306 TTE W/DOPPLER COMPLETE: CPT

## 2019-01-01 PROCEDURE — 87075 CULTR BACTERIA EXCEPT BLOOD: CPT

## 2019-01-01 PROCEDURE — G8417 CALC BMI ABV UP PARAM F/U: HCPCS | Performed by: UROLOGY

## 2019-01-01 PROCEDURE — 87205 SMEAR GRAM STAIN: CPT

## 2019-01-01 PROCEDURE — 84153 ASSAY OF PSA TOTAL: CPT

## 2019-01-01 RX ORDER — TORSEMIDE 100 MG/1
100 TABLET ORAL 2 TIMES DAILY
COMMUNITY
Start: 2019-01-01 | End: 2020-01-01 | Stop reason: SDUPTHER

## 2019-01-01 RX ORDER — LEVOTHYROXINE SODIUM 0.05 MG/1
50 TABLET ORAL DAILY
Qty: 90 TABLET | Refills: 0 | Status: SHIPPED | OUTPATIENT
Start: 2019-01-01 | End: 2020-01-01

## 2019-01-01 RX ORDER — OXCARBAZEPINE 300 MG/1
300 TABLET, FILM COATED ORAL 2 TIMES DAILY
Qty: 180 TABLET | Refills: 0 | Status: SHIPPED | OUTPATIENT
Start: 2019-01-01 | End: 2020-01-01

## 2019-01-01 RX ORDER — SPIRONOLACTONE 25 MG/1
12.5 TABLET ORAL DAILY
COMMUNITY
Start: 2019-04-25 | End: 2020-01-01 | Stop reason: ALTCHOICE

## 2019-01-01 RX ORDER — LIDOCAINE HYDROCHLORIDE 10 MG/ML
INJECTION, SOLUTION EPIDURAL; INFILTRATION; INTRACAUDAL; PERINEURAL
Status: COMPLETED | OUTPATIENT
Start: 2019-01-01 | End: 2019-01-01

## 2019-01-01 RX ORDER — FINASTERIDE 5 MG/1
5 TABLET, FILM COATED ORAL DAILY
COMMUNITY
Start: 2019-06-17

## 2019-01-01 RX ORDER — POTASSIUM CHLORIDE 20 MEQ/1
40 TABLET, EXTENDED RELEASE ORAL DAILY
COMMUNITY
Start: 2019-01-01 | End: 2020-01-01 | Stop reason: ALTCHOICE

## 2019-01-01 RX ORDER — DOXYCYCLINE HYCLATE 100 MG/1
100 CAPSULE ORAL 2 TIMES DAILY
COMMUNITY
Start: 2019-01-01 | End: 2020-01-01 | Stop reason: ALTCHOICE

## 2019-01-01 RX ORDER — POTASSIUM CHLORIDE 20 MEQ/1
20 TABLET, EXTENDED RELEASE ORAL DAILY
COMMUNITY
Start: 2019-06-17 | End: 2019-01-01 | Stop reason: DRUGHIGH

## 2019-01-01 RX ORDER — METOLAZONE 2.5 MG/1
2.5 TABLET ORAL
COMMUNITY
Start: 2019-06-17 | End: 2020-01-01 | Stop reason: ALTCHOICE

## 2019-01-01 RX ORDER — TAMSULOSIN HYDROCHLORIDE 0.4 MG/1
0.4 CAPSULE ORAL NIGHTLY
Qty: 90 CAPSULE | Refills: 0 | Status: SHIPPED | OUTPATIENT
Start: 2019-01-01 | End: 2020-01-01 | Stop reason: ALTCHOICE

## 2019-01-01 RX ORDER — TRAZODONE HYDROCHLORIDE 50 MG/1
TABLET ORAL
Qty: 30 TABLET | Refills: 1 | Status: SHIPPED
Start: 2019-01-01 | End: 2020-01-01 | Stop reason: DRUGHIGH

## 2019-01-01 RX ORDER — LANOLIN ALCOHOL/MO/W.PET/CERES
3 CREAM (GRAM) TOPICAL NIGHTLY PRN
COMMUNITY
End: 2020-01-01 | Stop reason: ALTCHOICE

## 2019-01-01 RX ORDER — AMIODARONE HYDROCHLORIDE 200 MG/1
200 TABLET ORAL DAILY
COMMUNITY
End: 2020-01-01 | Stop reason: SDUPTHER

## 2019-01-01 RX ADMIN — LIDOCAINE HYDROCHLORIDE 10 ML: 10 INJECTION, SOLUTION EPIDURAL; INFILTRATION; INTRACAUDAL; PERINEURAL at 15:11

## 2019-01-01 ASSESSMENT — ENCOUNTER SYMPTOMS
SHORTNESS OF BREATH: 0
NAUSEA: 0
WHEEZING: 0
BACK PAIN: 0
ABDOMINAL PAIN: 0
VOMITING: 0
COUGH: 0
EYE PAIN: 0
EYE REDNESS: 0
COLOR CHANGE: 0

## 2019-01-01 ASSESSMENT — PATIENT HEALTH QUESTIONNAIRE - PHQ9
1. LITTLE INTEREST OR PLEASURE IN DOING THINGS: 0
1. LITTLE INTEREST OR PLEASURE IN DOING THINGS: 0
SUM OF ALL RESPONSES TO PHQ QUESTIONS 1-9: 0
SUM OF ALL RESPONSES TO PHQ QUESTIONS 1-9: 0
SUM OF ALL RESPONSES TO PHQ9 QUESTIONS 1 & 2: 0
2. FEELING DOWN, DEPRESSED OR HOPELESS: 0
2. FEELING DOWN, DEPRESSED OR HOPELESS: 0
SUM OF ALL RESPONSES TO PHQ QUESTIONS 1-9: 0
SUM OF ALL RESPONSES TO PHQ9 QUESTIONS 1 & 2: 0
SUM OF ALL RESPONSES TO PHQ QUESTIONS 1-9: 0

## 2019-01-29 ENCOUNTER — OFFICE VISIT (OUTPATIENT)
Dept: FAMILY MEDICINE CLINIC | Age: 84
End: 2019-01-29
Payer: MEDICARE

## 2019-01-29 ENCOUNTER — HOSPITAL ENCOUNTER (OUTPATIENT)
Age: 84
Discharge: HOME | End: 2019-01-29
Payer: MEDICARE

## 2019-01-29 VITALS
HEIGHT: 67 IN | BODY MASS INDEX: 28.47 KG/M2 | SYSTOLIC BLOOD PRESSURE: 132 MMHG | WEIGHT: 181.4 LBS | DIASTOLIC BLOOD PRESSURE: 68 MMHG

## 2019-01-29 DIAGNOSIS — I50.32 CHRONIC DIASTOLIC CONGESTIVE HEART FAILURE (HCC): ICD-10-CM

## 2019-01-29 DIAGNOSIS — I48.91 ATRIAL FIBRILLATION, UNSPECIFIED TYPE (HCC): ICD-10-CM

## 2019-01-29 DIAGNOSIS — I25.10 ASHD (ARTERIOSCLEROTIC HEART DISEASE): ICD-10-CM

## 2019-01-29 DIAGNOSIS — L57.0 AK (ACTINIC KERATOSIS): ICD-10-CM

## 2019-01-29 DIAGNOSIS — E85.9 AMYLOIDOSIS, UNSPECIFIED TYPE (HCC): ICD-10-CM

## 2019-01-29 DIAGNOSIS — I50.32 CHRONIC DIASTOLIC CONGESTIVE HEART FAILURE (HCC): Primary | ICD-10-CM

## 2019-01-29 DIAGNOSIS — I10 ESSENTIAL HYPERTENSION: ICD-10-CM

## 2019-01-29 DIAGNOSIS — N18.30 CHRONIC RENAL FAILURE, STAGE 3 (MODERATE) (HCC): ICD-10-CM

## 2019-01-29 LAB
ALBUMIN SERPL-MCNC: 3.9 G/DL (ref 3.5–5.2)
ALBUMIN/GLOBULIN RATIO: 0.8 (ref 1–2.5)
ALP BLD-CCNC: 119 U/L (ref 40–129)
ALT SERPL-CCNC: 16 U/L (ref 5–41)
ANION GAP SERPL CALCULATED.3IONS-SCNC: 15 MMOL/L (ref 9–17)
AST SERPL-CCNC: 38 U/L
BILIRUB SERPL-MCNC: 0.59 MG/DL (ref 0.3–1.2)
BNP INTERPRETATION: ABNORMAL
BUN BLDV-MCNC: 42 MG/DL (ref 8–23)
BUN/CREAT BLD: 30 (ref 9–20)
CALCIUM SERPL-MCNC: 9.9 MG/DL (ref 8.6–10.4)
CHLORIDE BLD-SCNC: 95 MMOL/L (ref 98–107)
CO2: 27 MMOL/L (ref 20–31)
CREAT SERPL-MCNC: 1.38 MG/DL (ref 0.7–1.2)
GFR AFRICAN AMERICAN: 60 ML/MIN
GFR NON-AFRICAN AMERICAN: 49 ML/MIN
GFR SERPL CREATININE-BSD FRML MDRD: ABNORMAL ML/MIN/{1.73_M2}
GFR SERPL CREATININE-BSD FRML MDRD: ABNORMAL ML/MIN/{1.73_M2}
GLUCOSE BLD-MCNC: 91 MG/DL (ref 70–99)
HCT VFR BLD CALC: 32.9 % (ref 40.7–50.3)
HEMOGLOBIN: 9.4 G/DL (ref 13–17)
MCH RBC QN AUTO: 25.8 PG (ref 25.2–33.5)
MCHC RBC AUTO-ENTMCNC: 28.6 G/DL (ref 28.4–34.8)
MCV RBC AUTO: 90.4 FL (ref 82.6–102.9)
NRBC AUTOMATED: 0 PER 100 WBC
PDW BLD-RTO: 18.8 % (ref 11.8–14.4)
PLATELET # BLD: 226 K/UL (ref 138–453)
PMV BLD AUTO: 9.6 FL (ref 8.1–13.5)
POTASSIUM SERPL-SCNC: 4.6 MMOL/L (ref 3.7–5.3)
PRO-BNP: 2995 PG/ML
RBC # BLD: 3.64 M/UL (ref 4.21–5.77)
SODIUM BLD-SCNC: 137 MMOL/L (ref 135–144)
TOTAL PROTEIN: 8.8 G/DL (ref 6.4–8.3)
WBC # BLD: 5.6 K/UL (ref 3.5–11.3)

## 2019-01-29 PROCEDURE — 80053 COMPREHEN METABOLIC PANEL: CPT

## 2019-01-29 PROCEDURE — G8598 ASA/ANTIPLAT THER USED: HCPCS | Performed by: FAMILY MEDICINE

## 2019-01-29 PROCEDURE — 17003 DESTRUCT PREMALG LES 2-14: CPT | Performed by: FAMILY MEDICINE

## 2019-01-29 PROCEDURE — 36415 COLL VENOUS BLD VENIPUNCTURE: CPT

## 2019-01-29 PROCEDURE — 83880 ASSAY OF NATRIURETIC PEPTIDE: CPT

## 2019-01-29 PROCEDURE — 17000 DESTRUCT PREMALG LESION: CPT | Performed by: FAMILY MEDICINE

## 2019-01-29 PROCEDURE — 85027 COMPLETE CBC AUTOMATED: CPT

## 2019-01-29 PROCEDURE — 99214 OFFICE O/P EST MOD 30 MIN: CPT | Performed by: FAMILY MEDICINE

## 2019-01-29 PROCEDURE — G8417 CALC BMI ABV UP PARAM F/U: HCPCS | Performed by: FAMILY MEDICINE

## 2019-01-29 PROCEDURE — 4040F PNEUMOC VAC/ADMIN/RCVD: CPT | Performed by: FAMILY MEDICINE

## 2019-01-29 PROCEDURE — 1036F TOBACCO NON-USER: CPT | Performed by: FAMILY MEDICINE

## 2019-01-29 PROCEDURE — G8482 FLU IMMUNIZE ORDER/ADMIN: HCPCS | Performed by: FAMILY MEDICINE

## 2019-01-29 PROCEDURE — 1123F ACP DISCUSS/DSCN MKR DOCD: CPT | Performed by: FAMILY MEDICINE

## 2019-01-29 PROCEDURE — 1101F PT FALLS ASSESS-DOCD LE1/YR: CPT | Performed by: FAMILY MEDICINE

## 2019-01-29 PROCEDURE — G8427 DOCREV CUR MEDS BY ELIG CLIN: HCPCS | Performed by: FAMILY MEDICINE

## 2019-01-29 RX ORDER — POLYETHYLENE GLYCOL 3350 17 G/17G
17 POWDER, FOR SOLUTION ORAL DAILY
COMMUNITY
End: 2020-01-01 | Stop reason: ALTCHOICE

## 2019-01-29 RX ORDER — TORSEMIDE 20 MG/1
60 TABLET ORAL DAILY
COMMUNITY
End: 2019-03-12 | Stop reason: DRUGHIGH

## 2019-01-30 ENCOUNTER — TELEPHONE (OUTPATIENT)
Dept: FAMILY MEDICINE CLINIC | Age: 84
End: 2019-01-30

## 2019-01-30 RX ORDER — LEVOTHYROXINE SODIUM 0.05 MG/1
50 TABLET ORAL DAILY
Qty: 90 TABLET | Refills: 1 | Status: SHIPPED | OUTPATIENT
Start: 2019-01-30 | End: 2019-01-01 | Stop reason: SDUPTHER

## 2019-02-18 LAB
BILIRUBIN, URINE: NORMAL
BLOOD, URINE: NORMAL
CLARITY: NORMAL
COLOR: NORMAL
GLUCOSE URINE: NORMAL
KETONES, URINE: NORMAL
LEUKOCYTE ESTERASE, URINE: NORMAL
NITRITE, URINE: NORMAL
PH UA: NORMAL (ref 4.5–8)
PROTEIN UA: NORMAL
SPECIFIC GRAVITY, URINE: NORMAL
UROBILINOGEN, URINE: NORMAL

## 2019-03-05 ENCOUNTER — HOSPITAL ENCOUNTER (OUTPATIENT)
Age: 84
Discharge: HOME | End: 2019-03-05
Payer: MEDICARE

## 2019-03-05 LAB
ALBUMIN SERPL-MCNC: 3.9 G/DL (ref 3.5–5.2)
ALBUMIN/GLOBULIN RATIO: 0.8 (ref 1–2.5)
ALP BLD-CCNC: 120 U/L (ref 40–129)
ALT SERPL-CCNC: 16 U/L (ref 5–41)
ANION GAP SERPL CALCULATED.3IONS-SCNC: 13 MMOL/L (ref 9–17)
AST SERPL-CCNC: 30 U/L
BILIRUB SERPL-MCNC: 0.45 MG/DL (ref 0.3–1.2)
BNP INTERPRETATION: ABNORMAL
BUN BLDV-MCNC: 49 MG/DL (ref 8–23)
BUN/CREAT BLD: 30 (ref 9–20)
CALCIUM SERPL-MCNC: 9.7 MG/DL (ref 8.6–10.4)
CHLORIDE BLD-SCNC: 98 MMOL/L (ref 98–107)
CO2: 29 MMOL/L (ref 20–31)
CREAT SERPL-MCNC: 1.65 MG/DL (ref 0.7–1.2)
GFR AFRICAN AMERICAN: 49 ML/MIN
GFR NON-AFRICAN AMERICAN: 40 ML/MIN
GFR SERPL CREATININE-BSD FRML MDRD: ABNORMAL ML/MIN/{1.73_M2}
GFR SERPL CREATININE-BSD FRML MDRD: ABNORMAL ML/MIN/{1.73_M2}
GLUCOSE BLD-MCNC: 90 MG/DL (ref 70–99)
POTASSIUM SERPL-SCNC: 3.9 MMOL/L (ref 3.7–5.3)
PRO-BNP: 2874 PG/ML
SODIUM BLD-SCNC: 140 MMOL/L (ref 135–144)
TOTAL PROTEIN: 8.7 G/DL (ref 6.4–8.3)

## 2019-03-05 PROCEDURE — 83880 ASSAY OF NATRIURETIC PEPTIDE: CPT

## 2019-03-05 PROCEDURE — 80053 COMPREHEN METABOLIC PANEL: CPT

## 2019-03-05 PROCEDURE — 36415 COLL VENOUS BLD VENIPUNCTURE: CPT

## 2019-03-12 ENCOUNTER — OFFICE VISIT (OUTPATIENT)
Dept: FAMILY MEDICINE CLINIC | Age: 84
End: 2019-03-12
Payer: MEDICARE

## 2019-03-12 VITALS
WEIGHT: 181.6 LBS | SYSTOLIC BLOOD PRESSURE: 126 MMHG | HEIGHT: 67 IN | BODY MASS INDEX: 28.5 KG/M2 | DIASTOLIC BLOOD PRESSURE: 60 MMHG

## 2019-03-12 DIAGNOSIS — E85.9 AMYLOIDOSIS, UNSPECIFIED TYPE (HCC): ICD-10-CM

## 2019-03-12 DIAGNOSIS — I50.22 CHRONIC SYSTOLIC CONGESTIVE HEART FAILURE (HCC): ICD-10-CM

## 2019-03-12 DIAGNOSIS — I10 ESSENTIAL HYPERTENSION: ICD-10-CM

## 2019-03-12 DIAGNOSIS — N18.30 CHRONIC RENAL FAILURE, STAGE 3 (MODERATE) (HCC): ICD-10-CM

## 2019-03-12 DIAGNOSIS — D50.9 IRON DEFICIENCY ANEMIA, UNSPECIFIED IRON DEFICIENCY ANEMIA TYPE: Primary | ICD-10-CM

## 2019-03-12 PROCEDURE — 1036F TOBACCO NON-USER: CPT | Performed by: FAMILY MEDICINE

## 2019-03-12 PROCEDURE — 1101F PT FALLS ASSESS-DOCD LE1/YR: CPT | Performed by: FAMILY MEDICINE

## 2019-03-12 PROCEDURE — 1123F ACP DISCUSS/DSCN MKR DOCD: CPT | Performed by: FAMILY MEDICINE

## 2019-03-12 PROCEDURE — G8417 CALC BMI ABV UP PARAM F/U: HCPCS | Performed by: FAMILY MEDICINE

## 2019-03-12 PROCEDURE — G8598 ASA/ANTIPLAT THER USED: HCPCS | Performed by: FAMILY MEDICINE

## 2019-03-12 PROCEDURE — 4040F PNEUMOC VAC/ADMIN/RCVD: CPT | Performed by: FAMILY MEDICINE

## 2019-03-12 PROCEDURE — G8427 DOCREV CUR MEDS BY ELIG CLIN: HCPCS | Performed by: FAMILY MEDICINE

## 2019-03-12 PROCEDURE — 99214 OFFICE O/P EST MOD 30 MIN: CPT | Performed by: FAMILY MEDICINE

## 2019-03-12 PROCEDURE — G8482 FLU IMMUNIZE ORDER/ADMIN: HCPCS | Performed by: FAMILY MEDICINE

## 2019-03-12 RX ORDER — TORSEMIDE 20 MG/1
40 TABLET ORAL DAILY
Qty: 1 TABLET | Refills: 0
Start: 2019-03-12 | End: 2019-01-01 | Stop reason: DRUGHIGH

## 2019-03-12 RX ORDER — TRAZODONE HYDROCHLORIDE 50 MG/1
TABLET ORAL
Qty: 30 TABLET | Refills: 1 | Status: SHIPPED | OUTPATIENT
Start: 2019-03-12 | End: 2019-01-01

## 2019-03-12 RX ORDER — DOCUSATE SODIUM 100 MG/1
100 CAPSULE, LIQUID FILLED ORAL 2 TIMES DAILY
COMMUNITY

## 2019-03-12 ASSESSMENT — PATIENT HEALTH QUESTIONNAIRE - PHQ9
1. LITTLE INTEREST OR PLEASURE IN DOING THINGS: 0
SUM OF ALL RESPONSES TO PHQ9 QUESTIONS 1 & 2: 0
SUM OF ALL RESPONSES TO PHQ QUESTIONS 1-9: 0
2. FEELING DOWN, DEPRESSED OR HOPELESS: 0
SUM OF ALL RESPONSES TO PHQ QUESTIONS 1-9: 0

## 2019-03-13 DIAGNOSIS — D50.9 IRON DEFICIENCY ANEMIA, UNSPECIFIED IRON DEFICIENCY ANEMIA TYPE: ICD-10-CM

## 2019-03-13 RX ORDER — SODIUM CHLORIDE 0.9 % (FLUSH) 0.9 %
5 SYRINGE (ML) INJECTION PRN
Status: CANCELLED | OUTPATIENT
Start: 2019-03-13

## 2019-03-13 RX ORDER — SODIUM CHLORIDE 0.9 % (FLUSH) 0.9 %
10 SYRINGE (ML) INJECTION PRN
Status: CANCELLED | OUTPATIENT
Start: 2019-03-13

## 2019-03-13 RX ORDER — SODIUM CHLORIDE 9 MG/ML
INJECTION, SOLUTION INTRAVENOUS ONCE
Status: CANCELLED | OUTPATIENT
Start: 2019-03-13 | End: 2019-03-13

## 2019-03-14 ENCOUNTER — HOSPITAL ENCOUNTER (OUTPATIENT)
Dept: INFUSION THERAPY | Age: 84
Discharge: HOME | End: 2019-03-14
Payer: MEDICARE

## 2019-03-14 VITALS
RESPIRATION RATE: 16 BRPM | TEMPERATURE: 97.9 F | SYSTOLIC BLOOD PRESSURE: 116 MMHG | HEART RATE: 71 BPM | DIASTOLIC BLOOD PRESSURE: 65 MMHG

## 2019-03-14 DIAGNOSIS — D50.9 IRON DEFICIENCY ANEMIA, UNSPECIFIED IRON DEFICIENCY ANEMIA TYPE: Primary | ICD-10-CM

## 2019-03-14 PROCEDURE — 2580000003 HC RX 258: Performed by: FAMILY MEDICINE

## 2019-03-14 PROCEDURE — 6360000002 HC RX W HCPCS: Performed by: FAMILY MEDICINE

## 2019-03-14 PROCEDURE — 96365 THER/PROPH/DIAG IV INF INIT: CPT

## 2019-03-14 RX ORDER — SODIUM CHLORIDE 0.9 % (FLUSH) 0.9 %
10 SYRINGE (ML) INJECTION PRN
Status: DISCONTINUED | OUTPATIENT
Start: 2019-03-14 | End: 2019-03-15 | Stop reason: HOSPADM

## 2019-03-14 RX ORDER — SODIUM CHLORIDE 9 MG/ML
INJECTION, SOLUTION INTRAVENOUS ONCE
Status: CANCELLED | OUTPATIENT
Start: 2019-03-14 | End: 2019-03-14

## 2019-03-14 RX ORDER — SODIUM CHLORIDE 9 MG/ML
INJECTION, SOLUTION INTRAVENOUS ONCE
Status: COMPLETED | OUTPATIENT
Start: 2019-03-14 | End: 2019-03-14

## 2019-03-14 RX ORDER — SODIUM CHLORIDE 0.9 % (FLUSH) 0.9 %
5 SYRINGE (ML) INJECTION PRN
Status: CANCELLED | OUTPATIENT
Start: 2019-03-14

## 2019-03-14 RX ORDER — SODIUM CHLORIDE 0.9 % (FLUSH) 0.9 %
10 SYRINGE (ML) INJECTION PRN
Status: CANCELLED | OUTPATIENT
Start: 2019-03-14

## 2019-03-14 RX ADMIN — SODIUM CHLORIDE: 9 INJECTION, SOLUTION INTRAVENOUS at 10:56

## 2019-03-14 RX ADMIN — Medication 10 ML: at 10:40

## 2019-03-14 RX ADMIN — IRON SUCROSE 200 MG: 20 INJECTION, SOLUTION INTRAVENOUS at 10:58

## 2019-03-19 ENCOUNTER — HOSPITAL ENCOUNTER (OUTPATIENT)
Age: 84
Discharge: HOME | End: 2019-03-19
Payer: MEDICARE

## 2019-03-19 LAB
-: NORMAL
ALBUMIN SERPL-MCNC: 3.7 G/DL (ref 3.5–5.2)
AMORPHOUS: NORMAL
ANION GAP SERPL CALCULATED.3IONS-SCNC: 15 MMOL/L (ref 9–17)
BACTERIA: NORMAL
BILIRUBIN URINE: NEGATIVE
BUN BLDV-MCNC: 41 MG/DL (ref 8–23)
BUN/CREAT BLD: 24 (ref 9–20)
CALCIUM SERPL-MCNC: 9.3 MG/DL (ref 8.6–10.4)
CASTS UA: NORMAL /LPF
CHLORIDE BLD-SCNC: 97 MMOL/L (ref 98–107)
CO2: 25 MMOL/L (ref 20–31)
COLOR: YELLOW
COMMENT UA: ABNORMAL
CREAT SERPL-MCNC: 1.7 MG/DL (ref 0.7–1.2)
CREAT SERPL-MCNC: 1.7 MG/DL (ref 0.7–1.2)
CREATININE CLEARANCE: 31.9 ML/MIN/BSA (ref 71–151)
CREATININE URINE: 54 MG/DL (ref 39–259)
CRYSTALS, UA: NORMAL /HPF
EPITHELIAL CELLS UA: NORMAL /HPF (ref 0–5)
GFR AFRICAN AMERICAN: 47 ML/MIN
GFR NON-AFRICAN AMERICAN: 39 ML/MIN
GFR SERPL CREATININE-BSD FRML MDRD: ABNORMAL ML/MIN/{1.73_M2}
GFR SERPL CREATININE-BSD FRML MDRD: ABNORMAL ML/MIN/{1.73_M2}
GLUCOSE BLD-MCNC: 121 MG/DL (ref 70–99)
GLUCOSE URINE: NEGATIVE
HOURS COLLECTED: 24 H
KETONES, URINE: NEGATIVE
LENGTH OF COLLECTION: 24 H
LEUKOCYTE ESTERASE, URINE: NEGATIVE
MUCUS: NORMAL
NITRITE, URINE: NEGATIVE
OTHER OBSERVATIONS UA: NORMAL
PATIENT HEIGHT: 170 CM
PATIENT WEIGHT: 80 KG
PH UA: 7 (ref 5–9)
PHOSPHORUS: 3 MG/DL (ref 2.5–4.5)
POTASSIUM SERPL-SCNC: 3.7 MMOL/L (ref 3.7–5.3)
PROTEIN 24 HOUR URINE: 704 MG/24 H
PROTEIN UA: ABNORMAL
PROTEIN,TOT TIMED UR: ABNORMAL MG/X H
RBC UA: NORMAL /HPF (ref 0–2)
RENAL EPITHELIAL, UA: NORMAL /HPF
SODIUM BLD-SCNC: 137 MMOL/L (ref 135–144)
SPECIFIC GRAVITY UA: <1.005 (ref 1.01–1.02)
TRICHOMONAS: NORMAL
TURBIDITY: CLEAR
URINE HGB: NEGATIVE
URINE TOTAL PROTEIN: 44 MG/DL
UROBILINOGEN, URINE: NORMAL
VITAMIN D 25-HYDROXY: 22.6 NG/ML (ref 30–100)
VOLUME: 1600 ML
VOLUME: 1600 ML
WBC UA: NORMAL /HPF (ref 0–5)
YEAST: NORMAL

## 2019-03-19 PROCEDURE — 84156 ASSAY OF PROTEIN URINE: CPT

## 2019-03-19 PROCEDURE — 82575 CREATININE CLEARANCE TEST: CPT

## 2019-03-19 PROCEDURE — 80069 RENAL FUNCTION PANEL: CPT

## 2019-03-19 PROCEDURE — 82306 VITAMIN D 25 HYDROXY: CPT

## 2019-03-19 PROCEDURE — 81001 URINALYSIS AUTO W/SCOPE: CPT

## 2019-03-19 PROCEDURE — 36415 COLL VENOUS BLD VENIPUNCTURE: CPT

## 2019-03-21 ENCOUNTER — HOSPITAL ENCOUNTER (OUTPATIENT)
Dept: INFUSION THERAPY | Age: 84
Discharge: HOME | End: 2019-03-21
Payer: MEDICARE

## 2019-03-21 VITALS
SYSTOLIC BLOOD PRESSURE: 101 MMHG | TEMPERATURE: 97.1 F | DIASTOLIC BLOOD PRESSURE: 58 MMHG | RESPIRATION RATE: 16 BRPM | HEART RATE: 70 BPM

## 2019-03-21 DIAGNOSIS — D50.9 IRON DEFICIENCY ANEMIA, UNSPECIFIED IRON DEFICIENCY ANEMIA TYPE: Primary | ICD-10-CM

## 2019-03-21 PROCEDURE — 2580000003 HC RX 258: Performed by: FAMILY MEDICINE

## 2019-03-21 PROCEDURE — 6360000002 HC RX W HCPCS: Performed by: FAMILY MEDICINE

## 2019-03-21 PROCEDURE — 96365 THER/PROPH/DIAG IV INF INIT: CPT

## 2019-03-21 RX ORDER — SODIUM CHLORIDE 0.9 % (FLUSH) 0.9 %
5 SYRINGE (ML) INJECTION PRN
Status: CANCELLED | OUTPATIENT
Start: 2019-03-21

## 2019-03-21 RX ORDER — SODIUM CHLORIDE 0.9 % (FLUSH) 0.9 %
10 SYRINGE (ML) INJECTION PRN
Status: CANCELLED | OUTPATIENT
Start: 2019-03-21

## 2019-03-21 RX ORDER — DOCUSATE SODIUM 100 MG/1
100 CAPSULE, LIQUID FILLED ORAL 2 TIMES DAILY
COMMUNITY
Start: 2018-12-03 | End: 2019-04-23 | Stop reason: SDUPTHER

## 2019-03-21 RX ORDER — SODIUM CHLORIDE 9 MG/ML
INJECTION, SOLUTION INTRAVENOUS ONCE
Status: COMPLETED | OUTPATIENT
Start: 2019-03-21 | End: 2019-03-21

## 2019-03-21 RX ORDER — SODIUM CHLORIDE 9 MG/ML
INJECTION, SOLUTION INTRAVENOUS ONCE
Status: CANCELLED | OUTPATIENT
Start: 2019-03-21 | End: 2019-03-21

## 2019-03-21 RX ORDER — SODIUM CHLORIDE 0.9 % (FLUSH) 0.9 %
10 SYRINGE (ML) INJECTION PRN
Status: DISCONTINUED | OUTPATIENT
Start: 2019-03-21 | End: 2019-03-22 | Stop reason: HOSPADM

## 2019-03-21 RX ADMIN — SODIUM CHLORIDE: 9 INJECTION, SOLUTION INTRAVENOUS at 10:46

## 2019-03-21 RX ADMIN — Medication 10 ML: at 10:45

## 2019-03-21 RX ADMIN — IRON SUCROSE 200 MG: 20 INJECTION, SOLUTION INTRAVENOUS at 10:49

## 2019-03-26 ENCOUNTER — HOSPITAL ENCOUNTER (OUTPATIENT)
Age: 84
Discharge: HOME | End: 2019-03-26
Payer: MEDICARE

## 2019-03-26 DIAGNOSIS — D50.9 IRON DEFICIENCY ANEMIA, UNSPECIFIED IRON DEFICIENCY ANEMIA TYPE: ICD-10-CM

## 2019-03-26 LAB
ANION GAP SERPL CALCULATED.3IONS-SCNC: 15 MMOL/L (ref 9–17)
BNP INTERPRETATION: ABNORMAL
BUN BLDV-MCNC: 45 MG/DL (ref 8–23)
BUN/CREAT BLD: 29 (ref 9–20)
CALCIUM SERPL-MCNC: 9.6 MG/DL (ref 8.6–10.4)
CHLORIDE BLD-SCNC: 95 MMOL/L (ref 98–107)
CO2: 26 MMOL/L (ref 20–31)
CREAT SERPL-MCNC: 1.56 MG/DL (ref 0.7–1.2)
GFR AFRICAN AMERICAN: 52 ML/MIN
GFR NON-AFRICAN AMERICAN: 43 ML/MIN
GFR SERPL CREATININE-BSD FRML MDRD: ABNORMAL ML/MIN/{1.73_M2}
GFR SERPL CREATININE-BSD FRML MDRD: ABNORMAL ML/MIN/{1.73_M2}
GLUCOSE BLD-MCNC: 98 MG/DL (ref 70–99)
HCT VFR BLD CALC: 34.9 % (ref 40.7–50.3)
HEMOGLOBIN: 10.2 G/DL (ref 13–17)
MCH RBC QN AUTO: 25.6 PG (ref 25.2–33.5)
MCHC RBC AUTO-ENTMCNC: 29.2 G/DL (ref 28.4–34.8)
MCV RBC AUTO: 87.7 FL (ref 82.6–102.9)
NRBC AUTOMATED: 0 PER 100 WBC
PDW BLD-RTO: 22.6 % (ref 11.8–14.4)
PLATELET # BLD: 173 K/UL (ref 138–453)
PMV BLD AUTO: 9.1 FL (ref 8.1–13.5)
POTASSIUM SERPL-SCNC: 4.6 MMOL/L (ref 3.7–5.3)
PRO-BNP: 4095 PG/ML
RBC # BLD: 3.98 M/UL (ref 4.21–5.77)
SODIUM BLD-SCNC: 136 MMOL/L (ref 135–144)
WBC # BLD: 5.6 K/UL (ref 3.5–11.3)

## 2019-03-26 PROCEDURE — 83880 ASSAY OF NATRIURETIC PEPTIDE: CPT

## 2019-03-26 PROCEDURE — 36415 COLL VENOUS BLD VENIPUNCTURE: CPT

## 2019-03-26 PROCEDURE — 85027 COMPLETE CBC AUTOMATED: CPT

## 2019-03-26 PROCEDURE — 80048 BASIC METABOLIC PNL TOTAL CA: CPT

## 2019-03-27 RX ORDER — ROSUVASTATIN CALCIUM 40 MG/1
40 TABLET, COATED ORAL EVERY EVENING
Qty: 90 TABLET | Refills: 3 | Status: SHIPPED | OUTPATIENT
Start: 2019-03-27 | End: 2020-01-01 | Stop reason: ALTCHOICE

## 2019-04-23 ENCOUNTER — OFFICE VISIT (OUTPATIENT)
Dept: FAMILY MEDICINE CLINIC | Age: 84
End: 2019-04-23
Payer: MEDICARE

## 2019-04-23 ENCOUNTER — HOSPITAL ENCOUNTER (OUTPATIENT)
Age: 84
Discharge: HOME | End: 2019-04-23
Payer: MEDICARE

## 2019-04-23 VITALS
SYSTOLIC BLOOD PRESSURE: 122 MMHG | WEIGHT: 184.6 LBS | HEIGHT: 67 IN | DIASTOLIC BLOOD PRESSURE: 58 MMHG | BODY MASS INDEX: 28.97 KG/M2

## 2019-04-23 DIAGNOSIS — I10 ESSENTIAL HYPERTENSION: ICD-10-CM

## 2019-04-23 DIAGNOSIS — I25.10 ASHD (ARTERIOSCLEROTIC HEART DISEASE): ICD-10-CM

## 2019-04-23 DIAGNOSIS — R35.1 NOCTURIA: ICD-10-CM

## 2019-04-23 DIAGNOSIS — G47.00 INSOMNIA, UNSPECIFIED TYPE: ICD-10-CM

## 2019-04-23 DIAGNOSIS — M17.0 OSTEOARTHRITIS OF BOTH KNEES, UNSPECIFIED OSTEOARTHRITIS TYPE: ICD-10-CM

## 2019-04-23 DIAGNOSIS — I50.32 CHRONIC DIASTOLIC CONGESTIVE HEART FAILURE (HCC): Primary | ICD-10-CM

## 2019-04-23 DIAGNOSIS — I50.32 CHRONIC DIASTOLIC CONGESTIVE HEART FAILURE (HCC): ICD-10-CM

## 2019-04-23 DIAGNOSIS — N18.30 CHRONIC RENAL FAILURE, STAGE 3 (MODERATE) (HCC): ICD-10-CM

## 2019-04-23 LAB
ANION GAP SERPL CALCULATED.3IONS-SCNC: 13 MMOL/L (ref 9–17)
BNP INTERPRETATION: ABNORMAL
BUN BLDV-MCNC: 40 MG/DL (ref 8–23)
BUN/CREAT BLD: 25 (ref 9–20)
CALCIUM SERPL-MCNC: 9.4 MG/DL (ref 8.6–10.4)
CHLORIDE BLD-SCNC: 97 MMOL/L (ref 98–107)
CO2: 29 MMOL/L (ref 20–31)
CREAT SERPL-MCNC: 1.59 MG/DL (ref 0.7–1.2)
GFR AFRICAN AMERICAN: 51 ML/MIN
GFR NON-AFRICAN AMERICAN: 42 ML/MIN
GFR SERPL CREATININE-BSD FRML MDRD: ABNORMAL ML/MIN/{1.73_M2}
GFR SERPL CREATININE-BSD FRML MDRD: ABNORMAL ML/MIN/{1.73_M2}
GLUCOSE BLD-MCNC: 107 MG/DL (ref 70–99)
HCT VFR BLD CALC: 34.8 % (ref 40.7–50.3)
HEMOGLOBIN: 10.5 G/DL (ref 13–17)
MCH RBC QN AUTO: 26.3 PG (ref 25.2–33.5)
MCHC RBC AUTO-ENTMCNC: 30.2 G/DL (ref 28.4–34.8)
MCV RBC AUTO: 87.2 FL (ref 82.6–102.9)
NRBC AUTOMATED: 0 PER 100 WBC
PDW BLD-RTO: 22.6 % (ref 11.8–14.4)
PLATELET # BLD: 157 K/UL (ref 138–453)
PMV BLD AUTO: 8.7 FL (ref 8.1–13.5)
POTASSIUM SERPL-SCNC: 4.1 MMOL/L (ref 3.7–5.3)
PRO-BNP: 3148 PG/ML
RBC # BLD: 3.99 M/UL (ref 4.21–5.77)
SODIUM BLD-SCNC: 139 MMOL/L (ref 135–144)
WBC # BLD: 4.5 K/UL (ref 3.5–11.3)

## 2019-04-23 PROCEDURE — 99214 OFFICE O/P EST MOD 30 MIN: CPT | Performed by: FAMILY MEDICINE

## 2019-04-23 PROCEDURE — G8427 DOCREV CUR MEDS BY ELIG CLIN: HCPCS | Performed by: FAMILY MEDICINE

## 2019-04-23 PROCEDURE — 85027 COMPLETE CBC AUTOMATED: CPT

## 2019-04-23 PROCEDURE — G8417 CALC BMI ABV UP PARAM F/U: HCPCS | Performed by: FAMILY MEDICINE

## 2019-04-23 PROCEDURE — 80048 BASIC METABOLIC PNL TOTAL CA: CPT

## 2019-04-23 PROCEDURE — 83880 ASSAY OF NATRIURETIC PEPTIDE: CPT

## 2019-04-23 PROCEDURE — 1036F TOBACCO NON-USER: CPT | Performed by: FAMILY MEDICINE

## 2019-04-23 PROCEDURE — 1123F ACP DISCUSS/DSCN MKR DOCD: CPT | Performed by: FAMILY MEDICINE

## 2019-04-23 PROCEDURE — 36415 COLL VENOUS BLD VENIPUNCTURE: CPT

## 2019-04-23 PROCEDURE — G8598 ASA/ANTIPLAT THER USED: HCPCS | Performed by: FAMILY MEDICINE

## 2019-04-23 PROCEDURE — 4040F PNEUMOC VAC/ADMIN/RCVD: CPT | Performed by: FAMILY MEDICINE

## 2019-04-23 RX ORDER — MULTIVIT-MIN/IRON/FOLIC ACID/K 18-600-40
2000 CAPSULE ORAL DAILY
COMMUNITY

## 2019-04-23 NOTE — PATIENT INSTRUCTIONS
PLAN:  I am going to order a post void residual to see if he is emptying his bladder completely. This ultrasound is to see how much he has before he goes and then after he goes. Worst case scenario if he cannot empty the bladder, a catheter would be placed. No labs were ordered for this appt and I would like him to stop at the lab for some labs. Wife states he eats at least 4 pineapple a week, this may raise his sugar some or make his urine more acidic. For his knees I would like for him to use Voltaren Gel 3-4 times daily. I would like to see him back in 2 months.

## 2019-04-29 ENCOUNTER — HOSPITAL ENCOUNTER (OUTPATIENT)
Dept: ULTRASOUND IMAGING | Age: 84
Discharge: HOME | End: 2019-05-01
Payer: MEDICARE

## 2019-04-29 DIAGNOSIS — R35.1 NOCTURIA: ICD-10-CM

## 2019-04-29 PROCEDURE — 76705 ECHO EXAM OF ABDOMEN: CPT

## 2019-04-29 RX ORDER — TAMSULOSIN HYDROCHLORIDE 0.4 MG/1
0.4 CAPSULE ORAL NIGHTLY
Qty: 30 CAPSULE | Refills: 0 | Status: SHIPPED | OUTPATIENT
Start: 2019-04-29 | End: 2019-05-21 | Stop reason: SDUPTHER

## 2019-04-29 NOTE — TELEPHONE ENCOUNTER
Notes recorded by Anisa Lemon MD on 4/29/2019 at 4:02 PM EDT  Notify very little urine in the bladder post void [facility] likely is having bladder spasm as the etiology of his frequency  And/or prostate spasm  I would like him to give a trial of flomax 0.4mg at hs for 1 month and see if he gets relief

## 2019-05-07 ENCOUNTER — HOSPITAL ENCOUNTER (OUTPATIENT)
Age: 84
Discharge: HOME | End: 2019-05-07
Payer: MEDICARE

## 2019-05-07 LAB
ALBUMIN SERPL-MCNC: 3.8 G/DL (ref 3.5–5.2)
ALBUMIN/GLOBULIN RATIO: 0.8 (ref 1–2.5)
ALP BLD-CCNC: 122 U/L (ref 40–129)
ALT SERPL-CCNC: 18 U/L (ref 5–41)
ANION GAP SERPL CALCULATED.3IONS-SCNC: 13 MMOL/L (ref 9–17)
AST SERPL-CCNC: 35 U/L
BILIRUB SERPL-MCNC: 0.62 MG/DL (ref 0.3–1.2)
BNP INTERPRETATION: ABNORMAL
BUN BLDV-MCNC: 39 MG/DL (ref 8–23)
BUN/CREAT BLD: 23 (ref 9–20)
CALCIUM SERPL-MCNC: 9.6 MG/DL (ref 8.6–10.4)
CHLORIDE BLD-SCNC: 95 MMOL/L (ref 98–107)
CO2: 27 MMOL/L (ref 20–31)
CREAT SERPL-MCNC: 1.69 MG/DL (ref 0.7–1.2)
GFR AFRICAN AMERICAN: 47 ML/MIN
GFR NON-AFRICAN AMERICAN: 39 ML/MIN
GFR SERPL CREATININE-BSD FRML MDRD: ABNORMAL ML/MIN/{1.73_M2}
GFR SERPL CREATININE-BSD FRML MDRD: ABNORMAL ML/MIN/{1.73_M2}
GLUCOSE BLD-MCNC: 108 MG/DL (ref 70–99)
POTASSIUM SERPL-SCNC: 5 MMOL/L (ref 3.7–5.3)
PRO-BNP: 3772 PG/ML
SODIUM BLD-SCNC: 135 MMOL/L (ref 135–144)
TOTAL PROTEIN: 8.6 G/DL (ref 6.4–8.3)

## 2019-05-07 PROCEDURE — 36415 COLL VENOUS BLD VENIPUNCTURE: CPT

## 2019-05-07 PROCEDURE — 80053 COMPREHEN METABOLIC PANEL: CPT

## 2019-05-07 PROCEDURE — 83880 ASSAY OF NATRIURETIC PEPTIDE: CPT

## 2019-05-17 ENCOUNTER — HOSPITAL ENCOUNTER (OUTPATIENT)
Age: 84
Discharge: HOME | End: 2019-05-17
Payer: MEDICARE

## 2019-05-17 LAB
ALBUMIN SERPL-MCNC: 3.8 G/DL (ref 3.5–5.2)
ANION GAP SERPL CALCULATED.3IONS-SCNC: 14 MMOL/L (ref 9–17)
BUN BLDV-MCNC: 54 MG/DL (ref 8–23)
BUN/CREAT BLD: 32 (ref 9–20)
CALCIUM SERPL-MCNC: 9.2 MG/DL (ref 8.6–10.4)
CHLORIDE BLD-SCNC: 98 MMOL/L (ref 98–107)
CO2: 25 MMOL/L (ref 20–31)
CREAT SERPL-MCNC: 1.71 MG/DL (ref 0.7–1.2)
GFR AFRICAN AMERICAN: 47 ML/MIN
GFR NON-AFRICAN AMERICAN: 39 ML/MIN
GFR SERPL CREATININE-BSD FRML MDRD: ABNORMAL ML/MIN/{1.73_M2}
GFR SERPL CREATININE-BSD FRML MDRD: ABNORMAL ML/MIN/{1.73_M2}
GLUCOSE BLD-MCNC: 133 MG/DL (ref 70–99)
PHOSPHORUS: 4.3 MG/DL (ref 2.5–4.5)
POTASSIUM SERPL-SCNC: 4.6 MMOL/L (ref 3.7–5.3)
SODIUM BLD-SCNC: 137 MMOL/L (ref 135–144)
VITAMIN D 25-HYDROXY: 38.8 NG/ML (ref 30–100)

## 2019-05-17 PROCEDURE — 80069 RENAL FUNCTION PANEL: CPT

## 2019-05-17 PROCEDURE — 82306 VITAMIN D 25 HYDROXY: CPT

## 2019-05-21 RX ORDER — TAMSULOSIN HYDROCHLORIDE 0.4 MG/1
0.4 CAPSULE ORAL NIGHTLY
Qty: 90 CAPSULE | Refills: 1 | Status: SHIPPED | OUTPATIENT
Start: 2019-05-21 | End: 2019-01-01 | Stop reason: SDUPTHER

## 2019-05-30 ENCOUNTER — HOSPITAL ENCOUNTER (EMERGENCY)
Age: 84
Discharge: HOME | End: 2019-05-30
Payer: MEDICARE

## 2019-05-30 VITALS
DIASTOLIC BLOOD PRESSURE: 78 MMHG | RESPIRATION RATE: 16 BRPM | HEART RATE: 91 BPM | WEIGHT: 184 LBS | TEMPERATURE: 97.8 F | SYSTOLIC BLOOD PRESSURE: 140 MMHG | OXYGEN SATURATION: 97 % | HEIGHT: 67 IN | BODY MASS INDEX: 28.88 KG/M2

## 2019-05-30 DIAGNOSIS — B02.9 HERPES ZOSTER WITHOUT COMPLICATION: Primary | ICD-10-CM

## 2019-05-30 PROCEDURE — 99282 EMERGENCY DEPT VISIT SF MDM: CPT

## 2019-05-30 RX ORDER — VALACYCLOVIR HYDROCHLORIDE 1 G/1
1000 TABLET, FILM COATED ORAL 3 TIMES DAILY
Qty: 21 TABLET | Refills: 0 | Status: SHIPPED | OUTPATIENT
Start: 2019-05-30 | End: 2019-06-06

## 2019-05-30 RX ORDER — GABAPENTIN 100 MG/1
100 CAPSULE ORAL 3 TIMES DAILY PRN
Qty: 12 CAPSULE | Refills: 0 | Status: SHIPPED | OUTPATIENT
Start: 2019-05-30 | End: 2019-01-01

## 2019-05-30 ASSESSMENT — PAIN SCALES - GENERAL
PAINLEVEL_OUTOF10: 3
PAINLEVEL_OUTOF10: 0

## 2019-05-30 ASSESSMENT — PAIN DESCRIPTION - ORIENTATION: ORIENTATION: RIGHT

## 2019-05-30 ASSESSMENT — PAIN DESCRIPTION - PAIN TYPE: TYPE: ACUTE PAIN

## 2019-05-30 NOTE — ED PROVIDER NOTES
[facility] ED  EMERGENCY DEPARTMENT ENCOUNTER      Pt Name: Bonnie López  MRN: [id number]  [place] 1935  Date of evaluation: 5/30/2019  Provider: Cinthya Gilliland PA-C    CHIEF COMPLAINT       Chief Complaint   Patient presents with    Rash     Right back, possible shingles per pt       HISTORY OF PRESENT ILLNESS    Bonnie López is a 81 y.o. male who presents to the emergency department from home with a rash on the right side of his back. This was noted by family members last night he was concerned it might be shingles. He states slight burning in the area. Denies other illness. Triage notes and Nursing notes were reviewed by myself. Any discrepancies are addressed above. PAST MEDICAL HISTORY     Past Medical History:   Diagnosis Date    Abnormal cardiac cath 10/01/2012    30% LAD, 80% ostial stenosis-CX, RCAOccluded at its ostium. There was minimal left to right collateral flow.  Abnormal echocardiogram 11/12    EF >55%. Mild to moderate aortic stenosis. Mean gradient to 18 mmHg.  Atrial fibrillation ([address] 8/28/2017    CAD (coronary artery disease) 9/12    30% LAD, 80% ostial stenosis-CX, RCAOccluded at its ostium. There was minimal left to right collateral flow.  Chronic diastolic CHF (congestive heart failure), NYHA class 2 ([address] 9/7/2016    Chronic renal failure, stage 3 (moderate) ([address] 4/24/2017    Erectile dysfunction     H/O cardiac catheterization 06/02/2017    LMCA: Lesion on LMCA: Distal subsection. 60% stenosis. Comments: Calcified lesion in the ostial Cx extending into the distal left main and proximal LAD. LAD: lesion on Prox LAD: Ostial. 50% stenosis. Lesion on 1st Diag:Proximal subsection. 50% stenosis. LCx: Lesion on Prox CX: Osital. 95% stenosis. Lesion on Dist CX: Ostial 50% stenosis. RCA: Lesion on prox RCA: Proximal subsection. 100% stenosis.  H/O echocardiogram 12/9/15    EF:55%. LV wall thickness is moderately increased.  Bi atrial enlargement noted. Mean aortic valve gradient is 33mmhg w/moderate to severe aortic stenosis. Mitral annular calcification. Mild mitral regurgitation. Moderate pulm hypertension w/RV systolic pressure of 43 mmhg. Mild (grade 1) diastolic dysfunction.  H/O echocardiogram 12/05/2016    EF:60%. Mildly increased LV wall thickness. Biatrial enlargement. Moderate to severe aortic stenosis. Mean gradient 36 mmHg, KRISTINA 0.08. Mild mitral regurgitation. Moderate pulmonary hypertension with an estimated RV systolic pressure of 60 mmHg. Mild diastolic dysfunction.  H/O echocardiogram 06/13/2017    EF 55%. Moderately increased LV wall thickness normal LV cavity size. Severe bi-atrial enlargement. Mild mitral and moderate tricuspid regurg. Mild pulmonary hypertension estimated right ventricular systolic pressure of 35 mmHg. Bilateral pleural effusion.  History of cardiovascular stress test 12/21/2017    History of cardioversion 09/21/2017    Successful cardioversion to NSR with 200J biphasic    History of cardioversion 09/13/2018    Dr Manjula Rust History of echocardiogram 11/21/13    EF >55%, mild to mod LVH, LA  is mod dilated, mild to mod AS, mild diastolic dysfunction noted.  History of echocardiogram 06/09/15    EF 55%. LV wall thickness is severely increased. LA is moderately dilated(34-39)with a LA volume index of 36 ml.    History of echocardiogram 06/19/2018    EF 55%. Mildly increased LV wall thickness. Left atrium severely dilated (>40) w/ left atrial volume index of 46 ml/m2. Moderate aortic stenosis w/ mean gradient of 21.3 mmHg consistent w/ moderate aortis stenosis on what appears to be bioprosthetic aortic melanie, Moderate pulmonary HTN w/ RV systolic pressure of 58 mmHg. Moderate/severe tricuspid regurg. Diastoly cannot be assessed due to pt rhythm    History of echocardiogram 10/04/2018    EF 55%. Mildly increased LA wall thickness.  LA severely dilatedw/ LA volume index of 46. Bioprosthetic AV seen. Mean gradient is 20, which is unchanged from previous. Mild mitral regurg. Moderate/severe tricuspid regurg, Moderate pulmonary HTN w/ estimated RV systolic pressure fo 47. Mild diastolic dysfunction.  History of Holter monitoring 08/08/2017    Atrial fibrillation throughout with fairly controlled ventricular response (HR less than 100 bpm 93% of the time), no signficant pauses. Rare isolated PVC's    History of Holter monitoring 08/08/2017    Atrial Fibrillation throughout with fairly controlled ventricular response (HR less than 100bpm 93% of the time),no significant pauses. Rare isolated PVC's    History of Holter monitoring 12/06/2017    Rare PAC's with one 4 beat run of wide complex tachycardia at 170 bpm, No symptoms reported. clinical correlation required    Hyperlipidemia 2/21/2006    Hypertension     Iron deficiency anemia 3/13/2019    Osteoarthritis of hip 6/20/2013    PVD (peripheral vascular disease) [address]     Reported history of a 60% right carotid stenosis.     Trigeminal neuralgia 7/10/2013       SURGICAL HISTORY       Past Surgical History:   Procedure Laterality Date    CARDIAC CATHETERIZATION Left 05/18/2017    Right Radial/[facility]rement ([employer]) Maurizio/    CARDIOVERSION  09/21/2017    [facility]rement ([employer]) Maurizio/Dr Her/2 shocks at 200 joules    CORONARY ARTERY BYPASS GRAFT N/A 5/19/2017    CORONARY ARTERY BYPASS GRAFT X 2: LIMA-LAD, SVG-OM, AORTIC VALVE REPLACEMENT WITH 23 MM MEDTRONIC MOSAIC ULTRA, EVACUATION OF 1.5 LITER PLEURAL FLUID; ALLAN PER ANESTHESIA performed by Darnell Doherty MD at [address] Right 7/29/13    [facility] - Dr. Beckham [facility] Right    [facility]    tube was blocked    SHOULDER SURGERY      roto cuff surgery left shoulder       CURRENT MEDICATIONS       Discharge Medication List as of 5/30/2019 11:02 AM      CONTINUE these medications which have NOT CHANGED    Details to the right lateral iliac crest.  Extremities without edema. Good affect. Pleasant patient. DIAGNOSTIC RESULTS     none    EMERGENCY DEPARTMENT COURSE andMedical Decision Making:     MDM/     Findings consistent with shingles. We will treat with antiviral since the symptoms started yesterday and will write a small amount of gabapentin to take as needed for pain    Strict returnprecautions and follow up instructions were discussed with the patient with which the patient agrees    ED Medications administered this visit:  Medications - No data to display    CONSULTS: (None if blank)  None    Procedures: (None if blank)       CLINICAL       1. Herpes zoster without complication          DISPOSITION/PLAN   DISPOSITION Decision To Discharge 05/30/2019 10:59:59 AM      PATIENT REFERRED TO:  Deng Woodson MD  [address] [place] [address]  [phone]    In 4 days        DISCHARGE MEDICATIONS:  Discharge Medication List as of 5/30/2019 11:02 AM      START taking these medications    Details   valACYclovir (VALTREX) 1 g tablet Take 1 tablet by mouth 3 times daily for 7 days, Disp-21 tablet, R-0Print      gabapentin (NEURONTIN) 100 MG capsule Take 1 capsule by mouth 3 times daily as needed (pain) for up to 7 days.  Intended supply: 30 days, Disp-12 capsule, R-0Print                    (Please note that portions of this note were completed with a voice recognition program.  Efforts were made to edit the dictations but occasionallywords are mis-transcribed.)      Tray Freitas PA-C (electronically signed)  Attending Emergency Department Provider         Dian Sweet II, PA-C  05/30/19 1947

## 2019-06-01 ENCOUNTER — HOSPITAL ENCOUNTER (EMERGENCY)
Age: 84
Discharge: HOME | End: 2019-06-01
Payer: MEDICARE

## 2019-06-01 VITALS
SYSTOLIC BLOOD PRESSURE: 114 MMHG | BODY MASS INDEX: 28.82 KG/M2 | OXYGEN SATURATION: 99 % | RESPIRATION RATE: 18 BRPM | DIASTOLIC BLOOD PRESSURE: 60 MMHG | TEMPERATURE: 99 F | HEART RATE: 83 BPM | WEIGHT: 184 LBS

## 2019-06-01 DIAGNOSIS — S51.812A SKIN TEAR OF LEFT FOREARM WITHOUT COMPLICATION, INITIAL ENCOUNTER: Primary | ICD-10-CM

## 2019-06-01 PROCEDURE — 99282 EMERGENCY DEPT VISIT SF MDM: CPT

## 2019-06-01 NOTE — ED PROVIDER NOTES
[facility] ED  EMERGENCY DEPARTMENT ENCOUNTER      Pt Name: Mark Rascon  MRN: [id number]  [place] 1935  Date of evaluation: 6/1/2019  Provider: Niya Kiran II, PA-C    CHIEF COMPLAINT       Chief Complaint   Patient presents with    Laceration     pt has a very large skin tear to his left lower arm yesterday when his feet got tangled up while he was watering plants       HISTORY OF PRESENT ILLNESS    Mark Rascon is a 81 y.o. male who presents to the emergency department from home with a skin tear to his left arm that he obtained yesterday. He was watering the plants got tangled up in the hose fell on his arm struck a 4 x 4 post.  He denies any joint pain or bony pain but he does have a skin tear in his arm he applied a dressing bony change the dressing this morning it began bleeding heavily again so he waited to see the bleeding once. which it did not so he comes to the emergency department due to bleeding from a skin tear on his arm which happened yesterday. Patient is on Eliquis he denies any head injury or neck or back pain denies numbness or tingling. Triage notes and Nursing notes were reviewed by myself. Any discrepancies are addressed above. PAST MEDICAL HISTORY     Past Medical History:   Diagnosis Date    Abnormal cardiac cath 10/01/2012    30% LAD, 80% ostial stenosis-CX, RCAOccluded at its ostium. There was minimal left to right collateral flow.  Abnormal echocardiogram 11/12    EF >55%. Mild to moderate aortic stenosis. Mean gradient to 18 mmHg.  Atrial fibrillation ([address] 8/28/2017    CAD (coronary artery disease) 9/12    30% LAD, 80% ostial stenosis-CX, RCAOccluded at its ostium. There was minimal left to right collateral flow.       Chronic diastolic CHF (congestive heart failure), NYHA class 2 ([address] 9/7/2016    Chronic renal failure, stage 3 (moderate) ([address] 4/24/2017    Erectile dysfunction     H/O cardiac catheterization 06/02/2017    LMCA: Lesion on LMCA: Distal subsection. 60% stenosis. Comments: Calcified lesion in the ostial Cx extending into the distal left main and proximal LAD. LAD: lesion on Prox LAD: Ostial. 50% stenosis. Lesion on 1st Diag:Proximal subsection. 50% stenosis. LCx: Lesion on Prox CX: Osital. 95% stenosis. Lesion on Dist CX: Ostial 50% stenosis. RCA: Lesion on prox RCA: Proximal subsection. 100% stenosis.  H/O echocardiogram 12/9/15    EF:55%. LV wall thickness is moderately increased. Bi atrial enlargement noted. Mean aortic valve gradient is 33mmhg w/moderate to severe aortic stenosis. Mitral annular calcification. Mild mitral regurgitation. Moderate pulm hypertension w/RV systolic pressure of 43 mmhg. Mild (grade 1) diastolic dysfunction.  H/O echocardiogram 12/05/2016    EF:60%. Mildly increased LV wall thickness. Biatrial enlargement. Moderate to severe aortic stenosis. Mean gradient 36 mmHg, KRISTINA 0.08. Mild mitral regurgitation. Moderate pulmonary hypertension with an estimated RV systolic pressure of 60 mmHg. Mild diastolic dysfunction.  H/O echocardiogram 06/13/2017    EF 55%. Moderately increased LV wall thickness normal LV cavity size. Severe bi-atrial enlargement. Mild mitral and moderate tricuspid regurg. Mild pulmonary hypertension estimated right ventricular systolic pressure of 35 mmHg. Bilateral pleural effusion.  History of cardiovascular stress test 12/21/2017    History of cardioversion 09/21/2017    Successful cardioversion to NSR with 200J biphasic    History of cardioversion 09/13/2018    Dr Yusuf Marte History of echocardiogram 11/21/13    EF >55%, mild to mod LVH, LA  is mod dilated, mild to mod AS, mild diastolic dysfunction noted.  History of echocardiogram 06/09/15    EF 55%. LV wall thickness is severely increased. LA is moderately dilated(34-39)with a LA volume index of 36 ml.    History of echocardiogram 06/19/2018    EF 55%. Mildly increased LV wall thickness. MOSAIC ULTRA, EVACUATION OF 1.5 LITER PLEURAL FLUID; ALLAN PER ANESTHESIA performed by Malgorzata Russell MD at [address] Right 7/29/13    [facility] - Dr. Crys Rose    tube was blocked    SHOULDER SURGERY      roto cuff surgery left shoulder       CURRENT MEDICATIONS       Discharge Medication List as of 6/1/2019  6:22 PM      CONTINUE these medications which have NOT CHANGED    Details   valACYclovir (VALTREX) 1 g tablet Take 1 tablet by mouth 3 times daily for 7 days, Disp-21 tablet, R-0Print      gabapentin (NEURONTIN) 100 MG capsule Take 1 capsule by mouth 3 times daily as needed (pain) for up to 7 days.  Intended supply: 30 days, Disp-12 capsule, R-0Print      tamsulosin (FLOMAX) 0.4 MG capsule TAKE 1 CAPSULE BY MOUTH NIGHTLY, Disp-90 capsule, R-1Normal      Cholecalciferol (VITAMIN D) 2000 units CAPS capsule Take 2,000 Units by mouth dailyHistorical Med      diclofenac sodium 1 % GEL Apply 2 g topically 4 times daily, Topical, 4 TIMES DAILY Starting Tue 4/23/2019, Disp-2 Tube, R-1, Normal      rosuvastatin (CRESTOR) 40 MG tablet Take 1 tablet by mouth every evening, Disp-90 tablet, R-3Normal      docusate sodium (COLACE) 100 MG capsule Take 100 mg by mouth 2 times dailyHistorical Med      torsemide (DEMADEX) 20 MG tablet Take 2 tablets by mouth daily, Disp-1 tablet, R-0Adjust Sig      traZODone (DESYREL) 50 MG tablet Take 0.5 tab po qhs x2 weeks, then increase to 1 tab po qhs., Disp-30 tablet, R-1Normal      levothyroxine (SYNTHROID) 50 MCG tablet Take 1 tablet by mouth daily, Disp-90 tablet, R-1Normal      polyethylene glycol (GLYCOLAX) powder Take 17 g by mouth dailyHistorical Med      NONFORMULARY Tafamidis 61 mg qdHistorical Med      apixaban (ELIQUIS) 5 MG TABS tablet Take 1 tablet by mouth 2 times daily, Disp-180 tablet, R-3Normal      OXcarbazepine (TRILEPTAL) 300 MG tablet TAKE ONE TABLET BY MOUTH 4 TIMES Physically abused: None     Forced sexual activity: None   Other Topics Concern    None   Social History Narrative    None       REVIEW OF SYSTEMS     Review of Systems    Except as noted above the remainder of the review of systems was reviewed and is negative. PHYSICAL EXAM    (up to 7 for level 4, 8 or more for level 5)     ED Triage Vitals [06/01/19 1623]   BP Temp Temp Source Pulse Resp SpO2 Height Weight   114/60 99 °F (37.2 °C) Tympanic 83 18 99 % -- 184 lb (83.5 kg)       Physical Exam  Active and oriented ×3. Nontoxic. No acute distress. Well-hydrated. Head is atraumatic, facies symmetrical.  Respirations nonlabored. Skin with dermatomal zoster form rash noted on her right side of his back which had been noted on a previous evaluation by me 2 days ago. He presents with a skin tear of the left forearm which is 15 cm long by 9 cm wide. Most of the skin has been avulsed from the area or is now in dried clump nonviable flaps. He has full range of motion of the arm with distal neurovascular responses intact. Good affect. Pleasant patient.       DIAGNOSTIC RESULTS     none    EMERGENCY DEPARTMENT COURSE andMedical Decision Making:     MDM/     Strict returnprecautions and follow up instructions were discussed with the patient with which the patient agrees    ED Medications administered this visit:    Medications   gelatin adsorbable (GELFOAM) sponge 1 each (has no administration in time range)   gelatin adsorbable (GELFOAM) sponge 1 each (has no administration in time range)   gelatin adsorbable (GELFOAM) sponge 1 each (has no administration in time range)       CONSULTS: (None if blank)  None    Procedures: (None if blank)  Large old appearing bruising left arm skin tear noted on the forearm radial aspect this is debrided with sterile instruments cleaned with normal saline 3 spots of continuous oozing blood are noted Gelfoam applied to each one of these 3 locations I was able to approximate part

## 2019-06-03 ENCOUNTER — CARE COORDINATION (OUTPATIENT)
Dept: CARE COORDINATION | Age: 84
End: 2019-06-03

## 2019-06-03 NOTE — CARE COORDINATION
Patient was called to follow up with most recent ER visit. There was no answer. A message was left on voicemail to have patient call back regarding ER visit. Office number given [phone].

## 2019-06-07 ENCOUNTER — OFFICE VISIT (OUTPATIENT)
Dept: FAMILY MEDICINE CLINIC | Age: 84
End: 2019-06-07
Payer: MEDICARE

## 2019-06-07 VITALS
DIASTOLIC BLOOD PRESSURE: 60 MMHG | HEIGHT: 67 IN | SYSTOLIC BLOOD PRESSURE: 104 MMHG | WEIGHT: 189 LBS | BODY MASS INDEX: 29.66 KG/M2

## 2019-06-07 DIAGNOSIS — S51.011A SKIN TEAR OF RIGHT ELBOW WITHOUT COMPLICATION, INITIAL ENCOUNTER: Primary | ICD-10-CM

## 2019-06-07 DIAGNOSIS — B02.9 HERPES ZOSTER WITHOUT COMPLICATION: ICD-10-CM

## 2019-06-07 PROCEDURE — 99213 OFFICE O/P EST LOW 20 MIN: CPT | Performed by: FAMILY MEDICINE

## 2019-06-07 PROCEDURE — G8598 ASA/ANTIPLAT THER USED: HCPCS | Performed by: FAMILY MEDICINE

## 2019-06-07 PROCEDURE — 1036F TOBACCO NON-USER: CPT | Performed by: FAMILY MEDICINE

## 2019-06-07 PROCEDURE — 1123F ACP DISCUSS/DSCN MKR DOCD: CPT | Performed by: FAMILY MEDICINE

## 2019-06-07 PROCEDURE — G8427 DOCREV CUR MEDS BY ELIG CLIN: HCPCS | Performed by: FAMILY MEDICINE

## 2019-06-07 PROCEDURE — 4040F PNEUMOC VAC/ADMIN/RCVD: CPT | Performed by: FAMILY MEDICINE

## 2019-06-07 PROCEDURE — G8417 CALC BMI ABV UP PARAM F/U: HCPCS | Performed by: FAMILY MEDICINE

## 2019-06-07 NOTE — PATIENT INSTRUCTIONS
SURVEY:    You may be receiving a survey from [employer] regarding your visit today. Please complete the survey to enable us to provide the highest quality of care to you and your family. If you cannot score us a very good on any question, please call the office to discuss how we could have made your experience a very good one. Thank you. PLAN:  The dressing had to be moistened to remove it de to the gauze on all of the dried blood.  Once the gauze and mesh was removed the area was cleansed and triple ATB ointment and a non stick dressing was applied covered with an ACE wrap  His shingles look great, no need for further treatment

## 2019-06-07 NOTE — PROGRESS NOTES
facility-administered medications for this visit. ROS:  Admits skin tear to left arm  Shingles pain is completely gone, rash is healing    EXAM:  /60   Ht 5' 7\" (1.702 m)   Wt 189 lb (85.7 kg)   BMI 29.60 kg/m²   Wt Readings from Last 3 Encounters:   06/07/19 189 lb (85.7 kg)   06/01/19 184 lb (83.5 kg)   05/30/19 184 lb (83.5 kg)     BP Readings from Last 3 Encounters:   06/07/19 104/60   06/01/19 114/60   05/30/19 (!) 140/78     General Appearance: in no acute distress, well developed, well nourished. Eyes: pupils equal, round reactive to light and accommodation. Skin: full thickness skin tear overlying right elbow  Dried shingles lesions on back  Neurologic: nonfocal, motor strength normal upper and lower extremities, sensory exam intact. Psych: normal affect, speech fluent. ASSESSMENT:   Diagnosis Orders   1. Skin tear of right elbow without complication, initial encounter     2. Herpes zoster without complication      A[facility] derm         PLAN:  The dressing had to be moistened to remove it de to the gauze on all of the dried blood. Once the gauze and mesh was removed the area was cleansed and triple ATB ointment and a non stick dressing was applied covered with an ACE wrap  His shingles look great, no need for further treatment  No orders of the defined types were placed in this encounter. No orders of the defined types were placed in this encounter. I, Dr. Luciana Saenz, personally performed the services described in this documentation as scribed by ERNESTINA Aldridge in my presence, and is both accurate and complete.

## 2019-06-25 PROBLEM — I34.0 MITRAL VALVE INSUFFICIENCY: Status: ACTIVE | Noted: 2019-01-01

## 2019-06-25 NOTE — PROGRESS NOTES
MARTÍN Francis, am scribing for and in the presence of Dr. Raquel Nicole. 6/25/19/10:27am/SNP    [address] [address]  [place] [id number]  Dept: [phone]    Fernando Clemente is a 81 y.o. male here for Follow-up (2 Month); Hyperlipidemia; and Hypertension    HPI:  HYPERLIPIDEMIA:     Medication compliance: compliant all of the time. Patient is  following a low fat, low cholesterol diet. He is not exercising regularly. HYPERTENSION:  He is not exercising. He is adherent to a low-salt diet. Blood pressure is being monitored at home, average readings are 1teen-120/80s. [facility] put him on Metolazone and torsemide. He had SOB and weight gain. He is scheduled to f/u with them on 6/28/19. Would like to discuss why his voice sounds like this and why it is hard to carry on a conversation. They wonder if this is related to CHF. Accompanied by wife, Greg Puentes. Prior to Admission medications    Medication Sig Start Date End Date Taking?  Authorizing Provider   potassium chloride (KLOR-CON M) 20 MEQ extended release tablet Take 20 mEq by mouth 6/17/19  Yes Historical Provider, MD   finasteride (PROSCAR) 5 MG tablet  6/17/19  Yes Historical Provider, MD   metolazone (ZAROXOLYN) 2.5 MG tablet Take 2.5 mg by mouth 6/17/19  Yes Historical Provider, MD   Polyvinyl Alcohol-Povidone PF 1.4-0.6 % SOLN 1 drop 12/3/18  Yes Historical Provider, MD   spironolactone (ALDACTONE) 25 MG tablet Take 25 mg by mouth 4/25/19  Yes Historical Provider, MD   apixaban (ELIQUIS) 2.5 MG TABS tablet Take 1 tablet by mouth 2 times daily 6/25/19  Yes Raquel Nicole MD   tamsulosin (FLOMAX) 0.4 MG capsule TAKE 1 CAPSULE BY MOUTH NIGHTLY 5/21/19  Yes Raquel Nicole MD   Cholecalciferol (VITAMIN D) 2000 units CAPS capsule Take 2,000 Units by mouth daily   Yes Historical Provider, MD   rosuvastatin (CRESTOR) 40 MG tablet Take 1 tablet by mouth every evening 3/27/19  Yes Chela Carpio MD   docusate sodium (COLACE) 100 MG capsule Take 100 mg by mouth 2 times daily   Yes Historical Provider, MD   torsemide (DEMADEX) 20 MG tablet Take 2 tablets by mouth daily 3/12/19  Yes Chela Carpio MD   levothyroxine (SYNTHROID) 50 MCG tablet Take 1 tablet by mouth daily 1/30/19  Yes Chela Carpio MD   polyethylene glycol (GLYCOLAX) powder Take 17 g by mouth daily   Yes Historical Provider, MD   OXcarbazepine (TRILEPTAL) 300 MG tablet TAKE ONE TABLET BY MOUTH 4 TIMES DAILY  Patient taking differently: TAKE ONE TABLET BY MOUTH 3 TIMES DAILY 10/17/18  Yes Chela Carpio MD   aspirin 81 MG tablet Take 81 mg by mouth nightly    Yes Historical Provider, MD   diclofenac sodium 1 % GEL Apply 2 g topically 4 times daily 4/23/19   Chela Carpio MD   EPIPEN 2-AMBAR 0.3 MG/0.3ML SOAJ injection USE AS DIRECTED 9/19/16   Chela Carpio MD     ROS:  General Constitutional: Denies chills. Denies fever. Denies headache. Denies lightheadedness. Ophthalmologic: Denies blurred vision. ENT: Denies nasal congestion. Denies sore throat. Denies ear pain and pressure. Respiratory: Denies cough. Denies shortness of breath. Denies wheezing. Cardiovascular: Denies chest pain at rest. Denies irregular heartbeat. Denies palpitations. Gastrointestinal: Denies abdominal pain. Denies blood in the stool. Denies constipation. Denies diarrhea. Denies nausea. Denies vomiting. Genitourinary: Denies blood in the urine. Denies difficulty urinating. Denies frequent urination. Denies painful urination. Denies urinary incontinence. Musculoskeletal: Denies muscle aches. Denies painful joints. Denies swollen joints. Peripheral Vascular: Denies pain/cramping in legs after exertion. Skin: Denies dry skin. Denies itching. Denies rash. Neurologic: Denies falls. Denies dizziness. Denies fainting. Denies tingling/numbness. Psychiatric: Denies sleep disturbance. Denies anxiety. Denies depressed mood.  Admits decreased ambition to do proximal LAD. LAD: lesion on Prox LAD: Ostial. 50% stenosis. Lesion on 1st Diag:Proximal subsection. 50% stenosis. LCx: Lesion on Prox CX: Osital. 95% stenosis. Lesion on Dist CX: Ostial 50% stenosis. RCA: Lesion on prox RCA: Proximal subsection. 100% stenosis.  H/O echocardiogram 12/9/15    EF:55%. LV wall thickness is moderately increased. Bi atrial enlargement noted. Mean aortic valve gradient is 33mmhg w/moderate to severe aortic stenosis. Mitral annular calcification. Mild mitral regurgitation. Moderate pulm hypertension w/RV systolic pressure of 43 mmhg. Mild (grade 1) diastolic dysfunction.  H/O echocardiogram 12/05/2016    EF:60%. Mildly increased LV wall thickness. Biatrial enlargement. Moderate to severe aortic stenosis. Mean gradient 36 mmHg, KRISTINA 0.08. Mild mitral regurgitation. Moderate pulmonary hypertension with an estimated RV systolic pressure of 60 mmHg. Mild diastolic dysfunction.  H/O echocardiogram 06/13/2017    EF 55%. Moderately increased LV wall thickness normal LV cavity size. Severe bi-atrial enlargement. Mild mitral and moderate tricuspid regurg. Mild pulmonary hypertension estimated right ventricular systolic pressure of 35 mmHg. Bilateral pleural effusion.  History of cardiovascular stress test 12/21/2017    History of cardioversion 09/21/2017    Successful cardioversion to NSR with 200J biphasic    History of cardioversion 09/13/2018    Dr Susi Weaver History of echocardiogram 11/21/13    EF >55%, mild to mod LVH, LA  is mod dilated, mild to mod AS, mild diastolic dysfunction noted.  History of echocardiogram 06/09/15    EF 55%. LV wall thickness is severely increased. LA is moderately dilated(34-39)with a LA volume index of 36 ml.    History of echocardiogram 06/19/2018    EF 55%. Mildly increased LV wall thickness. Left atrium severely dilated (>40) w/ left atrial volume index of 46 ml/m2.  Moderate aortic stenosis w/ mean gradient of 21.3 mmHg consistent w/ moderate aortis stenosis on what appears to be bioprosthetic aortic melanie, Moderate pulmonary HTN w/ RV systolic pressure of 58 mmHg. Moderate/severe tricuspid regurg. Diastoly cannot be assessed due to pt rhythm    History of echocardiogram 10/04/2018    EF 55%. Mildly increased LA wall thickness. LA severely dilatedw/ LA volume index of 46. Bioprosthetic AV seen. Mean gradient is 20, which is unchanged from previous. Mild mitral regurg. Moderate/severe tricuspid regurg, Moderate pulmonary HTN w/ estimated RV systolic pressure fo 47. Mild diastolic dysfunction.  History of Holter monitoring 08/08/2017    Atrial fibrillation throughout with fairly controlled ventricular response (HR less than 100 bpm 93% of the time), no signficant pauses. Rare isolated PVC's    History of Holter monitoring 08/08/2017    Atrial Fibrillation throughout with fairly controlled ventricular response (HR less than 100bpm 93% of the time),no significant pauses. Rare isolated PVC's    History of Holter monitoring 12/06/2017    Rare PAC's with one 4 beat run of wide complex tachycardia at 170 bpm, No symptoms reported. clinical correlation required    Hyperlipidemia 2/21/2006    Hypertension     Iron deficiency anemia 3/13/2019    Osteoarthritis of hip 6/20/2013    PVD (peripheral vascular disease) ([facility] [address]     Reported history of a 60% right carotid stenosis.  Trigeminal neuralgia 7/10/2013      Social History     Tobacco Use    Smoking status: Former Smoker     Years: 15.00     Types: Cigars    Smokeless tobacco: Former User    Tobacco comment: quit 30-40 years ago   Substance Use Topics    Alcohol use:  Yes     Alcohol/week: 0.6 oz     Types: 1 Cans of beer per week     Comment: weekly      Current Outpatient Medications   Medication Sig Dispense Refill    potassium chloride (KLOR-CON M) 20 MEQ extended release tablet Take 20 mEq by mouth      finasteride (PROSCAR) 5 MG tablet       metolazone (ZAROXOLYN) 2.5 MG tablet Take 2.5 mg by mouth      Polyvinyl Alcohol-Povidone PF 1.4-0.6 % SOLN 1 drop      spironolactone (ALDACTONE) 25 MG tablet Take 25 mg by mouth      apixaban (ELIQUIS) 2.5 MG TABS tablet Take 1 tablet by mouth 2 times daily 180 tablet 3    tamsulosin (FLOMAX) 0.4 MG capsule TAKE 1 CAPSULE BY MOUTH NIGHTLY 90 capsule 1    Cholecalciferol (VITAMIN D) 2000 units CAPS capsule Take 2,000 Units by mouth daily      rosuvastatin (CRESTOR) 40 MG tablet Take 1 tablet by mouth every evening 90 tablet 3    docusate sodium (COLACE) 100 MG capsule Take 100 mg by mouth 2 times daily      torsemide (DEMADEX) 20 MG tablet Take 2 tablets by mouth daily 1 tablet 0    levothyroxine (SYNTHROID) 50 MCG tablet Take 1 tablet by mouth daily 90 tablet 1    polyethylene glycol (GLYCOLAX) powder Take 17 g by mouth daily      OXcarbazepine (TRILEPTAL) 300 MG tablet TAKE ONE TABLET BY MOUTH 4 TIMES DAILY (Patient taking differently: TAKE ONE TABLET BY MOUTH 3 TIMES DAILY) 360 tablet 1    aspirin 81 MG tablet Take 81 mg by mouth nightly       diclofenac sodium 1 % GEL Apply 2 g topically 4 times daily 2 Tube 1    EPIPEN 2-AMBAR 0.3 MG/0.3ML SOAJ injection USE AS DIRECTED 1 each 0     No current facility-administered medications for this visit. Allergies   Allergen Reactions    Fish Oil Shortness Of Breath and Swelling     Throat swells shut    Iodine Swelling     Other reaction(s): Urticaria  Patient states is alleric to Iodine and sea food    Norco [Hydrocodone-Acetaminophen]     Other Anaphylaxis    Shellfish-Derived Products Shortness Of Breath and Swelling     Throat swells shut    Shrimp Flavor Swelling     Patient states allergic to shell sea food.    shrimp     Oxycodone Other (See Comments)     Lowers BP     PHYSICAL EXAM:    /64 (Site: Left Upper Arm, Position: Sitting, Cuff Size: Medium Adult)   Ht 5' 7\" (1.702 m)   Wt 183 lb (83 kg)   BMI 28.66 kg/m²   Wt Readings from Last 3 Encounters: 06/25/19 183 lb (83 kg)   06/07/19 189 lb (85.7 kg)   06/01/19 184 lb (83.5 kg)     BP Readings from Last 3 Encounters:   06/25/19 112/64   06/07/19 104/60   06/01/19 114/60     General Appearance: in no acute distress, well developed, well nourished. Eyes: pupils equal, round reactive to light and accommodation. Ears: normal canal and TM's. Nose: nares patent, no lesions. Oral Cavity: mucosa moist. ecchymotic area on L oral vestibule behind 2nd molar and between pre molar and 1st molar  Throat: clear. Floppy uvula  Neck/Thyroid: neck supple, full range of motion, no cervical lymphadenopathy, no thyromegaly or carotid bruits. Skin: warm and dry. No suspicious lesions. Superficial ulceration on R lower anterior leg  Heart: regular rate and rhythm. S1, S2 normal, no gallops. Rate 90-95, 1st heart tone increased in apex 2/6 low pitched blowing murmur into axilla  Lungs: clear to auscultation bilaterally. Abdomen: bowel sounds present, soft, nontender, nondistended, no masses or organomegaly. Musculoskeletal: normal, full range of motion in knees and hips, no swelling or tenderness. Extremities: no cyanosis, Trace edema R ankle  Peripheral Pulses: 2+ throughout, symetric. Neurologic: nonfocal, motor strength normal upper and lower extremities, sensory exam intact. Psych: normal affect, speech fluent. ASSESSMENT:   Diagnosis Orders   1. Right-sided congestive heart failure, unspecified HF chronicity (HCC)  ECHO Complete 2D W Doppler W Color   2. Chronic atrial fibrillation (HCC)  ECHO Complete 2D W Doppler W Color   3. Chronic diastolic congestive heart failure (HCC)  ECHO Complete 2D W Doppler W Color   4. Pulmonary hypertension ([address]     5. Mitral valve insufficiency, unspecified etiology      new onset   6. Chronic renal failure, stage 3 (moderate) (HCC)     7. Essential hypertension         PLAN:  Labs reviewed, his creatinine and BUN are both elevated, which means he is dehydrated.  I will not make any changes to the medications that [facility] has him on, but the fatigue could be related to the dehydration. He has right heart failure, at night the fluid redistributes at night causing him to void through the night. Abdominal ultrasound reviewed, he had 7.38 cc post void residual, which is a very small amount and he is emptying his bladder nicely. The ecchymotic area in his mouth looks like he may have bit it. There is nothing to be concerned with and it will heal on it's own. I will order an echo due to the murmur I heard on today's exam. This could be a cause of his shortness of breath. His last echo was 10/2018 which did not show that much leaking of the mitral valve. I will see him back in 2months. Orders Placed This Encounter   Procedures    ECHO Complete 2D W Doppler W Color     Standing Status:   Future     Standing Expiration Date:   6/24/2020     Order Specific Question:   Reason for exam:     Answer:   heart failure     Orders Placed This Encounter   Medications    apixaban (ELIQUIS) 2.5 MG TABS tablet     Sig: Take 1 tablet by mouth 2 times daily     Dispense:  180 tablet     Refill:  3   I, Dr. Scarlett Miller, personally performed the services described in this documentation as scribed by BEATRIZ Hansen in my presence, and is both accurate and complete.

## 2019-06-25 NOTE — PATIENT INSTRUCTIONS
PLAN:  Labs reviewed, his creatinine and BUN are both elevated, which means he is dehydrated. I will not make any changes to the medications that [facility] has him on, but the fatigue could be related to the dehydration. He has right heart failure, at night the fluid redistributes at night causing him to void through the night. Abdominal ultrasound reviewed, he had 7.38 cc post void residual, which is a very small amount and he is emptying his bladder nicely. The ecchymotic area in his mouth looks like he may have bit it. There is nothing to be concerned with and it will heal on it's own. I will order an echo due to the murmur I heard on today's exam. This could be a cause of his shortness of breath. His last echo was 10/2018 which did not show that much leaking of the mitral valve. I will see him back in 2months. SURVEY:    You may be receiving a survey from [facility] regarding your visit today. Please complete the survey to enable us to provide the highest quality of care to you and your family. If you cannot score us a very good on any question, please call the office to discuss how we could have made your experience a very good one. Thank you.

## 2019-07-16 NOTE — RESULT ENCOUNTER NOTE
There is pulmonary HTN and dilated atria which is not new  The mitral valve does not appear to be leaking much  Aortic valve looks good  LV pump is very good

## 2019-08-21 PROBLEM — K76.9 CHRONIC LIVER DISEASE: Status: ACTIVE | Noted: 2019-01-01

## 2019-08-21 PROBLEM — I50.42 CHRONIC COMBINED SYSTOLIC AND DIASTOLIC HEART FAILURE (HCC): Status: ACTIVE | Noted: 2019-03-12

## 2019-08-21 PROBLEM — R18.8 OTHER ASCITES: Status: ACTIVE | Noted: 2019-01-01

## 2019-08-21 NOTE — PROGRESS NOTES
Denies itching. Denies rash. Neurologic: Denies falls. Denies dizziness. Denies fainting. Admits continued nerve pain lower right side of right back   Psychiatric: Denies sleep disturbance. Denies anxiety. Denies depressed mood. Past Surgical History:   Procedure Laterality Date    CARDIAC CATHETERIZATION Left 05/18/2017    Right Radial/[employer] [place]/    [facility]  [facility]21[facility]/2 shocks at 200 joules    CORONARY ARTERY BYPASS GRAFT N/A 5/19/2017    CORONARY ARTERY BYPASS GRAFT X 2: LIMA-LAD, SVG-OM, AORTIC VALVE REPLACEMENT WITH 23 MM MEDTRONIC MOSAIC ULTRA, EVACUATION OF 1.5 LITER PLEURAL FLUID; ALLAN PER ANESTHESIA performed by Esha Crowe MD at [address] Right 7/29/13    [facility] - Dr. Alma Smart    tube was blocked    SHOULDER SURGERY      roto cuff surgery left shoulder       Family History   Problem Relation Age of Onset    Heart Disease Mother     Heart Disease Father     Cancer Father     Heart Disease Sister     High Blood Pressure Sister     Heart Disease Sister     High Blood Pressure Sister     Heart Disease Brother     Heart Disease Brother     Heart Disease Brother        Past Medical History:   Diagnosis Date    Abnormal cardiac cath 10/01/2012    30% LAD, 80% ostial stenosis-CX, RCAOccluded at its ostium. There was minimal left to right collateral flow.  Abnormal echocardiogram 11/12    EF >55%. Mild to moderate aortic stenosis. Mean gradient to 18 mmHg.  Atrial fibrillation ([address] 8/28/2017    CAD (coronary artery disease) 9/12    30% LAD, 80% ostial stenosis-CX, RCAOccluded at its ostium. There was minimal left to right collateral flow.       Chronic diastolic CHF (congestive heart failure), NYHA class 2 ([address] 9/7/2016    Chronic liver disease 8/21/2019    Chronic renal failure, stage 3 (moderate) ([address] 4/24/2017    Erectile dysfunction     H/O cardiac catheterization 06/02/2017    LMCA: Lesion on LMCA: Distal subsection. 60% stenosis. Comments: Calcified lesion in the ostial Cx extending into the distal left main and proximal LAD. LAD: lesion on Prox LAD: Ostial. 50% stenosis. Lesion on 1st Diag:Proximal subsection. 50% stenosis. LCx: Lesion on Prox CX: Osital. 95% stenosis. Lesion on Dist CX: Ostial 50% stenosis. RCA: Lesion on prox RCA: Proximal subsection. 100% stenosis.  H/O echocardiogram 12/9/15    EF:55%. LV wall thickness is moderately increased. Bi atrial enlargement noted. Mean aortic valve gradient is 33mmhg w/moderate to severe aortic stenosis. Mitral annular calcification. Mild mitral regurgitation. Moderate pulm hypertension w/RV systolic pressure of 43 mmhg. Mild (grade 1) diastolic dysfunction.  H/O echocardiogram 12/05/2016    EF:60%. Mildly increased LV wall thickness. Biatrial enlargement. Moderate to severe aortic stenosis. Mean gradient 36 mmHg, KRISTINA 0.08. Mild mitral regurgitation. Moderate pulmonary hypertension with an estimated RV systolic pressure of 60 mmHg. Mild diastolic dysfunction.  H/O echocardiogram 06/13/2017    EF 55%. Moderately increased LV wall thickness normal LV cavity size. Severe bi-atrial enlargement. Mild mitral and moderate tricuspid regurg. Mild pulmonary hypertension estimated right ventricular systolic pressure of 35 mmHg. Bilateral pleural effusion.  History of cardiovascular stress test 12/21/2017    History of cardioversion 09/21/2017    Successful cardioversion to NSR with 200J biphasic    History of cardioversion 09/13/2018    Dr Frederic Collier History of echocardiogram 11/21/13    EF >55%, mild to mod LVH, LA  is mod dilated, mild to mod AS, mild diastolic dysfunction noted.  History of echocardiogram 06/09/15    EF 55%. LV wall thickness is severely increased.  LA is moderately dilated(34-39)with a LA volume index of 36 ml.    History of echocardiogram 06/19/2018    EF 55%. Mildly increased LV wall thickness. Left atrium severely dilated (>40) w/ left atrial volume index of 46 ml/m2. Moderate aortic stenosis w/ mean gradient of 21.3 mmHg consistent w/ moderate aortis stenosis on what appears to be bioprosthetic aortic melanie, Moderate pulmonary HTN w/ RV systolic pressure of 58 mmHg. Moderate/severe tricuspid regurg. Diastoly cannot be assessed due to pt rhythm    History of echocardiogram 10/04/2018    EF 55%. Mildly increased LA wall thickness. LA severely dilatedw/ LA volume index of 46. Bioprosthetic AV seen. Mean gradient is 20, which is unchanged from previous. Mild mitral regurg. Moderate/severe tricuspid regurg, Moderate pulmonary HTN w/ estimated RV systolic pressure fo 47. Mild diastolic dysfunction.  History of Holter monitoring 08/08/2017    Atrial fibrillation throughout with fairly controlled ventricular response (HR less than 100 bpm 93% of the time), no signficant pauses. Rare isolated PVC's    History of Holter monitoring 08/08/2017    Atrial Fibrillation throughout with fairly controlled ventricular response (HR less than 100bpm 93% of the time),no significant pauses. Rare isolated PVC's    History of Holter monitoring 12/06/2017    Rare PAC's with one 4 beat run of wide complex tachycardia at 170 bpm, No symptoms reported. clinical correlation required    Hyperlipidemia 2/21/2006    Hypertension     Iron deficiency anemia 3/13/2019    Osteoarthritis of hip 6/20/2013    PVD (peripheral vascular disease) [address] [address]     Reported history of a 60% right carotid stenosis.  Trigeminal neuralgia 7/10/2013      Social History     Tobacco Use    Smoking status: Former Smoker     Years: 15.00     Types: Cigars    Smokeless tobacco: Former User    Tobacco comment: quit 30-40 years ago   Substance Use Topics    Alcohol use:  Yes     Alcohol/week: 1.0 standard drinks     Types: 1 Cans of beer per week     Comment: weekly      Current of days. The fatigue and SOB have not changed. His GFR has been dropping the last few weeks and his creatinine level has been rising but this better than it was 2 months ago. He continues to restrict his sodium. To avoid dialysis, this is imperative. His most recent echo is reviewed. He has two leaky valves and pulmonary HTN. I suspect that he has free fluid in the abdomen. This could be too much pressure in the liver. I would like him to have an ultrasound of the liver to test the elasticity for portal HTN. Orders Placed This Encounter   Procedures    US ORGAN ELASTOGRAPHY     Standing Status:   Future     Standing Expiration Date:   8/21/2020     Scheduling Instructions:      NOT SCHEDULING AT [facility]     Orders Placed This Encounter   Medications    traZODone (DESYREL) 50 MG tablet     Sig: Take 0.5 tab po qhs x2 weeks, then increase to 1 tab po qhs. Dispense:  30 tablet     Refill:  1     I, Dr. Emerald Salazar, personally performed the services described in this documentation as scribed by MARTÍN Ambriz in my presence, and is both accurate and complete.

## 2019-08-28 PROBLEM — N40.1 BPH WITH OBSTRUCTION/LOWER URINARY TRACT SYMPTOMS: Status: ACTIVE | Noted: 2019-01-01

## 2019-08-28 PROBLEM — N13.8 BPH WITH OBSTRUCTION/LOWER URINARY TRACT SYMPTOMS: Status: ACTIVE | Noted: 2019-01-01

## 2019-08-28 NOTE — PROGRESS NOTES
0.5 tab po qhs x2 weeks, then increase to 1 tab po qhs. (Patient not taking: Reported on 8/28/2019) 30 tablet 1    diclofenac sodium 1 % GEL Apply 2 g topically 4 times daily (Patient not taking: Reported on 8/28/2019) 2 Tube 1     No facility-administered encounter medications on file as of 8/28/2019.        Current Outpatient Medications on File Prior to Visit   Medication Sig Dispense Refill    melatonin 3 MG TABS tablet Take 3 mg by mouth nightly as needed      doxycycline hyclate (VIBRAMYCIN) 100 MG capsule Take 100 mg by mouth 2 times daily      Tafamidis Meglumine, Cardiac, 20 MG CAPS Take 80 mg by mouth daily       levothyroxine (SYNTHROID) 50 MCG tablet TAKE 1 TABLET BY MOUTH DAILY 90 tablet 0    potassium chloride (KLOR-CON M) 20 MEQ extended release tablet Take 20 mEq by mouth      finasteride (PROSCAR) 5 MG tablet Take 5 mg by mouth daily       metolazone (ZAROXOLYN) 2.5 MG tablet Take 2.5 mg by mouth      Polyvinyl Alcohol-Povidone PF 1.4-0.6 % SOLN 1 drop      spironolactone (ALDACTONE) 25 MG tablet Take 25 mg by mouth      apixaban (ELIQUIS) 2.5 MG TABS tablet Take 1 tablet by mouth 2 times daily 180 tablet 3    tamsulosin (FLOMAX) 0.4 MG capsule TAKE 1 CAPSULE BY MOUTH NIGHTLY 90 capsule 1    Cholecalciferol (VITAMIN D) 2000 units CAPS capsule Take 2,000 Units by mouth daily      rosuvastatin (CRESTOR) 40 MG tablet Take 1 tablet by mouth every evening 90 tablet 3    docusate sodium (COLACE) 100 MG capsule Take 100 mg by mouth 2 times daily      torsemide (DEMADEX) 20 MG tablet Take 2 tablets by mouth daily (Patient taking differently: Take 40 mg by mouth daily Indications: patient also takes 80 mg in the morning two days per week, per Dr. Niecy Tijerina ) 1 tablet 0    polyethylene glycol (GLYCOLAX) powder Take 17 g by mouth daily      OXcarbazepine (TRILEPTAL) 300 MG tablet TAKE ONE TABLET BY MOUTH 4 TIMES DAILY (Patient taking differently: TAKE ONE TABLET BY MOUTH 3 TIMES DAILY) 360 tablet

## 2019-09-03 NOTE — SEDATION DOCUMENTATION
Discharge instructions reviewed with patient and spouse, voice understanding. Taken to car driven by wife via wheelchair.

## 2019-09-03 NOTE — SEDATION DOCUMENTATION
2650 ml of cloudy, green/brown fluid drained off abdomen. Catheter pulled. Abdomen cleansed and tip stop applied to site.

## 2019-09-09 NOTE — RESULT ENCOUNTER NOTE
Notify nothing suspicious with the fluid analysis  He should be feeling better  We should see him if he is dizzy or lightheaded  Get repeat cbc and cmp if not ordered

## 2019-10-25 PROBLEM — I48.20 CHRONIC A-FIB (HCC): Status: ACTIVE | Noted: 2017-08-21

## 2019-10-25 PROBLEM — D63.8 ANEMIA DUE TO CHRONIC ILLNESS: Status: ACTIVE | Noted: 2017-08-28

## 2019-10-25 PROBLEM — K76.9 CHRONIC LIVER DISEASE AND CIRRHOSIS (HCC): Status: ACTIVE | Noted: 2019-01-01

## 2019-10-25 PROBLEM — N18.30 STAGE 3 CHRONIC KIDNEY DISEASE (HCC): Status: ACTIVE | Noted: 2019-01-01

## 2019-10-25 PROBLEM — K74.60 CHRONIC LIVER DISEASE AND CIRRHOSIS (HCC): Status: ACTIVE | Noted: 2019-01-01

## 2019-12-30 PROBLEM — S46.211A RUPTURE OF RIGHT DISTAL BICEPS TENDON: Status: ACTIVE | Noted: 2019-01-01

## 2019-12-30 PROBLEM — I48.0 PAROXYSMAL A-FIB (HCC): Status: ACTIVE | Noted: 2019-01-01

## 2019-12-30 PROBLEM — M77.8 DELTOID TENDINITIS OF RIGHT SHOULDER: Status: ACTIVE | Noted: 2019-01-01

## 2019-12-30 PROBLEM — I07.1 TRICUSPID VALVE INSUFFICIENCY: Status: ACTIVE | Noted: 2019-01-01

## 2019-12-30 NOTE — PROGRESS NOTES
I, Zac Lozano CARLOS, am scribing for and in the presence of Dr. Marc Levin. 12/30/19 1:42 pm [facility]  [address]  [place] [address]  Dept: [phone]    Everton Epps is a 81 y.o. male here for Follow-up; Hypertension; and Hyperlipidemia      HPI:  HYPERLIPIDEMIA:     Medication compliance: compliant all of the time. Patient is  following a low fat, low cholesterol diet.    He is not exercising regularly.     HYPERTENSION:  He is not exercising. He is adherent to a low-salt diet.    Blood pressure is being monitored at home, average readings are 1teen-120/80s. Admits hospitalizations x2 in [facility]  In November and December for CHF. He was most recently discharged on 12/13 in which torsemide was increased to 100 mg bid, he was started on amiodarone, potassium was increased to 40 meq daily and aldactone decreased to 12.5 mg daily. Accompanied by: wife. Prior to Admission medications    Medication Sig Start Date End Date Taking? Authorizing Provider   torsemide (DEMADEX) 100 MG tablet Take 100 mg by mouth 2 times daily  12/13/19  Yes Historical Provider, MD   potassium chloride (KLOR-CON M) 20 MEQ extended release tablet Take 40 mEq by mouth daily  12/14/19  Yes Historical Provider, MD   amiodarone (CORDARONE) 200 MG tablet Take 200 mg by mouth daily   Yes Historical Provider, MD   Tafamidis 61 MG CAPS Take 1 tablet by mouth daily   Yes Historical Provider, MD   tamsulosin (FLOMAX) 0.4 MG capsule TAKE 1 CAPSULE BY MOUTH NIGHTLY 12/16/19  Yes Marc Levin MD   OXcarbazepine (TRILEPTAL) 300 MG tablet Take 1 tablet by mouth 2 times daily 12/3/19  Yes Marc Levin MD   melatonin 3 MG TABS tablet Take 3 mg by mouth nightly as needed   Yes Historical Provider, MD   traZODone (DESYREL) 50 MG tablet Take 0.5 tab po qhs x2 weeks, then increase to 1 tab po qhs.   Patient taking differently: Take 0.5 tab po qhs 8/21/19  Yes Marc Levin MD Denies vomiting. Genitourinary: Denies blood in the urine. Denies difficulty urinating. Denies frequent urination. Denies painful urination. Denies urinary incontinence. Admits nocturia 4-5x nightly  Musculoskeletal: Denies muscle aches. Admits painful joints, right shoulder pain felt a snap while doing therapy. Denies swollen joints. Peripheral Vascular: Denies pain/cramping in legs after exertion. Skin: Denies dry skin. Denies itching. Denies rash. Neurologic: Denies falls. Denies dizziness. Denies fainting. Denies tingling/numbness. Psychiatric: Denies sleep disturbance. Denies anxiety. Denies depressed mood. Past Surgical History:   Procedure Laterality Date    CARDIAC CATHETERIZATION Left 05/18/2017    Right Radial/[facility]/    CARDIOVERSION  09/21/2017    [facility] [place]/Dr Her/2 shocks at 200 joules    CORONARY ARTERY BYPASS GRAFT N/A 5/19/2017    CORONARY ARTERY BYPASS GRAFT X 2: LIMA-LAD, SVG-OM, AORTIC VALVE REPLACEMENT WITH 23 MM MEDTRONIC MOSAIC ULTRA, EVACUATION OF 1.5 LITER PLEURAL FLUID; ALLAN PER ANESTHESIA performed by Lupe Oliver MD at [address] Right 7/29/13    [facility] - Dr. Kris Post    tube was blocked    PARACENTESIS  09/03/2019    DR Hicks/[facility]    SHOULDER SURGERY      roto cuff surgery left shoulder       Family History   Problem Relation Age of Onset    Heart Disease Mother     Heart Disease Father     Cancer Father     Heart Disease Sister     High Blood Pressure Sister     Heart Disease Sister     High Blood Pressure Sister     Heart Disease Brother     Heart Disease Brother     Heart Disease Brother        Past Medical History:   Diagnosis Date    Abnormal cardiac cath 10/01/2012    30% LAD, 80% ostial stenosis-CX, RCAOccluded at its ostium. There was minimal left to right collateral flow.       Abnormal echocardiogram 11/12 EF >55%. Mild to moderate aortic stenosis. Mean gradient to 18 mmHg.  Atrial fibrillation ([address] 8/28/2017    CAD (coronary artery disease) 9/12    30% LAD, 80% ostial stenosis-CX, RCAOccluded at its ostium. There was minimal left to right collateral flow.  Chronic diastolic CHF (congestive heart failure), NYHA class 2 ([address] 9/7/2016    Chronic liver disease 8/21/2019    Chronic renal failure, stage 3 (moderate) (HCC) 4/24/2017    Erectile dysfunction     H/O cardiac catheterization 06/02/2017    LMCA: Lesion on LMCA: Distal subsection. 60% stenosis. Comments: Calcified lesion in the ostial Cx extending into the distal left main and proximal LAD. LAD: lesion on Prox LAD: Ostial. 50% stenosis. Lesion on 1st Diag:Proximal subsection. 50% stenosis. LCx: Lesion on Prox CX: Osital. 95% stenosis. Lesion on Dist CX: Ostial 50% stenosis. RCA: Lesion on prox RCA: Proximal subsection. 100% stenosis.  H/O echocardiogram 12/9/15    EF:55%. LV wall thickness is moderately increased. Bi atrial enlargement noted. Mean aortic valve gradient is 33mmhg w/moderate to severe aortic stenosis. Mitral annular calcification. Mild mitral regurgitation. Moderate pulm hypertension w/RV systolic pressure of 43 mmhg. Mild (grade 1) diastolic dysfunction.  H/O echocardiogram 12/05/2016    EF:60%. Mildly increased LV wall thickness. Biatrial enlargement. Moderate to severe aortic stenosis. Mean gradient 36 mmHg, KRISTINA 0.08. Mild mitral regurgitation. Moderate pulmonary hypertension with an estimated RV systolic pressure of 60 mmHg. Mild diastolic dysfunction.  H/O echocardiogram 06/13/2017    EF 55%. Moderately increased LV wall thickness normal LV cavity size. Severe bi-atrial enlargement. Mild mitral and moderate tricuspid regurg. Mild pulmonary hypertension estimated right ventricular systolic pressure of 35 mmHg. Bilateral pleural effusion.     History of cardiovascular stress test 12/21/2017    History of cardioversion 09/21/2017    Successful cardioversion to NSR with 200J biphasic    History of cardioversion 09/13/2018    Dr Carol Moreno History of echocardiogram 11/21/13    EF >55%, mild to mod LVH, LA  is mod dilated, mild to mod AS, mild diastolic dysfunction noted.  History of echocardiogram 06/09/15    EF 55%. LV wall thickness is severely increased. LA is moderately dilated(34-39)with a LA volume index of 36 ml.    History of echocardiogram 06/19/2018    EF 55%. Mildly increased LV wall thickness. Left atrium severely dilated (>40) w/ left atrial volume index of 46 ml/m2. Moderate aortic stenosis w/ mean gradient of 21.3 mmHg consistent w/ moderate aortis stenosis on what appears to be bioprosthetic aortic melanie, Moderate pulmonary HTN w/ RV systolic pressure of 58 mmHg. Moderate/severe tricuspid regurg. Diastoly cannot be assessed due to pt rhythm    History of echocardiogram 10/04/2018    EF 55%. Mildly increased LA wall thickness. LA severely dilatedw/ LA volume index of 46. Bioprosthetic AV seen. Mean gradient is 20, which is unchanged from previous. Mild mitral regurg. Moderate/severe tricuspid regurg, Moderate pulmonary HTN w/ estimated RV systolic pressure fo 47. Mild diastolic dysfunction.  History of Holter monitoring 08/08/2017    Atrial fibrillation throughout with fairly controlled ventricular response (HR less than 100 bpm 93% of the time), no signficant pauses. Rare isolated PVC's    History of Holter monitoring 08/08/2017    Atrial Fibrillation throughout with fairly controlled ventricular response (HR less than 100bpm 93% of the time),no significant pauses. Rare isolated PVC's    History of Holter monitoring 12/06/2017    Rare PAC's with one 4 beat run of wide complex tachycardia at 170 bpm, No symptoms reported.  clinical correlation required    Hyperlipidemia 2/21/2006    Hypertension     Iron deficiency anemia 3/13/2019    Osteoarthritis of hip 6/20/2013    PVD (peripheral vascular disease) [facility]     Reported history of a 60% right carotid stenosis.  Trigeminal neuralgia 7/10/2013      Social History     Tobacco Use    Smoking status: Former Smoker     Years: 15.00     Types: Cigars    Smokeless tobacco: Former User    Tobacco comment: quit 30-40 years ago   Substance Use Topics    Alcohol use: Not Currently     Alcohol/week: 1.0 standard drinks     Types: 1 Cans of beer per week      Current Outpatient Medications   Medication Sig Dispense Refill    torsemide (DEMADEX) 100 MG tablet Take 100 mg by mouth 2 times daily       potassium chloride (KLOR-CON M) 20 MEQ extended release tablet Take 40 mEq by mouth daily       amiodarone (CORDARONE) 200 MG tablet Take 200 mg by mouth daily      Tafamidis 61 MG CAPS Take 1 tablet by mouth daily      tamsulosin (FLOMAX) 0.4 MG capsule TAKE 1 CAPSULE BY MOUTH NIGHTLY 90 capsule 0    OXcarbazepine (TRILEPTAL) 300 MG tablet Take 1 tablet by mouth 2 times daily 180 tablet 0    melatonin 3 MG TABS tablet Take 3 mg by mouth nightly as needed      traZODone (DESYREL) 50 MG tablet Take 0.5 tab po qhs x2 weeks, then increase to 1 tab po qhs.  (Patient taking differently: Take 0.5 tab po qhs) 30 tablet 1    levothyroxine (SYNTHROID) 50 MCG tablet TAKE 1 TABLET BY MOUTH DAILY 90 tablet 0    finasteride (PROSCAR) 5 MG tablet Take 5 mg by mouth daily       metolazone (ZAROXOLYN) 2.5 MG tablet Take 2.5 mg by mouth      Polyvinyl Alcohol-Povidone PF 1.4-0.6 % SOLN 1 drop      spironolactone (ALDACTONE) 25 MG tablet Take 12.5 mg by mouth daily       apixaban (ELIQUIS) 2.5 MG TABS tablet Take 1 tablet by mouth 2 times daily 180 tablet 3    Cholecalciferol (VITAMIN D) 2000 units CAPS capsule Take 2,000 Units by mouth daily      diclofenac sodium 1 % GEL Apply 2 g topically 4 times daily 2 Tube 1    rosuvastatin (CRESTOR) 40 MG tablet Take 1 tablet by mouth every evening 90 tablet 3    docusate sodium (COLACE) 100 MG capsule Take 100 mg by mouth 2 times daily      polyethylene glycol (GLYCOLAX) powder Take 17 g by mouth daily      EPIPEN 2-MABAR 0.3 MG/0.3ML SOAJ injection USE AS DIRECTED 1 each 0    doxycycline hyclate (VIBRAMYCIN) 100 MG capsule Take 100 mg by mouth 2 times daily       No current facility-administered medications for this visit. Allergies   Allergen Reactions    Fish Oil Shortness Of Breath and Swelling     Throat swells shut    Iodine Swelling     Other reaction(s): Urticaria  Patient states is alleric to Iodine and sea food    Norco [Hydrocodone-Acetaminophen]     Other Anaphylaxis    Shellfish-Derived Products Shortness Of Breath and Swelling     Throat swells shut    Shrimp Flavor Swelling     Patient states allergic to shell sea food. shrimp     Oxycodone Other (See Comments)     Lowers BP       PHYSICAL EXAM:    BP (!) 102/52   Ht 5' 7\" (1.702 m)   Wt 172 lb (78 kg)   BMI 26.94 kg/m²   Wt Readings from Last 3 Encounters:   01/03/20 168 lb 8 oz (76.4 kg)   12/30/19 172 lb (78 kg)   10/25/19 173 lb (78.5 kg)     BP Readings from Last 3 Encounters:   01/03/20 112/62   12/30/19 (!) 102/52   10/25/19 116/60       General Appearance: in no acute distress, well developed, well nourished. Eyes: pupils equal, round reactive to light and accommodation. Ears: normal canal and TM's. Nose: nares patent, no lesions. Oral Cavity: mucosa moist.  Throat: clear. Neck/Thyroid: neck supple, full range of motion, no cervical lymphadenopathy, no thyromegaly or carotid bruits. Skin: warm and dry. No suspicious lesions. Large ecchymotic area overlying medial right shoulder. Heart: regular rate and rhythm. S1, S2 normal, no gallops. Rate: 70 low pitched 3/6 systolic murmor left sternal border,   Lungs: dullness, bilateral bases. Abdomen: bowel sounds present, soft, nontender, nondistended,Liver edge 4 cm below costal margin modest fluid wave.     Musculoskeletal: normal, full range of motion in knees and hips, no swelling or tenderness. Extremities: no cyanosis 1+ edema ankles, trace pretibial edema R>L. Orlando Escobar Peripheral Pulses: 2+ throughout, symetric. Neurologic: nonfocal, motor strength normal upper and lower extremities, sensory exam intact. Psych: normal affect, speech fluent. ASSESSMENT:   Diagnosis Orders   1. Chronic liver disease and cirrhosis ([address]     2. Deltoid tendinitis of right shoulder     3. Rupture of right distal biceps tendon, initial encounter     4. Tricuspid valve insufficiency, unspecified etiology  [facility] [place]    severe     5. Paroxysmal A-fib [facility])  [facility] [place]   6. ASHD (arteriosclerotic heart disease)  [facility] [place]   7. Chronic systolic congestive heart failure [facility] [place]   8. Chronic renal failure, stage 3 (moderate) (HCC)     9. Other ascites         PLAN:  We discuss the importance of cardiac rehab to make his heart stronger. We need to try to keep this as functioning as possible. He has torn one of his biceps tendons and/or deltoid insertion. The deltoid will likely heal but the biceps head will not. Since he has two of these he won't notice this much. Orders Placed This Encounter   Procedures   [facility]     Referral Priority:   Routine     Referral Type:   Eval and Treat     Referral Reason:   Specialty Services Required     Requested Specialty:   Cardiac Rehabilitation     Number of Visits Requested:   1     No orders of the defined types were placed in this encounter. I, Dr. Carleen Ramirez, personally performed the services described in this documentation as scribed by MARTÍN Macias in my presence, and is both accurate and complete.

## 2020-01-01 ENCOUNTER — HOSPITAL ENCOUNTER (OUTPATIENT)
Age: 85
Discharge: HOME | End: 2020-02-22
Payer: COMMERCIAL

## 2020-01-01 ENCOUNTER — TELEPHONE (OUTPATIENT)
Dept: PALLATIVE CARE | Age: 85
End: 2020-01-01

## 2020-01-01 ENCOUNTER — HOSPITAL ENCOUNTER (OUTPATIENT)
Dept: VASCULAR LAB | Age: 85
Discharge: HOME | End: 2020-02-26
Payer: MEDICARE

## 2020-01-01 ENCOUNTER — HOSPITAL ENCOUNTER (OUTPATIENT)
Dept: CARDIAC REHAB | Age: 85
Setting detail: THERAPIES SERIES
Discharge: HOME | End: 2020-01-16
Payer: MEDICARE

## 2020-01-01 ENCOUNTER — OFFICE VISIT (OUTPATIENT)
Dept: FAMILY MEDICINE CLINIC | Age: 85
End: 2020-01-01
Payer: MEDICARE

## 2020-01-01 ENCOUNTER — APPOINTMENT (OUTPATIENT)
Dept: CARDIAC REHAB | Age: 85
End: 2020-01-01
Payer: MEDICARE

## 2020-01-01 ENCOUNTER — HOSPITAL ENCOUNTER (OUTPATIENT)
Age: 85
Setting detail: SPECIMEN
Discharge: HOME | End: 2020-03-03
Payer: MEDICARE

## 2020-01-01 ENCOUNTER — HOSPITAL ENCOUNTER (OUTPATIENT)
Age: 85
Setting detail: SPECIMEN
Discharge: HOME | End: 2020-02-20
Payer: COMMERCIAL

## 2020-01-01 ENCOUNTER — HOSPITAL ENCOUNTER (OUTPATIENT)
Age: 85
Setting detail: SPECIMEN
Discharge: HOME | End: 2020-02-27
Payer: COMMERCIAL

## 2020-01-01 ENCOUNTER — HOSPITAL ENCOUNTER (OUTPATIENT)
Dept: GENERAL RADIOLOGY | Age: 85
Discharge: HOME | End: 2020-02-29
Payer: COMMERCIAL

## 2020-01-01 ENCOUNTER — HOSPITAL ENCOUNTER (OUTPATIENT)
Age: 85
Setting detail: SPECIMEN
Discharge: HOME | End: 2020-02-24
Payer: MEDICARE

## 2020-01-01 ENCOUNTER — HOSPITAL ENCOUNTER (INPATIENT)
Age: 85
LOS: 2 days | DRG: 314 | End: 2020-06-21
Attending: INTERNAL MEDICINE | Admitting: INTERNAL MEDICINE
Payer: MEDICARE

## 2020-01-01 ENCOUNTER — HOSPITAL ENCOUNTER (OUTPATIENT)
Dept: CARDIAC REHAB | Age: 85
Setting detail: THERAPIES SERIES
Discharge: HOME | End: 2020-01-09
Payer: MEDICARE

## 2020-01-01 ENCOUNTER — OUTSIDE SERVICES (OUTPATIENT)
Dept: FAMILY MEDICINE CLINIC | Age: 85
End: 2020-01-01
Payer: MEDICARE

## 2020-01-01 ENCOUNTER — HOSPITAL ENCOUNTER (OUTPATIENT)
Age: 85
Discharge: HOME | End: 2020-01-13
Payer: MEDICARE

## 2020-01-01 ENCOUNTER — HOSPITAL ENCOUNTER (OUTPATIENT)
Age: 85
Setting detail: SPECIMEN
Discharge: HOME | End: 2020-05-25
Payer: MEDICARE

## 2020-01-01 ENCOUNTER — APPOINTMENT (OUTPATIENT)
Dept: GENERAL RADIOLOGY | Age: 85
End: 2020-01-01
Payer: MEDICARE

## 2020-01-01 ENCOUNTER — OUTSIDE SERVICES (OUTPATIENT)
Dept: FAMILY MEDICINE CLINIC | Age: 85
End: 2020-01-01

## 2020-01-01 ENCOUNTER — HOSPITAL ENCOUNTER (OUTPATIENT)
Age: 85
Discharge: HOME | End: 2020-01-20
Payer: MEDICARE

## 2020-01-01 ENCOUNTER — VIRTUAL VISIT (OUTPATIENT)
Dept: FAMILY MEDICINE CLINIC | Age: 85
End: 2020-01-01
Payer: MEDICARE

## 2020-01-01 ENCOUNTER — HOSPITAL ENCOUNTER (OUTPATIENT)
Age: 85
Setting detail: SPECIMEN
Discharge: HOME | End: 2020-02-18
Payer: MEDICARE

## 2020-01-01 ENCOUNTER — HOSPITAL ENCOUNTER (EMERGENCY)
Age: 85
Discharge: ANOTHER HOSPITAL | End: 2020-06-19
Attending: EMERGENCY MEDICINE
Payer: MEDICARE

## 2020-01-01 ENCOUNTER — HOSPITAL ENCOUNTER (OUTPATIENT)
Dept: CARDIAC REHAB | Age: 85
Setting detail: THERAPIES SERIES
Discharge: HOME | End: 2020-01-08
Payer: MEDICARE

## 2020-01-01 ENCOUNTER — APPOINTMENT (OUTPATIENT)
Dept: GENERAL RADIOLOGY | Age: 85
DRG: 314 | End: 2020-01-01
Attending: INTERNAL MEDICINE
Payer: MEDICARE

## 2020-01-01 ENCOUNTER — HOSPITAL ENCOUNTER (OUTPATIENT)
Dept: CARDIAC REHAB | Age: 85
Setting detail: THERAPIES SERIES
Discharge: HOME | End: 2020-01-06
Payer: MEDICARE

## 2020-01-01 ENCOUNTER — HOSPITAL ENCOUNTER (OUTPATIENT)
Dept: CARDIAC REHAB | Age: 85
Setting detail: THERAPIES SERIES
Discharge: HOME | End: 2020-01-13
Payer: MEDICARE

## 2020-01-01 ENCOUNTER — HOSPITAL ENCOUNTER (OUTPATIENT)
Age: 85
Setting detail: SPECIMEN
Discharge: HOME | End: 2020-04-06
Payer: MEDICARE

## 2020-01-01 ENCOUNTER — APPOINTMENT (OUTPATIENT)
Dept: CT IMAGING | Age: 85
DRG: 314 | End: 2020-01-01
Attending: INTERNAL MEDICINE
Payer: MEDICARE

## 2020-01-01 ENCOUNTER — HOSPITAL ENCOUNTER (OUTPATIENT)
Age: 85
Discharge: HOME | End: 2020-02-29
Payer: COMMERCIAL

## 2020-01-01 ENCOUNTER — HOSPITAL ENCOUNTER (OUTPATIENT)
Dept: GENERAL RADIOLOGY | Age: 85
Discharge: HOME | End: 2020-02-22
Payer: COMMERCIAL

## 2020-01-01 ENCOUNTER — HOSPITAL ENCOUNTER (OUTPATIENT)
Age: 85
Setting detail: SPECIMEN
Discharge: HOME | End: 2020-02-11
Payer: MEDICARE

## 2020-01-01 ENCOUNTER — HOSPITAL ENCOUNTER (OUTPATIENT)
Dept: CARDIAC REHAB | Age: 85
Setting detail: THERAPIES SERIES
Discharge: HOME | End: 2020-01-03
Payer: MEDICARE

## 2020-01-01 ENCOUNTER — HOSPITAL ENCOUNTER (OUTPATIENT)
Age: 85
Discharge: HOME | End: 2020-01-06
Payer: MEDICARE

## 2020-01-01 ENCOUNTER — HOSPITAL ENCOUNTER (OUTPATIENT)
Dept: CARDIAC REHAB | Age: 85
Setting detail: THERAPIES SERIES
Discharge: HOME | End: 2020-01-20
Payer: MEDICARE

## 2020-01-01 VITALS
DIASTOLIC BLOOD PRESSURE: 50 MMHG | WEIGHT: 156 LBS | SYSTOLIC BLOOD PRESSURE: 96 MMHG | HEIGHT: 67 IN | BODY MASS INDEX: 24.48 KG/M2

## 2020-01-01 VITALS
BODY MASS INDEX: 28.56 KG/M2 | HEIGHT: 67 IN | DIASTOLIC BLOOD PRESSURE: 58 MMHG | WEIGHT: 182 LBS | SYSTOLIC BLOOD PRESSURE: 86 MMHG

## 2020-01-01 VITALS
WEIGHT: 174.82 LBS | OXYGEN SATURATION: 75 % | RESPIRATION RATE: 21 BRPM | DIASTOLIC BLOOD PRESSURE: 48 MMHG | TEMPERATURE: 97.7 F | HEIGHT: 67 IN | HEART RATE: 70 BPM | BODY MASS INDEX: 27.44 KG/M2 | SYSTOLIC BLOOD PRESSURE: 75 MMHG

## 2020-01-01 VITALS
TEMPERATURE: 97.1 F | HEART RATE: 86 BPM | RESPIRATION RATE: 18 BRPM | SYSTOLIC BLOOD PRESSURE: 76 MMHG | DIASTOLIC BLOOD PRESSURE: 45 MMHG | WEIGHT: 156 LBS | BODY MASS INDEX: 24.43 KG/M2 | OXYGEN SATURATION: 87 %

## 2020-01-01 VITALS
WEIGHT: 168.5 LBS | BODY MASS INDEX: 26.45 KG/M2 | HEIGHT: 67 IN | HEART RATE: 67 BPM | OXYGEN SATURATION: 97 % | RESPIRATION RATE: 16 BRPM | SYSTOLIC BLOOD PRESSURE: 112 MMHG | DIASTOLIC BLOOD PRESSURE: 62 MMHG

## 2020-01-01 VITALS — WEIGHT: 157 LBS | BODY MASS INDEX: 24.59 KG/M2

## 2020-01-01 VITALS — WEIGHT: 158 LBS | BODY MASS INDEX: 24.75 KG/M2

## 2020-01-01 LAB
-: NORMAL
ABO/RH: NORMAL
ABSOLUTE BASO #: 0 X10E9/L (ref 0–0.9)
ABSOLUTE EOS #: 0 K/UL (ref 0–0.4)
ABSOLUTE EOS #: 0 K/UL (ref 0–0.4)
ABSOLUTE EOS #: 0 K/UL (ref 0–0.44)
ABSOLUTE EOS #: 0 X10E9/L (ref 0–0.4)
ABSOLUTE EOS #: 0.19 K/UL (ref 0–0.44)
ABSOLUTE EOS #: 0.2 K/UL (ref 0–0.44)
ABSOLUTE IMMATURE GRANULOCYTE: 0 K/UL (ref 0–0.3)
ABSOLUTE LYMPH #: 0.29 K/UL (ref 1.1–3.7)
ABSOLUTE LYMPH #: 0.29 K/UL (ref 1.1–3.7)
ABSOLUTE LYMPH #: 0.35 K/UL (ref 1–4.8)
ABSOLUTE LYMPH #: 0.36 K/UL (ref 1.1–3.7)
ABSOLUTE LYMPH #: 0.37 K/UL (ref 1.1–3.7)
ABSOLUTE LYMPH #: 0.37 K/UL (ref 1.1–3.7)
ABSOLUTE LYMPH #: 0.4 X10E9/L (ref 1–3.5)
ABSOLUTE LYMPH #: 0.42 K/UL (ref 1–4.8)
ABSOLUTE LYMPH #: 0.91 K/UL (ref 1.1–3.7)
ABSOLUTE MONO #: 0.41 K/UL (ref 0.1–1.2)
ABSOLUTE MONO #: 0.42 K/UL (ref 0.1–0.8)
ABSOLUTE MONO #: 0.48 K/UL (ref 0.1–1.2)
ABSOLUTE MONO #: 0.55 K/UL (ref 0.1–1.2)
ABSOLUTE MONO #: 0.6 X10E9/L (ref 0–0.9)
ABSOLUTE MONO #: 0.72 K/UL (ref 0.1–1.2)
ABSOLUTE MONO #: 0.74 K/UL (ref 0.1–1.2)
ABSOLUTE MONO #: 0.89 K/UL (ref 0.1–0.8)
ABSOLUTE MONO #: 0.98 K/UL (ref 0.1–1.2)
ABSOLUTE NEUT #: 3.6 X10E9/L (ref 1.5–6.6)
ALBUMIN SERPL-MCNC: 3.1 G/DL (ref 3.5–5.2)
ALBUMIN/GLOBULIN RATIO: 1 (ref 1–2.5)
ALLEN TEST: ABNORMAL
ALP BLD-CCNC: 201 U/L (ref 40–129)
ALT SERPL-CCNC: 23 U/L
ALT SERPL-CCNC: 23 U/L (ref 0–40)
ALT SERPL-CCNC: 32 U/L (ref 5–41)
ANION GAP SERPL CALCULATED.3IONS-SCNC: 13 MMOL/L (ref 5–15)
ANION GAP SERPL CALCULATED.3IONS-SCNC: 16 MMOL/L (ref 9–17)
ANION GAP SERPL CALCULATED.3IONS-SCNC: 17 MMOL/L (ref 9–17)
ANION GAP SERPL CALCULATED.3IONS-SCNC: 17 MMOL/L (ref 9–17)
ANION GAP SERPL CALCULATED.3IONS-SCNC: 18 MMOL/L (ref 9–17)
ANION GAP SERPL CALCULATED.3IONS-SCNC: 18 MMOL/L (ref 9–17)
ANION GAP SERPL CALCULATED.3IONS-SCNC: 20 MMOL/L (ref 9–17)
ANION GAP SERPL CALCULATED.3IONS-SCNC: 20 MMOL/L (ref 9–17)
ANION GAP SERPL CALCULATED.3IONS-SCNC: 21 MMOL/L (ref 9–17)
ANION GAP SERPL CALCULATED.3IONS-SCNC: 22 MMOL/L (ref 9–17)
ANION GAP SERPL CALCULATED.3IONS-SCNC: 24 MMOL/L (ref 9–17)
ANION GAP SERPL CALCULATED.3IONS-SCNC: 25 MMOL/L (ref 9–17)
ANION GAP: 11 MMOL/L (ref 7–16)
ANTIBODY SCREEN: NEGATIVE
ARM BAND NUMBER: NORMAL
AST SERPL-CCNC: 33 U/L
AST SERPL-CCNC: 33 U/L (ref 0–41)
AST SERPL-CCNC: 53 U/L
B-TYPE NATRIURETIC PEPTIDE: 2252 PG/ML
B-TYPE NATRIURETIC PEPTIDE: 2252 PG/ML
BASOPHILS # BLD: 0 % (ref 0–2)
BASOPHILS # BLD: 1 % (ref 0–2)
BASOPHILS # BLD: 2 % (ref 0–2)
BASOPHILS ABSOLUTE: 0 /ΜL
BASOPHILS ABSOLUTE: 0 K/UL (ref 0–0.2)
BASOPHILS ABSOLUTE: 0.06 K/UL (ref 0–0.2)
BASOPHILS ABSOLUTE: 0.12 K/UL (ref 0–0.2)
BASOPHILS RELATIVE PERCENT: 0.5 %
BASOPHILS RELATIVE PERCENT: 0.5 %
BILIRUB SERPL-MCNC: 0.7 MG/DL (ref 0.3–1.2)
BILIRUBIN DIRECT: 0.51 MG/DL
BILIRUBIN, INDIRECT: 0.19 MG/DL (ref 0–1)
BLOOD BANK SPECIMEN: NORMAL
BNP INTERPRETATION: ABNORMAL
BUN BLDV-MCNC: 114 MG/DL (ref 8–23)
BUN BLDV-MCNC: 42 MG/DL
BUN BLDV-MCNC: 42 MG/DL (ref 5–27)
BUN BLDV-MCNC: 55 MG/DL (ref 8–23)
BUN BLDV-MCNC: 57 MG/DL (ref 8–23)
BUN BLDV-MCNC: 59 MG/DL (ref 8–23)
BUN BLDV-MCNC: 61 MG/DL (ref 8–23)
BUN BLDV-MCNC: 64 MG/DL (ref 8–23)
BUN BLDV-MCNC: 65 MG/DL (ref 8–23)
BUN BLDV-MCNC: 66 MG/DL (ref 8–23)
BUN BLDV-MCNC: 67 MG/DL (ref 8–23)
BUN BLDV-MCNC: 69 MG/DL (ref 8–23)
BUN BLDV-MCNC: 71 MG/DL (ref 8–23)
BUN BLDV-MCNC: 72 MG/DL (ref 8–23)
BUN BLDV-MCNC: 76 MG/DL (ref 8–23)
BUN BLDV-MCNC: 89 MG/DL (ref 8–23)
BUN BLDV-MCNC: 99 MG/DL (ref 8–23)
BUN/CREAT BLD: 13 (ref 9–20)
BUN/CREAT BLD: 13 (ref 9–20)
BUN/CREAT BLD: 14 (ref 9–20)
BUN/CREAT BLD: 15 (ref 9–20)
BUN/CREAT BLD: 15 (ref 9–20)
BUN/CREAT BLD: 20 (ref 9–20)
BUN/CREAT BLD: 34 (ref 9–20)
BUN/CREAT BLD: 34 (ref 9–20)
BUN/CREAT BLD: 38 (ref 9–20)
BUN/CREAT BLD: ABNORMAL (ref 9–20)
CALCIUM IONIZED: 1.12 MMOL/L (ref 1.13–1.33)
CALCIUM IONIZED: 1.14 MMOL/L (ref 1.13–1.33)
CALCIUM IONIZED: 1.15 MMOL/L (ref 1.13–1.33)
CALCIUM IONIZED: 1.2 MMOL/L (ref 1.13–1.33)
CALCIUM SERPL-MCNC: 8.4 MG/DL (ref 8.6–10.4)
CALCIUM SERPL-MCNC: 8.5 MG/DL (ref 8.6–10.4)
CALCIUM SERPL-MCNC: 8.6 MG/DL (ref 8.6–10.4)
CALCIUM SERPL-MCNC: 8.7 MG/DL (ref 8.6–10.4)
CALCIUM SERPL-MCNC: 8.8 MG/DL (ref 8.6–10.4)
CALCIUM SERPL-MCNC: 8.8 MG/DL (ref 8.6–10.4)
CALCIUM SERPL-MCNC: 9.1 MG/DL (ref 8.6–10.4)
CALCIUM SERPL-MCNC: 9.2 MG/DL (ref 8.6–10.4)
CALCIUM SERPL-MCNC: 9.3 MG/DL (ref 8.6–10.4)
CALCIUM SERPL-MCNC: 9.5 MG/DL
CALCIUM SERPL-MCNC: 9.5 MG/DL (ref 8.5–10.5)
CARBOXYHEMOGLOBIN: ABNORMAL % (ref 0–5)
CHLORIDE BLD-SCNC: 84 MMOL/L (ref 98–107)
CHLORIDE BLD-SCNC: 84 MMOL/L (ref 98–107)
CHLORIDE BLD-SCNC: 85 MMOL/L (ref 98–107)
CHLORIDE BLD-SCNC: 86 MMOL/L (ref 98–107)
CHLORIDE BLD-SCNC: 88 MMOL/L (ref 98–107)
CHLORIDE BLD-SCNC: 89 MMOL/L (ref 98–107)
CHLORIDE BLD-SCNC: 91 MMOL/L (ref 98–107)
CHLORIDE BLD-SCNC: 91 MMOL/L (ref 98–107)
CHLORIDE BLD-SCNC: 92 MMOL/L (ref 98–107)
CHLORIDE BLD-SCNC: 92 MMOL/L (ref 98–107)
CHLORIDE BLD-SCNC: 93 MMOL/L (ref 98–107)
CHLORIDE BLD-SCNC: 95 MMOL/L (ref 98–107)
CHLORIDE BLD-SCNC: 97 MMOL/L
CHLORIDE BLD-SCNC: 97 MMOL/L (ref 98–109)
CO2: 11 MMOL/L (ref 20–31)
CO2: 11 MMOL/L (ref 20–31)
CO2: 12 MMOL/L (ref 20–31)
CO2: 13 MMOL/L (ref 20–31)
CO2: 13 MMOL/L (ref 20–31)
CO2: 17 MMOL/L (ref 20–31)
CO2: 18 MMOL/L (ref 20–31)
CO2: 19 MMOL/L (ref 20–31)
CO2: 19 MMOL/L (ref 20–31)
CO2: 20 MMOL/L (ref 20–31)
CO2: 22 MMOL/L (ref 20–31)
CO2: 22 MMOL/L (ref 20–31)
CO2: 24 MMOL/L (ref 20–31)
CO2: 26 MMOL/L
CO2: 26 MMOL/L (ref 22–32)
CORTISOL COLLECTION INFO: ABNORMAL
CORTISOL: 48.8 UG/DL (ref 2.7–18.4)
CREAT SERPL-MCNC: 2.59 MG/DL (ref 0.7–1.2)
CREAT SERPL-MCNC: 2.6 MG/DL (ref 0.7–1.2)
CREAT SERPL-MCNC: 3.16 MG/DL (ref 0.7–1.2)
CREAT SERPL-MCNC: 3.22 MG/DL
CREAT SERPL-MCNC: 3.22 MG/DL (ref 0.6–1.3)
CREAT SERPL-MCNC: 3.36 MG/DL (ref 0.7–1.2)
CREAT SERPL-MCNC: 3.38 MG/DL (ref 0.7–1.2)
CREAT SERPL-MCNC: 3.48 MG/DL (ref 0.7–1.2)
CREAT SERPL-MCNC: 3.84 MG/DL (ref 0.7–1.2)
CREAT SERPL-MCNC: 4.17 MG/DL (ref 0.7–1.2)
CREAT SERPL-MCNC: 4.19 MG/DL (ref 0.7–1.2)
CREAT SERPL-MCNC: 4.35 MG/DL (ref 0.7–1.2)
CREAT SERPL-MCNC: 4.39 MG/DL (ref 0.7–1.2)
CREAT SERPL-MCNC: 4.54 MG/DL (ref 0.7–1.2)
CREAT SERPL-MCNC: 4.67 MG/DL (ref 0.7–1.2)
CREAT SERPL-MCNC: 4.75 MG/DL (ref 0.7–1.2)
CREAT SERPL-MCNC: 4.76 MG/DL (ref 0.7–1.2)
CREAT SERPL-MCNC: 4.92 MG/DL (ref 0.7–1.2)
CREAT SERPL-MCNC: 5.05 MG/DL (ref 0.7–1.2)
CULTURE: ABNORMAL
DIFFERENTIAL TYPE: ABNORMAL
DIRECT EXAM: NORMAL
EGFR AFRICAN AMERICAN: 22 ML/MIN/1.73SQ.M
EGFR IF NONAFRICAN AMERICAN: 19 ML/MIN/1.73SQ.M
EKG ATRIAL RATE: 67 BPM
EKG ATRIAL RATE: 83 BPM
EKG Q-T INTERVAL: 318 MS
EKG Q-T INTERVAL: 392 MS
EKG QRS DURATION: 110 MS
EKG QRS DURATION: 124 MS
EKG QTC CALCULATION (BAZETT): 403 MS
EKG QTC CALCULATION (BAZETT): 429 MS
EKG R AXIS: 134 DEGREES
EKG R AXIS: 82 DEGREES
EKG T AXIS: -30 DEGREES
EKG T AXIS: 137 DEGREES
EKG VENTRICULAR RATE: 72 BPM
EKG VENTRICULAR RATE: 97 BPM
EOSINOPHILS ABSOLUTE: 0 /ΜL
EOSINOPHILS RELATIVE PERCENT: 0 % (ref 1–4)
EOSINOPHILS RELATIVE PERCENT: 0.6 %
EOSINOPHILS RELATIVE PERCENT: 0.9 %
EOSINOPHILS RELATIVE PERCENT: 2 % (ref 1–4)
EOSINOPHILS RELATIVE PERCENT: 3 % (ref 1–4)
EXPIRATION DATE: NORMAL
FIO2: 21
FIO2: 50
FIO2: 60
GFR AFRICAN AMERICAN: 13 ML/MIN
GFR AFRICAN AMERICAN: 14 ML/MIN
GFR AFRICAN AMERICAN: 15 ML/MIN
GFR AFRICAN AMERICAN: 15 ML/MIN
GFR AFRICAN AMERICAN: 16 ML/MIN
GFR AFRICAN AMERICAN: 16 ML/MIN
GFR AFRICAN AMERICAN: 17 ML/MIN
GFR AFRICAN AMERICAN: 17 ML/MIN
GFR AFRICAN AMERICAN: 18 ML/MIN
GFR AFRICAN AMERICAN: 21 ML/MIN
GFR AFRICAN AMERICAN: 23 ML/MIN
GFR AFRICAN AMERICAN: 29 ML/MIN
GFR AFRICAN AMERICAN: 29 ML/MIN
GFR CALCULATED: NORMAL
GFR NON-AFRICAN AMERICAN: 11 ML/MIN
GFR NON-AFRICAN AMERICAN: 11 ML/MIN
GFR NON-AFRICAN AMERICAN: 12 ML/MIN
GFR NON-AFRICAN AMERICAN: 13 ML/MIN
GFR NON-AFRICAN AMERICAN: 13 ML/MIN
GFR NON-AFRICAN AMERICAN: 14 ML/MIN
GFR NON-AFRICAN AMERICAN: 14 ML/MIN
GFR NON-AFRICAN AMERICAN: 15 ML/MIN
GFR NON-AFRICAN AMERICAN: 15 ML/MIN
GFR NON-AFRICAN AMERICAN: 17 ML/MIN
GFR NON-AFRICAN AMERICAN: 18 ML/MIN
GFR NON-AFRICAN AMERICAN: 18 ML/MIN
GFR NON-AFRICAN AMERICAN: 19 ML/MIN
GFR NON-AFRICAN AMERICAN: 24 ML/MIN
GFR NON-AFRICAN AMERICAN: 24 ML/MIN
GFR SERPL CREATININE-BSD FRML MDRD: 18 ML/MIN
GFR SERPL CREATININE-BSD FRML MDRD: ABNORMAL ML/MIN/{1.73_M2}
GLOBULIN: ABNORMAL G/DL (ref 1.5–3.8)
GLUCOSE BLD-MCNC: 103 MG/DL (ref 70–99)
GLUCOSE BLD-MCNC: 106 MG/DL (ref 70–99)
GLUCOSE BLD-MCNC: 114 MG/DL (ref 70–99)
GLUCOSE BLD-MCNC: 119 MG/DL (ref 70–99)
GLUCOSE BLD-MCNC: 124 MG/DL (ref 70–99)
GLUCOSE BLD-MCNC: 125 MG/DL (ref 70–99)
GLUCOSE BLD-MCNC: 128 MG/DL
GLUCOSE BLD-MCNC: 130 MG/DL (ref 70–99)
GLUCOSE BLD-MCNC: 142 MG/DL (ref 70–99)
GLUCOSE BLD-MCNC: 145 MG/DL (ref 70–99)
GLUCOSE BLD-MCNC: 64 MG/DL (ref 70–99)
GLUCOSE BLD-MCNC: 83 MG/DL (ref 70–99)
GLUCOSE BLD-MCNC: 88 MG/DL (ref 70–99)
GLUCOSE BLD-MCNC: 88 MG/DL (ref 70–99)
GLUCOSE BLD-MCNC: 92 MG/DL (ref 70–99)
GLUCOSE BLD-MCNC: 93 MG/DL (ref 70–99)
GLUCOSE BLD-MCNC: 96 MG/DL (ref 70–99)
GLUCOSE BLD-MCNC: 97 MG/DL (ref 70–99)
GLUCOSE BLD-MCNC: 99 MG/DL (ref 74–100)
GLUCOSE: 128 MG/DL (ref 65–99)
HCO3 ARTERIAL: 18.4 MMOL/L (ref 22–26)
HCT VFR BLD CALC: 28.9 % (ref 40.7–50.3)
HCT VFR BLD CALC: 31.9 % (ref 40.7–50.3)
HCT VFR BLD CALC: 32.4 % (ref 40.7–50.3)
HCT VFR BLD CALC: 34.1 % (ref 40.7–50.3)
HCT VFR BLD CALC: 35.2 % (ref 40.7–50.3)
HCT VFR BLD CALC: 35.7 % (ref 40.7–50.3)
HCT VFR BLD CALC: 36.8 % (ref 40.7–50.3)
HCT VFR BLD CALC: 38.4 % (ref 40.7–50.3)
HCT VFR BLD CALC: 39.7 % (ref 36–53)
HCT VFR BLD CALC: 39.7 % (ref 41–53)
HCT VFR BLD CALC: 41.6 % (ref 40.7–50.3)
HEMOGLOBIN: 10.4 G/DL (ref 13–17)
HEMOGLOBIN: 11.1 G/DL (ref 13–17)
HEMOGLOBIN: 11.4 G/DL (ref 13–17)
HEMOGLOBIN: 11.8 G/DL (ref 13–17)
HEMOGLOBIN: 12.5 G/DL (ref 13–17)
HEMOGLOBIN: 13 G/DL (ref 12.6–17.4)
HEMOGLOBIN: 13 G/DL (ref 13.5–17.5)
HEMOGLOBIN: 13.4 G/DL (ref 13–17)
HEMOGLOBIN: 8.8 G/DL (ref 13–17)
HEMOGLOBIN: 9.6 G/DL (ref 13–17)
HEMOGLOBIN: 9.8 G/DL (ref 13–17)
IMMATURE GRANULOCYTES: 0 %
INR BLD: 1.1
LACTIC ACID, SEPSIS WHOLE BLOOD: ABNORMAL MMOL/L (ref 0.5–1.9)
LACTIC ACID, SEPSIS: 3.1 MMOL/L (ref 0.5–1.9)
LACTIC ACID, WHOLE BLOOD: 2.5 MMOL/L (ref 0.7–2.1)
LYMPHOCYTE %: 7.9 %
LYMPHOCYTES # BLD: 14 % (ref 24–43)
LYMPHOCYTES # BLD: 2 % (ref 24–44)
LYMPHOCYTES # BLD: 3 % (ref 24–43)
LYMPHOCYTES # BLD: 3 % (ref 24–44)
LYMPHOCYTES # BLD: 5 % (ref 24–43)
LYMPHOCYTES # BLD: 6 % (ref 24–43)
LYMPHOCYTES ABSOLUTE: 0.4 /ΜL
LYMPHOCYTES RELATIVE PERCENT: 7.9 %
Lab: ABNORMAL
Lab: NORMAL
MAGNESIUM: 2 MG/DL (ref 1.6–2.6)
MAGNESIUM: 2.1 MG/DL (ref 1.6–2.6)
MAGNESIUM: 2.2 MG/DL (ref 1.6–2.6)
MCH RBC QN AUTO: 28.2 PG (ref 25.2–33.5)
MCH RBC QN AUTO: 28.4 PG (ref 25.2–33.5)
MCH RBC QN AUTO: 28.7 PG (ref 25.2–33.5)
MCH RBC QN AUTO: 29.7 PG (ref 25.2–33.5)
MCH RBC QN AUTO: 30.2 PG (ref 25.2–33.5)
MCH RBC QN AUTO: 32.6 PG (ref 25.2–33.5)
MCH RBC QN AUTO: 32.9 PG (ref 25.2–33.5)
MCH RBC QN AUTO: 33.2 PG (ref 25.2–33.5)
MCH RBC QN AUTO: 33.3 PG
MCH RBC QN AUTO: 33.3 PG (ref 25.2–33.5)
MCH RBC QN AUTO: 33.3 PG (ref 27–35)
MCHC RBC AUTO-ENTMCNC: 30.1 G/DL (ref 28.4–34.8)
MCHC RBC AUTO-ENTMCNC: 30.2 G/DL (ref 28.4–34.8)
MCHC RBC AUTO-ENTMCNC: 30.2 G/DL (ref 28.4–34.8)
MCHC RBC AUTO-ENTMCNC: 30.4 G/DL (ref 28.4–34.8)
MCHC RBC AUTO-ENTMCNC: 30.5 G/DL (ref 28.4–34.8)
MCHC RBC AUTO-ENTMCNC: 32.2 G/DL (ref 28.4–34.8)
MCHC RBC AUTO-ENTMCNC: 32.3 G/DL
MCHC RBC AUTO-ENTMCNC: 32.4 G/DL (ref 28.4–34.8)
MCHC RBC AUTO-ENTMCNC: 32.6 G/DL (ref 28.4–34.8)
MCHC RBC AUTO-ENTMCNC: 32.8 G/DL (ref 31–36)
MCHC RBC AUTO-ENTMCNC: 33.1 G/DL (ref 28.4–34.8)
MCV RBC AUTO: 100.3 FL (ref 82.6–102.9)
MCV RBC AUTO: 100.8 FL (ref 82.6–102.9)
MCV RBC AUTO: 101 FL
MCV RBC AUTO: 101 FL (ref 81–101)
MCV RBC AUTO: 101.2 FL (ref 82.6–102.9)
MCV RBC AUTO: 101.7 FL (ref 82.6–102.9)
MCV RBC AUTO: 102.1 FL (ref 82.6–102.9)
MCV RBC AUTO: 92.6 FL (ref 82.6–102.9)
MCV RBC AUTO: 94.4 FL (ref 82.6–102.9)
MCV RBC AUTO: 95 FL (ref 82.6–102.9)
MCV RBC AUTO: 97.4 FL (ref 82.6–102.9)
METHEMOGLOBIN: ABNORMAL % (ref 0–1.9)
MODE: ABNORMAL
MONOCYTES # BLD: 12 % (ref 3–12)
MONOCYTES # BLD: 12 % (ref 3–12)
MONOCYTES # BLD: 12.8 %
MONOCYTES # BLD: 15 % (ref 3–12)
MONOCYTES # BLD: 3 % (ref 1–7)
MONOCYTES # BLD: 5 % (ref 1–7)
MONOCYTES # BLD: 5 % (ref 3–12)
MONOCYTES # BLD: 7 % (ref 3–12)
MONOCYTES # BLD: 9 % (ref 3–12)
MONOCYTES ABSOLUTE: 0.6 /ΜL
MONOCYTES RELATIVE PERCENT: 12.8 %
MORPHOLOGY: ABNORMAL
MORPHOLOGY: NORMAL
NEGATIVE BASE EXCESS, ART: 10 (ref 0–2)
NEGATIVE BASE EXCESS, ART: 11 (ref 0–2)
NEGATIVE BASE EXCESS, ART: 4 MMOL/L (ref 0–2)
NEUTROPHILS ABSOLUTE: 3.6 /ΜL
NEUTROPHILS RELATIVE PERCENT: 77.9 %
NEUTROPHILS RELATIVE PERCENT: 77.9 %
NOTIFICATION TIME: ABNORMAL
NOTIFICATION: ABNORMAL
NRBC AUTOMATED: 0 PER 100 WBC
NRBC AUTOMATED: 0.2 PER 100 WBC
NRBC AUTOMATED: 0.3 PER 100 WBC
NRBC AUTOMATED: 0.5 PER 100 WBC
NUCLEATED RED BLOOD CELLS: 1 PER 100 WBC
NUCLEATED RED BLOOD CELLS: 1 PER 100 WBC (ref 0–5)
O2 DEVICE/FLOW/%: ABNORMAL
O2 SAT, ARTERIAL: 91.6 % (ref 95–98)
OXYHEMOGLOBIN: ABNORMAL % (ref 95–98)
PARTIAL THROMBOPLASTIN TIME: 27.3 SEC (ref 20.5–30.5)
PARTIAL THROMBOPLASTIN TIME: 27.9 SEC (ref 20.5–30.5)
PARTIAL THROMBOPLASTIN TIME: 70.1 SEC (ref 20.5–30.5)
PARTIAL THROMBOPLASTIN TIME: 87.8 SEC (ref 20.5–30.5)
PATIENT TEMP: 37
PATIENT TEMP: ABNORMAL
PATIENT TEMP: ABNORMAL
PCO2 ARTERIAL: 27.4 MMHG (ref 35–45)
PCO2, ART, TEMP ADJ: ABNORMAL (ref 35–45)
PDW BLD-RTO: 17.3 % (ref 11.8–14.4)
PDW BLD-RTO: 17.4 % (ref 11.8–14.4)
PDW BLD-RTO: 17.4 % (ref 11.8–14.4)
PDW BLD-RTO: 17.5 % (ref 11.8–14.4)
PDW BLD-RTO: 18.3 % (ref 11.4–14.3)
PDW BLD-RTO: 19 % (ref 11.8–14.4)
PDW BLD-RTO: 22.5 % (ref 11.8–14.4)
PDW BLD-RTO: 22.7 % (ref 11.8–14.4)
PDW BLD-RTO: 24.4 % (ref 11.8–14.4)
PDW BLD-RTO: 24.4 % (ref 11.8–14.4)
PDW BLD-RTO: ABNORMAL %
PEEP/CPAP: ABNORMAL
PH ARTERIAL: 7.45 (ref 7.35–7.45)
PH, ART, TEMP ADJ: ABNORMAL (ref 7.35–7.45)
PHOSPHORUS: 2.9 MG/DL (ref 2.5–4.5)
PHOSPHORUS: 4.2 MG/DL (ref 2.5–4.5)
PHOSPHORUS: 5.1 MG/DL (ref 2.5–4.5)
PLATELET # BLD: 150 K/UL (ref 138–453)
PLATELET # BLD: 152 K/UL (ref 138–453)
PLATELET # BLD: 166 K/UL (ref 138–453)
PLATELET # BLD: 181 K/ΜL
PLATELET # BLD: 183 K/UL (ref 138–453)
PLATELET # BLD: 187 K/UL (ref 138–453)
PLATELET # BLD: 191 K/UL (ref 138–453)
PLATELET # BLD: 197 K/UL (ref 138–453)
PLATELET # BLD: 231 K/UL (ref 138–453)
PLATELET # BLD: 249 K/UL (ref 138–453)
PLATELET ESTIMATE: ABNORMAL
PLATELETS: 181 X10E9/L (ref 150–450)
PMV BLD AUTO: 10 FL (ref 8.1–13.5)
PMV BLD AUTO: 7.1 FL
PMV BLD AUTO: 7.1 FL (ref 7–12)
PMV BLD AUTO: 8.7 FL (ref 8.1–13.5)
PMV BLD AUTO: 9 FL (ref 8.1–13.5)
PMV BLD AUTO: 9.1 FL (ref 8.1–13.5)
PMV BLD AUTO: 9.2 FL (ref 8.1–13.5)
PMV BLD AUTO: 9.4 FL (ref 8.1–13.5)
PMV BLD AUTO: 9.4 FL (ref 8.1–13.5)
PMV BLD AUTO: 9.5 FL (ref 8.1–13.5)
PMV BLD AUTO: 9.5 FL (ref 8.1–13.5)
PO2 ARTERIAL: 57.6 MMHG (ref 80–100)
PO2, ART, TEMP ADJ: ABNORMAL MMHG (ref 80–100)
POC CHLORIDE: 98 MMOL/L (ref 98–107)
POC CREATININE: 3.93 MG/DL (ref 0.51–1.19)
POC HCO3: 13.2 MMOL/L (ref 21–28)
POC HCO3: 15.3 MMOL/L (ref 21–28)
POC HEMATOCRIT: 38 % (ref 41–53)
POC HEMOGLOBIN: 13.1 G/DL (ref 13.5–17.5)
POC IONIZED CALCIUM: 1.15 MMOL/L (ref 1.15–1.33)
POC LACTIC ACID: 2.01 MMOL/L (ref 0.56–1.39)
POC O2 SATURATION: 98 % (ref 94–98)
POC O2 SATURATION: 99 % (ref 94–98)
POC PCO2 TEMP: ABNORMAL MM HG
POC PCO2 TEMP: ABNORMAL MM HG
POC PCO2: 26 MM HG (ref 35–48)
POC PCO2: 32.5 MM HG (ref 35–48)
POC PH TEMP: ABNORMAL
POC PH TEMP: ABNORMAL
POC PH: 7.28 (ref 7.35–7.45)
POC PH: 7.31 (ref 7.35–7.45)
POC PO2 TEMP: ABNORMAL MM HG
POC PO2 TEMP: ABNORMAL MM HG
POC PO2: 111.1 MM HG (ref 83–108)
POC PO2: 154.7 MM HG (ref 83–108)
POC POTASSIUM: 4.8 MMOL/L (ref 3.5–4.5)
POC SODIUM: 124 MMOL/L (ref 138–146)
POSITIVE BASE EXCESS, ART: ABNORMAL (ref 0–3)
POSITIVE BASE EXCESS, ART: ABNORMAL (ref 0–3)
POSITIVE BASE EXCESS, ART: ABNORMAL MMOL/L (ref 0–2)
POTASSIUM SERPL-SCNC: 4.4 MMOL/L
POTASSIUM SERPL-SCNC: 4.4 MMOL/L (ref 3.5–5)
POTASSIUM SERPL-SCNC: 4.6 MMOL/L (ref 3.7–5.3)
POTASSIUM SERPL-SCNC: 4.7 MMOL/L (ref 3.7–5.3)
POTASSIUM SERPL-SCNC: 4.7 MMOL/L (ref 3.7–5.3)
POTASSIUM SERPL-SCNC: 4.8 MMOL/L (ref 3.7–5.3)
POTASSIUM SERPL-SCNC: 4.9 MMOL/L (ref 3.7–5.3)
POTASSIUM SERPL-SCNC: 5 MMOL/L (ref 3.7–5.3)
POTASSIUM SERPL-SCNC: 5 MMOL/L (ref 3.7–5.3)
POTASSIUM SERPL-SCNC: 5.1 MMOL/L (ref 3.7–5.3)
POTASSIUM SERPL-SCNC: 5.2 MMOL/L (ref 3.7–5.3)
POTASSIUM SERPL-SCNC: 5.3 MMOL/L (ref 3.7–5.3)
POTASSIUM SERPL-SCNC: 5.4 MMOL/L (ref 3.7–5.3)
POTASSIUM SERPL-SCNC: 5.4 MMOL/L (ref 3.7–5.3)
POTASSIUM SERPL-SCNC: 5.5 MMOL/L (ref 3.7–5.3)
POTASSIUM SERPL-SCNC: 5.7 MMOL/L (ref 3.7–5.3)
PRO-BNP: 5629 PG/ML
PRO-BNP: 5985 PG/ML
PRO-BNP: 9727 PG/ML
PRO-BNP: ABNORMAL PG/ML
PROTHROMBIN TIME: 11.1 SEC (ref 9–12)
PSV: ABNORMAL
PT. POSITION: ABNORMAL
RBC # BLD: 3.12 M/UL (ref 4.21–5.77)
RBC # BLD: 3.38 M/UL (ref 4.21–5.77)
RBC # BLD: 3.41 M/UL (ref 4.21–5.77)
RBC # BLD: 3.46 M/UL (ref 4.21–5.77)
RBC # BLD: 3.5 M/UL (ref 4.21–5.77)
RBC # BLD: 3.54 M/UL (ref 4.21–5.77)
RBC # BLD: 3.67 M/UL (ref 4.21–5.77)
RBC # BLD: 3.76 M/UL (ref 4.21–5.77)
RBC # BLD: 3.91 10^6/ΜL
RBC # BLD: 4.11 M/UL (ref 4.21–5.77)
RBC # BLD: ABNORMAL 10*6/UL
RBC: 3.91 X10E12/L (ref 3.3–5.5)
REASON FOR REJECTION: NORMAL
RESPIRATORY RATE: ABNORMAL
SAMPLE SITE: ABNORMAL
SEG NEUTROPHILS: 68 % (ref 36–65)
SEG NEUTROPHILS: 81 % (ref 36–65)
SEG NEUTROPHILS: 82 % (ref 36–65)
SEG NEUTROPHILS: 83 % (ref 36–65)
SEG NEUTROPHILS: 88 % (ref 36–65)
SEG NEUTROPHILS: 90 % (ref 36–65)
SEG NEUTROPHILS: 93 % (ref 36–66)
SEG NEUTROPHILS: 94 % (ref 36–66)
SEGMENTED NEUTROPHILS ABSOLUTE COUNT: 13.16 K/UL (ref 1.8–7.7)
SEGMENTED NEUTROPHILS ABSOLUTE COUNT: 16.46 K/UL (ref 1.8–7.7)
SEGMENTED NEUTROPHILS ABSOLUTE COUNT: 4.44 K/UL (ref 1.5–8.1)
SEGMENTED NEUTROPHILS ABSOLUTE COUNT: 4.86 K/UL (ref 1.5–8.1)
SEGMENTED NEUTROPHILS ABSOLUTE COUNT: 5.06 K/UL (ref 1.5–8.1)
SEGMENTED NEUTROPHILS ABSOLUTE COUNT: 5.09 K/UL (ref 1.5–8.1)
SEGMENTED NEUTROPHILS ABSOLUTE COUNT: 5.1 K/UL (ref 1.5–8.1)
SEGMENTED NEUTROPHILS ABSOLUTE COUNT: 8.64 K/UL (ref 1.5–8.1)
SET RATE: ABNORMAL
SODIUM BLD-SCNC: 120 MMOL/L (ref 135–144)
SODIUM BLD-SCNC: 122 MMOL/L (ref 135–144)
SODIUM BLD-SCNC: 122 MMOL/L (ref 135–144)
SODIUM BLD-SCNC: 123 MMOL/L (ref 135–144)
SODIUM BLD-SCNC: 124 MMOL/L (ref 135–144)
SODIUM BLD-SCNC: 124 MMOL/L (ref 135–144)
SODIUM BLD-SCNC: 125 MMOL/L (ref 135–144)
SODIUM BLD-SCNC: 125 MMOL/L (ref 135–144)
SODIUM BLD-SCNC: 126 MMOL/L (ref 135–144)
SODIUM BLD-SCNC: 128 MMOL/L (ref 135–144)
SODIUM BLD-SCNC: 128 MMOL/L (ref 135–144)
SODIUM BLD-SCNC: 129 MMOL/L (ref 135–144)
SODIUM BLD-SCNC: 130 MMOL/L (ref 135–144)
SODIUM BLD-SCNC: 131 MMOL/L (ref 135–144)
SODIUM BLD-SCNC: 131 MMOL/L (ref 135–144)
SODIUM BLD-SCNC: 132 MMOL/L (ref 135–144)
SODIUM BLD-SCNC: 132 MMOL/L (ref 135–144)
SODIUM BLD-SCNC: 136 MMOL/L
SODIUM BLD-SCNC: 136 MMOL/L (ref 134–146)
SPECIMEN DESCRIPTION: ABNORMAL
SPECIMEN DESCRIPTION: NORMAL
T4 TOTAL: 6.5
T4 TOTAL: 6.5 UG/DL (ref 6.1–12.2)
TCO2 (CALC), ART: 14 MMOL/L (ref 22–29)
TCO2 (CALC), ART: 16 MMOL/L (ref 22–29)
TEXT FOR RESPIRATORY: ABNORMAL
THYROXINE, FREE: 1.05 NG/DL (ref 0.93–1.7)
TOTAL HB: ABNORMAL G/DL (ref 12–16)
TOTAL PROTEIN: 6.2 G/DL (ref 6.4–8.3)
TOTAL RATE: ABNORMAL
TROPONIN INTERP: ABNORMAL
TROPONIN T: ABNORMAL NG/ML
TROPONIN, HIGH SENSITIVITY: 261 NG/L (ref 0–22)
TROPONIN, HIGH SENSITIVITY: 276 NG/L (ref 0–22)
TROPONIN, HIGH SENSITIVITY: 277 NG/L (ref 0–22)
TROPONIN, HIGH SENSITIVITY: 286 NG/L (ref 0–22)
TROPONIN, HIGH SENSITIVITY: 303 NG/L (ref 0–22)
TROPONIN, HIGH SENSITIVITY: 330 NG/L (ref 0–22)
TSH SERPL DL<=0.05 MIU/L-ACNC: 7.4 MIU/L (ref 0.3–5)
TSH SERPL DL<=0.05 MIU/L-ACNC: 9.65 UIU/ML
TSH SERPL DL<=0.05 MIU/L-ACNC: 9.65 UIU/ML (ref 0.49–4.67)
VT: ABNORMAL
WBC # BLD: 14 K/UL (ref 3.5–11.3)
WBC # BLD: 17.7 K/UL (ref 3.5–11.3)
WBC # BLD: 21.8 K/UL (ref 3.5–11.3)
WBC # BLD: 4.6 10^3/ML
WBC # BLD: 5.8 K/UL (ref 3.5–11.3)
WBC # BLD: 6 K/UL (ref 3.5–11.3)
WBC # BLD: 6.1 K/UL (ref 3.5–11.3)
WBC # BLD: 6.2 K/UL (ref 3.5–11.3)
WBC # BLD: 6.5 K/UL (ref 3.5–11.3)
WBC # BLD: 9.6 K/UL (ref 3.5–11.3)
WBC # BLD: ABNORMAL 10*3/UL
WBC: 4.6 X10E9/L (ref 4.4–12)
ZZ NTE CLEAN UP: ORDERED TEST: NORMAL
ZZ NTE WITH NAME CLEAN UP: SPECIMEN SOURCE: NORMAL

## 2020-01-01 PROCEDURE — 93005 ELECTROCARDIOGRAM TRACING: CPT | Performed by: EMERGENCY MEDICINE

## 2020-01-01 PROCEDURE — 83735 ASSAY OF MAGNESIUM: CPT

## 2020-01-01 PROCEDURE — 2580000003 HC RX 258: Performed by: STUDENT IN AN ORGANIZED HEALTH CARE EDUCATION/TRAINING PROGRAM

## 2020-01-01 PROCEDURE — 2580000003 HC RX 258: Performed by: EMERGENCY MEDICINE

## 2020-01-01 PROCEDURE — 85025 COMPLETE CBC W/AUTO DIFF WBC: CPT

## 2020-01-01 PROCEDURE — 84484 ASSAY OF TROPONIN QUANT: CPT

## 2020-01-01 PROCEDURE — 84132 ASSAY OF SERUM POTASSIUM: CPT

## 2020-01-01 PROCEDURE — 72125 CT NECK SPINE W/O DYE: CPT

## 2020-01-01 PROCEDURE — 87186 SC STD MICRODIL/AGAR DIL: CPT

## 2020-01-01 PROCEDURE — 6360000002 HC RX W HCPCS: Performed by: STUDENT IN AN ORGANIZED HEALTH CARE EDUCATION/TRAINING PROGRAM

## 2020-01-01 PROCEDURE — 70450 CT HEAD/BRAIN W/O DYE: CPT

## 2020-01-01 PROCEDURE — 93798 PHYS/QHP OP CAR RHAB W/ECG: CPT

## 2020-01-01 PROCEDURE — 85730 THROMBOPLASTIN TIME PARTIAL: CPT

## 2020-01-01 PROCEDURE — 87641 MR-STAPH DNA AMP PROBE: CPT

## 2020-01-01 PROCEDURE — 71045 X-RAY EXAM CHEST 1 VIEW: CPT

## 2020-01-01 PROCEDURE — 83605 ASSAY OF LACTIC ACID: CPT

## 2020-01-01 PROCEDURE — 2580000003 HC RX 258: Performed by: INTERNAL MEDICINE

## 2020-01-01 PROCEDURE — 80048 BASIC METABOLIC PNL TOTAL CA: CPT

## 2020-01-01 PROCEDURE — 87150 DNA/RNA AMPLIFIED PROBE: CPT

## 2020-01-01 PROCEDURE — 36415 COLL VENOUS BLD VENIPUNCTURE: CPT

## 2020-01-01 PROCEDURE — 83880 ASSAY OF NATRIURETIC PEPTIDE: CPT

## 2020-01-01 PROCEDURE — 84100 ASSAY OF PHOSPHORUS: CPT

## 2020-01-01 PROCEDURE — 99291 CRITICAL CARE FIRST HOUR: CPT | Performed by: INTERNAL MEDICINE

## 2020-01-01 PROCEDURE — 99211 OFF/OP EST MAY X REQ PHY/QHP: CPT | Performed by: FAMILY MEDICINE

## 2020-01-01 PROCEDURE — 86403 PARTICLE AGGLUT ANTBDY SCRN: CPT

## 2020-01-01 PROCEDURE — 82565 ASSAY OF CREATININE: CPT

## 2020-01-01 PROCEDURE — 6370000000 HC RX 637 (ALT 250 FOR IP): Performed by: STUDENT IN AN ORGANIZED HEALTH CARE EDUCATION/TRAINING PROGRAM

## 2020-01-01 PROCEDURE — 93010 ELECTROCARDIOGRAM REPORT: CPT | Performed by: INTERNAL MEDICINE

## 2020-01-01 PROCEDURE — 2500000003 HC RX 250 WO HCPCS

## 2020-01-01 PROCEDURE — 85610 PROTHROMBIN TIME: CPT

## 2020-01-01 PROCEDURE — 2500000003 HC RX 250 WO HCPCS: Performed by: STUDENT IN AN ORGANIZED HEALTH CARE EDUCATION/TRAINING PROGRAM

## 2020-01-01 PROCEDURE — 6360000002 HC RX W HCPCS: Performed by: INTERNAL MEDICINE

## 2020-01-01 PROCEDURE — 6360000002 HC RX W HCPCS

## 2020-01-01 PROCEDURE — 84295 ASSAY OF SERUM SODIUM: CPT

## 2020-01-01 PROCEDURE — 2700000000 HC OXYGEN THERAPY PER DAY

## 2020-01-01 PROCEDURE — 86850 RBC ANTIBODY SCREEN: CPT

## 2020-01-01 PROCEDURE — 99443 PR PHYS/QHP TELEPHONE EVALUATION 21-30 MIN: CPT | Performed by: FAMILY MEDICINE

## 2020-01-01 PROCEDURE — 82805 BLOOD GASES W/O2 SATURATION: CPT

## 2020-01-01 PROCEDURE — 85014 HEMATOCRIT: CPT

## 2020-01-01 PROCEDURE — 96374 THER/PROPH/DIAG INJ IV PUSH: CPT

## 2020-01-01 PROCEDURE — 99309 SBSQ NF CARE MODERATE MDM 30: CPT | Performed by: FAMILY MEDICINE

## 2020-01-01 PROCEDURE — 06HY33Z INSERTION OF INFUSION DEVICE INTO LOWER VEIN, PERCUTANEOUS APPROACH: ICD-10-PCS | Performed by: INTERNAL MEDICINE

## 2020-01-01 PROCEDURE — 82803 BLOOD GASES ANY COMBINATION: CPT

## 2020-01-01 PROCEDURE — 2000000000 HC ICU R&B

## 2020-01-01 PROCEDURE — 94761 N-INVAS EAR/PLS OXIMETRY MLT: CPT

## 2020-01-01 PROCEDURE — 37799 UNLISTED PX VASCULAR SURGERY: CPT

## 2020-01-01 PROCEDURE — 85027 COMPLETE CBC AUTOMATED: CPT

## 2020-01-01 PROCEDURE — 71046 X-RAY EXAM CHEST 2 VIEWS: CPT

## 2020-01-01 PROCEDURE — 87205 SMEAR GRAM STAIN: CPT

## 2020-01-01 PROCEDURE — 71250 CT THORAX DX C-: CPT

## 2020-01-01 PROCEDURE — 86900 BLOOD TYPING SEROLOGIC ABO: CPT

## 2020-01-01 PROCEDURE — 2500000003 HC RX 250 WO HCPCS: Performed by: EMERGENCY MEDICINE

## 2020-01-01 PROCEDURE — 20610 DRAIN/INJ JOINT/BURSA W/O US: CPT | Performed by: FAMILY MEDICINE

## 2020-01-01 PROCEDURE — 6360000002 HC RX W HCPCS: Performed by: EMERGENCY MEDICINE

## 2020-01-01 PROCEDURE — 93970 EXTREMITY STUDY: CPT

## 2020-01-01 PROCEDURE — 80076 HEPATIC FUNCTION PANEL: CPT

## 2020-01-01 PROCEDURE — 6370000000 HC RX 637 (ALT 250 FOR IP): Performed by: EMERGENCY MEDICINE

## 2020-01-01 PROCEDURE — G8427 DOCREV CUR MEDS BY ELIG CLIN: HCPCS | Performed by: FAMILY MEDICINE

## 2020-01-01 PROCEDURE — 99495 TRANSJ CARE MGMT MOD F2F 14D: CPT | Performed by: FAMILY MEDICINE

## 2020-01-01 PROCEDURE — 99223 1ST HOSP IP/OBS HIGH 75: CPT | Performed by: INTERNAL MEDICINE

## 2020-01-01 PROCEDURE — 82330 ASSAY OF CALCIUM: CPT

## 2020-01-01 PROCEDURE — 99233 SBSQ HOSP IP/OBS HIGH 50: CPT | Performed by: INTERNAL MEDICINE

## 2020-01-01 PROCEDURE — 87040 BLOOD CULTURE FOR BACTERIA: CPT

## 2020-01-01 PROCEDURE — 82435 ASSAY OF BLOOD CHLORIDE: CPT

## 2020-01-01 PROCEDURE — 94660 CPAP INITIATION&MGMT: CPT

## 2020-01-01 PROCEDURE — 5A1D90Z PERFORMANCE OF URINARY FILTRATION, CONTINUOUS, GREATER THAN 18 HOURS PER DAY: ICD-10-PCS | Performed by: INTERNAL MEDICINE

## 2020-01-01 PROCEDURE — 93005 ELECTROCARDIOGRAM TRACING: CPT | Performed by: STUDENT IN AN ORGANIZED HEALTH CARE EDUCATION/TRAINING PROGRAM

## 2020-01-01 PROCEDURE — 99285 EMERGENCY DEPT VISIT HI MDM: CPT

## 2020-01-01 PROCEDURE — 1036F TOBACCO NON-USER: CPT | Performed by: FAMILY MEDICINE

## 2020-01-01 PROCEDURE — 99214 OFFICE O/P EST MOD 30 MIN: CPT | Performed by: FAMILY MEDICINE

## 2020-01-01 PROCEDURE — 84443 ASSAY THYROID STIM HORMONE: CPT

## 2020-01-01 PROCEDURE — 99222 1ST HOSP IP/OBS MODERATE 55: CPT | Performed by: INTERNAL MEDICINE

## 2020-01-01 PROCEDURE — G8420 CALC BMI NORM PARAMETERS: HCPCS | Performed by: FAMILY MEDICINE

## 2020-01-01 PROCEDURE — 86901 BLOOD TYPING SEROLOGIC RH(D): CPT

## 2020-01-01 PROCEDURE — 73030 X-RAY EXAM OF SHOULDER: CPT

## 2020-01-01 PROCEDURE — 82947 ASSAY GLUCOSE BLOOD QUANT: CPT

## 2020-01-01 PROCEDURE — 82533 TOTAL CORTISOL: CPT

## 2020-01-01 PROCEDURE — P9604 ONE-WAY ALLOW PRORATED TRIP: HCPCS

## 2020-01-01 PROCEDURE — 1123F ACP DISCUSS/DSCN MKR DOCD: CPT | Performed by: FAMILY MEDICINE

## 2020-01-01 PROCEDURE — 84439 ASSAY OF FREE THYROXINE: CPT

## 2020-01-01 PROCEDURE — 5A2204Z RESTORATION OF CARDIAC RHYTHM, SINGLE: ICD-10-PCS | Performed by: INTERNAL MEDICINE

## 2020-01-01 PROCEDURE — 4040F PNEUMOC VAC/ADMIN/RCVD: CPT | Performed by: FAMILY MEDICINE

## 2020-01-01 RX ORDER — FENTANYL CITRATE 50 UG/ML
25 INJECTION, SOLUTION INTRAMUSCULAR; INTRAVENOUS
Status: DISCONTINUED | OUTPATIENT
Start: 2020-01-01 | End: 2020-06-22 | Stop reason: HOSPADM

## 2020-01-01 RX ORDER — SODIUM CHLORIDE 0.9 % (FLUSH) 0.9 %
10 SYRINGE (ML) INJECTION PRN
Status: DISCONTINUED | OUTPATIENT
Start: 2020-01-01 | End: 2020-06-22 | Stop reason: HOSPADM

## 2020-01-01 RX ORDER — 0.9 % SODIUM CHLORIDE 0.9 %
500 INTRAVENOUS SOLUTION INTRAVENOUS ONCE
Status: COMPLETED | OUTPATIENT
Start: 2020-01-01 | End: 2020-01-01

## 2020-01-01 RX ORDER — SEVELAMER CARBONATE 800 MG/1
2 TABLET, FILM COATED ORAL
COMMUNITY
End: 2020-01-01

## 2020-01-01 RX ORDER — HEPARIN SODIUM 1000 [USP'U]/ML
30 INJECTION, SOLUTION INTRAVENOUS; SUBCUTANEOUS PRN
Status: DISCONTINUED | OUTPATIENT
Start: 2020-01-01 | End: 2020-01-01

## 2020-01-01 RX ORDER — MIDODRINE HYDROCHLORIDE 10 MG/1
TABLET ORAL
Qty: 120 TABLET | Refills: 0 | Status: SHIPPED | OUTPATIENT
Start: 2020-01-01

## 2020-01-01 RX ORDER — AMIODARONE HYDROCHLORIDE 200 MG/1
200 TABLET ORAL DAILY
Qty: 90 TABLET | Refills: 3 | Status: SHIPPED | OUTPATIENT
Start: 2020-01-01

## 2020-01-01 RX ORDER — HEPARIN SODIUM 1000 [USP'U]/ML
1700 INJECTION, SOLUTION INTRAVENOUS; SUBCUTANEOUS ONCE
Status: DISCONTINUED | OUTPATIENT
Start: 2020-01-01 | End: 2020-06-22 | Stop reason: HOSPADM

## 2020-01-01 RX ORDER — HEPARIN SODIUM 1000 [USP'U]/ML
60 INJECTION, SOLUTION INTRAVENOUS; SUBCUTANEOUS PRN
Status: DISCONTINUED | OUTPATIENT
Start: 2020-01-01 | End: 2020-06-22 | Stop reason: HOSPADM

## 2020-01-01 RX ORDER — SODIUM CHLORIDE 0.9 % (FLUSH) 0.9 %
10 SYRINGE (ML) INJECTION EVERY 12 HOURS SCHEDULED
Status: DISCONTINUED | OUTPATIENT
Start: 2020-01-01 | End: 2020-06-22 | Stop reason: HOSPADM

## 2020-01-01 RX ORDER — MIDODRINE HYDROCHLORIDE 5 MG/1
10 TABLET ORAL 3 TIMES DAILY
Status: DISCONTINUED | OUTPATIENT
Start: 2020-01-01 | End: 2020-06-22 | Stop reason: HOSPADM

## 2020-01-01 RX ORDER — HEPARIN SODIUM 10000 [USP'U]/100ML
12 INJECTION, SOLUTION INTRAVENOUS CONTINUOUS
Status: DISCONTINUED | OUTPATIENT
Start: 2020-01-01 | End: 2020-06-22 | Stop reason: HOSPADM

## 2020-01-01 RX ORDER — ROSUVASTATIN CALCIUM 40 MG/1
40 TABLET, COATED ORAL EVERY EVENING
COMMUNITY
End: 2020-01-01

## 2020-01-01 RX ORDER — TRAZODONE HYDROCHLORIDE 100 MG/1
150 TABLET ORAL NIGHTLY
Qty: 30 TABLET | Refills: 0 | Status: SHIPPED | OUTPATIENT
Start: 2020-01-01

## 2020-01-01 RX ORDER — MIDODRINE HYDROCHLORIDE 5 MG/1
10 TABLET ORAL ONCE
Status: COMPLETED | OUTPATIENT
Start: 2020-01-01 | End: 2020-01-01

## 2020-01-01 RX ORDER — SEVELAMER CARBONATE 800 MG/1
800 TABLET, FILM COATED ORAL
Status: DISCONTINUED | OUTPATIENT
Start: 2020-01-01 | End: 2020-06-22 | Stop reason: HOSPADM

## 2020-01-01 RX ORDER — LEVOTHYROXINE SODIUM 0.07 MG/1
TABLET ORAL
Qty: 30 TABLET | Refills: 0 | Status: SHIPPED | OUTPATIENT
Start: 2020-01-01 | End: 2020-01-01

## 2020-01-01 RX ORDER — MIRTAZAPINE 15 MG/1
TABLET, FILM COATED ORAL
Qty: 30 TABLET | Refills: 0 | Status: SHIPPED
Start: 2020-01-01 | End: 2020-01-01 | Stop reason: DRUGHIGH

## 2020-01-01 RX ORDER — OXCARBAZEPINE 300 MG/1
300 TABLET, FILM COATED ORAL 2 TIMES DAILY
Qty: 180 TABLET | Refills: 0 | Status: SHIPPED | OUTPATIENT
Start: 2020-01-01

## 2020-01-01 RX ORDER — HEPARIN SODIUM 1000 [USP'U]/ML
60 INJECTION, SOLUTION INTRAVENOUS; SUBCUTANEOUS ONCE
Status: COMPLETED | OUTPATIENT
Start: 2020-01-01 | End: 2020-01-01

## 2020-01-01 RX ORDER — HEPARIN SODIUM 1000 [USP'U]/ML
1900 INJECTION, SOLUTION INTRAVENOUS; SUBCUTANEOUS PRN
Status: DISCONTINUED | OUTPATIENT
Start: 2020-01-01 | End: 2020-06-22 | Stop reason: HOSPADM

## 2020-01-01 RX ORDER — MIDODRINE HYDROCHLORIDE 10 MG/1
10 TABLET ORAL 3 TIMES DAILY
COMMUNITY
End: 2020-01-01

## 2020-01-01 RX ORDER — LEVOTHYROXINE SODIUM 0.1 MG/1
100 TABLET ORAL DAILY
Qty: 90 TABLET | Refills: 1 | Status: SHIPPED | OUTPATIENT
Start: 2020-01-01

## 2020-01-01 RX ORDER — ONDANSETRON 2 MG/ML
4 INJECTION INTRAMUSCULAR; INTRAVENOUS EVERY 6 HOURS PRN
Status: DISCONTINUED | OUTPATIENT
Start: 2020-01-01 | End: 2020-06-22 | Stop reason: HOSPADM

## 2020-01-01 RX ORDER — AMIODARONE HYDROCHLORIDE 200 MG/1
200 TABLET ORAL DAILY
Status: DISCONTINUED | OUTPATIENT
Start: 2020-01-01 | End: 2020-06-22 | Stop reason: HOSPADM

## 2020-01-01 RX ORDER — FINASTERIDE 5 MG/1
5 TABLET, FILM COATED ORAL DAILY
Status: DISCONTINUED | OUTPATIENT
Start: 2020-01-01 | End: 2020-06-22 | Stop reason: HOSPADM

## 2020-01-01 RX ORDER — ROSUVASTATIN CALCIUM 40 MG/1
TABLET, COATED ORAL
Qty: 90 TABLET | Refills: 0 | Status: SHIPPED | OUTPATIENT
Start: 2020-01-01

## 2020-01-01 RX ORDER — SODIUM CHLORIDE 9 MG/ML
INJECTION, SOLUTION INTRAVENOUS CONTINUOUS
Status: DISCONTINUED | OUTPATIENT
Start: 2020-01-01 | End: 2020-06-22 | Stop reason: HOSPADM

## 2020-01-01 RX ORDER — ROSUVASTATIN CALCIUM 20 MG/1
40 TABLET, COATED ORAL NIGHTLY
Status: DISCONTINUED | OUTPATIENT
Start: 2020-01-01 | End: 2020-06-22 | Stop reason: HOSPADM

## 2020-01-01 RX ORDER — VANCOMYCIN HYDROCHLORIDE 1 G/200ML
1000 INJECTION, SOLUTION INTRAVENOUS EVERY 24 HOURS
Status: DISCONTINUED | OUTPATIENT
Start: 2020-01-01 | End: 2020-06-22 | Stop reason: HOSPADM

## 2020-01-01 RX ORDER — MIRTAZAPINE 30 MG/1
30 TABLET, FILM COATED ORAL NIGHTLY
Qty: 30 TABLET | Refills: 3 | Status: SHIPPED | OUTPATIENT
Start: 2020-01-01

## 2020-01-01 RX ORDER — MIDODRINE HYDROCHLORIDE 10 MG/1
TABLET ORAL
Qty: 120 TABLET | Refills: 0 | OUTPATIENT
Start: 2020-01-01

## 2020-01-01 RX ORDER — OXCARBAZEPINE 300 MG/1
300 TABLET, FILM COATED ORAL 2 TIMES DAILY
Status: DISCONTINUED | OUTPATIENT
Start: 2020-01-01 | End: 2020-06-22 | Stop reason: HOSPADM

## 2020-01-01 RX ORDER — ACETAMINOPHEN 325 MG/1
650 TABLET ORAL EVERY 6 HOURS PRN
Status: DISCONTINUED | OUTPATIENT
Start: 2020-01-01 | End: 2020-06-22 | Stop reason: HOSPADM

## 2020-01-01 RX ORDER — PROMETHAZINE HYDROCHLORIDE 25 MG/1
12.5 TABLET ORAL EVERY 6 HOURS PRN
Status: DISCONTINUED | OUTPATIENT
Start: 2020-01-01 | End: 2020-06-22 | Stop reason: HOSPADM

## 2020-01-01 RX ORDER — NOREPINEPHRINE BIT/0.9 % NACL 16MG/250ML
2 INFUSION BOTTLE (ML) INTRAVENOUS CONTINUOUS
Status: DISCONTINUED | OUTPATIENT
Start: 2020-01-01 | End: 2020-06-22 | Stop reason: HOSPADM

## 2020-01-01 RX ORDER — HEPARIN SODIUM 1000 [USP'U]/ML
30 INJECTION, SOLUTION INTRAVENOUS; SUBCUTANEOUS PRN
Status: DISCONTINUED | OUTPATIENT
Start: 2020-01-01 | End: 2020-06-22 | Stop reason: HOSPADM

## 2020-01-01 RX ORDER — POLYETHYLENE GLYCOL 3350 17 G/17G
17 POWDER, FOR SOLUTION ORAL DAILY PRN
Status: DISCONTINUED | OUTPATIENT
Start: 2020-01-01 | End: 2020-06-22 | Stop reason: HOSPADM

## 2020-01-01 RX ORDER — TRAZODONE HYDROCHLORIDE 100 MG/1
100 TABLET ORAL NIGHTLY
Qty: 90 TABLET | Refills: 1 | Status: SHIPPED | OUTPATIENT
Start: 2020-01-01 | End: 2020-01-01 | Stop reason: SDUPTHER

## 2020-01-01 RX ORDER — TRAZODONE HYDROCHLORIDE 100 MG/1
100 TABLET ORAL NIGHTLY
Qty: 90 TABLET | Refills: 3 | Status: SHIPPED | OUTPATIENT
Start: 2020-01-01 | End: 2020-01-01

## 2020-01-01 RX ORDER — TORSEMIDE 100 MG/1
100 TABLET ORAL 2 TIMES DAILY
Qty: 180 TABLET | Refills: 3 | Status: SHIPPED | OUTPATIENT
Start: 2020-01-01

## 2020-01-01 RX ORDER — MIRTAZAPINE 15 MG/1
15 TABLET, FILM COATED ORAL NIGHTLY
COMMUNITY
End: 2020-01-01

## 2020-01-01 RX ORDER — MIDODRINE HYDROCHLORIDE 10 MG/1
TABLET ORAL
Qty: 120 TABLET | Refills: 0 | Status: SHIPPED | OUTPATIENT
Start: 2020-01-01 | End: 2020-01-01

## 2020-01-01 RX ORDER — HEPARIN SODIUM 1000 [USP'U]/ML
1600 INJECTION, SOLUTION INTRAVENOUS; SUBCUTANEOUS PRN
Status: DISCONTINUED | OUTPATIENT
Start: 2020-01-01 | End: 2020-06-22 | Stop reason: HOSPADM

## 2020-01-01 RX ORDER — ACETAMINOPHEN 650 MG/1
650 SUPPOSITORY RECTAL EVERY 6 HOURS PRN
Status: DISCONTINUED | OUTPATIENT
Start: 2020-01-01 | End: 2020-06-22 | Stop reason: HOSPADM

## 2020-01-01 RX ORDER — LEVOTHYROXINE SODIUM 0.1 MG/1
100 TABLET ORAL DAILY
Status: DISCONTINUED | OUTPATIENT
Start: 2020-01-01 | End: 2020-06-22 | Stop reason: HOSPADM

## 2020-01-01 RX ADMIN — Medication 1500 ML/HR: at 03:01

## 2020-01-01 RX ADMIN — HYDROCORTISONE SODIUM SUCCINATE 100 MG: 100 INJECTION, POWDER, FOR SOLUTION INTRAMUSCULAR; INTRAVENOUS at 02:43

## 2020-01-01 RX ADMIN — Medication 10 ML: at 08:08

## 2020-01-01 RX ADMIN — ROSUVASTATIN CALCIUM 40 MG: 20 TABLET, FILM COATED ORAL at 20:26

## 2020-01-01 RX ADMIN — VASOPRESSIN 0.04 UNITS/MIN: 20 INJECTION INTRAVENOUS at 22:49

## 2020-01-01 RX ADMIN — Medication 1500 ML/HR: at 13:59

## 2020-01-01 RX ADMIN — MIDODRINE HYDROCHLORIDE 10 MG: 5 TABLET ORAL at 14:15

## 2020-01-01 RX ADMIN — AMIODARONE HYDROCHLORIDE 200 MG: 200 TABLET ORAL at 08:07

## 2020-01-01 RX ADMIN — Medication 50 MCG/MIN: at 04:07

## 2020-01-01 RX ADMIN — LEVOTHYROXINE SODIUM 100 MCG: 100 TABLET ORAL at 19:59

## 2020-01-01 RX ADMIN — OXCARBAZEPINE 300 MG: 300 TABLET, FILM COATED ORAL at 20:26

## 2020-01-01 RX ADMIN — FINASTERIDE 5 MG: 5 TABLET, FILM COATED ORAL at 08:33

## 2020-01-01 RX ADMIN — Medication 10 ML: at 19:58

## 2020-01-01 RX ADMIN — SODIUM CHLORIDE: 9 INJECTION, SOLUTION INTRAVENOUS at 20:22

## 2020-01-01 RX ADMIN — MIDODRINE HYDROCHLORIDE 10 MG: 5 TABLET ORAL at 13:31

## 2020-01-01 RX ADMIN — Medication 1500 ML/HR: at 20:21

## 2020-01-01 RX ADMIN — HEPARIN SODIUM AND DEXTROSE 12 UNITS/KG/HR: 10000; 5 INJECTION INTRAVENOUS at 23:59

## 2020-01-01 RX ADMIN — HYDROCORTISONE SODIUM SUCCINATE 100 MG: 100 INJECTION, POWDER, FOR SOLUTION INTRAMUSCULAR; INTRAVENOUS at 08:33

## 2020-01-01 RX ADMIN — Medication 1500 ML/HR: at 14:01

## 2020-01-01 RX ADMIN — HYDROCORTISONE SODIUM SUCCINATE 100 MG: 100 INJECTION, POWDER, FOR SOLUTION INTRAMUSCULAR; INTRAVENOUS at 10:11

## 2020-01-01 RX ADMIN — Medication 46 MCG/MIN: at 11:11

## 2020-01-01 RX ADMIN — LEVOTHYROXINE SODIUM 100 MCG: 100 TABLET ORAL at 08:07

## 2020-01-01 RX ADMIN — PIPERACILLIN SODIUM AND TAZOBACTAM SODIUM 2.25 G: 2; .25 INJECTION, POWDER, LYOPHILIZED, FOR SOLUTION INTRAVENOUS at 08:08

## 2020-01-01 RX ADMIN — SEVELAMER CARBONATE 800 MG: 800 TABLET, FILM COATED ORAL at 12:27

## 2020-01-01 RX ADMIN — VASOPRESSIN 0.04 UNITS/MIN: 20 INJECTION INTRAVENOUS at 02:59

## 2020-01-01 RX ADMIN — PIPERACILLIN SODIUM AND TAZOBACTAM SODIUM 2.25 G: 2; .25 INJECTION, POWDER, LYOPHILIZED, FOR SOLUTION INTRAVENOUS at 23:46

## 2020-01-01 RX ADMIN — SODIUM CHLORIDE 500 ML: 9 INJECTION, SOLUTION INTRAVENOUS at 23:45

## 2020-01-01 RX ADMIN — OXCARBAZEPINE 300 MG: 300 TABLET, FILM COATED ORAL at 08:33

## 2020-01-01 RX ADMIN — Medication 1500 ML/HR: at 02:58

## 2020-01-01 RX ADMIN — HYDROCORTISONE SODIUM SUCCINATE 100 MG: 100 INJECTION, POWDER, FOR SOLUTION INTRAMUSCULAR; INTRAVENOUS at 04:03

## 2020-01-01 RX ADMIN — Medication 1500 ML/HR: at 02:59

## 2020-01-01 RX ADMIN — MIDODRINE HYDROCHLORIDE 10 MG: 5 TABLET ORAL at 08:07

## 2020-01-01 RX ADMIN — ACETAMINOPHEN 650 MG: 325 TABLET ORAL at 21:07

## 2020-01-01 RX ADMIN — MIDODRINE HYDROCHLORIDE 10 MG: 5 TABLET ORAL at 12:02

## 2020-01-01 RX ADMIN — VANCOMYCIN HYDROCHLORIDE 1000 MG: 1 INJECTION, POWDER, LYOPHILIZED, FOR SOLUTION INTRAVENOUS at 12:51

## 2020-01-01 RX ADMIN — PHENYLEPHRINE HYDROCHLORIDE 50 MCG/MIN: 10 INJECTION INTRAVENOUS at 18:34

## 2020-01-01 RX ADMIN — MIDODRINE HYDROCHLORIDE 10 MG: 5 TABLET ORAL at 08:33

## 2020-01-01 RX ADMIN — Medication 36 MCG/MIN: at 21:10

## 2020-01-01 RX ADMIN — MIDODRINE HYDROCHLORIDE 10 MG: 5 TABLET ORAL at 20:26

## 2020-01-01 RX ADMIN — PIPERACILLIN SODIUM AND TAZOBACTAM SODIUM 2.25 G: 2; .25 INJECTION, POWDER, LYOPHILIZED, FOR SOLUTION INTRAVENOUS at 17:47

## 2020-01-01 RX ADMIN — VANCOMYCIN HYDROCHLORIDE 1000 MG: 1 INJECTION, SOLUTION INTRAVENOUS at 11:45

## 2020-01-01 RX ADMIN — MIDODRINE HYDROCHLORIDE 10 MG: 5 TABLET ORAL at 19:59

## 2020-01-01 RX ADMIN — SEVELAMER CARBONATE 800 MG: 800 TABLET, FILM COATED ORAL at 11:44

## 2020-01-01 RX ADMIN — PHENYLEPHRINE HYDROCHLORIDE 200 MCG/MIN: 10 INJECTION INTRAVENOUS at 02:01

## 2020-01-01 RX ADMIN — ROSUVASTATIN CALCIUM 40 MG: 20 TABLET, FILM COATED ORAL at 19:59

## 2020-01-01 RX ADMIN — Medication 1500 ML/HR: at 20:22

## 2020-01-01 RX ADMIN — NOREPINEPHRINE BITARTRATE 2 MCG/MIN: 1 INJECTION, SOLUTION, CONCENTRATE INTRAVENOUS at 12:12

## 2020-01-01 RX ADMIN — SODIUM CHLORIDE 500 ML: 9 INJECTION, SOLUTION INTRAVENOUS at 11:11

## 2020-01-01 RX ADMIN — SEVELAMER CARBONATE 800 MG: 800 TABLET, FILM COATED ORAL at 08:07

## 2020-01-01 RX ADMIN — Medication 10 ML: at 08:34

## 2020-01-01 RX ADMIN — PHENYLEPHRINE HYDROCHLORIDE 220 MCG/MIN: 10 INJECTION INTRAVENOUS at 06:46

## 2020-01-01 RX ADMIN — Medication 10 ML: at 20:26

## 2020-01-01 RX ADMIN — HYPROMELLOSE 2906 (4000 MPA.S) AND HYPROMELLOSE 2906 (50 MPA.S) 1 DROP: 25; 25 SOLUTION OPHTHALMIC at 10:09

## 2020-01-01 RX ADMIN — HYDROCORTISONE SODIUM SUCCINATE 100 MG: 100 INJECTION, POWDER, FOR SOLUTION INTRAMUSCULAR; INTRAVENOUS at 19:29

## 2020-01-01 RX ADMIN — OXCARBAZEPINE 300 MG: 300 TABLET, FILM COATED ORAL at 08:07

## 2020-01-01 RX ADMIN — APIXABAN 2.5 MG: 2.5 TABLET, FILM COATED ORAL at 08:07

## 2020-01-01 RX ADMIN — Medication 16 MCG/MIN: at 13:47

## 2020-01-01 RX ADMIN — Medication 38 MCG/MIN: at 18:55

## 2020-01-01 RX ADMIN — SODIUM CHLORIDE 500 ML: 0.9 INJECTION, SOLUTION INTRAVENOUS at 16:45

## 2020-01-01 RX ADMIN — MIDODRINE HYDROCHLORIDE 10 MG: 5 TABLET ORAL at 15:54

## 2020-01-01 RX ADMIN — FINASTERIDE 5 MG: 5 TABLET, FILM COATED ORAL at 08:07

## 2020-01-01 RX ADMIN — ACETAMINOPHEN 650 MG: 325 TABLET ORAL at 15:07

## 2020-01-01 RX ADMIN — ACETAMINOPHEN 650 MG: 325 TABLET ORAL at 03:38

## 2020-01-01 RX ADMIN — Medication 40 MCG/MIN: at 14:15

## 2020-01-01 RX ADMIN — LEVOTHYROXINE SODIUM 100 MCG: 100 TABLET ORAL at 08:33

## 2020-01-01 RX ADMIN — Medication 1500 ML/HR: at 13:57

## 2020-01-01 RX ADMIN — AMIODARONE HYDROCHLORIDE 200 MG: 200 TABLET ORAL at 08:33

## 2020-01-01 RX ADMIN — PIPERACILLIN SODIUM AND TAZOBACTAM SODIUM 2.25 G: 2; .25 INJECTION, POWDER, LYOPHILIZED, FOR SOLUTION INTRAVENOUS at 16:38

## 2020-01-01 RX ADMIN — Medication 36 MCG/MIN: at 03:48

## 2020-01-01 RX ADMIN — SEVELAMER CARBONATE 800 MG: 800 TABLET, FILM COATED ORAL at 08:33

## 2020-01-01 RX ADMIN — HEPARIN SODIUM 4250 UNITS: 1000 INJECTION INTRAVENOUS; SUBCUTANEOUS at 23:59

## 2020-01-01 RX ADMIN — VASOPRESSIN 0.04 UNITS/MIN: 20 INJECTION INTRAVENOUS at 07:54

## 2020-01-01 RX ADMIN — VASOPRESSIN 0.04 UNITS/MIN: 20 INJECTION INTRAVENOUS at 17:29

## 2020-01-01 RX ADMIN — VASOPRESSIN 0.04 UNITS/MIN: 20 INJECTION INTRAVENOUS at 12:28

## 2020-01-01 RX ADMIN — VASOPRESSIN 0.03 UNITS/MIN: 20 INJECTION INTRAVENOUS at 16:17

## 2020-01-01 RX ADMIN — SEVELAMER CARBONATE 800 MG: 800 TABLET, FILM COATED ORAL at 16:38

## 2020-01-01 RX ADMIN — FENTANYL CITRATE 25 MCG: 50 INJECTION, SOLUTION INTRAMUSCULAR; INTRAVENOUS at 18:27

## 2020-01-01 RX ADMIN — OXCARBAZEPINE 300 MG: 300 TABLET, FILM COATED ORAL at 20:00

## 2020-01-01 ASSESSMENT — ENCOUNTER SYMPTOMS
ABDOMINAL PAIN: 0
SHORTNESS OF BREATH: 0
ABDOMINAL DISTENTION: 0
TROUBLE SWALLOWING: 0
VOMITING: 0
DIARRHEA: 0
EYE ITCHING: 0
WHEEZING: 0
ABDOMINAL DISTENTION: 0
COUGH: 0
CONSTIPATION: 0
APNEA: 0
COLOR CHANGE: 0
SORE THROAT: 0
NAUSEA: 0

## 2020-01-01 ASSESSMENT — PULMONARY FUNCTION TESTS
PIF_VALUE: 12
PIF_VALUE: 16
PIF_VALUE: 13
PIF_VALUE: 12
PIF_VALUE: 15
PIF_VALUE: 13
PIF_VALUE: 2
PIF_VALUE: 12
PIF_VALUE: 14
PIF_VALUE: 15
PIF_VALUE: 11
PIF_VALUE: 15
PIF_VALUE: 19
PIF_VALUE: 15
PIF_VALUE: 12
PIF_VALUE: 12
PIF_VALUE: 14

## 2020-01-01 ASSESSMENT — PAIN DESCRIPTION - DESCRIPTORS
DESCRIPTORS: ACHING;DISCOMFORT
DESCRIPTORS: ACHING
DESCRIPTORS: ACHING

## 2020-01-01 ASSESSMENT — PAIN SCALES - GENERAL
PAINLEVEL_OUTOF10: 0
PAINLEVEL_OUTOF10: 3
PAINLEVEL_OUTOF10: 0
PAINLEVEL_OUTOF10: 3
PAINLEVEL_OUTOF10: 0
PAINLEVEL_OUTOF10: 3
PAINLEVEL_OUTOF10: 0
PAINLEVEL_OUTOF10: 2
PAINLEVEL_OUTOF10: 0
PAINLEVEL_OUTOF10: 3
PAINLEVEL_OUTOF10: 4

## 2020-01-01 ASSESSMENT — PATIENT HEALTH QUESTIONNAIRE - PHQ9
SUM OF ALL RESPONSES TO PHQ9 QUESTIONS 1 & 2: 1
SUM OF ALL RESPONSES TO PHQ QUESTIONS 1-9: 1
2. FEELING DOWN, DEPRESSED OR HOPELESS: 1
SUM OF ALL RESPONSES TO PHQ QUESTIONS 1-9: 1
1. LITTLE INTEREST OR PLEASURE IN DOING THINGS: 0

## 2020-01-01 ASSESSMENT — PAIN DESCRIPTION - ORIENTATION
ORIENTATION: UPPER
ORIENTATION: MID;LOWER

## 2020-01-01 ASSESSMENT — PAIN DESCRIPTION - LOCATION
LOCATION: GENERALIZED
LOCATION: BACK
LOCATION: GENERALIZED;BACK
LOCATION: BACK
LOCATION: GENERALIZED;BACK

## 2020-01-03 NOTE — PROGRESS NOTES
fairly controlled ventricular response (HR less than 100bpm 93% of the time),no significant pauses. Rare isolated PVC's    History of Holter monitoring 12/06/2017    Rare PAC's with one 4 beat run of wide complex tachycardia at 170 bpm, No symptoms reported. clinical correlation required    Hyperlipidemia 2/21/2006    Hypertension     Iron deficiency anemia 3/13/2019    Osteoarthritis of hip 6/20/2013    PVD (peripheral vascular disease) ([facility]     Reported history of a 60% right carotid stenosis.  Trigeminal neuralgia 7/10/2013     Past Surgical History:   Procedure Laterality Date    CARDIAC CATHETERIZATION Left 05/18/2017    Right Radial/[facility]/    CARDIOVERSION  09/21/2017    [facility]) [place]/Dr Her/Noemi shocks at 200 joules    CORONARY ARTERY BYPASS GRAFT N/A 5/19/2017    CORONARY ARTERY BYPASS GRAFT X 2: LIMA-LAD, SVG-OM, AORTIC VALVE REPLACEMENT WITH 23 MM MEDTRONIC MOSAIC ULTRA, EVACUATION OF 1.5 LITER PLEURAL FLUID; ALLAN PER ANESTHESIA performed by Veronica Bella MD at [address] Right 7/29/13    [facility] - Dr. Negron Glass    tube was blocked    PARACENTESIS  09/03/2019    DR Hicks/[facility]    SHOULDER SURGERY      roto cuff surgery left shoulder       Family History  Family History   Problem Relation Age of Onset    Heart Disease Mother     Heart Disease Father     Cancer Father     Heart Disease Sister     High Blood Pressure Sister     Heart Disease Sister     High Blood Pressure Sister     Heart Disease Brother     Heart Disease Brother     Heart Disease Brother          Symptoms:  1. Angina   [x] None   [] Tightness   [] Shortness of Breath   [] Pressure    [] Nausea   [] Sharp, Stabbing  [] Pallor   [] Indigestion, Heartburn [] Sweaty    Where was discomfort located? Precipitating Factors? Relieved by:    2.  Arrhythmia   [] None   [] Irregular

## 2020-01-06 NOTE — PROGRESS NOTES
Cardiac Rehabilitation   Physician Order Form    Anastasiia Robert  1935    [x] Phase 2 ECG Monitored Cardiac Rehabilitation    [] MI   [] PTCA with/without stents  [] CABG  [] Heart Valve Repair/Replaced   [] Stable Angina [x] Other:  CHF    Onset Date: 11/15/19                    Cardiac Education Goals: (see individualized treatment plan for specific goals, progression & compliance)    [x] Hypertension [x] Physical Inactivity  [x] A & P  [] Smoking  [x] Coping/Depression  [x] Medications  [x] Diabetes  [] Obesity   [x] Angina  [x] Hyperlipidemia [x] Stress   [x] Home Exercise                     Prescribed Exercise Plan:    Target HR:       Duration:31-60 min   Frequency 3 x week  Initial Met Level:METs 1.5  Limitations:    Modalities:  [x]Treadmill   [x] Recumb. Stepper/bike  [] Elliptical  [] Air Dyne  [x] Weights/therabands    · Aerobic exercise to total 31-60 minutes. Progressing by 1-2 minutes per week and/or 1-2 levels per week per patient tolerance using various modalities; according to Eugenio Scale 11-13 and THR  · Strength training starting with weights 1-3 # / 5-8 reps progressing to 5-10 # / 15-20 reps; therabands red-gray 5-20 reps. Per patient symptoms use:  · Appropriate ACLS Algorhythm for Cardiac Events. · Nitroglycerine 0.4mg SLq 5mins X 3 for angina pain. · 12 lead EKG for c/o chest pain or change in rhythm. · Nasal O2 for SaO2 <90% or symptoms warranted. · Blood sugar monitoring for Hyper/Hypoglycemia symptoms.

## 2020-01-06 NOTE — PROGRESS NOTES
Phase II Cardiac Rehab Individualized Treatment Plan-Initial     Patient Name: Darlene Webb  Date of Initial Assessment: 1/6/2020  ACCOUNT #: [de-identified]  Diagnosis: CHF   Onset Date: 11/19/19  Referring Physician: Dr Lazarus Altamirano:   Session Number: 1   EXERCISE    Stages of Change:   [] pre-contemplation   [x] Action   [] Contemplate   [] Maintainence   [x] Prep   [] Relapse          Exercise Prescription:  Mode: [x] TM [x] B [x] STP [] EL [x] R  Frequency: 3 x week  Duration: 31-60 min   Intensity: METS 1.5  Progression: Increase 1-2 levels/week or 1-2 min/week to achieve target HR and RPE 11-13. [] Angina with Exertion THR:    [] Resistance Training Weight (lbs):   Reps:     Hypertension:  [x] Yes  [] No  Resting BP: 124/72  Peak Exercise BP:112/60  BP Meds LIsinipril     Intervention:  Home Exercise:  Type: walking   Duration: 30 mi   Frequency: daily-pt is not walking each day. [] Resistance Training    Education:   [x] Equipment [place]  [x] Self pulse   [] Proper use weights/therabands   [x] S/S to report  [x] Low Na Diet    [x] Warm up/ Cool down  [] BP Medication    [x] RPE Scale   [] Understand BP   [x] Ex Safety   [] Exercise specialist class-Home Exercise       Target Goal:   -Individual Exercise Plan  -BP<140/90 or <130/80 if DM   -Aerobic active 30 + minutes 5-7 days per week    Nutrition    Stages of Change:   [] pre-contemplation   [x] Action   [] Contemplate   [] Maintainence   [x] Prep   [] Relapse    Lipids: Total Cholesterol: 130  Triglycerides: 49  HDL: 52  LDL: 70  Lipid Meds: Lipitor    Diabetes:  [] Yes  [x] No  FBS:   HbA1c:  Diabetes Medication:  [] Monitor BS at home   Frequency?:     Weight Management:  Last Weight: 174.9  Height: 67  BMI: 26.4.   Wt Goal: 1-2 lbs/wk  Alcohol:   Social History     Substance and Sexual Activity   Alcohol Use Not Currently    Alcohol/week: 1.0 standard drinks    Types: 1 Cans of beer per week     Diet Assessment Tool: Food Diary  Special Diet: Low fat, low chol, low sodium     Intervention:   [x] Dietitian Consult       [x] Nurse/Patient Discussion     [x] Diet Class           [] Referred to Diabetes Education     Education:  [x] S&S hypo/hyperglycemia  [x] Low fat/low cholesterol diet  [] Weight loss methods      [] Relate Diabetes/CAD     [x] Eating heart healthy handout    Target Goal:  -LDL-C<100 if triglycerides are > 200  -LDL-C < 70 for high risk patients  -HbA1c < 7%  -BMI < 25   Education    Stages of Change:    [] pre-contemplation   [x] Action   [] Contemplate   [] Maintainence   [x] Prep   [] Relapse    Learning Barriers:   [] Speech   [] Cognitive   [] Literacy   [] Visions   [] Hearing    [] Ready Learn    Knowledge test score: 100      Family support: [x] Yes  [] No    Tobacco use:   Social History     Tobacco Use   Smoking Status Former Smoker    Years: 15.00    Types: Cigars   Smokeless Tobacco Former User   Tobacco Comment    quit 30-40 years ago       Intervention:  [] Referred to smoking cessation counselor     [] Individual education and counseling  [] Tobacco adjunct  [] Informed of education class schedule     Education:   [] Risk Factors/Modifications  [] Psychological aspects  [] Angina    [] Medications  [x] CHF      [x] Cardiac A&P    Target Goal:  -Complete cessation of tobacco use (if applicable)  -Continued risk factor modifications  -Recognizing signs/symptoms to report  -Proper use of meds    Psychosocial  Stages of Change:    [] pre-contemplation   [x] Action   [] Contemplate   [] Maintainence   [x] Prep   [] Relapse    Psychosocial Test:  Tool Used: Elena Hartman Quality of Life  Score: 15.43  Depression screening score: 3    Intervention:   [] Psych Consult/  [x] Uses stress management skills    [] Physician Referral    [] Stress management class  Medications:     Education:    [] Coping Techniques   [] Relaxation techniques   [] S/S of Depression    Target Goal:  -Assess

## 2020-01-08 NOTE — PROGRESS NOTES
Cardiopulmonary Rehab   Medical Nutrition Therapy Food Diary Evaluation    Patient Name: Meliza Sanchez  Registered Dietitian:  Kathryn Villa RDN, LD  Date:  1/8/2020    Dear Jessica Nair,    Thank you for sharing your information about your eating patterns, it serves not only to help me see what you are eating, but you as well. Eating 3 meals a day is great way to start, I am pleased to see that you are doing that. Whole grains, fruits and vegetables, along with lean meats and low fat dairy are the cornerstones of your health. With that in mind, look below for some suggestions. Your Rate Your Plate (RYP) Score was: 64, which means: You are making many healthy choices    Recommendations from the Dietitian based on your RYP and Food Diary / activity record to improve health and decrease risk factors    1. Continue to use low sodium food choices (see included material)  2. Increase use of whole fruits and non-starchy vegetables to 5 or more daily  3. Ensure use of whole grain breads and cereals  4. Drink water throughout the day for hydration  5. Use MyPlate for guide to \"balance\" at meals   6. Increase physical activity, with goals of 30 minutes daily    If there are many things to change, try making change with one recommendation, then move on from there. Recommendations are based on guidelines from the American Heart Association, American Diabetes Association and current literature.     Feel free to call with questions or concerns [phone] or [phone] Shane Wilson RDN, LD

## 2020-02-12 NOTE — PROGRESS NOTES
[address] [address]  [place] [phone]  Dept: [phone]    Sharyn JOHNSON RMA, am scribing for and in the presence of Dr. Gabe Ferrari. 02/12/2020 8:01 am BENSON Land is a 81 y.o. male being seen for his H&P follow up. Location of visit: [facility]    HPI:  Admission History:  Went to [facility] due to extra fluid on board. Yosef Daugherty was admitted to [facility] on 01/22 for possible fluid overald and kidney failure. He was discharged on 02/04 to [facility]. His discharge summary is as follows:      \"? Fluid overload and kidney failure due to heart failure in the setting of ATTR cardiac amyloidosis: You were fluid overloaded but your kidneys were not responding well enough to diuretics and so it was necessary to initiate hemodialysis during this admission. Please continue hemodialysis as an outpatient (first treatment scheduled Thursday 2/6) and follow-up with an outpatient nephrologist whether that is here at the [facility] or locally close to home. \"      Faxes since last seen:  Linsey Barboza [birth year] Physician Certification and Recertification signed    New today:  Nurse reports:  Fluid restriction changed from 1500 ml to 1200 ml  He is getting an extra dialysis tx today to take off extra fluid. He is getting midodrine 10 mg with his dialysis treatment due to low BP during treatment. Yosef Daugherty reports:  He continues to make urine, urinating around once daily. Breathing is okay at rest, becomes SOB on exertion. Gets a little lightheaded after dialysis treatments. ROS:  General Constitutional: Denies fever. Admits lightheadedness with dialysis. Respiratory: Denies cough. Admits shortness of breath on exertion. Denies wheezing. Cardiovascular: Denies chest pain at rest.  Gastrointestinal: Denies abdominal pain. Denies blood in the stool. Denies constipation. Denies diarrhea. Denies nausea. Denies vomiting.   Genitourinary: Denies blood in the urine. Denies painful urination. Skin:  Denies rash. Neurologic: Denies falls. Denies dizziness. Psychiatric: Denies sleep disturbance. Denies anxiety. Denies depressed mood. PHYSICAL EXAM:    General Appearance: in no acute distress, well nourished. Eyes: pupils equal, round reactive to light and accommodation. Oral Cavity: mucosa moist.  Neck/Thyroid:  no cervical lymphadenopathy, no thyromegaly  Skin: warm and dry  Heart: regular rate and rhythm. No murmurs. S1, S2 normal, no gallops. Rate 85: occasional ectopic beat. Lungs: clear to auscultation bilaterally. Abdomen:firm, nontender, nondistended, no masses or organomegaly. Extremities: no cyanosis 2-3 +edema legs  Peripheral Pulses: 2+ throughout, symetric. Neurologic: verbally responsive, moves extremities. Psych: normal affect, speech fluent. ASSESSMENT:   Diagnosis Orders   1. Chronic systolic congestive heart failure (HCC)     2. Stage 4 chronic kidney disease ([facility]     3. Wild-type transthyretin-related (ATTR) amyloidosis ([facility]     4. Hemodialysis patient ([facility]     5. Paroxysmal A-fib ([facility]     6. Coronary artery disease with angina pectoris, unspecified vessel or lesion type, unspecified whether native or transplanted heart ([facility]     7. Essential hypertension     8. Hyperlipidemia, unspecified hyperlipidemia type     9. PVD (peripheral vascular disease) ([facility]     10. Hypothyroidism, unspecified type         PLAN:  We discuss the importance of keeping his legs elevated as much as possible especially during dialysis to get the fluid back to his heart to maintain his blood pressure. His creatinine level is around 3.84. This is not good but his BUN is coming down. I explain that the rationale for going on dialysis is based on the creatinine level  but once on dialysis, the BUN is more closely monitored. This is coming down as expected.       The key is to find out whether this fluid retention is coming from his heart or

## 2020-02-19 NOTE — PROGRESS NOTES
[address] [address]  [place] [phone]  Dept: [phone]    BEATRIZ JOHNSON RMA, am scribing for and in the presence of Dr. Perfecto Camarillo. 2/19/20/11:35am/SNP    Deana Bradley is a 81 y.o. male being seen for his weekly follow up. Location of visit: [facility]    HPI:  Admission History:  Went to [facility] due to extra fluid on board. Sara Morris was admitted to [facility] on 01/22 for possible fluid overald and kidney failure. He was discharged on 02/04 to [facility]. His discharge summary is as follows:       \"? Fluid overload and kidney failure due to heart failure in the setting of ATTR cardiac amyloidosis: You were fluid overloaded but your kidneys were not responding well enough to diuretics and so it was necessary to initiate hemodialysis during this admission. Please continue hemodialysis as an outpatient (first treatment scheduled Thursday 2/6) and follow-up with an outpatient nephrologist whether that is here at the [facility] or locally close to home. \"     Last visit:  We discuss the importance of keeping his legs elevated as much as possible especially during dialysis to get the fluid back to his heart to maintain his blood pressure. His creatinine level is around 3.84. This is not good but his BUN is coming down. I explain that the rationale for going on dialysis is based on the creatinine level  but once on dialysis, the BUN is more closely monitored. This is coming down as expected.       The key is to find out whether this fluid retention is coming from his heart or kidneys. Right now, I think it is a combination of the two. His right heart failure severely limits our ability to remove fluid from dialysis due to need to maintain filling pressure for the right heart but unable to pull off the fluid due to hypotension     I will see him back next week.      Faxes since last seen:  2/10/20 Blood filled blister on RUE 6 cm x  4 cm, and is not intact. ABD elastic tube netting applied. Skin tears on scapula and LE noted. New today:  Wife is concerned with his sleeping. Admits pain in upper R arm    ROS:  General Constitutional: Denies fever. Denies lightheadedness. Admits fatigue, he had dialysis earlier today and states he gets very tired after the appointment. Respiratory: Denies cough. Denies shortness of breath. Denies wheezing. Cardiovascular: Denies chest pain at rest.  Gastrointestinal: Denies abdominal pain. Denies blood in the stool. Denies constipation. Denies diarrhea. Denies nausea. Denies vomiting. Genitourinary: Denies blood in the urine. Denies painful urination. Skin:  Denies rash. Neurologic: Denies falls. Denies dizziness. Psychiatric: Denies anxiety. Denies depressed mood. Admits troubles getting to sleep and staying asleep through the night. States it seems like he just wants to sleep a lot. PHYSICAL EXAM:    General Appearance: in no acute distress, well nourished. Eyes: pupils equal, round reactive to light and accommodation. Oral Cavity: mucosa moist.  Neck/Thyroid:  no cervical lymphadenopathy, no thyromegaly  Skin: warm and dry rj sub-q hematoma R upper arm  Heart: regular rate and rhythm. No murmurs. S1, S2 normal, no gallops. Rate 60  Lungs: clear to auscultation bilaterally. Abdomen:soft, nontender, nondistended, no masses or organomegaly. Extremities: no cyanosis, 1+ edema ankles  Peripheral Pulses: 2+ throughout, symetric. Neurologic: verbally responsive, moves extremities. Psych: normal affect, speech fluent. Musculoskeletal: R shoulder: Crepitus limited ROM in shoulder, little bi ceps function    ASSESSMENT:   Diagnosis Orders   1. Hemodialysis patient ([facility]     2. Chronic systolic congestive heart failure (HCC)     3. Stage 4 chronic kidney disease ([facility]     4. Wild-type transthyretin-related (ATTR) amyloidosis (HCC)     5. Paroxysmal A-fib (HCC)     6.  Coronary artery disease with angina pectoris, unspecified vessel or lesion type, unspecified whether native or transplanted heart ([facility]     7. Essential hypertension     8. Hyperlipidemia, unspecified hyperlipidemia type     9. PVD (peripheral vascular disease) ([facility]     10. Hypothyroidism, unspecified type         PLAN:  He is currently taking 50 mg Trazodone qhs, I will increase this to 75 mg nightly x 2 nights and then 100 mg nightly. He needs a letter that he can't travel for the next year, I will have this available in my office    I will order an x-ray of his shoulder, he may need a kenalog injection. I also will write a letter stating he should not travel for the next year due to dialysis. I, Dr. Grady Arroyo, personally performed the services described in this documentation as scribed by BEATRIZ Galvan in my presence, and is both accurate and complete.

## 2020-02-21 NOTE — RESULT ENCOUNTER NOTE
Notify xray shows arthritis and calcification of rotator cuff tendon and blood vessels  We can give him a kenalog shot in the office to see if it is helpful

## 2020-03-02 NOTE — TELEPHONE ENCOUNTER
[facility] called, pt has transferred his Rxs to them however his Trazodone dose was changed since his last fill at [facility] Rx sent to [facility]

## 2020-03-04 PROBLEM — I87.2 VENOUS INSUFFICIENCY: Status: ACTIVE | Noted: 2020-01-01

## 2020-03-04 PROBLEM — M77.8 TENDINITIS OF RIGHT SHOULDER: Status: ACTIVE | Noted: 2020-01-01

## 2020-03-04 PROBLEM — N18.6 ESRF (END STAGE RENAL FAILURE) (HCC): Status: ACTIVE | Noted: 2020-01-01

## 2020-03-04 PROBLEM — R55 VASODEPRESSOR SYNCOPE: Status: ACTIVE | Noted: 2020-01-01

## 2020-03-04 PROBLEM — S81.811A NONINFECTED SKIN TEAR OF RIGHT LEG: Status: ACTIVE | Noted: 2019-06-07

## 2020-03-04 NOTE — PROGRESS NOTES
I, Sandro Sewell, [facility], am scribing for and in the presence of Dr. David Koenig. 3/4/20/2:54 pm/JML    [address] [address]  [place] [phone]  Dept: [phone]    Kaitlynn Browne is a 81 y.o. male here for Follow-up      HPI:  Pt is here for a follow up after being discharged from [facility]   His recent history is as follows: Went to [facility] due to extra fluid on board. Oniel Baird was admitted to [facility] on 01/22 for possible fluid overald and kidney failure. He was discharged on 02/04 to [facility]. His discharge summary from [address] [place] [address] is as follows:     \"? Fluid overload and kidney failure due to heart failure in the setting of ATTR cardiac amyloidosis: You were fluid overloaded but your kidneys were not responding well enough to diuretics and so it was necessary to initiate hemodialysis during this admission. Please continue hemodialysis as an outpatient (first treatment scheduled Thursday 2/6) and follow-up with an outpatient nephrologist whether that is here at the [facility] or locally close to home. \"    He was discharged home from [facility] on 2/29/20. He has been feeling good up until today. He states that his Midodrine is prescribed as 3x/day but he has taken it morning and evening up until today he took it this am and again at noon. Pt is accompanied by his wife    Prior to Admission medications    Medication Sig Start Date End Date Taking?  Authorizing Provider   midodrine (PROAMATINE) 10 MG tablet Take 10 mg by mouth 3 times daily   Yes Historical Provider, MD   mirtazapine (REMERON) 15 MG tablet Take 15 mg by mouth nightly   Yes Historical Provider, MD   sevelamer (RENVELA) 800 MG tablet Take 2 tablets by mouth 3 times daily (with meals)   Yes Historical Provider, MD   rosuvastatin (CRESTOR) 40 MG tablet Take 40 mg by mouth every evening   Yes Historical Provider, MD   traZODone (DESYREL) 100 MG tablet Take 1 tablet by mouth nightly 3/4/20 Yes Jamin Buckner MD   torsemide (DEMADEX) 100 MG tablet Take 100 mg by mouth 2 times daily  12/13/19  Yes Historical Provider, MD   amiodarone (CORDARONE) 200 MG tablet Take 200 mg by mouth daily   Yes Historical Provider, MD   OXcarbazepine (TRILEPTAL) 300 MG tablet Take 1 tablet by mouth 2 times daily 12/3/19  Yes Jamin Buckner MD   levothyroxine (SYNTHROID) 50 MCG tablet TAKE 1 TABLET BY MOUTH DAILY 7/15/19  Yes Jamin Buckner MD   finasteride (PROSCAR) 5 MG tablet Take 5 mg by mouth daily  6/17/19  Yes Historical Provider, MD   apixaban (ELIQUIS) 2.5 MG TABS tablet Take 1 tablet by mouth 2 times daily 6/25/19  Yes Jamin Buckner MD   Cholecalciferol (VITAMIN D) 2000 units CAPS capsule Take 2,000 Units by mouth daily   Yes Historical Provider, MD   docusate sodium (COLACE) 100 MG capsule Take 100 mg by mouth 2 times daily   Yes Historical Provider, MD Mike Duran 2-AMBAR 0.3 MG/0.3ML SOAJ injection USE AS DIRECTED 9/19/16  Yes Jamin Buckner MD       ROS:  General Constitutional: Denies chills. Denies fever. Denies headache. Admits lightheadedness. Ophthalmologic: Denies blurred vision. ENT: Denies nasal congestion. Denies sore throat. Denies ear pain and pressure. Respiratory: Denies cough. Denies shortness of breath. Denies wheezing. Cardiovascular: Denies chest pain at rest. Denies irregular heartbeat. Denies palpitations. Gastrointestinal: Denies abdominal pain. Denies blood in the stool. Denies constipation. Denies diarrhea. Denies nausea. Denies vomiting. Genitourinary: Denies blood in the urine. Denies difficulty urinating. Denies frequent urination. Denies painful urination. Denies urinary incontinence. Musculoskeletal: Denies muscle aches. Denies painful joints. Denies swollen joints. Peripheral Vascular: Denies pain/cramping in legs after exertion. Skin: Denies dry skin. Denies itching. Denies rash. Neurologic: Denies falls. Denies dizziness. Denies fainting.  Denies tingling/numbness. Psychiatric: Denies sleep disturbance. Denies anxiety. Denies depressed mood. Past Surgical History:   Procedure Laterality Date    CARDIAC CATHETERIZATION Left 05/18/2017    Right Radial/[place]/    CARDIOVERSION  09/21/2017    [facility]) [facility]/Dr Her/2 shocks at 200 joules    CORONARY ARTERY BYPASS GRAFT N/A 5/19/2017    CORONARY ARTERY BYPASS GRAFT X 2: LIMA-LAD, SVG-OM, AORTIC VALVE REPLACEMENT WITH 23 MM MEDTRONIC MOSAIC ULTRA, EVACUATION OF 1.5 LITER PLEURAL FLUID; ALLAN PER ANESTHESIA performed by Norman Echeverria MD at [address],[place] [address] Right 7/29/13    [facility] - Dr. Kevon Rivas    tube was blocked    PARACENTESIS  09/03/2019    DR Hicks/[place] [facility]    SHOULDER SURGERY      roto cuff surgery left shoulder       Family History   Problem Relation Age of Onset    Heart Disease Mother     Heart Disease Father     Cancer Father     Heart Disease Sister     High Blood Pressure Sister     Heart Disease Sister     High Blood Pressure Sister     Heart Disease Brother     Heart Disease Brother     Heart Disease Brother        Past Medical History:   Diagnosis Date    Abnormal cardiac cath 10/01/2012    30% LAD, 80% ostial stenosis-CX, RCAOccluded at its ostium. There was minimal left to right collateral flow.  Abnormal echocardiogram 11/12    EF >55%. Mild to moderate aortic stenosis. Mean gradient to 18 mmHg.  Atrial fibrillation ([address] 8/28/2017    CAD (coronary artery disease) 9/12    30% LAD, 80% ostial stenosis-CX, RCAOccluded at its ostium. There was minimal left to right collateral flow.       Chronic diastolic CHF (congestive heart failure), NYHA class 2 ([address] 9/7/2016    Chronic liver disease 8/21/2019    Chronic renal failure, stage 3 (moderate) (HCC) 4/24/2017    Erectile dysfunction     H/O cardiac catheterization 06/02/2017    LMCA: Left atrium severely dilated (>40) w/ left atrial volume index of 46 ml/m2. Moderate aortic stenosis w/ mean gradient of 21.3 mmHg consistent w/ moderate aortis stenosis on what appears to be bioprosthetic aortic melanie, Moderate pulmonary HTN w/ RV systolic pressure of 58 mmHg. Moderate/severe tricuspid regurg. Diastoly cannot be assessed due to pt rhythm    History of echocardiogram 10/04/2018    EF 55%. Mildly increased LA wall thickness. LA severely dilatedw/ LA volume index of 46. Bioprosthetic AV seen. Mean gradient is 20, which is unchanged from previous. Mild mitral regurg. Moderate/severe tricuspid regurg, Moderate pulmonary HTN w/ estimated RV systolic pressure fo 47. Mild diastolic dysfunction.  History of Holter monitoring 08/08/2017    Atrial fibrillation throughout with fairly controlled ventricular response (HR less than 100 bpm 93% of the time), no signficant pauses. Rare isolated PVC's    History of Holter monitoring 08/08/2017    Atrial Fibrillation throughout with fairly controlled ventricular response (HR less than 100bpm 93% of the time),no significant pauses. Rare isolated PVC's    History of Holter monitoring 12/06/2017    Rare PAC's with one 4 beat run of wide complex tachycardia at 170 bpm, No symptoms reported. clinical correlation required    Hyperlipidemia 2/21/2006    Hypertension     Iron deficiency anemia 3/13/2019    Osteoarthritis of hip 6/20/2013    PVD (peripheral vascular disease) ([facility]     Reported history of a 60% right carotid stenosis.     Trigeminal neuralgia 7/10/2013      Social History     Tobacco Use    Smoking status: Former Smoker     Years: 15.00     Types: Cigars    Smokeless tobacco: Former User    Tobacco comment: quit 30-40 years ago   Substance Use Topics    Alcohol use: Not Currently     Alcohol/week: 1.0 standard drinks     Types: 1 Cans of beer per week      Current Outpatient Medications   Medication Sig Dispense Refill    midodrine (PROAMATINE) 10 MG tablet Take 10 mg by mouth 3 times daily      mirtazapine (REMERON) 15 MG tablet Take 15 mg by mouth nightly      sevelamer (RENVELA) 800 MG tablet Take 2 tablets by mouth 3 times daily (with meals)      rosuvastatin (CRESTOR) 40 MG tablet Take 40 mg by mouth every evening      traZODone (DESYREL) 100 MG tablet Take 1 tablet by mouth nightly 90 tablet 3    torsemide (DEMADEX) 100 MG tablet Take 100 mg by mouth 2 times daily       amiodarone (CORDARONE) 200 MG tablet Take 200 mg by mouth daily      OXcarbazepine (TRILEPTAL) 300 MG tablet Take 1 tablet by mouth 2 times daily 180 tablet 0    levothyroxine (SYNTHROID) 50 MCG tablet TAKE 1 TABLET BY MOUTH DAILY 90 tablet 0    finasteride (PROSCAR) 5 MG tablet Take 5 mg by mouth daily       apixaban (ELIQUIS) 2.5 MG TABS tablet Take 1 tablet by mouth 2 times daily 180 tablet 3    Cholecalciferol (VITAMIN D) 2000 units CAPS capsule Take 2,000 Units by mouth daily      docusate sodium (COLACE) 100 MG capsule Take 100 mg by mouth 2 times daily      EPIPEN 2-AMBAR 0.3 MG/0.3ML SOAJ injection USE AS DIRECTED 1 each 0     No current facility-administered medications for this visit. Allergies   Allergen Reactions    Fish Oil Shortness Of Breath and Swelling     Throat swells shut    Iodine Swelling     Other reaction(s): Urticaria  Patient states is alleric to Iodine and sea food    Norco [Hydrocodone-Acetaminophen]     Other Anaphylaxis    Shellfish-Derived Products Shortness Of Breath and Swelling     Throat swells shut    Shrimp Flavor Swelling     Patient states allergic to shell sea food.    shrimp     Oxycodone Other (See Comments)     Lowers BP       PHYSICAL EXAM:    BP (!) 86/58   Ht 5' 7\" (1.702 m)   Wt 182 lb (82.6 kg)   BMI 28.51 kg/m²   Wt Readings from Last 3 Encounters:   03/04/20 182 lb (82.6 kg)   01/03/20 168 lb 8 oz (76.4 kg)   12/30/19 172 lb (78 kg)     BP Readings from Last 3 Encounters:   03/04/20 (!) 86/58   01/03/20 112/62 12/30/19 (!) 102/52       General Appearance: in no acute distress, well developed, well nourished. Eyes: pupils equal, round reactive to light and accommodation. Ears: normal canal and TM's. Nose: nares patent, no lesions. Oral Cavity: mucosa moist.  Throat: clear. Neck/Thyroid: neck supple, full range of motion, no cervical lymphadenopathy, no thyromegaly or carotid bruits. Skin: Skin tear on right knee, rectangular, serous fluid draining, 4 x 2 cm  Heart: irregularly irregular, 2/6 systolic murmur at left sternal border  Lungs: clear to auscultation bilaterally. Abdomen: bowel sounds present, soft, nontender, nondistended, no masses or organomegaly. Musculoskeletal: normal, full range of motion in knees and hips, no swelling or tenderness. Extremities: no cyanosis, 3+ proximal legs, 1+ at ankles bilat  Peripheral Pulses: 2+ throughout, symetric. Neurologic: nonfocal, motor strength normal upper and lower extremities, sensory exam intact. Psych: normal affect, speech fluent. ASSESSMENT:   Diagnosis Orders   1. Noninfected skin tear of right lower extremity, initial encounter  Compression Stocking   2. Insomnia, unspecified type  DME Order for Hospital Bed as OP   3. ESRF (end stage renal failure) ([facility]  DME Order for Hospital Bed as OP   4. Pulmonary hypertension ([address]  DME Order for Hospital Bed as OP   5. Right-sided congestive heart failure, unspecified HF chronicity ([address]  DME Order for Hospital Bed as OP   6. ASHD (arteriosclerotic heart disease)  DME Order for Hospital Bed as OP   7. Vasodepressor syncope  DME Order for Hospital Bed as OP   8. Venous insufficiency  DME Order for Hospital Bed as OP   9. Tendinitis of right shoulder  PA ARTHROCENTESIS ASPIR&/INJ MAJOR JT/BURSA W/O US    (1-10 mg) [place] TRIAMCINOLONE ACETONIDE INJ []     PLAN:  We discussed his hypotension and lightheadedness.  He did not have dialysis today, he had it yesterday  His dialysis along with his cardiac issues are causing this hypotension. It is going to be hard to keep his blood pressure up with this heart failure and extra fluid on board  I reviewed his Echo from over the summer showing his leaking mitral and tricuspid valves and his elevated right sided heart pressure. He had an Echo done in [place] in December as well. His left ventricle is pumping very well, it is just the right sided heart pressure giving him issues. I would like for him to get circaid pumps to help with his swelling  We discussed the importance of avoiding falls, I would like him to have a hospital bed so he can be elevated to at least 30 degrees to improve his breathing. I will give him an order for this. The Midodrine doesn't do anything for him at night. I want him taking this first thing in the morning, 4 hours after that and then again 4 hours after that. He should be sure to get one before dialysis on those days. I will get him a Kenalog injection in his R shoulder today. [facility] stopped his Crestor d/t liver concerns. I reviewed his liver enzymes which were fine. This was restarted today. Kenalog Joint Injection    Written consent obtained after explanation of risks and benefits    Skin overlying the right Shoulder was prepped in the usual manner with betadine and alcohol  The joint was injected with 1.5 ml Lidocaine, 1.5 ml Marcaine and 2 ml Kenalog-40 mg  The area was covered with a bandage and the patient was sent home in stable condition      Orders Placed This Encounter   Procedures    [place] ARTHROCENTESIS ASPIR&/INJ MAJOR JT/BURSA W/O US    (1-10 mg) [place] TRIAMCINOLONE ACETONIDE INJ []    Compression Stocking     Velcro circaid pumps, bilateral  Dx: severe pulmonary hypertension and right heart failure    DME Order for Hospital Bed as OP     You must complete the order parameters below and add the medical necessity documentation for this DME in a separate note.     [employer] hospital bed with mattress and

## 2020-03-04 NOTE — PATIENT INSTRUCTIONS
PLAN:  We discussed his hypotension and lightheadedness. He did not have dialysis today, he had it yesterday  His dialysis along with his cardiac issues are causing this hypotension. It is going to be hard to keep his blood pressure up with this heart failure and extra fluid on board  I reviewed his Echo from over the summer showing his leaking mitral and tricuspid valves and his elevated right sided heart pressure. He had an Echo done in [facility] in December as well. His left ventricle is pumping very well, it is just the right sided heart pressure giving him issues. I would like for him to get circaid pumps to help with his swelling  We discussed the importance of avoiding falls, I would like him to have a hospital bed on his first story so he doesn't have to go up the stairs. I will give him an order for this. The Midodrine doesn't do anything for him at night. I want him taking this first thing in the morning, 4 hours after that and then again 4 hours after that. He should be sure to get one before dialysis on those days. I will get him a Kenalog injection in his R shoulder today. [facility] stopped his Crestor d/t liver concerns. I reviewed his liver enzymes which were fine. This was restarted today. SURVEY:    You may be receiving a survey from [employer] regarding your visit today. Please complete the survey to enable us to provide the highest quality of care to you and your family. If you cannot score us a very good on any question, please call the office to discuss how we could have made your experience a very good one. Thank you.

## 2020-04-07 PROBLEM — Z91.81 STATUS POST FALL: Status: ACTIVE | Noted: 2020-01-01

## 2020-04-07 PROBLEM — F32.A DEPRESSION: Status: ACTIVE | Noted: 2020-01-01

## 2020-04-07 PROBLEM — E03.9 HYPOTHYROIDISM: Status: ACTIVE | Noted: 2020-01-01

## 2020-04-07 NOTE — PATIENT INSTRUCTIONS
PLAN:  If the circaid wraps are uncomfortable, I recommend that he wear compression hose, alternatively. He has had a substantial loss of fluid given that his weight is down over 20 lbs. We discuss his recent fall. I do think continuing therapy would be a great idea. I will make a referral to Kasi Penny at [facility]. I stress the importance of Recardo Dancer taking the midodrine three times daily with the last dose being no later than 4 pm.    I would like to check a Blood count, liver enzymes and thyroid levels and send them to [facility] for them to draw these at dialysis. I will see him back in 1 month. SURVEY:    You may be receiving a survey from [employer] regarding your visit today. Please complete the survey to enable us to provide the highest quality of care to you and your family. If you cannot score us a very good on any question, please call the office to discuss how we could have made your experience a very good one. Thank you.

## 2020-04-07 NOTE — PROGRESS NOTES
constipation, taking colace and dulcolax when needed. Bowels move once daily, with frequent straining. Denies diarrhea. Denies nausea. Denies vomiting. Genitourinary: Denies blood in the urine. Denies difficulty urinating. Denies frequent urination. Denies painful urination. Denies urinary incontinence. Musculoskeletal: Denies muscle aches. Admits Right shoulder pain. Denies swollen joints. Peripheral Vascular: Denies pain/cramping in legs after exertion. Admits swelling in legs has improved. Weight is down to 157 lb. Skin: Denies dry skin. Denies itching. Denies rash. Neurologic: Admits fall. Denies dizziness. Denies fainting. Denies tingling/numbness. Psychiatric: Admits sleep disturbance, trouble falling and staying asleep wakes after 4 hours of sleep to urinate and then can't fall back asleep. Denies anxiety. Admits depressed mood, unsure what is triggering these feelings. Still takes pleasure in going for walks for car rides.  .    Past Surgical History:   Procedure Laterality Date    CARDIAC CATHETERIZATION Left 05/18/2017    [facility]    CARDIOVERSION  09/21/2017    [facility]/Dr Her/2 shocks at 200 joules    CORONARY ARTERY BYPASS GRAFT N/A 5/19/2017    CORONARY ARTERY BYPASS GRAFT X 2: LIMA-LAD, SVG-OM, AORTIC VALVE REPLACEMENT WITH 23 MM MEDTRONIC MOSAIC ULTRA, EVACUATION OF 1.5 LITER PLEURAL FLUID; ALLAN PER ANESTHESIA performed by Rebeca Bragg MD at [address] Right 7/29/13    [facility] - Dr. Marte Bile Right    [facility]    tube was blocked    PARACENTESIS  09/03/2019    DR Hicks/[facility]    SHOULDER SURGERY      roto cuff surgery left shoulder       Family History   Problem Relation Age of Onset    Heart Disease Mother     Heart Disease Father     Cancer Father     Heart Disease Sister     High Blood Pressure Sister     Heart Disease Sister     High Blood Pressure Sister     Heart Disease Brother     Heart Disease Brother     Heart Disease Brother        Past Medical History:   Diagnosis Date    Abnormal cardiac cath 10/01/2012    30% LAD, 80% ostial stenosis-CX, RCAOccluded at its ostium. There was minimal left to right collateral flow.  Abnormal echocardiogram 11/12    EF >55%. Mild to moderate aortic stenosis. Mean gradient to 18 mmHg.  Atrial fibrillation ([address] 8/28/2017    CAD (coronary artery disease) 9/12    30% LAD, 80% ostial stenosis-CX, RCAOccluded at its ostium. There was minimal left to right collateral flow.  Chronic diastolic CHF (congestive heart failure), NYHA class 2 ([address] 9/7/2016    Chronic liver disease 8/21/2019    Chronic renal failure, stage 3 (moderate) (HCC) 4/24/2017    Erectile dysfunction     H/O cardiac catheterization 06/02/2017    LMCA: Lesion on LMCA: Distal subsection. 60% stenosis. Comments: Calcified lesion in the ostial Cx extending into the distal left main and proximal LAD. LAD: lesion on Prox LAD: Ostial. 50% stenosis. Lesion on 1st Diag:Proximal subsection. 50% stenosis. LCx: Lesion on Prox CX: Osital. 95% stenosis. Lesion on Dist CX: Ostial 50% stenosis. RCA: Lesion on prox RCA: Proximal subsection. 100% stenosis.  H/O echocardiogram 12/9/15    EF:55%. LV wall thickness is moderately increased. Bi atrial enlargement noted. Mean aortic valve gradient is 33mmhg w/moderate to severe aortic stenosis. Mitral annular calcification. Mild mitral regurgitation. Moderate pulm hypertension w/RV systolic pressure of 43 mmhg. Mild (grade 1) diastolic dysfunction.  H/O echocardiogram 12/05/2016    EF:60%. Mildly increased LV wall thickness. Biatrial enlargement. Moderate to severe aortic stenosis. Mean gradient 36 mmHg, KRISTINA 0.08. Mild mitral regurgitation. Moderate pulmonary hypertension with an estimated RV systolic pressure of 60 mmHg. Mild diastolic dysfunction.      H/O echocardiogram 06/13/2017 EF 55%. Moderately increased LV wall thickness normal LV cavity size. Severe bi-atrial enlargement. Mild mitral and moderate tricuspid regurg. Mild pulmonary hypertension estimated right ventricular systolic pressure of 35 mmHg. Bilateral pleural effusion.  History of cardiovascular stress test 12/21/2017    History of cardioversion 09/21/2017    Successful cardioversion to NSR with 200J biphasic    History of cardioversion 09/13/2018    Dr Meek Zamudio History of echocardiogram 11/21/13    EF >55%, mild to mod LVH, LA  is mod dilated, mild to mod AS, mild diastolic dysfunction noted.  History of echocardiogram 06/09/15    EF 55%. LV wall thickness is severely increased. LA is moderately dilated(34-39)with a LA volume index of 36 ml.    History of echocardiogram 06/19/2018    EF 55%. Mildly increased LV wall thickness. Left atrium severely dilated (>40) w/ left atrial volume index of 46 ml/m2. Moderate aortic stenosis w/ mean gradient of 21.3 mmHg consistent w/ moderate aortis stenosis on what appears to be bioprosthetic aortic melaine, Moderate pulmonary HTN w/ RV systolic pressure of 58 mmHg. Moderate/severe tricuspid regurg. Diastoly cannot be assessed due to pt rhythm    History of echocardiogram 10/04/2018    EF 55%. Mildly increased LA wall thickness. LA severely dilatedw/ LA volume index of 46. Bioprosthetic AV seen. Mean gradient is 20, which is unchanged from previous. Mild mitral regurg. Moderate/severe tricuspid regurg, Moderate pulmonary HTN w/ estimated RV systolic pressure fo 47. Mild diastolic dysfunction.  History of Holter monitoring 08/08/2017    Atrial fibrillation throughout with fairly controlled ventricular response (HR less than 100 bpm 93% of the time), no signficant pauses.  Rare isolated PVC's    History of Holter monitoring 08/08/2017    Atrial Fibrillation throughout with fairly controlled ventricular response (HR less than 100bpm 93% of the time),no

## 2020-05-05 NOTE — PROGRESS NOTES
Gale Corea is a 81 y.o. male evaluated via telephone on 5/5/2020. Consent:  He and/or health care decision maker is aware that that he may receive a bill for this telephone service, depending on his insurance coverage, and has provided verbal consent to proceed: Yes    Merlyn JOHNSON RMA, am scribing for and in the presence of Dr. Samantha Cee. 05/05/2020 1:50 pm [place] [address]  [address]  [place] [id number]  Dept: [phone]    Gale Corea is a 81 y.o. male here for 1 Month Follow-Up    HPI:  Patient presents today for a 1 month f/u. BP is always low  systolically. He states he has urinary urgency and then when he goes he does not have much output. Currently on 32 oz fluid restriction. Dialysis recommends paracentesis for fluid in abdomen. Accompanied by wife    Prior to Admission medications    Medication Sig Start Date End Date Taking? Authorizing Provider   mirtazapine (REMERON) 30 MG tablet Take 1 tablet by mouth nightly 5/5/20  Yes Samantha Cee MD   midodrine (PROAMATINE) 10 MG tablet TAKE 1 TABLET BY MOUTH 3 TIMES DAILY AS NEEDED FOR HYPERTENSION.  TAKEADDITIONAL TABLET TO DIALYSIS 4/22/20  Yes Samantha Cee MD   torsemide [facility]) 100 MG tablet Take 1 tablet by mouth 2 times daily 4/22/20  Yes Samantha Cee MD   amiodarone (CORDARONE) 200 MG tablet Take 1 tablet by mouth daily 4/22/20  Yes Samantha Cee MD   sevelamer (RENVELA) 800 MG tablet TAKE 2 TABLETS BY MOUTH 3 TIMES A DAY 3/25/20  Yes Samantha Cee MD   OXcarbazepine (TRILEPTAL) 300 MG tablet TAKE 1 TABLET BY MOUTH 2 TIMES DAILY 3/9/20  Yes Samantha Cee MD   rosuvastatin (CRESTOR) 40 MG tablet Take 40 mg by mouth every evening   Yes Historical Provider, MD   traZODone (DESYREL) 100 MG tablet Take 1 tablet by mouth nightly 3/4/20  Yes Samantha Cee MD   levothyroxine (SYNTHROID) 50 MCG tablet TAKE 1 TABLET BY MOUTH DAILY 7/15/19  Yes Samantha Cee MD finasteride (PROSCAR) 5 MG tablet Take 5 mg by mouth daily  6/17/19  Yes Historical Provider, MD   apixaban (ELIQUIS) 2.5 MG TABS tablet Take 1 tablet by mouth 2 times daily 6/25/19  Yes Andie Sal MD   Cholecalciferol (VITAMIN D) 2000 units CAPS capsule Take 2,000 Units by mouth daily   Yes Historical Provider, MD   docusate sodium (COLACE) 100 MG capsule Take 100 mg by mouth 2 times daily   Yes Historical Provider, MD   EPIPEN 2-AMBAR 0.3 MG/0.3ML SOAJ injection USE AS DIRECTED 9/19/16  Yes Andie Sal MD     ROS:  General Constitutional: Denies chills. Denies fever. Denies headache. Denies lightheadedness. Admits weakness  Ophthalmologic: Denies blurred vision. ENT: Denies nasal congestion. Denies sore throat. Denies ear pain and pressure. Admits hoarse voice every since he started dialysis  Respiratory: Denies cough. Denies wheezing. Admits SOB with exertion  Cardiovascular: Denies chest pain at rest. Denies irregular heartbeat. Denies palpitations. Gastrointestinal: Denies abdominal pain. Denies blood in the stool. Denies diarrhea. Denies nausea. Denies vomiting. Admits constipation, started taking metamucil 3-4 nights ago, is helpful. Straining, hard as a rock  Genitourinary: Denies blood in the urine. Denies difficulty urinating. Denies frequent urination. Denies painful urination. Denies urinary incontinence. Admits urinary urgency, poor output. Musculoskeletal: Denies muscle aches. Denies painful joints. Denies swollen joints. Peripheral Vascular: Denies pain/cramping in legs after exertion. Skin: Denies dry skin. Denies itching. Denies rash. Neurologic: Denies falls. Denies dizziness. Denies fainting. Denies tingling/numbness. Psychiatric: Admits sleep disturbance, taking 100 mg Trazodone, states he wakes up about 2:30am and can't get back to sleep. Denies anxiety. Denies depressed mood.     Past Surgical History:   Procedure Laterality Date    CARDIAC CATHETERIZATION Left 05/18/2017    [facility]    CARDIOVERSION  09/21/2017    [facility]/Dr Her/2 shocks at 200 joules    CORONARY ARTERY BYPASS GRAFT N/A 5/19/2017    CORONARY ARTERY BYPASS GRAFT X 2: LIMA-LAD, SVG-OM, AORTIC VALVE REPLACEMENT WITH 23 MM MEDTRONIC MOSAIC ULTRA, EVACUATION OF 1.5 LITER PLEURAL FLUID; ALLAN PER ANESTHESIA performed by Roxana Maxwell MD at [address] Right 7/29/13    [facility] - Dr. Joseph Carlos    tube was blocked    PARACENTESIS  09/03/2019    DR Hicks/[employer] [facility]    SHOULDER SURGERY      roto cuff surgery left shoulder     Family History   Problem Relation Age of Onset    Heart Disease Mother     Heart Disease Father     Cancer Father     Heart Disease Sister     High Blood Pressure Sister     Heart Disease Sister     High Blood Pressure Sister     Heart Disease Brother     Heart Disease Brother     Heart Disease Brother        Past Medical History:   Diagnosis Date    Abnormal cardiac cath 10/01/2012    30% LAD, 80% ostial stenosis-CX, RCAOccluded at its ostium. There was minimal left to right collateral flow.  Abnormal echocardiogram 11/12    EF >55%. Mild to moderate aortic stenosis. Mean gradient to 18 mmHg.  Atrial fibrillation ([address] 8/28/2017    CAD (coronary artery disease) 9/12    30% LAD, 80% ostial stenosis-CX, RCAOccluded at its ostium. There was minimal left to right collateral flow.  Chronic diastolic CHF (congestive heart failure), NYHA class 2 ([address] 9/7/2016    Chronic liver disease 8/21/2019    Chronic renal failure, stage 3 (moderate) (HCC) 4/24/2017    Erectile dysfunction     H/O cardiac catheterization 06/02/2017    LMCA: Lesion on LMCA: Distal subsection. 60% stenosis. Comments: Calcified lesion in the ostial Cx extending into the distal left main and proximal LAD. LAD: lesion on Prox LAD: Ostial. 50% stenosis.  Lesion on 1st Diag:Proximal subsection. 50% stenosis. LCx: Lesion on Prox CX: Osital. 95% stenosis. Lesion on Dist CX: Ostial 50% stenosis. RCA: Lesion on prox RCA: Proximal subsection. 100% stenosis.  H/O echocardiogram 12/9/15    EF:55%. LV wall thickness is moderately increased. Bi atrial enlargement noted. Mean aortic valve gradient is 33mmhg w/moderate to severe aortic stenosis. Mitral annular calcification. Mild mitral regurgitation. Moderate pulm hypertension w/RV systolic pressure of 43 mmhg. Mild (grade 1) diastolic dysfunction.  H/O echocardiogram 12/05/2016    EF:60%. Mildly increased LV wall thickness. Biatrial enlargement. Moderate to severe aortic stenosis. Mean gradient 36 mmHg, KRISTINA 0.08. Mild mitral regurgitation. Moderate pulmonary hypertension with an estimated RV systolic pressure of 60 mmHg. Mild diastolic dysfunction.  H/O echocardiogram 06/13/2017    EF 55%. Moderately increased LV wall thickness normal LV cavity size. Severe bi-atrial enlargement. Mild mitral and moderate tricuspid regurg. Mild pulmonary hypertension estimated right ventricular systolic pressure of 35 mmHg. Bilateral pleural effusion.  History of cardiovascular stress test 12/21/2017    History of cardioversion 09/21/2017    Successful cardioversion to NSR with 200J biphasic    History of cardioversion 09/13/2018    Dr Sridhar Cooper History of echocardiogram 11/21/13    EF >55%, mild to mod LVH, LA  is mod dilated, mild to mod AS, mild diastolic dysfunction noted.  History of echocardiogram 06/09/15    EF 55%. LV wall thickness is severely increased. LA is moderately dilated(34-39)with a LA volume index of 36 ml.    History of echocardiogram 06/19/2018    EF 55%. Mildly increased LV wall thickness. Left atrium severely dilated (>40) w/ left atrial volume index of 46 ml/m2.  Moderate aortic stenosis w/ mean gradient of 21.3 mmHg consistent w/ moderate aortis stenosis on what appears to be bioprosthetic aortic melanie, Moderate pulmonary HTN w/ RV systolic pressure of 58 mmHg. Moderate/severe tricuspid regurg. Diastoly cannot be assessed due to pt rhythm    History of echocardiogram 10/04/2018    EF 55%. Mildly increased LA wall thickness. LA severely dilatedw/ LA volume index of 46. Bioprosthetic AV seen. Mean gradient is 20, which is unchanged from previous. Mild mitral regurg. Moderate/severe tricuspid regurg, Moderate pulmonary HTN w/ estimated RV systolic pressure fo 47. Mild diastolic dysfunction.  History of Holter monitoring 08/08/2017    Atrial fibrillation throughout with fairly controlled ventricular response (HR less than 100 bpm 93% of the time), no signficant pauses. Rare isolated PVC's    History of Holter monitoring 08/08/2017    Atrial Fibrillation throughout with fairly controlled ventricular response (HR less than 100bpm 93% of the time),no significant pauses. Rare isolated PVC's    History of Holter monitoring 12/06/2017    Rare PAC's with one 4 beat run of wide complex tachycardia at 170 bpm, No symptoms reported. clinical correlation required    Hyperlipidemia 2/21/2006    Hypertension     Hypothyroidism 4/7/2020    Iron deficiency anemia 3/13/2019    Osteoarthritis of hip 6/20/2013    PVD (peripheral vascular disease) [address]     Reported history of a 60% right carotid stenosis.  Trigeminal neuralgia 7/10/2013      Social History     Tobacco Use    Smoking status: Former Smoker     Years: 15.00     Types: Cigars    Smokeless tobacco: Former User    Tobacco comment: quit 30-40 years ago   Substance Use Topics    Alcohol use: Not Currently     Alcohol/week: 1.0 standard drinks     Types: 1 Cans of beer per week      Current Outpatient Medications   Medication Sig Dispense Refill    mirtazapine (REMERON) 30 MG tablet Take 1 tablet by mouth nightly 30 tablet 3    midodrine (PROAMATINE) 10 MG tablet TAKE 1 TABLET BY MOUTH 3 TIMES DAILY AS NEEDED FOR HYPERTENSION.  TAKEADDITIONAL by mouth nightly     Dispense:  30 tablet     Refill:  3       I affirm this is a Patient Initiated Episode with a Patient who has not had a related appointment within my department in the past 7 days or scheduled within the next 24 hours. Patient identification was verified at the start of the visit: Yes    Total Time: minutes: 21-30 minutes    Note: not billable if this call serves to triage the patient into an appointment for the relevant concern      Jayant Eng, Dr. Su Keller, personally performed the services described in this documentation as scribed by MARTÍN Marinelli in my presence, and is both accurate and complete.

## 2020-06-02 NOTE — PROGRESS NOTES
Leonor Rae, JOAQUIN Student, am scribing for and in the presence of Dr. Emelda Schirmer. 6-2-20/1500/RF      [address] [address]  [place] [address]  Dept: [phone]    HPI:  Pt is here for a 1 month follow up  He was seen on 5/5/20  At that visit his Metamucil was changed to Miralax d/t fluid restriction, labs were ordered, and his Remeron was increased to 30 mg qhs  His bowels have remained stable through the change, he had his labs done  He is still not sleeping well and has not noticed an improvement since increasing his Remeron. States that he can fall asleep but then wakes up after a couple of hours and will stay awake for a few hours before able to go back to sleep. He is still having issues with his BP dropping to 80s/50 after dialysis    Pt is accompanied by his wife    Current Outpatient Medications   Medication Sig Dispense Refill    levothyroxine (SYNTHROID) 75 MCG tablet TAKE 1 TABLET BY MOUTH ONCE A DAY 30 tablet 0    mirtazapine (REMERON) 30 MG tablet Take 1 tablet by mouth nightly 30 tablet 3    midodrine (PROAMATINE) 10 MG tablet TAKE 1 TABLET BY MOUTH 3 TIMES DAILY AS NEEDED FOR HYPERTENSION.  TAKEADDITIONAL TABLET TO DIALYSIS 120 tablet 0    torsemide (DEMADEX) 100 MG tablet Take 1 tablet by mouth 2 times daily 180 tablet 3    amiodarone (CORDARONE) 200 MG tablet Take 1 tablet by mouth daily 90 tablet 3    sevelamer (RENVELA) 800 MG tablet TAKE 2 TABLETS BY MOUTH 3 TIMES A  tablet 1    OXcarbazepine (TRILEPTAL) 300 MG tablet TAKE 1 TABLET BY MOUTH 2 TIMES DAILY 180 tablet 0    rosuvastatin (CRESTOR) 40 MG tablet Take 40 mg by mouth every evening      traZODone (DESYREL) 100 MG tablet Take 1 tablet by mouth nightly 90 tablet 3    levothyroxine (SYNTHROID) 50 MCG tablet TAKE 1 TABLET BY MOUTH DAILY 90 tablet 0    finasteride (PROSCAR) 5 MG tablet Take 5 mg by mouth daily       apixaban (ELIQUIS) 2.5 MG TABS tablet Take 1 tablet by mouth 2 times daily 180 tablet 3    Cholecalciferol (VITAMIN D) 2000 units CAPS capsule Take 2,000 Units by mouth daily      docusate sodium (COLACE) 100 MG capsule Take 100 mg by mouth 2 times daily      EPIPEN 2-AMBAR 0.3 MG/0.3ML SOAJ injection USE AS DIRECTED 1 each 0     No current facility-administered medications for this visit. ROS:  Admits sleep disturbance  Admits skin tears on left leg, no fall has just been bumping     EXAM:  BP (!) 96/50   Ht 5' 7\" (1.702 m)   Wt 156 lb (70.8 kg)   BMI 24.43 kg/m²   Wt Readings from Last 3 Encounters:   06/02/20 156 lb (70.8 kg)   05/05/20 158 lb (71.7 kg)   04/07/20 157 lb (71.2 kg)     BP Readings from Last 3 Encounters:   06/02/20 (!) 96/50   03/04/20 (!) 86/58   01/03/20 112/62     PHYSICAL EXAM:  General Appearance: in no acute distress, well developed, well nourished. Eyes: pupils equal, round reactive to light and accommodation. Ears: normal canal and TM's. Nose: nares patent, no lesions. Oral Cavity: mucosa moist.  Throat: clear. Neck/Thyroid: neck supple, full range of motion, no cervical lymphadenopathy, no thyromegaly or carotid bruits. Skin: warm and dry. No suspicious lesions. Skin tear noted the left leg on the proximal shin and patella. Heart: regular rate and rhythm. No murmurs. S1, S2 normal, no gallops. HR 68, systolic 2/6 apical murmur noted  Lungs: clear to auscultation bilaterally. Abdomen: bowel sounds present, soft, nontender, nondistended, no masses or organomegaly. Musculoskeletal: normal, full range of motion in knees and hips, no swelling or tenderness. Extremities: no cyanosis. Trace/+1 tibial edema noted to BLE. Peripheral Pulses: 2+ throughout, symetric. Neurologic: nonfocal, motor strength normal upper and lower extremities, sensory exam intact. Psych: normal affect, speech fluent. ASSESSMENT:   Diagnosis Orders   1. Pulmonary hypertension ([address]     2. Insomnia, unspecified type     3.  ASHD

## 2020-06-02 NOTE — PATIENT INSTRUCTIONS
PLAN:    We discussed his lightheadedness and hypotension. I would like him to continue wearing compression stockings daily (knee high). His TSH is elevated and I would like him to alternate his synthroid with 75mcg and 100mcg every other day for about 2 weeks and then take 100mcg daily. Reviewed his lab work with Boni Montero and his wife. We discussed he may continue with therapy as long as he if feeling asymptomatic with his low blood pressure's. I do believe he is getting better slowly and to continue using lotion daily for his dry skin. I would like for him to take trazodone 150mg at bedtime and to continue to take the remeron and will discuss the remeron at his next appt if the trazodone is beneficial. Advised him to take midodrine an hour prior to going to physical therapy. I would like to follow up in 6 weeks. SURVEY:    You may be receiving a survey from [employer] regarding your visit today. Please complete the survey to enable us to provide the highest quality of care to you and your family. If you cannot score us a very good on any question, please call the office to discuss how we could have made your experience a very good one. Thank you.

## 2020-06-02 NOTE — PROGRESS NOTES
MCG tablet TAKE 1 TABLET BY MOUTH DAILY 90 tablet 0    finasteride (PROSCAR) 5 MG tablet Take 5 mg by mouth daily       apixaban (ELIQUIS) 2.5 MG TABS tablet Take 1 tablet by mouth 2 times daily 180 tablet 3    Cholecalciferol (VITAMIN D) 2000 units CAPS capsule Take 2,000 Units by mouth daily      docusate sodium (COLACE) 100 MG capsule Take 100 mg by mouth 2 times daily      EPIPEN 2-AMBAR 0.3 MG/0.3ML SOAJ injection USE AS DIRECTED 1 each 0     No current facility-administered medications for this visit. ROS:  Admits sleep disturbance  Admits skin tears on left leg, no fall has just been bumping     EXAM:  BP (!) 96/50   Ht 5' 7\" (1.702 m)   Wt 156 lb (70.8 kg)   BMI 24.43 kg/m²   Wt Readings from Last 3 Encounters:   06/02/20 156 lb (70.8 kg)   05/05/20 158 lb (71.7 kg)   04/07/20 157 lb (71.2 kg)     BP Readings from Last 3 Encounters:   06/02/20 (!) 96/50   03/04/20 (!) 86/58   01/03/20 112/62     PHYSICAL EXAM:  General Appearance: in no acute distress, well developed, well nourished. Eyes: pupils equal, round reactive to light and accommodation. Ears: normal canal and TM's. Nose: nares patent, no lesions. Oral Cavity: mucosa moist.  Throat: clear. Neck/Thyroid: neck supple, full range of motion, no cervical lymphadenopathy, no thyromegaly or carotid bruits. Skin: warm and dry. No suspicious lesions. Skin tear noted the left leg on the proximal shin and patella. Heart: regular rate and rhythm. No murmurs. S1, S2 normal, no gallops. HR 68, systolic 2/6 apical murmur noted  Lungs: clear to auscultation bilaterally. Abdomen: bowel sounds present, soft, nontender, nondistended, no masses or organomegaly. Musculoskeletal: normal, full range of motion in knees and hips, no swelling or tenderness. Extremities: no cyanosis. Trace/+1 tibial edema noted to BLE. Peripheral Pulses: 2+ throughout, symetric.   Neurologic: nonfocal, motor strength normal upper and lower extremities, sensory exam Status:   Future     Standing Expiration Date:   6/2/2021    TSH without Reflex     Standing Status:   Future     Standing Expiration Date:   6/2/2021     Orders Placed This Encounter   Medications    levothyroxine (SYNTHROID) 100 MCG tablet     Sig: Take 1 tablet by mouth daily     Dispense:  90 tablet     Refill:  1    traZODone (DESYREL) 100 MG tablet     Sig: Take 1.5 tablets by mouth nightly     Dispense:  30 tablet     Refill:  0     I, Dr. Guillermo Maguire, personally performed the services described in this documentation as scribed by Lynne Benton in my presence, and is both accurate and complete.

## 2020-06-19 PROBLEM — I21.4 NON-STEMI (NON-ST ELEVATED MYOCARDIAL INFARCTION) (HCC): Status: ACTIVE | Noted: 2020-01-01

## 2020-06-19 PROBLEM — I95.9 ACUTE HYPOTENSION: Status: ACTIVE | Noted: 2020-01-01

## 2020-06-19 NOTE — FLOWSHEET NOTE
received referral from other .  consulted with nurse and other  to clarify current visitation policy and expectation-setting with family. There is a question about details of his prognosis and if this is an end of life situation. Chaplains remain available to visit as needed/requested.  made referral to visit patient and family after transfer is complete and patient is situated in room.        06/19/20 1446   Encounter Summary   Services provided to: Patient   Referral/Consult From: Other    Support System Family members   Continue Visiting   (6/19/2020)   Complexity of Encounter Low   Length of Encounter 15 minutes   Routine   Type Follow up

## 2020-06-19 NOTE — ED PROVIDER NOTES
[facility] ED  EMERGENCY DEPARTMENT ENCOUNTER      Pt Name:Ruy Mendez  MRN: [id number]  Birthdate 1935  Date of evaluation: 6/19/2020  Provider: Oliverio Stewart MD    [address] [place] [address]     Chief Complaint   Patient presents with    Fall     Pt arrived via EMS after falling this a.m. landing on back. Denies LOC. Pt has multiple skin tears on his back. Pt was on his way to dialysis. Pt has chest port to rt side   +++Pt is [place]+++    Fatigue         HISTORY OF PRESENT ILLNESS   (Location/Symptom, Timing/Onset, Context/Setting, Quality, Duration, Modifying Factors, Severity)  Note limiting factors. HPI the patient is an 67-year-old male who is complaining of weakness. He has chronic weakness but he feels today he is more weak than usual.  Today is his dialysis day. He did lose his balance and fall landing on his back this morning. Other than skin tears he denies any injury. He does have some low back pain but he states this is chronic and no worse than usual.  He has not had any chest pain or shortness of breath. He has not been febrile. He has not had a cough. He is not had abdominal pain. No vomiting or diarrhea. Nursing Notes were reviewed. REVIEW OF SYSTEMS    (2-9 systems for level 4, 10 or more for level 5)     Review of Systems   Constitutional: Positive for activity change and fatigue. Negative for fever. HENT: Negative for congestion, sore throat and trouble swallowing. Eyes: Negative for visual disturbance. Respiratory: Negative for cough, shortness of breath and wheezing. Cardiovascular: Positive for leg swelling. Negative for chest pain and palpitations. Gastrointestinal: Negative for abdominal distention, abdominal pain, constipation, diarrhea, nausea and vomiting. Skin: Positive for wound. Negative for rash. The patient has multiple skin tears. Neurological: Negative for dizziness, light-headedness and headaches.    Psychiatric/Behavioral: Moderately increased LV wall thickness normal LV cavity size. Severe bi-atrial enlargement. Mild mitral and moderate tricuspid regurg. Mild pulmonary hypertension estimated right ventricular systolic pressure of 35 mmHg. Bilateral pleural effusion.  History of cardiovascular stress test 12/21/2017    History of cardioversion 09/21/2017    Successful cardioversion to NSR with 200J biphasic    History of cardioversion 09/13/2018    Dr Sagastume Duty History of echocardiogram 11/21/13    EF >55%, mild to mod LVH, LA  is mod dilated, mild to mod AS, mild diastolic dysfunction noted.  History of echocardiogram 06/09/15    EF 55%. LV wall thickness is severely increased. LA is moderately dilated(34-39)with a LA volume index of 36 ml.    History of echocardiogram 06/19/2018    EF 55%. Mildly increased LV wall thickness. Left atrium severely dilated (>40) w/ left atrial volume index of 46 ml/m2. Moderate aortic stenosis w/ mean gradient of 21.3 mmHg consistent w/ moderate aortis stenosis on what appears to be bioprosthetic aortic melanie, Moderate pulmonary HTN w/ RV systolic pressure of 58 mmHg. Moderate/severe tricuspid regurg. Diastoly cannot be assessed due to pt rhythm    History of echocardiogram 10/04/2018    EF 55%. Mildly increased LA wall thickness. LA severely dilatedw/ LA volume index of 46. Bioprosthetic AV seen. Mean gradient is 20, which is unchanged from previous. Mild mitral regurg. Moderate/severe tricuspid regurg, Moderate pulmonary HTN w/ estimated RV systolic pressure fo 47. Mild diastolic dysfunction.  History of Holter monitoring 08/08/2017    Atrial fibrillation throughout with fairly controlled ventricular response (HR less than 100 bpm 93% of the time), no signficant pauses.  Rare isolated PVC's    History of Holter monitoring 08/08/2017    Atrial Fibrillation throughout with fairly controlled ventricular response (HR less than 100bpm 93% of the time),no significant pauses. Rare isolated PVC's    History of Holter monitoring 12/06/2017    Rare PAC's with one 4 beat run of wide complex tachycardia at 170 bpm, No symptoms reported. clinical correlation required    Hyperlipidemia 2/21/2006    Hypertension     Hypothyroidism 4/7/2020    Iron deficiency anemia 3/13/2019    Osteoarthritis of hip 6/20/2013    PVD (peripheral vascular disease) ([address]     Reported history of a 60% right carotid stenosis.     Trigeminal neuralgia 7/10/2013         SURGICAL HISTORY       Past Surgical History:   Procedure Laterality Date    CARDIAC CATHETERIZATION Left 05/18/2017    [facility] Radial[facility]/    CARDIOVERSION  09/21/2017    [facility]) Maur[facility]/Dr Her/2 shocks at 200 joules    CORONARY ARTERY BYPASS GRAFT N/A 5/19/2017    CORONARY ARTERY BYPASS GRAFT X 2: LIMA-LAD, SVG-OM, AORTIC VALVE REPLACEMENT WITH 23 MM MEDTRONIC MOSAIC ULTRA, EVACUATION OF 1.5 LITER PLEURAL FLUID; ALLAN PER ANESTHESIA performed by Jose Gonzalez MD at [address] Right 7/29/13    [facility] - Dr. Lela Libman Right    [facility]    tube was blocked    PARACENTESIS  09/03/2019    DR Hicks/[facility]    SHOULDER SURGERY      roto cuff surgery left shoulder         CURRENT MEDICATIONS       Previous Medications    AMIODARONE (CORDARONE) 200 MG TABLET    Take 1 tablet by mouth daily    APIXABAN (ELIQUIS) 2.5 MG TABS TABLET    Take 1 tablet by mouth 2 times daily    CHOLECALCIFEROL (VITAMIN D) 2000 UNITS CAPS CAPSULE    Take 2,000 Units by mouth daily    DOCUSATE SODIUM (COLACE) 100 MG CAPSULE    Take 100 mg by mouth 2 times daily    EPIPEN 2-AMBAR 0.3 MG/0.3ML SOAJ INJECTION    USE AS DIRECTED    FINASTERIDE (PROSCAR) 5 MG TABLET    Take 5 mg by mouth daily     LEVOTHYROXINE (SYNTHROID) 100 MCG TABLET    Take 1 tablet by mouth daily    MIDODRINE (PROAMATINE) 10 MG TABLET    TAKE 1 TABLET BY MOUTH 3 TIMES DAILY AS EKG: All EKG's are interpreted by the Emergency Department Physician whoeither signs or Co-signs this chart in the absence of a cardiologist.  Atrial fibrillation at a rate of 97. Widened QRS. Right axis of 134. Left posterior fascicular block. Nonspecific ST-T wave changes. Nothing that looks acutely ischemic. RADIOLOGY:   Non-plain film images such as CT, Ultrasound and MRI are read by the radiologist. Plain radiographic images are visualized and preliminarily interpreted by the emergency physician     Interpretation per the Radiologist below, if available at the time of this note:    XR CHEST 1 VW   Final Result   1. Mild interval worsening in the pulmonary interstitial edema and small   bilateral pleural effusions. Unchanged bibasilar pulmonary opacities. 2. Persistent enlargement of the cardiomediastinal silhouette.                ED BEDSIDE ULTRASOUND:   Performed by ED Physician - none    LABS:  Labs Reviewed   CBC WITH AUTO DIFFERENTIAL - Abnormal; Notable for the following components:       Result Value    RBC 4.11 (*)     RDW 17.3 (*)     NRBC Automated 0.5 (*)     Seg Neutrophils 90 (*)     Lymphocytes 3 (*)     Segs Absolute 8.64 (*)     Absolute Lymph # 0.29 (*)     All other components within normal limits   BASIC METABOLIC PANEL - Abnormal; Notable for the following components:    BUN 66 (*)     CREATININE 4.39 (*)     Sodium 125 (*)     Potassium 5.4 (*)     Chloride 84 (*)     Anion Gap 21 (*)     GFR Non- 13 (*)     GFR  16 (*)     All other components within normal limits   TROPONIN - Abnormal; Notable for the following components:    Troponin, High Sensitivity 276 (*)     All other components within normal limits   BRAIN NATRIURETIC PEPTIDE - Abnormal; Notable for the following components:    Pro-BNP 91,925 (*)     All other components within normal limits   LACTATE, SEPSIS - Abnormal; Notable for the following components:    Lactic Acid, Sepsis

## 2020-06-19 NOTE — ED NOTES
Per Dr. Jameson Jeff it is not necessary to cath for urine at this time.       Aleyda Petersen RN  06/19/20 8027

## 2020-06-19 NOTE — PROCEDURES
over the guide wire using the Seldinger technique. All ports showed good, free flowing blood return and were flushed with saline solution. The catheter was then securely fastened to the skin with sutures and covered with a sterile dressing. A post procedure X-ray was not indicated. The patient tolerated the procedure well.     Complications: None    6:43 PM

## 2020-06-19 NOTE — CONSULTS
Nephrology ESRD Consult Note    Reason for Consult:  Fluid and hypertension management for pt with ESRD     Requesting Physician:  Dr Dilma Hoffman    History Obtained From:  patient, electronic medical record    HD Unit:           Dry Weight:       Chief Complaint: Fall suspected from standing height    History of Present Illness: This is a 81 y.o. male with end stage renal disease on hemodialysis [id number]-Ulalfn who presents to the hospital for evaluation of fall and hypotension. Patient states that this morning he woke up was noticing that he had tremors on to the dining room to eat and when trying to sit in his chair he had fallen backwards. Denies loss of consciousness. Patient is on dialysis Monday Wednesday Friday, [facility] dialysis. Patient did take Eliquis this morning. Patient has significant history of CHF and attr amyloidosis as well as paroxysmal atrial fibrillation on Eliquis 2.5 mg. Noted to have bruising and some bleeding around his left shoulder as well as dried blood in left ear. Noted to have multiple skin tears, has lower extremities in different colors    His outpatient history and dialysis orders, usual dry wt  and out pt dialysis run sheets were reviewed. Past Medical History:        Diagnosis Date    Abnormal cardiac cath 10/01/2012    30% LAD, 80% ostial stenosis-CX, RCAOccluded at its ostium. There was minimal left to right collateral flow.  Abnormal echocardiogram 11/12    EF >55%. Mild to moderate aortic stenosis. Mean gradient to 18 mmHg.  Atrial fibrillation ([address] 8/28/2017    CAD (coronary artery disease) 9/12    30% LAD, 80% ostial stenosis-CX, RCAOccluded at its ostium. There was minimal left to right collateral flow.       Chronic diastolic CHF (congestive heart failure), NYHA class 2 ([address] 9/7/2016    Chronic liver disease 8/21/2019    Chronic renal failure, stage 3 (moderate) (HCC) 4/24/2017    Erectile dysfunction     H/O cardiac GRAFT X 2: LIMA-LAD, SVG-OM, AORTIC VALVE REPLACEMENT WITH 23 MM MEDTRONIC MOSAIC ULTRA, EVACUATION OF 1.5 LITER PLEURAL FLUID; ALLAN PER ANESTHESIA performed by Benjy Monteiro MD at [address] Right 7/29/13    [facility] - Dr. Karley Underwood    tube was blocked    PARACENTESIS  09/03/2019    DR Hicks/[facility]    SHOULDER SURGERY      roto cuff surgery left shoulder       Outpatient Medications:     Medications Prior to Admission: rosuvastatin (CRESTOR) 40 MG tablet, TAKE 1 TABLET BY MOUTH EVERY EVENING  levothyroxine (SYNTHROID) 100 MCG tablet, Take 1 tablet by mouth daily  traZODone (DESYREL) 100 MG tablet, Take 1.5 tablets by mouth nightly  mirtazapine (REMERON) 30 MG tablet, Take 1 tablet by mouth nightly  midodrine (PROAMATINE) 10 MG tablet, TAKE 1 TABLET BY MOUTH 3 TIMES DAILY AS NEEDED FOR HYPERTENSION.  TAKEADDITIONAL TABLET TO DIALYSIS  torsemide (DEMADEX) 100 MG tablet, Take 1 tablet by mouth 2 times daily  amiodarone (CORDARONE) 200 MG tablet, Take 1 tablet by mouth daily  sevelamer (RENVELA) 800 MG tablet, TAKE 2 TABLETS BY MOUTH 3 TIMES A DAY  OXcarbazepine (TRILEPTAL) 300 MG tablet, TAKE 1 TABLET BY MOUTH 2 TIMES DAILY  finasteride (PROSCAR) 5 MG tablet, Take 5 mg by mouth daily   apixaban (ELIQUIS) 2.5 MG TABS tablet, Take 1 tablet by mouth 2 times daily  Cholecalciferol (VITAMIN D) 2000 units CAPS capsule, Take 2,000 Units by mouth daily  docusate sodium (COLACE) 100 MG capsule, Take 100 mg by mouth 2 times daily  EPIPEN 2-AMBAR 0.3 MG/0.3ML SOAJ injection, USE AS DIRECTED    Current Medications:    sodium chloride flush 0.9 % injection 10 mL, 2 times per day  sodium chloride flush 0.9 % injection 10 mL, PRN  acetaminophen (TYLENOL) tablet 650 mg, Q6H PRN    Or  acetaminophen (TYLENOL) suppository 650 mg, Q6H PRN  polyethylene glycol (GLYCOLAX) packet 17 g, Daily PRN  promethazine (PHENERGAN) tablet 06/19/20  1040   *   K 5.4*   CL 84*   CO2 20   BUN 66*   CREATININE 4.39*   GLUCOSE 93   CALCIUM 9.3        Assessment:  1. ESRD: On  Hemodialysis. patient's regular HD days are [id number]-Qohcya          Admitted with fall and hypotension  2. Patient has a known history of hypotension has been on ProAmatine especially with dialysis  3. History of CHF has been on torsemide, with elevated proBNP concern for possible CHF exacerbation however due to increased amounts of Levophed may need fluids to improve blood pressure  4. Atrial fibrillation history on amiodarone at home  5. Hypothyroidism on Synthroid 100 mcg at home  6. Anemia of chronic disease   7. Leukocytosis now evident, concern for possible sepsis patient was recently discharged from [facility] [employer] [facility] clinic 2/4/2020 due to fluid overload and had tunnel catheter placement 1/28/2020  8. History of CABG x2 with aortic valve replacement  9. History of BPH  10. History of multiple falls at home  11. Cardiac amyloidosis with ESRD  Plan:  1. We will hold for dialysis at this point as patient is requiring increasing amounts of Levophed currently at 30 mcg/h, if needed will start CVVHD due to concern of tolerance of fluid shifts of regular dialysis  2. Agree with using ProAmatine to help with increasing blood pressures  3. Follow-up BMP around midnight for hyperkalemia  4. New BMP still pending  5. Elevated BNP concerning for possible CHF exacerbation, but patient is hypotensive and has been requiring fluids  6. Agree with CT head, dried blood from left ear noted  7. We will continue to follow    Thank you for the consultation. Please do not hesitate to call with questions. Electronically signed by Prince Andre MD on 6/19/2020 at 2:51 PM    Attending Physician Statement  I have discussed the care of Tracie Dillard, including pertinent history and exam findings with the NP I have reviewed the key elements of all parts of the encounter with the np. Note was updated and recorded changes were made I have seen and examined the patient with the np. I agree with the assessment and plan and status of the problem list as documented. I called [place] [id number] dialysis center. Patient has decompensated CHF attributed to amyloidosis. [facility]. Due to diuretic resistant congestive cardiac failure he was started on dialysis. [facility] he used to run low blood pressure but his clinical condition is stabilized. However recently he is having again low blood pressure. He is having recurrent chronic fall that was attributed to orthostatic drop in his blood pressure. There was some discussion about starting peritoneal dialysis so to avoid rapid shift in volume status  Although patient is quite hypotensive but alert and oriented x3. Addiitionally I recommend repeat BMP this evening  Dialysis in a.m.   We will follow with you  Esteban Duarte

## 2020-06-19 NOTE — ED NOTES
Dr. Imani Camacho was at bedside--pt in full trendelenberg with blood pressures remaining in the 51'B systolic     [facility]  06/19/20 1201

## 2020-06-19 NOTE — H&P
Critical Care - History and Physical Examination    Patient's name:  Malissa Evans Record Number: [id number]  Patient's account/billing number: [de-identified]  Patient's YOB: 1935  Age: 81 y.o. Date of Admission: 6/19/2020  1:45 PM  Date of History and Physical Examination: 6/19/2020      Primary Care Physician: Soha Thompson MD  Attending Physician:    Code Status: Prior    Chief complaint: Dizziness, syncope    HISTORY OF PRESENT ILLNESS:   History was obtained from chart review and the patient. Homero Ware is a 81 y.o. with history of chronic renal disease on dialysis, congestive heart failure secondary to amyloidosis, atrial fibrillation on amiodarone and Eliquis who was seen at [facility] today after he had an episode of dizziness/lightheadedness and syncope/fall backwards onto his back while in his kitchen. Patient states that he was not feeling well and was about to sit down but then his knees gave out and he fell backwards. At [facility] patient was found to have skin tears on his back, hyperkalemia of 5.3, creatinine of 4.35, sodium of 124, BNP of 91,000 which is elevated from his baseline, chest x-ray with congestion, and WBC of 14. At that time, patient was hypotensive requiring levo fed at initially 6 mcg. After discussion with patient's nephrologist decision was made to transfer to [facility] for nephrology consult and possible emergent dialysis. Patient also had an elevated troponin of 261 without any EKG changes and repeat troponin here was also 261. Cardiology was consulted. Patient denies any fevers, chills, chest pain, shortness of breath, abdominal pain, nausea, vomiting, worsening swelling from baseline, or any other complaints at this time. Last echocardiogram was on 7/15/2019 with ejection fraction of 55% without any specific wall motion abnormalities.   Patient appears to have bioprosthetic aortic valve      Past Medical input(s): IONCA in the last 72 hours. S. Magnesium:No results for input(s): MG in the last 72 hours. S. Phosphorus:No results for input(s): PHOS in the last 72 hours. S. Glucose:No results for input(s): POCGLU in the last 72 hours. Glycosylated hemoglobin A1C: No results for input(s): LABA1C in the last 72 hours. INR: No results for input(s): INR in the last 72 hours. Hepatic functions: No results for input(s): ALKPHOS, ALT, AST, PROT, BILITOT, BILIDIR, LABALBU in the last 72 hours. Pancreatic functions:No results for input(s): LACTA, AMYLASE in the last 72 hours. S. Lactic Acid: No results for input(s): LACTA in the last 72 hours. Cardiac enzymes:No results for input(s): CKTOTAL, CKMB, CKMBINDEX, TROPONINI in the last 72 hours. BNP:No results for input(s): BNP in the last 72 hours. Lipid profile: No results for input(s): CHOL, TRIG, HDL, LDLCALC in the last 72 hours. Invalid input(s): LDL  Blood Gases: No results found for: PH, PCO2, PO2, HCO3, O2SAT  Thyroid functions:   Lab Results   Component Value Date    TSH 9.65 05/28/2020        Urinalysis:     Microbiology:    Cultures during this admission:     Blood cultures:                 [] None drawn      [] Negative             []  Positive (Details:  )  Urine Culture:                   [x] None drawn      [] Negative             []  Positive (Details:  )  Sputum Culture:               [x] None drawn       [] Negative             []  Positive (Details:  )   Endotracheal aspirate:     [x] None drawn       [] Negative             []  Positive (Details:  )         -----------------------------------------------------------------  Radiological reports:  CXR 6/19  Impression   1. Mild interval worsening in the pulmonary interstitial edema and small   bilateral pleural effusions.  Unchanged bibasilar pulmonary opacities.       2. Persistent enlargement of the cardiomediastinal silhouette.        (See actual reports for details)      Recent Cardiac

## 2020-06-19 NOTE — ED NOTES
Pt. Placed in trendelenberg for BP 70/40's. Pt. Did waken briefly to question what I was doing.      Ulices Traylor RN  06/19/20 2125

## 2020-06-19 NOTE — ED NOTES
Once a SpO2 was obtained with forehead sensor and a good wave form reading was 69% on room. This writer and [id number] [facility] 434 RN applied O2 via NC at 4lpm  O2 sat came up to 76 lpm.  Dr. Tompkins Police made aware. Per Dr. Tompkins Police, oxygen should be turned off until ABG is obtained.   O2 turned off     [address]  06/19/20 8865

## 2020-06-19 NOTE — ED NOTES
Chaplain Davis at bedside along with wife and daughter in law discussing if pt should go to [facility]. V's or stay here and make pt comfortable     Kit Frey RN  06/19/20 0827

## 2020-06-19 NOTE — ED NOTES
Pt's  at bedside also     Henrique Higgins, [facility]  06/19/20 [address]

## 2020-06-19 NOTE — ED NOTES
This writer asked registration to call veronica Davis to bedside for comfort for wife     Cesar Sheets RN  06/19/20 3581

## 2020-06-19 NOTE — ED NOTES
Writer calling [facility] to get bed assignment from [facility] ICU. Writer informed [facility] that Ramesh Nath has landed and currently in pt room.      Judah Putt  06/19/20 6137

## 2020-06-20 NOTE — PROGRESS NOTES
pupils equal and reactive, extraocular eye movements intact  Chest - clear to auscultation, no wheezes, rales or rhonchi, symmetric air entry  Heart - normal rate, regular rhythm, normal S1, S2, no murmurs, rubs, clicks or gallops  Abdomen - soft, nontender, nondistended, no masses or organomegaly  Neurological - alert, oriented, normal speech, no focal findings or movement disorder noted  Extremities - peripheral pulses normal, no pedal edema, no clubbing or cyanosis  Skin -dressings over skin tears over left shoulder and back clean/dry/intact    Any additional physical findings:     MEDICATIONS:    Scheduled Meds:   hydrocortisone sodium succinate PF  100 mg Intravenous Q8H    sodium chloride flush  10 mL Intravenous 2 times per day    amiodarone  200 mg Oral Daily    apixaban  2.5 mg Oral BID    finasteride  5 mg Oral Daily    levothyroxine  100 mcg Oral Daily    midodrine  10 mg Oral TID    OXcarbazepine  300 mg Oral BID    rosuvastatin  40 mg Oral Nightly    sevelamer  800 mg Oral TID WC    piperacillin-tazobactam  2.25 g Intravenous Q8H    [START ON 6/22/2020] vancomycin  750 mg Intravenous Once per day on Mon Wed Fri    vancomycin (VANCOCIN) intermittent dosing (placeholder)   Other RX Placeholder     Continuous Infusions:   norepinephrine 46 mcg/min (06/20/20 0548)    vasopressin (Septic Shock) infusion 0.04 Units/min (06/20/20 0754)    phenylephrine (ANNEL-SYNEPHRINE) 50mg/250mL infusion 110 mcg/min (06/20/20 0841)     PRN Meds:   sodium chloride flush, 10 mL, PRN  acetaminophen, 650 mg, Q6H PRN    Or  acetaminophen, 650 mg, Q6H PRN  polyethylene glycol, 17 g, Daily PRN  promethazine, 12.5 mg, Q6H PRN    Or  ondansetron, 4 mg, Q6H PRN          VENT SETTINGS (Comprehensive) (if applicable):  Vent Information  SpO2: 90 %  Additional Respiratory  Assessments  Pulse: 69  Resp: 25  SpO2: 90 %    ABGs:     Laboratory findings:    Complete Blood Count:   Recent Labs     06/19/20  1040 06/19/20  1441 06/20/20  0507   WBC 9.6 14.0* 17.7*   HGB 13.4 11.4* 12.5*   HCT 41.6 35.2* 38.4*    187 191        Last 3 Blood Glucose:   Recent Labs     06/19/20  1441 06/20/20  0007 06/20/20  0507   GLUCOSE 64* 88 103*        PT/INR:    Lab Results   Component Value Date    PROTIME 11.1 06/19/2020    INR 1.1 06/19/2020     PTT:    Lab Results   Component Value Date    APTT 27.9 06/19/2020       Comprehensive Metabolic Profile:   Recent Labs     06/19/20  1441 06/19/20  1443 06/20/20  0007 06/20/20  0507   *  --  124* 125*   K 4.9  --  5.2 5.3   CL 86*  --  89* 92*   CO2 18*  --  13* 11*   BUN 65*  --  66* 67*   CREATININE 4.35*  --  4.19* 4.54*   GLUCOSE 64*  --  88 103*   CALCIUM 8.6  --  8.5* 8.5*   PROT  --  6.2*  --   --    LABALBU  --  3.1*  --   --    BILITOT  --  0.70  --   --    ALKPHOS  --  201*  --   --    AST  --  53*  --   --    ALT  --  32  --   --       Magnesium:   Lab Results   Component Value Date    MG 2.0 06/20/2020     Phosphorus:   Lab Results   Component Value Date    PHOS 4.2 06/20/2020     Ionized Calcium: No results found for: CAION     Urinalysis:     Troponin: No results for input(s): TROPONINI in the last 72 hours. Microbiology:    Cultures during this admission:     Blood cultures:                 [] None drawn      [] Negative             [x]  Positive (Details:MRSA  )  Urine Culture:                   [x] None drawn      [] Negative             []  Positive (Details:  )  Sputum Culture:               [x] None drawn       [] Negative             []  Positive (Details:  )   Endotracheal aspirate:     [x] None drawn       [] Negative             []  Positive (Details:  )     Other pertinent Labs:       Radiology/Imaging:     Chest Xray (6/20/2020): Impression   Persistent pleural effusions.  Underlying bibasilar lung opacities may   represent atelectasis with pneumonia not excluded.       Mild pulmonary edema.          ASSESSMENT:     Patient Active Problem List    Diagnosis Date (moderate) ([facility] 04/24/2017    Chronic diastolic congestive heart failure ([facility] 12/20/2016    Essential hypertension 12/20/2016    Pulmonary hypertension ([facility] 12/13/2016    Nocturia 11/28/2016    ASHD (arteriosclerotic heart disease) 11/28/2016    Prostate nodule 03/07/2016    Abnormal cardiovascular stress test 01/06/2016    Erectile dysfunction 03/06/2014    History of total hip replacement 08/28/2013    Trigeminal neuralgia 07/10/2013    Osteoarthritis of hip 06/20/2013    CAD (coronary artery disease)     Benign essential hypertension 02/21/2006    Hyperlipidemia 02/21/2006       Additional assessment:    · Septic shock secondary to MRSA bacteremia        PLAN:     Neuro: Lightheadedness, syncope with head trauma  -Patient is awake and alert and oriented x4, continue to monitor mental status  -CT head and cervical spine negative for acute injury     CV: Hypotension, chronic atrial fibrillation  -Patient requiring levo fed, Ceasar-Synephrine, and vasopressin  -Blood pressures improved after adding Solu-Cortef overnight, will attempt to wean off of Ceasar-Synephrine first  -Troponin 261, 261. EKG without any ST elevation but with chronic atrial fibrillation. 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   Patient tolerating BiPAP well and still awake and alert and oriented  -Chest x-ray with bilateral pleural effusions which appear to be chronic, possible evidence for pneumonia  -BNP 91,925     Renal: CKD dialysis dependent, hyponatremia, hypochloremia  -Sodium 125  -Creatinine 4.54  -Nephrology following: No dialysis at this time due to instability, possible CVVHD, continue Pro-Amatine     ID: Leukocytosis  -WBC trending up 14-17.7  -Blood cultures positive for MRSA x3  -We will consult infectious disease     GI:  -N.p.o.     Lines:  -Right femoral CVC 6/19  -Right radial arterial line 6/19  -Chronic right subclavian dialysis port     DVT prophylaxis: Discontinue Eliquis today, heparin tomorrow     Continue ICU    Librado Atkinson, [place] [address], [place]  6/20/2020, 8:43 AM    Attending Physician Statement  I have discussed the care of Tracie Dillard, including pertinent history and exam findings with the resident. I have reviewed the key elements of all parts of the encounter with the resident. I have seen and examined the patient with the resident. I agree with the assessment and plan and status of the problem list as documented. I have seen the patient during my round today, I have reviewed the chart, lab seen, culture data seen discussed with nursing staff. Overnight events reviewed and lab seen. Patient is requiring more pressors as he is more hypotensive he is on Levophed, Ceasar-Synephrine and vasopressin to maintain systolic blood pressure above 100. He is also requiring noninvasive ventilation while he is on noninvasive ventilation he is tachypneic with respiratory rate of 24-26 and he has good volumes he is arousable on noninvasive ventilation and follows command. Chest x-ray shows bilateral pleural effusion, he does have history of chronic bilateral pleural effusion on looking at previous chest x-rays secondary to end-stage renal disease and CHF  He does have troponin elevation although with end-stage renal disease his troponin is 261 initially increased to 330. He was also started on Solu-Cortef overnight because of sepsis and septic shock. He has history of CHF, atrial fibrillation on Eliquis, end-stage renal disease on hemodialysis. Acute hypoxic respiratory failure secondary to metabolic acidosis secondary to septic shock and likely lactic acidosis in combination with end-stage renal disease bilateral pleural effusion.   Acute metabolic acidosis/lactic acidosis secondary to septic shock and renal failure. His blood cultures are growing staph aureus identification and sensitivities pending and he is currently on vancomycin he is also on Zosyn. His heart rate is controlled with atrial fibrillation and he is on oral amiodarone. Cardiology has been consulted for troponin elevation and echo is requested and not done yet. We will follow final cultures. Continue with vancomycin. ID evaluation. Follow-up 2D echocardiogram.  If he is able to take orally would recommend midodrine  Discontinue all fluid. Will hold Eliquis for now and later today start heparin drip. Continue with noninvasive ventilation. We will try to wean pressors initially Ceasar-Synephrine then vasopressin and then Levophed. He is going to need CVVHD to correct acidosis as he would not be able to tolerate hemodialysis. Total critical care time caring for this patient with life threatening, unstable organ failure, including direct patient contact, management of life support systems, review of data including imaging and labs, discussions with other team members and physicians at least 27  Min so far today, excluding procedures. Please note that this chart was generated using voice recognition Dragon dictation software. Although every effort was made to ensure the accuracy of this automated transcription, some errors in transcription may have occurred.     Joshua Nassar MD  6/20/2020 10:51 AM

## 2020-06-20 NOTE — PROGRESS NOTES
Attempted to call Echo at 1400 to inquire about ETA of echo for pt, no answer and no reply from Echo.

## 2020-06-21 NOTE — PROGRESS NOTES
Nephrology Progress Note    SUBJECTIVE    Patient seen and examined at bedside. Discussed and reviewed plan of care with his nurse. Placed on BiPAP overnight. Now on high flow humidified nasal cannula 30 L. Continues on CVVHD. Started yesterday. Blood flow continues at 981, diastolic flow 8311, no fluid removal since initiating. 0.9%  mL/hr prefilter running as well. Continues on heparin drip, vasopressin maxed out, and Levophed at 20 mcg/min. Ceasar-Synephrine weaned off overnight. Intake/output 24 hours: 2923/10 = +2.9 L over the last 24 hours. +4.1 L since admission. Labs reviewed. Na 123, K 5.4, Cl 89, CO2 12, BUN 59 creatinine improved from 4.75-3.48 after starting CVVHD yesterday. Ionized calcium 1.2. Phosphorus 5.1. Lactic acid needs to trend down now 2.01. Leukocytosis worsening 21.8, red blood cell 3.54, hemoglobin 11.8, platelet count 897. TSH 7.4 thyroxine, Free 1.05. Glucose 97. Blood gas reviewed this morning pH 7.281, PO2 154.7, CO2 32.5, bicarb 15.3, O2 sat 99. Blood cultures positive x4 gram-positive cocci MRSA. ID consulted note reviewed. Recommend removal of the recommend removal of the right tunneled hemodialysis catheter likely the source of infection. Suggesting we consult IR.     OBJECTIVE      CURRENT TEMPERATURE:  Temp: 97.5 °F (36.4 °C)  MAXIMUM TEMPERATURE OVER 24HRS:  Temp (24hrs), Av °F (36.7 °C), Min:97.5 °F (36.4 °C), Max:98.6 °F (37 °C)    CURRENT RESPIRATORY RATE:  Resp: 18  CURRENT PULSE:  Pulse: 55  CURRENT BLOOD PRESSURE:  BP: (!) 94/27  24HR BLOOD PRESSURE RANGE:  Systolic (44BJH), WLM:89 , Min:94 , RA   ; Diastolic (61PTO), KPR:84, Min:27, Max:56    24HR INTAKE/OUTPUT:      Intake/Output Summary (Last 24 hours) at 2020 0838  Last data filed at 2020 0700  Gross per 24 hour   Intake 2472.98 ml   Output 10 ml   Net 2462.98 ml     WEIGHT :  Patient Vitals for the past 96 hrs (Last 3 readings):   Weight   20 0600 174 lb 13.2 oz (79.3 kg)

## 2020-06-21 NOTE — CONSULTS
conversive on 2 pressors. Past Medical History:   has a past medical history of Abnormal cardiac cath, Abnormal echocardiogram, Atrial fibrillation ([address] CAD (coronary artery disease), Chronic diastolic CHF (congestive heart failure), NYHA class 2 ([address] Chronic liver disease, Chronic renal failure, stage 3 (moderate) (HCC), Erectile dysfunction, H/O cardiac catheterization, H/O echocardiogram, H/O echocardiogram, H/O echocardiogram, History of cardiovascular stress test, History of cardioversion, History of cardioversion, History of echocardiogram, History of echocardiogram, History of echocardiogram, History of echocardiogram, History of Holter monitoring, History of Holter monitoring, History of Holter monitoring, Hyperlipidemia, Hypertension, Hypothyroidism, Iron deficiency anemia, Osteoarthritis of hip, PVD (peripheral vascular disease) ([address] and Trigeminal neuralgia. Past Surgical History:   has a past surgical history that includes shoulder surgery; Kidney surgery (1983); Hand surgery (Left); hip surgery (Right, 7/29/13); joint replacement (Right); Cardiac catheterization (Left, 05/18/2017); Coronary artery bypass graft (N/A, 5/19/2017); Cardioversion (09/21/2017); and Paracentesis (09/03/2019). Home Medications:    Prior to Admission medications    Medication Sig Start Date End Date Taking? Authorizing Provider   rosuvastatin (CRESTOR) 40 MG tablet TAKE 1 TABLET BY MOUTH EVERY EVENING 6/8/20   Uday Haro MD   levothyroxine (SYNTHROID) 100 MCG tablet Take 1 tablet by mouth daily 6/2/20   Uday Haro MD   traZODone (DESYREL) 100 MG tablet Take 1.5 tablets by mouth nightly 6/2/20   Uday Haro MD   mirtazapine (REMERON) 30 MG tablet Take 1 tablet by mouth nightly 5/5/20   Uday Haro MD   midodrine (PROAMATINE) 10 MG tablet TAKE 1 TABLET BY MOUTH 3 TIMES DAILY AS NEEDED FOR HYPERTENSION.  TAKEADDITIONAL TABLET TO DIALYSIS 4/22/20   Uday Haro MD   torsemide (DEMADEX) 100 MG Results   Component Value Date    HDL 52 08/15/2018    TRIG 49 08/15/2018     LIVER PROFILE:  Recent Labs     06/19/20  1443   AST 53*   ALT 32   LABALBU 3.1*         EKG: sinus with 1st degree AVB    ECHO: 7/2019  Summary  Global left ventricular systolic function appears preserved with an  estimated ejection fraction of 55%. Mildly increased left ventricular wall thickness with a normal left  ventricular cavity size. No definite specific wall motion abnormalities were identified. Severe bi-atrial enlargement. What appears to be a bioprosthetic aortic valve is seen and appears normal  in structure and function. Mild mitral regurgitation. Moderate tricuspid regurgitation. Moderate pulmonary hypertension with an estimated right ventricular systolic  pressure of 45 mmHg. Evidence of mild diastolic dysfunction is seen. Right pleural effusion.     Compared to the previous study of 10/4/2018, no significant change in  ejection fraction or valvular abnormalities. Right pleural effusion is now seen in the subcostal views. CATH: 5/2017   Severe three vessel disease involving the left main, circumflex and right   coronary arteries. Normal LVEDP. IMPRESSION:    1. Syncope and fall - likely in setting of hypotension/sepsis  2. Sepsis -suspected endocarditis, central line associated infection  3. Troponin elevation -likely type II NSTEMI from sepsis, ESRD  4. Multivessel CAD status post CABG x2 5/19/2017  5. Severe aortic stenosis status post bioprosthetic aVR 5/19/2017  6. Paroxysmal A. fib status post cardioversion 9/13/2018  7. History of bacterial endocarditis 2018  8. ESRD on HD  9. Hyponatremia      RECOMMENDATIONS:  1. Continue IV antibiotics. 2. Wean vasopressors. 3. We will obtain formal echocardiogram to rule out endocarditis. If there is high suspicion we will consider ALLAN. 4. Continue heparin drip for 48 hours. 5. Continue statin, amiodarone  6.  Start aspirin        Thank you for

## 2020-06-21 NOTE — PROCEDURES
PROCEDURE NOTE - CENTRAL VENOUS LINE PLACEMENT- Tito Gutierrez FOR TEMPORARY DIALYSIS CATHETER:    PATIENT NAME: Jg Lund RECORD NO. [id number]  DATE: 6/21/2020  ATTENDING PHYSICIAN: Dr. Loulou Moise DIAGNOSIS:  Need for temporary dialysis IV access for ESRD  POSTOPERATIVE DIAGNOSIS:  Same  PROCEDURE PERFORMED:  Left Femoral Vein Central Line Insertion  PERFORMING PHYSICIAN: Yasmany Sweet MD  ANESTHESIA:  Local utilizing 1% lidocaine  ESTIMATED BLOOD LOSS:  Less than 50 ml  COMPLICATIONS:  None immediately appreciated. DISCUSSION:  Homero Ware is a 81y.o.-year-old male who requires central IV access for temporary dialysis. The history and physical examination were reviewed and confirmed. The diagnoses, proposed procedure, risks, possible complications, benefits and alternatives were discussed with the patient or family. He was given the opportunity to ask questions, and once answered, informed verbal consent was obtained with RN presence. The patient was then prepared for the procedure. PROCEDURE:  A timeout was initiated by the bedside nurse and was confirmed by those present. The patient was placed in a supine position. The skin overlying the Left Femoral Vein was prepped with chlorhexadine and draped in sterile fashion. The skin was infiltrated with local anesthetic. The vessel and surrounding anatomy was visualized using ultrasound. Through the anesthetized region, the introducer needle was inserted into the femoral vein returning dark red non pulsatile blood. A guidewire was placed through the center of the needle with no resistance. Ultrasound confirmed presence of wire in the vein. A small incision made in the skin with a #11 scalpel blade. The 2 dilators were inserted into the skin one after the other and vein over guidewire using Seldinger technique. The dilator was then removed and the catheter was placed in the vein over the guidewire using Seldinger technique.  The guidewire was then removed and all ports aspirated and flushed appropriately. The catheter then secured using silk suture and a temporary sterile dressing was applied. No immediate complication was evident. All sponge, instrument and needle counts were correct at the completion of the procedure. The patient tolerated the procedure well with no immediate complication evident.       Az Jones MD, PGY-1  Internal Medicine Residency Program  [facility]  6/21/2020 3:47 PM

## 2020-06-21 NOTE — PROGRESS NOTES
RN spoke with Dr. Sarah Bustos about the need to remove the tunneled catheter and that Dr. Betsy Hatch needed a Radonna Yang to be placed so we can continue running CVVHD through a different line. No new orders at this time.

## 2020-06-21 NOTE — PROGRESS NOTES
of CHF secondary to amyloidosis  -Desaturations overnight and patient requiring BiPAP. Patient tolerating BiPAP well and still awake and alert and oriented  -Chest x-ray with bilateral pleural effusions which appear to be chronic, possible evidence for pneumonia  -BNP 91,925     Renal: CKD dialysis dependent, hyponatremia, hypochloremia  -Sodium 125  -Creatinine 4.54  -Nephrology following: No dialysis at this time due to instability, possible CVVHD, continue Pro-Amatine     ID: Leukocytosis  -Blood cultures positive for MRSA x3  -CT chest  -replace tunneled catheter     GI:  -N.p.o.     Lines:  -Right femoral CVC 6/19  -Right radial arterial line 6/19  -Chronic right subclavian dialysis port     DVT prophylaxis: heparin     Continue ICU    Davonte Miller MD  [address] [facility] [place], [address]   6/21/2020, 8:42 AM      Attending Physician Statement  I have discussed the care of Joshua Smith, including pertinent history and exam findings with the resident. I have reviewed the key elements of all parts of the encounter with the resident. I have seen and examined the patient with the resident. I agree with the assessment and plan and status of the problem list as documented. I have seen the patient during my round today, I have reviewed the labs, events noted and last chest x-ray seen. He was started on CVVH with a tunnel catheter, his blood culture is growing MRSA and he is on vancomycin seen by infectious disease and likely source is Brenden dialysis catheter, chest x-ray shows bilateral pleural effusion which he has chronic effusion and doubt that that is a source of infection. His bicarbonate remains low at 12 BUN is 57 creatinine is 3.38 on CVVH he is balanced. He is off Ceasar-Synephrine and remains on Levophed and vasopressin this morning.   He was on BiPAP yesterday and he did not want to use the BiPAP was switched to high flow nasal cannula and currently is on 60% and 30 L of high flow nasal

## 2020-06-21 NOTE — PROGRESS NOTES
[address] [place] [address]     Department of Internal Medicine - Critical Care Service    INPATIENT DEATH SUMMARY      PATIENT IDENTIFICATION:  NAME:  Mortimer Elk   :   1935  MRN:    [id number]     Acct:    [de-identified]   Admit Date:  2020  Discharge date:  No discharge date for patient encounter. Attending Provider: Tez Lees MD                                     Active Problems:    History of endocarditis    History of prosthetic aortic valve    Pleural effusion, bilateral    History of atrial fibrillation    Sepsis ([address]    End stage renal disease (HCC)    Hypothyroidism    Status post fall    Arterial hypotension    Acute hypotension    Chronic orthostatic hypotension    Shock circulatory (HCC)    Status post coronary artery bypass graft    Septic shock due to [facility])    Acute respiratory failure with hypoxia ([address]  Resolved Problems:    * No resolved hospital problems. *       REASON FOR HOSPITALIZATION:   [address] [place] [address]       Hospital Course    Patient initially admitted on  due to septic shock with hypotension and dialysis dependent chronic kidney disease with need for CVVHD. Throughout patient's hospital course he was awake and alert and oriented with hypotension requiring multiple pressors. Patient was found to be in septic shock with MRSA bacteremia thought to be likely secondary from patient's chronic dialysis port in the right subclavian. Patient also required high flow nasal cannula secondary to increased oxygen demand likely secondary to CHF and chronic pleural effusions. Patient was started on antibiotics, continued on vasopressor support, with nephrology consulted for CVVHD. Cardiology also consulted due to elevated troponins which did not continue to increase and are likely secondary to hypotension and chronic kidney disease.   Plan was to have dialysis port removed and have new tunnel catheter on , however in the evening of

## 2020-06-21 NOTE — PLAN OF CARE
Problem: Falls - Risk of:  Goal: Will remain free from falls  Description: Will remain free from falls  Outcome: Ongoing     Problem: Falls - Risk of:  Goal: Absence of physical injury  Description: Absence of physical injury  Outcome: Ongoing     Problem: Cardiac Output - Decreased:  Goal: Hemodynamic stability will improve  Description: Hemodynamic stability will improve  Outcome: Ongoing     Problem: Skin Integrity:  Goal: Will show no infection signs and symptoms  Description: Will show no infection signs and symptoms  Outcome: Ongoing     Problem: Skin Integrity:  Goal: Absence of new skin breakdown  Description: Absence of new skin breakdown  Outcome: Ongoing     Problem: Breathing Pattern - Ineffective:  Goal: Ability to achieve and maintain a regular respiratory rate will improve  Description: Ability to achieve and maintain a regular respiratory rate will improve  Outcome: Ongoing     Problem: Infection, Septic Shock:  Goal: Will show no infection signs and symptoms  Description: Will show no infection signs and symptoms  Outcome: Ongoing     Problem: Tissue Perfusion, Altered:  Goal: Circulatory function within specified parameters  Description: Circulatory function within specified parameters  Outcome: Ongoing     Problem: Pain:  Goal: Pain level will decrease  Description: Pain level will decrease  Outcome: Ongoing     Problem: Pain:  Goal: Control of acute pain  Description: Control of acute pain  Outcome: Ongoing     Problem: Pain:  Goal: Control of chronic pain  Description: Control of chronic pain  Outcome: Ongoing

## 2020-06-21 NOTE — PLAN OF CARE
Problem: Falls - Risk of:  Goal: Will remain free from falls  Description: Will remain free from falls  6/21/2020 0751 by Hoa Casas RN  Outcome: Ongoing     Problem: Falls - Risk of:  Goal: Absence of physical injury  Description: Absence of physical injury  6/21/2020 0751 by Hoa Casas RN  Outcome: Ongoing     Problem: Cardiac Output - Decreased:  Goal: Hemodynamic stability will improve  Description: Hemodynamic stability will improve  6/21/2020 0751 by Hoa Casas RN  Outcome: Ongoing     Problem: Skin Integrity:  Goal: Will show no infection signs and symptoms  Description: Will show no infection signs and symptoms  6/21/2020 0751 by Hoa Casas RN  Outcome: Ongoing     Problem: Skin Integrity:  Goal: Absence of new skin breakdown  Description: Absence of new skin breakdown  6/21/2020 0751 by Hoa Casas RN  Outcome: Ongoing     Problem: Breathing Pattern - Ineffective:  Goal: Ability to achieve and maintain a regular respiratory rate will improve  Description: Ability to achieve and maintain a regular respiratory rate will improve  6/21/2020 0751 by Hoa Casas RN  Outcome: Ongoing     Problem: Infection, Septic Shock:  Goal: Will show no infection signs and symptoms  Description: Will show no infection signs and symptoms  6/21/2020 0751 by Hoa Casas RN  Outcome: Ongoing     Problem: Tissue Perfusion, Altered:  Goal: Circulatory function within specified parameters  Description: Circulatory function within specified parameters  6/21/2020 0751 by Hoa Casas RN  Outcome: Ongoing     Problem: Pain:  Goal: Pain level will decrease  Description: Pain level will decrease  6/21/2020 0751 by Hoa Casas RN  Outcome: Ongoing     Problem: Pain:  Goal: Control of acute pain  Description: Control of acute pain  6/21/2020 0751 by Hoa Casas RN  Outcome: Ongoing     Problem: Pain:  Goal: Control of chronic pain  Description: Control of chronic

## 2020-06-21 NOTE — CONSULTS
partner violence     Fear of current or ex partner: Not on file     Emotionally abused: Not on file     Physically abused: Not on file     Forced sexual activity: Not on file   Other Topics Concern    Not on file   Social History Narrative    Not on file       Family History:     Family History   Problem Relation Age of Onset    Heart Disease Mother     Heart Disease Father     Cancer Father     Heart Disease Sister     High Blood Pressure Sister     Heart Disease Sister     High Blood Pressure Sister     Heart Disease Brother     Heart Disease Brother     Heart Disease Brother         Allergies:   Fish oil; Iodine; Norco [hydrocodone-acetaminophen]; Other; Shellfish-derived products; Shrimp flavor; and Oxycodone     Review of Systems:     Review of Systems   Constitutional: Positive for activity change. Negative for appetite change. HENT: Negative for congestion. Eyes: Negative for itching. Respiratory: Negative for apnea. Cardiovascular: Negative for chest pain. Gastrointestinal: Negative for abdominal distention. Endocrine: Negative for heat intolerance. Genitourinary: Negative for dysuria. Musculoskeletal: Negative for arthralgias. Skin: Negative for color change. Allergic/Immunologic: Negative for immunocompromised state. Neurological: Negative for dizziness. Hematological: Negative for adenopathy. Psychiatric/Behavioral: Negative for agitation.        Physical Examination :     Patient Vitals for the past 8 hrs:   BP Temp Temp src Pulse Resp SpO2   06/20/20 1949 -- -- -- -- 19 93 %   06/20/20 1915 -- -- -- 66 22 91 %   06/20/20 1900 -- -- -- 66 26 92 %   06/20/20 1845 -- -- -- 73 22 97 %   06/20/20 1830 -- -- -- 71 27 93 %   06/20/20 1815 -- -- -- 74 23 92 %   06/20/20 1800 -- -- -- 88 30 (!) 82 %   06/20/20 1745 -- -- -- 77 30 (!) 86 %   06/20/20 1730 -- -- -- 82 23 --   06/20/20 1715 -- -- -- 67 23 94 %   06/20/20 1700 -- -- -- 75 26 93 %   06/20/20 1645 -- -- -- 73 28

## 2020-06-22 ENCOUNTER — TELEPHONE (OUTPATIENT)
Dept: PULMONOLOGY | Age: 85
End: 2020-06-22

## 2020-06-22 LAB
EKG ATRIAL RATE: 80 BPM
EKG Q-T INTERVAL: 400 MS
EKG QRS DURATION: 150 MS
EKG QTC CALCULATION (BAZETT): 446 MS
EKG R AXIS: 118 DEGREES
EKG T AXIS: 18 DEGREES
EKG VENTRICULAR RATE: 75 BPM
INTERVENTION: NORMAL

## 2020-06-22 NOTE — PLAN OF CARE
Talked to son, Joby Muhammad, and no decision on  home has been made. Pastoral care phone number was provided to call when they come to a decision. Security was updated.

## 2020-06-22 NOTE — FLOWSHEET NOTE
[address] [facility] [place] [address]   Patient Death Note  DEATH   Shift date: 2020    Shift day:   Shift #: 2                 Room # 0123/0123-01   Name: J Luis Vasquez            Age: 81 y.o. Gender: male          Voodoo: Restoration      Place of Jain:   Admit Date & Time: 2020  1:45 PM     Referral: nurse   Actual date of death: 2020   TOD: 17:46 pm       SITUATION AT DEATH:  Pt had heart issues; coded and . Pt was dnr-cca. IS THIS A 'S CASE? No    SPIRITUAL/EMOTIONAL INTERVENTION:   responded to death via call from nurse. Upon arrival  met with pt's nurse. Nurse stated family had fathers day celebration with pt and left. No family present at time of death.  was told family would come to visit pt again and later changed their minds.  received call from son, Josue Stone and gave   home information.  offered condolences to him and his family.  then received call from  home listed below and provided necessary information. Family Received Grief Packet? No, will mail out. NAME AND PHONE NUMBER OF DOCTOR SIGNING DEATH CERTIFICATE:  (full name)  Dr. Aura Catalan   (phone)  [phone]        contacted    (Please note which nurse you spoke with to confirm the  home will be contacted. Family should not be listed)    Copy of COMPLETED Release of Body Form Received? Yes     HOME:  Name: [facility]: [place]  Phone Number: [phone]    NEXT OF KIN:  Name: [facility] LEJEUNE Cornelious Minus [phone] is a good ]   Relationship: Wife  Street Address: [facility]: [place]  State: [place]  Zip code: [id number]   Phone Number: [phone]    [facility]? No    IF SO, WHAT? Electronically signed by Ashtyn Thibodeaux. , on 2020 at 9:36 PM.  [employer]

## (undated) DEVICE — SUTURE PERMAHAND SZ 0 L30IN NONABSORBABLE BLK FSL L30MM 3/8 680H

## (undated) DEVICE — SUTURE PERMAHAND SZ 0 L30IN NONABSORBABLE BLK L26MM SH 1/2 K834H

## (undated) DEVICE — ELECTRODE ES L3IN S STL BLDE INSUL DISP VALLEYLAB EDGE

## (undated) DEVICE — SUTURE PROL 5-0 L36IN NONABSORBABLE BLU CC-1 MFIL 8721H

## (undated) DEVICE — Device

## (undated) DEVICE — GLOVE SURG SZ 7 L12IN FNGR THK87MIL WHT LTX FREE

## (undated) DEVICE — PUNCH AORT DIA4MM LNG HNDL

## (undated) DEVICE — GAUZE,SPONGE,FLUFF,6"X6.75",STRL,5/TRAY: Brand: MEDLINE

## (undated) DEVICE — SUTURE PROL SZ 7-0 L24IN NONABSORBABLE BLU L9.3MM BV-1 3/8 M8702

## (undated) DEVICE — ELECTRODE PT RET AD L9FT HI MOIST COND ADH HYDRGEL CORDED

## (undated) DEVICE — SUTURE VCRL + SZ 2-0 L27IN ABSRB CLR CT-1 1/2 CIR TAPERCUT VCP259H

## (undated) DEVICE — PLEDGET SURG W3.5XL7MM THK1.5MM WHT PTFE RECT FIRM TFE

## (undated) DEVICE — SUTURE ETHBND EXCEL SZ 0 L30IN NONABSORBABLE GRN CT1 L36MM X424H

## (undated) DEVICE — SUTURE SILK PERMAHAND PRECUT 6 X 30 IN SZ 1 BLK BRAID A307H

## (undated) DEVICE — CANNULA PERFUSION 5.5IN 9FR AORTIC ROOT

## (undated) DEVICE — Z INACTIVE USE 2540311 LEAD PACE L475MM CHN A OR V MYOCARDIAL STEROID ELUT SIL

## (undated) DEVICE — INTENDED FOR TISSUE SEPARATION, AND OTHER PROCEDURES THAT REQUIRE A SHARP SURGICAL BLADE TO PUNCTURE OR CUT.: Brand: BARD-PARKER ® CARBON RIB-BACK BLADES

## (undated) DEVICE — Device: Brand: VIRTUOSAPH PLUS WITH RADIAL INDICATION

## (undated) DEVICE — STERNUM BLADE, OFFSET (31.7 X 0.64 X 6.3MM)

## (undated) DEVICE — Z DISCONTINUED NO SUB IDED DRAIN SURG 2 COLL PT TB FOR ATS BG OASIS

## (undated) DEVICE — 3M™ PRECISE™ VISTA DISPOSABLE SKIN STAPLER 3995: Brand: 3M™ PRECISE™

## (undated) DEVICE — PROTECTOR ULN NRV PUR FOAM HK LOOP STRP ANATOMICALLY

## (undated) DEVICE — CATHETER THOR 28FR L23CM DIA9.3MM POLYVI CHL TAPR CONN TIP

## (undated) DEVICE — CONVERTED USE 338908 SPONGES LAP 18X18 ST

## (undated) DEVICE — GLOVE SURG SZ 75 L12IN FNGR THK87MIL DK GRN LTX FREE ISOLEX

## (undated) DEVICE — CANNULA PERF L2IN BLNT TIP 2MM VES CLR RADPQ BODY FEM LUER

## (undated) DEVICE — PRESSURE MONITORING LINES 4FT. (122CM) M/F: Brand: PRESSURE MONITORING LINES

## (undated) DEVICE — SUTURE NONABSORBABLE MONOFILAMENT 6-0 CC-1 1X30 IN PROLENE 8707H

## (undated) DEVICE — GOWN,AURORA,NONREINFORCED,LARGE: Brand: MEDLINE

## (undated) DEVICE — SUTURE NONABSORBABLE MONOFILAMENT 7-0 BV-1 24 IN PROLENE D8228

## (undated) DEVICE — CATHETER URETH 18FR RED RUB INTMIT ALL PURP 12 PER CA

## (undated) DEVICE — SUTURE VCRL + SZ 4-0 L27IN ABSRB UD L26MM SH 1/2 CIR VCP415H

## (undated) DEVICE — GOWN,SURGICAL,AURORA,SLEEVE: Brand: MEDLINE

## (undated) DEVICE — TUBING INSUF 12MM RND CONN LAPSCP AND ROT LUER DISP

## (undated) DEVICE — Z DISCONTINUED USE 2275676 GLOVE SURG SZ 65 L12IN FNGR THK87MIL DK GRN LTX FREE ISOLEX

## (undated) DEVICE — DRAPE SLUSH DISC W44XL66IN ST FOR RND BSIN HUSH SLUSH SYS

## (undated) DEVICE — TRAY CATH 16FR COMPLT CARE URIN M TEMP SENS STATLOK STBL

## (undated) DEVICE — HEADREST NEURO DONUT 7 IN

## (undated) DEVICE — GLOVE SURG SZ 65 L12IN FNGR THK87MIL WHT LTX FREE

## (undated) DEVICE — Z DISCONTINUED APPLICATOR SURG PREP 0.35OZ 2% CHG 70% ISO ALC W/ HI LT

## (undated) DEVICE — CATHETER THORACENTESIS STR 28 FRX23 IN 6 EYELET TAPR TIP LF

## (undated) DEVICE — SUTURE PERMAHAND SZ 3-0 L30IN NONABSORBABLE BLK SH L26MM K832H

## (undated) DEVICE — CHLORAPREP 26ML ORANGE

## (undated) DEVICE — PACK PROCEDURE SURG OPN HRT ADD ON

## (undated) DEVICE — Z DISCONTINUED USE 2739180 SUTURE ETHIB EXCL BR GRN WHT V-7 2-0 30 PXX77

## (undated) DEVICE — 3M™ STERI-DRAPE™ INCISE DRAPE, XL 1051: Brand: STERI-DRAPE™

## (undated) DEVICE — SUTURE ETHIB EXCL BR GRN TAPR PT 2-0 30 X563H X563H

## (undated) DEVICE — WAX SURG 2.5GM HEMSTAT BNE BEESWAX PARAFFIN ISO PALMITATE

## (undated) DEVICE — FOGARTY - HYDRAGRIP SURGICAL - CLAMP INSERTS: Brand: FOGARTY HYDRAJAW

## (undated) DEVICE — SUTURE SZ 7 L18IN NONABSORBABLE SIL CCS L48MM 1/2 CIR STRNM M655G

## (undated) DEVICE — PACK PROCEDURE SURG OPN HRT

## (undated) DEVICE — SUTURE PROL SZ 5-0 L36IN NONABSORBABLE BLU L13MM C-1 3/8 8720H

## (undated) DEVICE — GLOVE SURG SZ 75 L12IN FNGR THK87MIL WHT LTX FREE

## (undated) DEVICE — CONVERTED USE 297712 TOWELS OR BL X-RAY ST

## (undated) DEVICE — CATHETER URETH 18FR RED RUB INTMIT ALL PURP

## (undated) DEVICE — SUTURE PROL 4-0 L36IN NONABSORBABLE BLU V-7 L26MM 1/2 CIR 8975H

## (undated) DEVICE — SUTURE PERMAHAND SZ 2-0 L30IN NONABSORBABLE BLK SH L26MM C016D

## (undated) DEVICE — UNIT DRNGE SGL COLL W/ INLINE CONN EXPR